# Patient Record
Sex: FEMALE | Race: WHITE | NOT HISPANIC OR LATINO | Employment: FULL TIME | URBAN - METROPOLITAN AREA
[De-identification: names, ages, dates, MRNs, and addresses within clinical notes are randomized per-mention and may not be internally consistent; named-entity substitution may affect disease eponyms.]

---

## 2017-06-10 ENCOUNTER — OFFICE VISIT (OUTPATIENT)
Dept: URGENT CARE | Facility: MEDICAL CENTER | Age: 52
End: 2017-06-10

## 2017-08-14 ENCOUNTER — TRANSCRIBE ORDERS (OUTPATIENT)
Dept: ADMINISTRATIVE | Age: 52
End: 2017-08-14

## 2017-08-14 ENCOUNTER — APPOINTMENT (OUTPATIENT)
Dept: LAB | Age: 52
End: 2017-08-14
Payer: COMMERCIAL

## 2017-08-14 DIAGNOSIS — Z00.8 HEALTH EXAMINATION IN POPULATION SURVEY: Primary | ICD-10-CM

## 2017-08-14 DIAGNOSIS — Z00.8 HEALTH EXAMINATION IN POPULATION SURVEY: ICD-10-CM

## 2017-08-14 LAB
CHOLEST SERPL-MCNC: 217 MG/DL (ref 50–200)
EST. AVERAGE GLUCOSE BLD GHB EST-MCNC: 120 MG/DL
HBA1C MFR BLD: 5.8 % (ref 4.2–6.3)
HDLC SERPL-MCNC: 50 MG/DL (ref 40–60)
LDLC SERPL CALC-MCNC: 141 MG/DL (ref 0–100)
TRIGL SERPL-MCNC: 129 MG/DL

## 2017-08-14 PROCEDURE — 80061 LIPID PANEL: CPT

## 2017-08-14 PROCEDURE — 36415 COLL VENOUS BLD VENIPUNCTURE: CPT

## 2017-08-14 PROCEDURE — 83036 HEMOGLOBIN GLYCOSYLATED A1C: CPT

## 2017-10-05 ENCOUNTER — OFFICE VISIT (OUTPATIENT)
Dept: URGENT CARE | Age: 52
End: 2017-10-05
Payer: COMMERCIAL

## 2017-10-05 PROCEDURE — 99202 OFFICE O/P NEW SF 15 MIN: CPT

## 2017-10-05 PROCEDURE — S9088 SERVICES PROVIDED IN URGENT: HCPCS

## 2017-10-06 NOTE — PROGRESS NOTES
Assessment  1  Herpes zoster (053 9) (B02 9)    Plan  Herpes zoster    · Famciclovir 500 MG Oral Tablet; take 1 tablet every 8 hours (3 doses daily) until  finished (7 days)    Discussion/Summary  Discussion Summary:   Famvir every 8 hours (3 doses daily) until finished (7 days)  Zostrix cream to skin  with family physician as discussed  Medication Side Effects Reviewed: Possible side effects of new medications were reviewed with the patient/guardian today  Understands and agrees with treatment plan: The treatment plan was reviewed with the patient/guardian  The patient/guardian understands and agrees with the treatment plan   Counseling Documentation With Imm: The patient was counseled regarding  Chief Complaint  Chief Complaint Free Text Note Form: rash x4 days      History of Present Illness  HPI: Pain and erythematous lesions left chest and left upper back areas   Hospital Based Practices Required Assessment: (on a scale of 0 to 10, the patient rates the pain at 4 )   Abuse And Domestic Violence Screen    Yes, the patient is safe at home -The patient states no one is hurting them  Depression And Suicide Screen  No, the patient has not had thoughts of hurting themself  No, the patient has not felt depressed in the past 7 days  Prefered Language is  [place]  Primary Language is  English  Review of Systems  Focused-Female:   Constitutional: as noted in HPI    ENT: no ear ache, no loss of hearing, no nosebleeds or nasal discharge, no sore throat or hoarseness  Cardiovascular: no complaints of slow or fast heart rate, no chest pain, no palpitations, no leg claudication or lower extremity edema  Respiratory: no complaints of shortness of breath, no wheezing, no dyspnea on exertion, no orthopnea or PND  Breasts: no complaints of breast pain, breast lump or nipple discharge     Gastrointestinal: no complaints of abdominal pain, no constipation, no nausea or diarrhea, no vomiting, no bloody stools  Genitourinary: no complaints of dysuria, no incontinence, no pelvic pain, no dysmenorrhea, no vaginal discharge or abnormal vaginal bleeding  Musculoskeletal: no complaints of arthralgia, no myalgia, no joint swelling or stiffness, no limb pain or swelling  Integumentary: skin lesion, but-as noted in HPI  Neurological: no complaints of headache, no confusion, no numbness or tingling, no dizziness or fainting  ROS Reviewed:   ROS reviewed  Active Problems  1  Arthritis (716 90) (M19 90)   2  Hay fever (477 9) (J30 1)   3  Physical exam (V70 9) (Z00 00)   4  School physical exam (V70 5) (Z02 0)   5  Seasonal allergies (477 9) (J30 2)    Past Medical History  1  History of Appendicitis (541)  Active Problems And Past Medical History Reviewed: The active problems and past medical history were reviewed and updated today  Family History  Mother    1  No pertinent family history  Father    2  No pertinent family history  Maternal Grandmother    3  Family history of Acute Myocardial Infarction (V17 3)  Family History Reviewed: The family history was reviewed and updated today  Social History   · Denied: History of Alcohol Use (History)   · Denied: History of Drug Use   · Never A Smoker   · Non-smoker (V49 89) (Z78 9)  Social History Reviewed: The social history was reviewed and updated today  The social history was reviewed and is unchanged  Surgical History  1  History of Appendectomy   2  History of Oral Surgery Tooth Extraction   3  History of Tubal Ligation  Surgical History Reviewed: The surgical history was reviewed and updated today  Current Meds   1  No Reported Medications Recorded  Medication List Reviewed: The medication list was reviewed and updated today  Allergies  1   No Known Drug Allergies    Vitals  Signs   Recorded: [id number] 03:49PM   Temperature: 98 1 F  Heart Rate: 66  Respiration: 16  Systolic: 091  Diastolic: 86  Height: 5 ft 5 in  Weight: 150 lb   BMI Calculated: 24 96  BSA Calculated: 1 75  O2 Saturation: 97  LMP: [id number]    Physical Exam    Skin Erythematous vesiculated lesions left chest and left upper back areas        Signatures   Electronically signed by : Emir Styles DO; Oct  5 2017  3:59PM EST                       (Author)

## 2018-01-03 ENCOUNTER — GENERIC CONVERSION - ENCOUNTER (OUTPATIENT)
Dept: OBGYN CLINIC | Facility: CLINIC | Age: 53
End: 2018-01-03

## 2018-01-03 ENCOUNTER — HOSPITAL ENCOUNTER (OUTPATIENT)
Dept: RADIOLOGY | Facility: HOSPITAL | Age: 53
Discharge: HOME | End: 2018-01-03
Attending: OBSTETRICS & GYNECOLOGY
Payer: COMMERCIAL

## 2018-01-03 DIAGNOSIS — Z12.31 VISIT FOR SCREENING MAMMOGRAM: ICD-10-CM

## 2018-01-03 PROCEDURE — 77067 SCR MAMMO BI INCL CAD: CPT

## 2018-02-18 ENCOUNTER — OFFICE VISIT (OUTPATIENT)
Dept: URGENT CARE | Facility: MEDICAL CENTER | Age: 53
End: 2018-02-18
Payer: COMMERCIAL

## 2018-02-18 VITALS
SYSTOLIC BLOOD PRESSURE: 136 MMHG | HEART RATE: 88 BPM | RESPIRATION RATE: 18 BRPM | TEMPERATURE: 99.3 F | OXYGEN SATURATION: 99 % | HEIGHT: 65 IN | WEIGHT: 143.4 LBS | DIASTOLIC BLOOD PRESSURE: 78 MMHG | BODY MASS INDEX: 23.89 KG/M2

## 2018-02-18 DIAGNOSIS — B34.9 VIRAL INFECTION: Primary | ICD-10-CM

## 2018-02-18 PROCEDURE — 99202 OFFICE O/P NEW SF 15 MIN: CPT | Performed by: FAMILY MEDICINE

## 2018-02-18 PROCEDURE — S9088 SERVICES PROVIDED IN URGENT: HCPCS | Performed by: FAMILY MEDICINE

## 2018-02-18 RX ORDER — OSELTAMIVIR PHOSPHATE 75 MG/1
75 CAPSULE ORAL EVERY 12 HOURS SCHEDULED
Qty: 10 CAPSULE | Refills: 0 | Status: SHIPPED | OUTPATIENT
Start: 2018-02-18 | End: 2018-02-23

## 2018-02-18 RX ORDER — IBUPROFEN 600 MG/1
600 TABLET ORAL EVERY 6 HOURS PRN
Qty: 30 TABLET | Refills: 0 | Status: SHIPPED | OUTPATIENT
Start: 2018-02-18 | End: 2018-09-13

## 2018-02-18 NOTE — PROGRESS NOTES
Assessment/Plan:    Patient Instructions   Viral Syndrome   WHAT YOU NEED TO KNOW:   Viral syndrome is a term used for a viral infection that has no clear cause  Viruses are spread easily from person to person through the air and on shared items  DISCHARGE INSTRUCTIONS:   Call 911 for the following:   · You have a seizure  · You cannot be woken  · You have chest pain or trouble breathing  Return to the emergency department if:   · You have a stiff neck, a bad headache, and sensitivity to light  · You feel weak, dizzy, or confused  · You stop urinating or urinate a lot less than normal      · You cough up blood or thick, yellow or green, mucus  · You have severe abdominal pain or your abdomen is larger than usual   Contact your healthcare provider if:   · Your symptoms do not get better with treatment, or get worse, after 3 days  · You have a rash or ear pain  · You have burning when you urinate  · You have questions or concerns about your condition or care  Medicines: You may  need any of the following:  · Acetaminophen  decreases pain and fever  It is available without a doctor's order  Ask how much medicine to take and how often to take it  Follow directions  Acetaminophen can cause liver damage if not taken correctly  · NSAIDs , such as ibuprofen, help decrease swelling, pain, and fever  NSAIDs can cause stomach bleeding or kidney problems in certain people  If you take blood thinner medicine, always ask your healthcare provider if NSAIDs are safe for you  Always read the medicine label and follow directions  · Cold medicine  helps decrease swelling, control a cough, and relieve chest or nasal congestion  · Saline nasal spray  helps decrease nasal congestion  · Take your medicine as directed  Contact your healthcare provider if you think your medicine is not helping or if you have side effects  Tell him of her if you are allergic to any medicine   Keep a list of the medicines, vitamins, and herbs you take  Include the amounts, and when and why you take them  Bring the list or the pill bottles to follow-up visits  Carry your medicine list with you in case of an emergency  Manage your symptoms:   · Drink liquids as directed  to prevent dehydration  Ask how much liquid to drink each day and which liquids are best for you  Ask if you should drink an oral rehydration solution (ORS)  An ORS has the right amounts of water, salts, and sugar you need to replace body fluids  This may help prevent dehydration caused by vomiting or diarrhea  Do not drink liquids with caffeine  Drinks with caffeine can make dehydration worse  · Get plenty of rest  to help your body heal  Take naps throughout the day  Ask your healthcare provider when you can return to work and your normal activities  · Use a cool mist humidifier  to help you breathe easier if you have nasal or chest congestion  Ask your healthcare provider how to use a cool mist humidifier  · Eat honey or use cough drops  to help decrease throat discomfort  Ask your healthcare provider how much honey you should eat each day  Cough drops are available without a doctor's order  Follow directions for taking cough drops  · Do not smoke and stay away from others who smoke  Nicotine and other chemicals in cigarettes and cigars can cause lung damage  Smoking can also delay healing  Ask your healthcare provider for information if you currently smoke and need help to quit  E-cigarettes or smokeless tobacco still contain nicotine  Talk to your healthcare provider before you use these products  · Wash your hands frequently  to prevent the spread of germs to others  Use soap and water  Use gel hand  when soap and water are not available  Wash your hands after you use the bathroom, cough, or sneeze  Wash your hands before you prepare or eat food    Follow up with your healthcare provider as directed:  Write down your questions so you remember to ask them during your visits  © 2017 [employer] Information is for End User's use only and may not be sold, redistributed or otherwise used for commercial purposes  All illustrations and images included in CareNotes® are the copyrighted property of [employer]  or [employer]  The above information is an  only  It is not intended as medical advice for individual conditions or treatments  Talk to your doctor, nurse or pharmacist before following any medical regimen to see if it is safe and effective for you  Diagnoses and all orders for this visit:    Viral infection  -     oseltamivir (TAMIFLU) 75 mg capsule; Take 1 capsule (75 mg total) by mouth every 12 (twelve) hours for 5 days  -     ibuprofen (MOTRIN) 600 mg tablet; Take 1 tablet (600 mg total) by mouth every 6 (six) hours as needed for mild pain, moderate pain, fever or headaches for up to 5 days          Subjective:      Patient ID: Arelis Cabrera 46 y o  female      URI    This is a new problem  The current episode started yesterday  The problem has been rapidly worsening  The maximum temperature recorded prior to her arrival was 100 4 - 100 9 F  The fever has been present for less than 1 day  Associated symptoms include congestion, coughing, headaches, rhinorrhea, sinus pain, sneezing and a sore throat  Pertinent negatives include no abdominal pain, chest pain, diarrhea, dysuria, ear pain, joint pain, joint swelling, nausea, neck pain, plugged ear sensation, rash, swollen glands, vomiting or wheezing  She has tried acetaminophen for the symptoms  The treatment provided no relief  Patient son had been diagnosed with the flu several days ago and is on Tamiflu  The following portions of the patient's history were reviewed and updated as appropriate: past family history, past medical history, past social history and past surgical history      Review of Systems   HENT: Positive for congestion, rhinorrhea, sinus pain, sneezing and sore throat  Negative for ear pain  Respiratory: Positive for cough  Negative for wheezing  Cardiovascular: Negative for chest pain  Gastrointestinal: Negative for abdominal pain, diarrhea, nausea and vomiting  Genitourinary: Negative for dysuria  Musculoskeletal: Negative for joint pain and neck pain  Skin: Negative for rash  Neurological: Positive for headaches  All other systems reviewed and are negative  Objective:    Physical Exam   Constitutional: She appears well-developed and well-nourished  HENT:   Head: Normocephalic and atraumatic  Right Ear: External ear normal    Left Ear: External ear normal    Cardiovascular: Normal rate, regular rhythm and normal heart sounds  Pulmonary/Chest: Effort normal and breath sounds normal    Lymphadenopathy:     She has no cervical adenopathy     Tms bulging, nasal mucosa boggy  Throat slightly injected  Vitals:    02/18/18 1044   BP: 136/78   BP Location: Left arm   Patient Position: Sitting   Cuff Size: Adult   Pulse: 88   Resp: 18   Temp: 99 3 °F (37 4 °C)   TempSrc: Tympanic   SpO2: 99%   Weight: 65 kg (143 lb 6 4 oz)   Height: 5' 5" (1 651 m)

## 2018-02-18 NOTE — LETTER
February 18, 2018     Patient: Marly Granger   YOB: 1965   Date of Visit: 2/18/2018       To Whom It May Concern: It is my medical opinion that Pauly Gomez should remain out of work until 2/26/18   Diagnosis: Flu  If you have any questions or concerns, please don't hesitate to call           Sincerely,        Lazaro Spencer PA-C    CC: Marly Lana

## 2018-02-18 NOTE — PATIENT INSTRUCTIONS
Viral Syndrome   WHAT YOU NEED TO KNOW:   Viral syndrome is a term used for a viral infection that has no clear cause  Viruses are spread easily from person to person through the air and on shared items  DISCHARGE INSTRUCTIONS:   Call 911 for the following:   · You have a seizure  · You cannot be woken  · You have chest pain or trouble breathing  Return to the emergency department if:   · You have a stiff neck, a bad headache, and sensitivity to light  · You feel weak, dizzy, or confused  · You stop urinating or urinate a lot less than normal      · You cough up blood or thick, yellow or green, mucus  · You have severe abdominal pain or your abdomen is larger than usual   Contact your healthcare provider if:   · Your symptoms do not get better with treatment, or get worse, after 3 days  · You have a rash or ear pain  · You have burning when you urinate  · You have questions or concerns about your condition or care  Medicines: You may  need any of the following:  · Acetaminophen  decreases pain and fever  It is available without a doctor's order  Ask how much medicine to take and how often to take it  Follow directions  Acetaminophen can cause liver damage if not taken correctly  · NSAIDs , such as ibuprofen, help decrease swelling, pain, and fever  NSAIDs can cause stomach bleeding or kidney problems in certain people  If you take blood thinner medicine, always ask your healthcare provider if NSAIDs are safe for you  Always read the medicine label and follow directions  · Cold medicine  helps decrease swelling, control a cough, and relieve chest or nasal congestion  · Saline nasal spray  helps decrease nasal congestion  · Take your medicine as directed  Contact your healthcare provider if you think your medicine is not helping or if you have side effects  Tell him of her if you are allergic to any medicine   Keep a list of the medicines, vitamins, and herbs you take  Include the amounts, and when and why you take them  Bring the list or the pill bottles to follow-up visits  Carry your medicine list with you in case of an emergency  Manage your symptoms:   · Drink liquids as directed  to prevent dehydration  Ask how much liquid to drink each day and which liquids are best for you  Ask if you should drink an oral rehydration solution (ORS)  An ORS has the right amounts of water, salts, and sugar you need to replace body fluids  This may help prevent dehydration caused by vomiting or diarrhea  Do not drink liquids with caffeine  Drinks with caffeine can make dehydration worse  · Get plenty of rest  to help your body heal  Take naps throughout the day  Ask your healthcare provider when you can return to work and your normal activities  · Use a cool mist humidifier  to help you breathe easier if you have nasal or chest congestion  Ask your healthcare provider how to use a cool mist humidifier  · Eat honey or use cough drops  to help decrease throat discomfort  Ask your healthcare provider how much honey you should eat each day  Cough drops are available without a doctor's order  Follow directions for taking cough drops  · Do not smoke and stay away from others who smoke  Nicotine and other chemicals in cigarettes and cigars can cause lung damage  Smoking can also delay healing  Ask your healthcare provider for information if you currently smoke and need help to quit  E-cigarettes or smokeless tobacco still contain nicotine  Talk to your healthcare provider before you use these products  · Wash your hands frequently  to prevent the spread of germs to others  Use soap and water  Use gel hand  when soap and water are not available  Wash your hands after you use the bathroom, cough, or sneeze  Wash your hands before you prepare or eat food    Follow up with your healthcare provider as directed:  Write down your questions so you remember to ask them during your visits  © 2017 [employer] Information is for End User's use only and may not be sold, redistributed or otherwise used for commercial purposes  All illustrations and images included in CareNotes® are the copyrighted property of [employer]  or [employer]  The above information is an  only  It is not intended as medical advice for individual conditions or treatments  Talk to your doctor, nurse or pharmacist before following any medical regimen to see if it is safe and effective for you

## 2018-03-21 ENCOUNTER — OFFICE VISIT (OUTPATIENT)
Dept: FAMILY MEDICINE CLINIC | Facility: CLINIC | Age: 53
End: 2018-03-21
Payer: COMMERCIAL

## 2018-03-21 VITALS
DIASTOLIC BLOOD PRESSURE: 68 MMHG | HEART RATE: 76 BPM | SYSTOLIC BLOOD PRESSURE: 108 MMHG | WEIGHT: 133 LBS | HEIGHT: 64 IN | BODY MASS INDEX: 22.71 KG/M2

## 2018-03-21 DIAGNOSIS — E78.49 OTHER HYPERLIPIDEMIA: ICD-10-CM

## 2018-03-21 DIAGNOSIS — R73.01 IMPAIRED FASTING GLUCOSE: ICD-10-CM

## 2018-03-21 DIAGNOSIS — F41.9 ANXIETY: Primary | ICD-10-CM

## 2018-03-21 DIAGNOSIS — M06.041 RHEUMATOID ARTHRITIS INVOLVING BOTH HANDS WITH NEGATIVE RHEUMATOID FACTOR (HCC): ICD-10-CM

## 2018-03-21 DIAGNOSIS — R23.2 HOT FLASHES: ICD-10-CM

## 2018-03-21 DIAGNOSIS — M06.042 RHEUMATOID ARTHRITIS INVOLVING BOTH HANDS WITH NEGATIVE RHEUMATOID FACTOR (HCC): ICD-10-CM

## 2018-03-21 PROBLEM — R53.83 FATIGUE: Status: ACTIVE | Noted: 2018-03-21

## 2018-03-21 PROBLEM — B02.9 HERPES ZOSTER: Status: ACTIVE | Noted: 2017-10-05

## 2018-03-21 PROCEDURE — 99214 OFFICE O/P EST MOD 30 MIN: CPT | Performed by: FAMILY MEDICINE

## 2018-03-21 RX ORDER — VENLAFAXINE HYDROCHLORIDE 37.5 MG/1
37.5 CAPSULE, EXTENDED RELEASE ORAL DAILY
Qty: 30 CAPSULE | Refills: 0 | Status: SHIPPED | OUTPATIENT
Start: 2018-03-21 | End: 2018-04-25 | Stop reason: SDUPTHER

## 2018-03-21 RX ORDER — CLONAZEPAM 0.5 MG/1
0.5 TABLET, ORALLY DISINTEGRATING ORAL
Qty: 30 TABLET | Refills: 0 | Status: SHIPPED | OUTPATIENT
Start: 2018-03-21 | End: 2018-04-25 | Stop reason: SDUPTHER

## 2018-03-21 RX ORDER — VENLAFAXINE HYDROCHLORIDE 37.5 MG/1
37.5 CAPSULE, EXTENDED RELEASE ORAL DAILY
Qty: 30 CAPSULE | Refills: 0 | Status: SHIPPED | OUTPATIENT
Start: 2018-03-21 | End: 2018-03-21 | Stop reason: SDUPTHER

## 2018-03-21 NOTE — PROGRESS NOTES
Assessment/Plan:    45 y/o woman with:  anxiety, hot flashes, insomnia, IFG, HLD, Rheumatoid arthritis and fatigue  Discussed treatment options with risks and benefits  Encourage healthy diet, exercise, ample sleep and stress reduction  Will begin trial of Effexor and give Klonopin PRN  Will check labs and follow-up in 1 month  No problem-specific Assessment & Plan notes found for this encounter  Diagnoses and all orders for this visit:    Anxiety  -     Discontinue: venlafaxine (EFFEXOR-XR) 37 5 mg 24 hr capsule; Take 1 capsule (37 5 mg total) by mouth daily  -     clonazePAM (KlonoPIN) 0 5 MG disintegrating tablet; Take 1 tablet (0 5 mg total) by mouth daily at bedtime as needed for seizures  -     CBC and differential; Future  -     Comprehensive metabolic panel; Future  -     TSH, 3rd generation with T4 reflex; Future  -     HEMOGLOBIN A1C W/ EAG ESTIMATION; Future  -     C-reactive protein; Future  -     Sedimentation rate, automated; Future  -     venlafaxine (EFFEXOR-XR) 37 5 mg 24 hr capsule; Take 1 capsule (37 5 mg total) by mouth daily    Hot flashes  -     Discontinue: venlafaxine (EFFEXOR-XR) 37 5 mg 24 hr capsule; Take 1 capsule (37 5 mg total) by mouth daily  -     CBC and differential; Future  -     Comprehensive metabolic panel; Future  -     TSH, 3rd generation with T4 reflex; Future  -     HEMOGLOBIN A1C W/ EAG ESTIMATION; Future  -     C-reactive protein; Future  -     Sedimentation rate, automated; Future  -     venlafaxine (EFFEXOR-XR) 37 5 mg 24 hr capsule; Take 1 capsule (37 5 mg total) by mouth daily    Impaired fasting glucose  -     CBC and differential; Future  -     Comprehensive metabolic panel; Future  -     TSH, 3rd generation with T4 reflex; Future  -     HEMOGLOBIN A1C W/ EAG ESTIMATION; Future  -     C-reactive protein; Future  -     Sedimentation rate, automated;  Future    Rheumatoid arthritis involving both hands with negative rheumatoid factor (HCC)  -     CBC and differential; Future  -     Comprehensive metabolic panel; Future  -     TSH, 3rd generation with T4 reflex; Future  -     HEMOGLOBIN A1C W/ EAG ESTIMATION; Future  -     C-reactive protein; Future  -     Sedimentation rate, automated; Future          Subjective:   Chief Complaint   Patient presents with    Anxiety     past month, cannot think of a reason why its happening    Insomnia      Patient ID: Ye Dang is a 46 y o  female  Patient is a 45 y/o woman who presents to establish care in this practice  Patient has h/o anxiety, hot flashes, insomnia, IFG, HLD, Rheumatoid arthritis and fatigue  Patient is having some difficulties at work and is finding it difficult to cope  No suicidal or homicidal ideation  Patient previously was on meds  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  Anxiety   Symptoms include nervous/anxious behavior  The following portions of the patient's history were reviewed and updated as appropriate: allergies, current medications, past family history, past medical history, past social history, past surgical history and problem list     Review of Systems   Constitutional: Positive for fatigue  HENT: Negative  Eyes: Negative  Respiratory: Negative  Cardiovascular: Negative  Gastrointestinal: Negative  Endocrine: Negative  Genitourinary: Negative  Musculoskeletal: Positive for arthralgias  Allergic/Immunologic: Negative  Neurological: Negative  Hematological: Negative  Psychiatric/Behavioral: Positive for dysphoric mood  The patient is nervous/anxious  All other systems reviewed and are negative  Objective:      /68 (BP Location: Right arm)   Pulse 76   Ht 5' 4" (1 626 m)   Wt 60 3 kg (133 lb)   LMP 02/21/2018   BMI 22 83 kg/m²          Physical Exam   Constitutional: She is oriented to person, place, and time  She appears well-developed and well-nourished  HENT:   Head: Atraumatic     Right Ear: External ear normal  Left Ear: External ear normal    Eyes: Conjunctivae and EOM are normal  Pupils are equal, round, and reactive to light  Neck: Normal range of motion  Cardiovascular: Normal rate, regular rhythm and normal heart sounds  Pulmonary/Chest: Effort normal and breath sounds normal  No respiratory distress  Abdominal: Soft  Bowel sounds are normal  She exhibits no distension  There is no tenderness  There is no rebound and no guarding  Musculoskeletal: Normal range of motion  Neurological: She is alert and oriented to person, place, and time  No cranial nerve deficit  Skin: Skin is warm and dry  Psychiatric: She has a normal mood and affect   Her behavior is normal  Judgment and thought content normal

## 2018-03-21 NOTE — LETTER
March 21, 2018     Patient: Evertt Sandhoff   YOB: 1965   Date of Visit: 3/21/2018       To Whom it May Concern:    Winter Banuelos is under my professional care  She was seen in my office on 3/21/2018  She may return to work on 3/21/18  If you have any questions or concerns, please don't hesitate to call           Sincerely,          Augustine Casanova MD        CC: No Recipients

## 2018-03-26 ENCOUNTER — OFFICE VISIT (OUTPATIENT)
Dept: OBGYN CLINIC | Facility: CLINIC | Age: 53
End: 2018-03-26
Payer: COMMERCIAL

## 2018-03-26 VITALS
BODY MASS INDEX: 22.57 KG/M2 | SYSTOLIC BLOOD PRESSURE: 100 MMHG | DIASTOLIC BLOOD PRESSURE: 72 MMHG | HEIGHT: 64 IN | WEIGHT: 132.2 LBS

## 2018-03-26 DIAGNOSIS — F41.9 ANXIETY: Primary | ICD-10-CM

## 2018-03-26 DIAGNOSIS — N95.1 PERIMENOPAUSAL SYMPTOMS: ICD-10-CM

## 2018-03-26 PROCEDURE — 99213 OFFICE O/P EST LOW 20 MIN: CPT | Performed by: OBSTETRICS & GYNECOLOGY

## 2018-03-26 NOTE — PROGRESS NOTES
Assessment/Plan:      Diagnoses and all orders for this visit:    Anxiety     Newly prescribed Effexor and Clonazepam by pcp  Perimenopausal symptoms    The definition of menopause reviewed with patient  Patient informed it is not uncommon to have changes in her mood due to the fluctuation in her hormones  She has already been started on an antidepressant and antianxiety medication  I explained to the patient that it takes 4 - 6 weeks until she will feel the effects of the medications  She has a f/u appt with her pcp in April  All questions and concerns addressed and patient verbalized understanding  She was advised to schedule a yearly appt as she is overdue for her pap smear  Subjective:    HPI      Patient ID: Michael Hamilton is a 46 y o  female presents with symptoms of anxiety, sadness and insomnia, racing thoughts  Symptoms have been present since February  Last menses was 2/2018  She was seen by her pcp last week and placed on Effexor-XR and Clonazepam   She is experiencing night sweats  Denies hot flashes  She has a hx postpartum depression 11 years ago, she was on Zoloft in the past  Denies SI/HI  She denies any new changes in her life that may be contributing to her symptoms  Review of Systems   Constitutional: Positive for fatigue  Negative for chills and fever  Respiratory: Negative for shortness of breath  Cardiovascular: Negative for chest pain and palpitations  Endocrine: Positive for heat intolerance  Psychiatric/Behavioral: Positive for decreased concentration and sleep disturbance  Negative for self-injury and suicidal ideas  The patient is nervous/anxious  Increased sadness   All other systems reviewed and are negative  Objective:     Physical Exam   Constitutional: Vital signs are normal  She appears well-developed and well-nourished  HENT:   Head: Normocephalic  Neck: Neck supple  Cardiovascular: Normal rate      Pulmonary/Chest: Effort normal  Neurological: She is alert  Skin: Skin is warm  Nursing note and vitals reviewed

## 2018-04-11 ENCOUNTER — ANNUAL EXAM (OUTPATIENT)
Dept: OBGYN CLINIC | Facility: CLINIC | Age: 53
End: 2018-04-11
Payer: COMMERCIAL

## 2018-04-11 VITALS
WEIGHT: 128.8 LBS | BODY MASS INDEX: 21.99 KG/M2 | SYSTOLIC BLOOD PRESSURE: 104 MMHG | HEIGHT: 64 IN | DIASTOLIC BLOOD PRESSURE: 74 MMHG

## 2018-04-11 DIAGNOSIS — Z01.419 WOMEN'S ANNUAL ROUTINE GYNECOLOGICAL EXAMINATION: Primary | ICD-10-CM

## 2018-04-11 DIAGNOSIS — Z12.39 BREAST CANCER SCREENING: ICD-10-CM

## 2018-04-11 PROCEDURE — 87624 HPV HI-RISK TYP POOLED RSLT: CPT | Performed by: OBSTETRICS & GYNECOLOGY

## 2018-04-11 PROCEDURE — 99396 PREV VISIT EST AGE 40-64: CPT | Performed by: OBSTETRICS & GYNECOLOGY

## 2018-04-11 PROCEDURE — G0145 SCR C/V CYTO,THINLAYER,RESCR: HCPCS | Performed by: OBSTETRICS & GYNECOLOGY

## 2018-04-11 NOTE — PATIENT INSTRUCTIONS
Try Augustine Salena over the counter for night sweats  Take daily for 3 months  Follow up if symptoms do not improve

## 2018-04-11 NOTE — PROGRESS NOTES
Subjective  HPI:     Eunice Andrade is a 46 y o  female  She is a  1 Para 1, with one vaginal delivery 11 years ago  She has not had a menses since February 10, 2018  She admits to experiencing night sweats and vaginal dryness  No hot flashes  Her current method of contraception includes tubal ligation    She denies issues with intimacy  She denies /GI and Gyn complaints  She is being treated for anxiety by per pcp with Effexor and Klonopin  She states she has good days and some bad days, but she is noticing an improvement  She denies any new family illnesses or stressors  Her dental care is up-to-date  Vaccines are up-to-date  Gynecologic History    Patient's last menstrual period was 02/10/2018 (approximate)  Last Pap: years ago  Last mammogram: 2018  Results were: normal  Colonoscopy: Needs to make arrangements    Obstetric History    OB History    Para Term  AB Living   1 1       1   SAB TAB Ectopic Multiple Live Births           1      # Outcome Date GA Lbr John/2nd Weight Sex Delivery Anes PTL Lv   1 Para 07    M Vag-Spont             The following portions of the patient's history were reviewed and updated as appropriate: allergies, current medications, past family history, past medical history, past social history, past surgical history and problem list     Review of Systems    Pertinent items are noted in HPI  Objective    Physical Exam   Constitutional: Vital signs are normal  She appears well-developed and well-nourished  Genitourinary: Vagina normal and uterus normal  Pelvic exam was performed with patient supine  There is no rash, tenderness, lesion or Bartholin's cyst on the right labia  There is no rash, tenderness, lesion or Bartholin's cyst on the left labia  Right adnexum does not display mass, does not display tenderness and does not display fullness  Left adnexum does not display mass, does not display tenderness and does not display fullness     Cervix is parous  Cervix does not exhibit motion tenderness, lesion, discharge, friability, polyp or nabothian cyst      Uterus is anteverted  HENT:   Head: Normocephalic and atraumatic  Neck: Neck supple  No thyromegaly present  Cardiovascular: Normal rate, regular rhythm, S1 normal, S2 normal and normal heart sounds  Pulmonary/Chest: Effort normal and breath sounds normal  Right breast exhibits no inverted nipple, no mass, no nipple discharge, no skin change and no tenderness  Left breast exhibits no inverted nipple, no mass, no nipple discharge, no skin change and no tenderness  Abdominal: Soft  Bowel sounds are normal  She exhibits no distension and no mass  There is no tenderness  There is no guarding  Lymphadenopathy:     She has no cervical adenopathy  She has no axillary adenopathy  Neurological: She is alert  Skin: Skin is warm  Psychiatric: She has a normal mood and affect  Nursing note and vitals reviewed  Assessment and Plan    Patient informed of a Stable GYN exam  A pap smear was performed  The current ASCCP guidelines were reviewed  The low risk patient will receive pap smear screening every 3 years  I emphasized the importance of an annual pelvic and breast exam  A yearly mammogram is recommended for breast cancer screening starting at age 36  She was advised to try Estraven OTC for her night sweats  She needs to take it daily for 3 months consistently to see it's effectiveness  The definition of menopause was reviewed and she understands that she needs to have a full year with no menses to be considered menopausal  She is aware she needs to make arrangements for a colonoscopy  All questions have been answered to her satisfaction  Hormone replacement therapy: OTC Estraven  Mammogram ordered  Follow up in: 1 year

## 2018-04-16 LAB
HPV RRNA GENITAL QL NAA+PROBE: NORMAL
LAB AP GYN PRIMARY INTERPRETATION: NORMAL
LAB AP LMP: NORMAL
Lab: NORMAL

## 2018-04-24 ENCOUNTER — APPOINTMENT (OUTPATIENT)
Dept: LAB | Facility: OTHER | Age: 53
End: 2018-04-24
Payer: COMMERCIAL

## 2018-04-24 ENCOUNTER — TRANSCRIBE ORDERS (OUTPATIENT)
Dept: LAB | Facility: OTHER | Age: 53
End: 2018-04-24

## 2018-04-24 DIAGNOSIS — R73.01 IMPAIRED FASTING GLUCOSE: ICD-10-CM

## 2018-04-24 DIAGNOSIS — M06.042 RHEUMATOID ARTHRITIS INVOLVING BOTH HANDS WITH NEGATIVE RHEUMATOID FACTOR (HCC): ICD-10-CM

## 2018-04-24 DIAGNOSIS — F41.9 ANXIETY: ICD-10-CM

## 2018-04-24 DIAGNOSIS — R23.2 HOT FLASHES: ICD-10-CM

## 2018-04-24 DIAGNOSIS — M06.041 RHEUMATOID ARTHRITIS INVOLVING BOTH HANDS WITH NEGATIVE RHEUMATOID FACTOR (HCC): ICD-10-CM

## 2018-04-24 LAB
ALBUMIN SERPL BCP-MCNC: 2.9 G/DL (ref 3.5–5)
ALP SERPL-CCNC: 105 U/L (ref 46–116)
ALT SERPL W P-5'-P-CCNC: 14 U/L (ref 12–78)
ANION GAP SERPL CALCULATED.3IONS-SCNC: 7 MMOL/L (ref 4–13)
AST SERPL W P-5'-P-CCNC: 15 U/L (ref 5–45)
BASOPHILS # BLD AUTO: 0.02 THOUSANDS/ΜL (ref 0–0.1)
BASOPHILS NFR BLD AUTO: 0 % (ref 0–1)
BILIRUB SERPL-MCNC: 0.31 MG/DL (ref 0.2–1)
BUN SERPL-MCNC: 14 MG/DL (ref 5–25)
CALCIUM SERPL-MCNC: 8.8 MG/DL (ref 8.3–10.1)
CHLORIDE SERPL-SCNC: 106 MMOL/L (ref 100–108)
CO2 SERPL-SCNC: 28 MMOL/L (ref 21–32)
CREAT SERPL-MCNC: 0.62 MG/DL (ref 0.6–1.3)
CRP SERPL QL: 7.5 MG/L
EOSINOPHIL # BLD AUTO: 0.19 THOUSAND/ΜL (ref 0–0.61)
EOSINOPHIL NFR BLD AUTO: 3 % (ref 0–6)
ERYTHROCYTE [DISTWIDTH] IN BLOOD BY AUTOMATED COUNT: 14.2 % (ref 11.6–15.1)
ERYTHROCYTE [SEDIMENTATION RATE] IN BLOOD: 51 MM/HOUR (ref 0–20)
EST. AVERAGE GLUCOSE BLD GHB EST-MCNC: 111 MG/DL
GFR SERPL CREATININE-BSD FRML MDRD: 104 ML/MIN/1.73SQ M
GLUCOSE P FAST SERPL-MCNC: 88 MG/DL (ref 65–99)
HBA1C MFR BLD: 5.5 % (ref 4.2–6.3)
HCT VFR BLD AUTO: 35.5 % (ref 34.8–46.1)
HGB BLD-MCNC: 11 G/DL (ref 11.5–15.4)
LYMPHOCYTES # BLD AUTO: 1.63 THOUSANDS/ΜL (ref 0.6–4.47)
LYMPHOCYTES NFR BLD AUTO: 26 % (ref 14–44)
MCH RBC QN AUTO: 24.9 PG (ref 26.8–34.3)
MCHC RBC AUTO-ENTMCNC: 31 G/DL (ref 31.4–37.4)
MCV RBC AUTO: 80 FL (ref 82–98)
MONOCYTES # BLD AUTO: 0.53 THOUSAND/ΜL (ref 0.17–1.22)
MONOCYTES NFR BLD AUTO: 8 % (ref 4–12)
NEUTROPHILS # BLD AUTO: 3.92 THOUSANDS/ΜL (ref 1.85–7.62)
NEUTS SEG NFR BLD AUTO: 63 % (ref 43–75)
NRBC BLD AUTO-RTO: 0 /100 WBCS
PLATELET # BLD AUTO: 326 THOUSANDS/UL (ref 149–390)
PMV BLD AUTO: 9.1 FL (ref 8.9–12.7)
POTASSIUM SERPL-SCNC: 3.9 MMOL/L (ref 3.5–5.3)
PROT SERPL-MCNC: 7 G/DL (ref 6.4–8.2)
RBC # BLD AUTO: 4.42 MILLION/UL (ref 3.81–5.12)
SODIUM SERPL-SCNC: 141 MMOL/L (ref 136–145)
TSH SERPL DL<=0.05 MIU/L-ACNC: 2.7 UIU/ML (ref 0.36–3.74)
WBC # BLD AUTO: 6.3 THOUSAND/UL (ref 4.31–10.16)

## 2018-04-24 PROCEDURE — 85025 COMPLETE CBC W/AUTO DIFF WBC: CPT

## 2018-04-24 PROCEDURE — 83036 HEMOGLOBIN GLYCOSYLATED A1C: CPT

## 2018-04-24 PROCEDURE — 84443 ASSAY THYROID STIM HORMONE: CPT

## 2018-04-24 PROCEDURE — 86140 C-REACTIVE PROTEIN: CPT

## 2018-04-24 PROCEDURE — 36415 COLL VENOUS BLD VENIPUNCTURE: CPT

## 2018-04-24 PROCEDURE — 80053 COMPREHEN METABOLIC PANEL: CPT

## 2018-04-24 PROCEDURE — 85652 RBC SED RATE AUTOMATED: CPT

## 2018-04-25 ENCOUNTER — OFFICE VISIT (OUTPATIENT)
Dept: FAMILY MEDICINE CLINIC | Facility: CLINIC | Age: 53
End: 2018-04-25
Payer: COMMERCIAL

## 2018-04-25 ENCOUNTER — TELEPHONE (OUTPATIENT)
Dept: FAMILY MEDICINE CLINIC | Facility: CLINIC | Age: 53
End: 2018-04-25

## 2018-04-25 VITALS
SYSTOLIC BLOOD PRESSURE: 106 MMHG | HEIGHT: 65 IN | HEART RATE: 68 BPM | DIASTOLIC BLOOD PRESSURE: 74 MMHG | BODY MASS INDEX: 21.49 KG/M2 | WEIGHT: 129 LBS | TEMPERATURE: 98.2 F | OXYGEN SATURATION: 98 %

## 2018-04-25 DIAGNOSIS — F41.9 ANXIETY: ICD-10-CM

## 2018-04-25 DIAGNOSIS — M06.041 RHEUMATOID ARTHRITIS INVOLVING BOTH HANDS WITH NEGATIVE RHEUMATOID FACTOR (HCC): ICD-10-CM

## 2018-04-25 DIAGNOSIS — M06.042 RHEUMATOID ARTHRITIS INVOLVING BOTH HANDS WITH NEGATIVE RHEUMATOID FACTOR (HCC): ICD-10-CM

## 2018-04-25 DIAGNOSIS — R23.2 HOT FLASHES: Primary | ICD-10-CM

## 2018-04-25 PROCEDURE — 99214 OFFICE O/P EST MOD 30 MIN: CPT | Performed by: FAMILY MEDICINE

## 2018-04-25 RX ORDER — VENLAFAXINE HYDROCHLORIDE 37.5 MG/1
37.5 CAPSULE, EXTENDED RELEASE ORAL DAILY
Qty: 90 CAPSULE | Refills: 1 | Status: SHIPPED | OUTPATIENT
Start: 2018-04-25 | End: 2018-11-02

## 2018-04-25 RX ORDER — CLONAZEPAM 0.5 MG/1
0.5 TABLET, ORALLY DISINTEGRATING ORAL
Qty: 30 TABLET | Refills: 1 | Status: SHIPPED | OUTPATIENT
Start: 2018-04-25 | End: 2018-09-13

## 2018-04-25 NOTE — TELEPHONE ENCOUNTER
----- Message from Bailee Jeong MD sent at 4/25/2018 12:23 PM EDT -----  Please call patient to discuss that her labs showed elevated inflammatory markers but were otherwise fairly normal   Will discuss more fully at her follow-up visit

## 2018-04-25 NOTE — PROGRESS NOTES
Assessment/Plan:    47 y/o woman with: Hot flashes, Ra, anxiety and insomnia  Improving  Continue current meds  Discussed supportive care and return parameters and follow-up in 3 months  No problem-specific Assessment & Plan notes found for this encounter  Diagnoses and all orders for this visit:    Hot flashes  -     venlafaxine (EFFEXOR-XR) 37 5 mg 24 hr capsule; Take 1 capsule (37 5 mg total) by mouth daily    Rheumatoid arthritis involving both hands with negative rheumatoid factor (HCC)    Anxiety  -     venlafaxine (EFFEXOR-XR) 37 5 mg 24 hr capsule; Take 1 capsule (37 5 mg total) by mouth daily  -     clonazePAM (KlonoPIN) 0 5 MG disintegrating tablet; Take 1 tablet (0 5 mg total) by mouth daily at bedtime as needed for seizures          Subjective:   Chief Complaint   Patient presents with    Follow-up     1 month          Patient ID: Gregg Birmingham is a 46 y o  female  Patient is a 47 y/o woman who presents for follow-up on anxiety, hot flashes and insomnia  Patient admits significant improvement in symptoms  She is in an RA flare so started OTC NSAIDs but denies fevers, chills, nausea or vomiting  The following portions of the patient's history were reviewed and updated as appropriate: allergies, current medications, past family history, past medical history, past social history, past surgical history and problem list     Review of Systems   Constitutional: Negative  HENT: Negative  Eyes: Negative  Respiratory: Negative  Cardiovascular: Negative  Gastrointestinal: Negative  Endocrine: Negative  Genitourinary: Negative  Musculoskeletal: Positive for arthralgias  Allergic/Immunologic: Negative  Neurological: Negative  Hematological: Negative  Psychiatric/Behavioral: Negative  All other systems reviewed and are negative          Objective:      /74 (BP Location: Right arm, Patient Position: Sitting, Cuff Size: Standard)   Pulse 68   Temp 98 2 °F (36 8 °C) (Tympanic)   Ht 5' 5" (1 651 m)   Wt 58 5 kg (129 lb)   SpO2 98%   BMI 21 47 kg/m²          Physical Exam   Constitutional: She is oriented to person, place, and time  She appears well-developed and well-nourished  HENT:   Head: Atraumatic  Right Ear: External ear normal    Left Ear: External ear normal    Eyes: Conjunctivae and EOM are normal  Pupils are equal, round, and reactive to light  Neck: Normal range of motion  Pulmonary/Chest: Effort normal  No respiratory distress  Musculoskeletal: Normal range of motion  Neurological: She is alert and oriented to person, place, and time  No cranial nerve deficit  Skin: Skin is warm and dry  Psychiatric: She has a normal mood and affect   Her behavior is normal  Judgment and thought content normal

## 2018-07-14 ENCOUNTER — OFFICE VISIT (OUTPATIENT)
Dept: URGENT CARE | Age: 53
End: 2018-07-14
Payer: COMMERCIAL

## 2018-07-14 VITALS
DIASTOLIC BLOOD PRESSURE: 86 MMHG | OXYGEN SATURATION: 98 % | TEMPERATURE: 99.1 F | HEART RATE: 94 BPM | SYSTOLIC BLOOD PRESSURE: 121 MMHG | RESPIRATION RATE: 16 BRPM | HEIGHT: 65 IN | BODY MASS INDEX: 20.66 KG/M2 | WEIGHT: 124 LBS

## 2018-07-14 DIAGNOSIS — R11.0 NAUSEA: ICD-10-CM

## 2018-07-14 DIAGNOSIS — R10.32 LEFT LOWER QUADRANT PAIN: Primary | ICD-10-CM

## 2018-07-14 DIAGNOSIS — R19.7 DIARRHEA, UNSPECIFIED TYPE: ICD-10-CM

## 2018-07-14 PROCEDURE — S9088 SERVICES PROVIDED IN URGENT: HCPCS | Performed by: PHYSICIAN ASSISTANT

## 2018-07-14 PROCEDURE — 99213 OFFICE O/P EST LOW 20 MIN: CPT | Performed by: PHYSICIAN ASSISTANT

## 2018-07-14 PROCEDURE — 87086 URINE CULTURE/COLONY COUNT: CPT | Performed by: PHYSICIAN ASSISTANT

## 2018-07-14 RX ORDER — ONDANSETRON 4 MG/1
4 TABLET, FILM COATED ORAL EVERY 8 HOURS PRN
Qty: 20 TABLET | Refills: 0 | Status: SHIPPED | OUTPATIENT
Start: 2018-07-14 | End: 2018-11-02

## 2018-07-14 RX ORDER — LOPERAMIDE HYDROCHLORIDE 2 MG/1
2 CAPSULE ORAL 4 TIMES DAILY PRN
Qty: 30 CAPSULE | Refills: 0 | Status: SHIPPED | OUTPATIENT
Start: 2018-07-14 | End: 2018-11-02

## 2018-07-14 NOTE — PATIENT INSTRUCTIONS
Complaining of fluids  Motrin and/or Tylenol as needed for body aches and pains  [place] diet  Follow up with PCP in 3-5 days  Proceed to  ER if symptoms worsen  Gastroenteritis   AMBULATORY CARE:   Gastroenteritis , or stomach flu, is an infection of the stomach and intestines  It is caused by bacteria, parasites, or viruses  Common symptoms include the following:   · Diarrhea or gas    · Nausea, vomiting, or poor appetite    · Abdominal cramps, pain, or gurgling    · Fever    · Tiredness or weakness    · Headaches or muscle aches with any of the above symptoms  Call 911 for any of the following:   · You have trouble breathing or a very fast pulse  Seek care immediately if:   · You see blood in your diarrhea  · You cannot stop vomiting  · You have not urinated for 12 hours  · You feel like you are going to faint  Contact your healthcare provider if:   · You have a fever  · You continue to vomit or have diarrhea, even after treatment  · You see worms in your diarrhea  · Your mouth or eyes are dry  You are not urinating as much or as often  · You have questions or concerns about your condition or care  Treatment for gastroenteritis  may include medicines to slow or stop your diarrhea or vomiting  You may also need medicines to treat an infection caused by bacteria or a parasite  Manage your symptoms:   · Drink liquids as directed  Ask your healthcare provider how much liquid to drink each day, and which liquids are best for you  You may also need to drink an oral rehydration solution (ORS)  An ORS has the right amounts of sugar, salt, and minerals in water to replace body fluids  · Eat bland foods  When you feel hungry, begin eating soft, bland foods  Examples are bananas, clear soup, potatoes, and applesauce  Do not have dairy products, alcohol, sugary drinks, or drinks with caffeine until you feel better  · Rest as much as possible    Slowly start to do more each day when you begin to feel better  Prevent the spread of germs:  Gastroenteritis can spread easily  Keep yourself, your family, and your surroundings clean to help prevent the spread of gastroenteritis:  · Wash your hands often  Use soap and water  Wash your hands after you use the bathroom, change a child's diapers, or sneeze  Wash your hands before you prepare or eat food  · Clean surfaces and do laundry often  Wash your clothes and towels separately from the rest of the laundry  Clean surfaces in your home with antibacterial  or bleach  · Clean food thoroughly and cook safely  Wash raw vegetables before you cook  Cook meat, fish, and eggs fully  Do not use the same dishes for raw meat as you do for other foods  Refrigerate any leftover food immediately  · Be aware when you camp or travel  Drink only clean water  Do not drink from rivers or lakes unless you purify or boil the water first  When you travel, drink bottled water and do not add ice  Do not eat fruit that has not been peeled  Do not eat raw fish or meat that is not fully cooked  Follow up with your healthcare provider as directed:  Write down your questions so you remember to ask them during your visits  © 2017 [employer]  Information is for End User's use only and may not be sold, redistributed or otherwise used for commercial purposes  All illustrations and images included in CareNotes® are the copyrighted property of [employer]  or [employer] Arriaga  The above information is an  only  It is not intended as medical advice for individual conditions or treatments  Talk to your doctor, nurse or pharmacist before following any medical regimen to see if it is safe and effective for you  Abdominal Pain   AMBULATORY CARE:   Abdominal pain  can be dull, achy, or sharp  You may have pain in one area of your abdomen, or in your entire abdomen   Your pain may be caused by a condition such as constipation, food sensitivity or poisoning, infection, or a blockage  Abdominal pain can also be from a hernia, appendicitis, or an ulcer  Liver, gallbladder, or kidney conditions can also cause abdominal pain  The cause of your abdominal pain may be unknown  Seek care immediately if:   · You have new chest pain or shortness of breath  · You have pulsing pain in your upper abdomen or lower back that suddenly becomes constant  · Your pain is in the right lower abdominal area and worsens with movement  · You have a fever over 100 4°F (38°C) or shaking chills  · You are vomiting and cannot keep food or liquids down  · Your pain does not improve or gets worse over the next 8 to 12 hours  · You see blood in your vomit or bowel movements, or they look black and tarry  · Your skin or the whites of your eyes turn yellow  · You are a woman and have a large amount of vaginal bleeding that is not your monthly period  Contact your healthcare provider if:   · You have pain in your lower back  · You are a man and have pain in your testicles  · You have pain when you urinate  · You have questions or concerns about your condition or care  Treatment for abdominal pain  may include medicine to calm your stomach, prevent vomiting, or decrease pain  Follow up with your healthcare provider as directed:  Write down your questions so you remember to ask them during your visits  © 2017 [employer]  Information is for End User's use only and may not be sold, redistributed or otherwise used for commercial purposes  All illustrations and images included in CareNotes® are the copyrighted property of A [employer]  or N[employer] Arriaga  The above information is an  only  It is not intended as medical advice for individual conditions or treatments   Talk to your doctor, nurse or pharmacist before following any medical regimen to see if it is safe and effective for you  Acute Diarrhea   AMBULATORY CARE:   Acute diarrhea  starts quickly and lasts a short time, usually 1 to 3 days  It can last up to 2 weeks  Signs and symptoms that may happen with diarrhea:  You may have 3 or more episodes of diarrhea  It may be hard to control your diarrhea  You may also have any of the following:  · Fever and chills    · Headache or abdominal pain    · Nausea and vomiting    · Symptoms of dehydration such as thirst, decreased urination, dry skin, sunken eyes, or fast, pounding heartbeat  Seek care immediately if:   · You feel confused  · Your heartbeat is faster than normal      · Your eyes look deeply sunken, or you have no tears when you cry  · You urinate less than usual, or your urine is dark yellow  · You have blood or mucus in your stools  · You have severe abdominal pain  · You are unable to drink any liquids  Contact your healthcare provider if:  · Your symptoms do not get better with treatment  · You have a fever higher than 101 3°F (38 5°C)  · You have trouble eating and drinking because you are vomiting  · You are thirsty or have a dry mouth  · Your diarrhea does not get better in 7 days  · You have questions or concerns about your condition or care  Treatment for acute diarrhea  may include medicines to slow or stop your diarrhea  You may also need medicine to treat an infection  Self-care:   · Drink liquids as directed  Liquids will help prevent dehydration caused by diarrhea  Ask your healthcare provider how much liquid to drink each day and which liquids are best for you  You may need to drink an oral rehydration solution (ORS)  An ORS has the right amounts of water, salts, and sugar you need to replace body fluids  You can buy an ORS at most grocery stores and pharmacies  · Eat foods that are easy to digest   Examples include rice, lentils, cereal, bananas, potatoes, and bread   It also includes some fruits (bananas, melon), well-cooked vegetables, and lean meats  Avoid foods high in fiber, fat, and sugar  Also avoid caffeine, alcohol, dairy, and red meat until your diarrhea is gone  Prevent diarrhea:   · Wash your hands often  Use soap and water  Wash your hands before you eat or prepare food  Also wash your hands after you use the bathroom  Use an alcohol-based hand gel when soap and water are not available  · Keep bathroom surfaces clean  This helps prevent the spread of germs that cause acute diarrhea  · Wash fruits and vegetables well before you eat them  This can help remove germs that cause diarrhea  If possible, remove the skin from fruits and vegetables, or cook them well before you eat them  · Cook meat as directed  ¨ Cook ground meat  to 160°F      ¨ Cook ground poultry, whole poultry, or cuts of poultry  to at least 165°F  Remove the meat from heat  Let it stand for 3 minutes before you eat it  ¨ Cook whole cuts of meat other than poultry  to at least 145°F  Remove the meat from heat  Let it stand for 3 minutes before you eat it  · Wash dishes that have touched raw meat with hot water and soap  This includes cutting boards, utensils, dishes, and serving containers  · Place raw or cooked meat in the refrigerator as soon as possible  Bacteria can grow in meat that is left at room temperature too long  · Do not eat raw or undercooked oysters, clams, or mussels  These foods may be contaminated and cause infection  · Drink filtered or treated water only when you travel  Do not put ice in your drinks  Drink bottled water whenever possible  Follow up with your healthcare provider as directed:  Write down your questions so you remember to ask them during your visits  © 2017 [employer] Information is for End User's use only and may not be sold, redistributed or otherwise used for commercial purposes   All illustrations and images included in CareNotes® are the copyrighted property of [employer]  or Nito Arriaga  The above information is an  only  It is not intended as medical advice for individual conditions or treatments  Talk to your doctor, nurse or pharmacist before following any medical regimen to see if it is safe and effective for you

## 2018-07-14 NOTE — PROGRESS NOTES
[employer]        NAME: Soila Matos is a 46 y o  female  : 1965    MRN: [id number]  DATE: 2018  TIME: 3:42 PM    Assessment and Plan   Left lower quadrant pain [R10 32]  1  Left lower quadrant pain  Urine culture   2  Diarrhea, unspecified type  loperamide (IMODIUM) 2 mg capsule   3  Nausea  ondansetron (ZOFRAN) 4 mg tablet         Patient Instructions     Complaining of fluids  Motrin and/or Tylenol as needed for body aches and pains  [place] diet  Follow up with PCP in 3-5 days  Proceed to  ER if symptoms worsen  Chief Complaint     Chief Complaint   Patient presents with    Nausea     abdominal pain 5 days; with diarrhea         History of Present Illness       24-year-old female presents with abdominal pain nausea and diarrhea that started last Tuesday  Patient reports she was out to dinner with her  and they both ate the same thing however Tuesday morning she started with her symptoms  Reports for the 1st 2 days profound diarrhea of fevers chills body aches and pains  Was starting to feel better over the last 2 days and then today started feeling bad again  Has not tried any medications to help with diarrhea  Abdominal Pain   This is a new problem  The current episode started in the past 7 days  The onset quality is sudden  The problem occurs intermittently  The problem has been waxing and waning  The pain is located in the LLQ  The pain is moderate  The quality of the pain is dull and aching  The abdominal pain does not radiate  Associated symptoms include diarrhea, a fever (), myalgias and nausea  Pertinent negatives include no arthralgias, belching, constipation, dysuria, frequency, headaches, hematochezia, melena, vomiting or weight loss  The pain is aggravated by eating  The pain is relieved by nothing  She has tried nothing for the symptoms  Nausea   This is a new problem  The current episode started in the past 7 days   The problem occurs constantly  The problem has been waxing and waning  Associated symptoms include abdominal pain, a fever (Tuesday Wednesday), myalgias and nausea  Pertinent negatives include no arthralgias, chills, congestion, coughing, headaches, urinary symptoms or vomiting  Nothing aggravates the symptoms  She has tried nothing for the symptoms  Diarrhea    This is a new problem  The current episode started in the past 7 days  The problem occurs less than 2 times per day  The problem has been waxing and waning  The stool consistency is described as watery  The patient states that diarrhea does not awaken her from sleep  Associated symptoms include abdominal pain, a fever (Tuesday Wednesday) and myalgias  Pertinent negatives include no arthralgias, chills, coughing, headaches, vomiting or weight loss  Nothing aggravates the symptoms  There are no known risk factors  She has tried nothing for the symptoms  The treatment provided mild relief  Review of Systems   Review of Systems   Constitutional: Positive for fever (Tuesday Wednesday)  Negative for chills and weight loss  HENT: Negative for congestion  Eyes: Negative  Respiratory: Negative  Negative for cough  Cardiovascular: Negative  Gastrointestinal: Positive for abdominal pain, diarrhea and nausea  Negative for constipation, hematochezia, melena and vomiting  Genitourinary: Negative  Negative for dysuria and frequency  Musculoskeletal: Positive for myalgias  Negative for arthralgias  Neurological: Negative for headaches           Current Medications       Current Outpatient Prescriptions:     clonazePAM (KlonoPIN) 0 5 MG disintegrating tablet, Take 1 tablet (0 5 mg total) by mouth daily at bedtime as needed for seizures, Disp: 30 tablet, Rfl: 1    ibuprofen (MOTRIN) 600 mg tablet, Take 1 tablet (600 mg total) by mouth every 6 (six) hours as needed for mild pain, moderate pain, fever or headaches for up to 5 days, Disp: 30 tablet, Rfl: 0   loperamide (IMODIUM) 2 mg capsule, Take 1 capsule (2 mg total) by mouth 4 (four) times a day as needed for diarrhea, Disp: 30 capsule, Rfl: 0    ondansetron (ZOFRAN) 4 mg tablet, Take 1 tablet (4 mg total) by mouth every 8 (eight) hours as needed for nausea or vomiting, Disp: 20 tablet, Rfl: 0    venlafaxine (EFFEXOR-XR) 37 5 mg 24 hr capsule, Take 1 capsule (37 5 mg total) by mouth daily, Disp: 90 capsule, Rfl: 1    Current Allergies     Allergies as of 07/14/2018    (No Known Allergies)            The following portions of the patient's history were reviewed and updated as appropriate: allergies, current medications, past family history, past medical history, past social history, past surgical history and problem list      Past Medical History:   Diagnosis Date    Anxious mood        Past Surgical History:   Procedure Laterality Date    APPENDECTOMY      TUBAL LIGATION      resolved 2007    WISDOM TOOTH EXTRACTION      resolved 1990       Family History   Problem Relation Age of Onset    Anxiety disorder Mother     Hypertension Father     Diabetes Father     Heart attack Maternal Grandmother         acute MI         Medications have been verified  Objective   /86   Pulse 94   Temp 99 1 °F (37 3 °C)   Resp 16   Ht 5' 5" (1 651 m)   Wt 56 2 kg (124 lb)   SpO2 98%   BMI 20 63 kg/m²        Physical Exam     Physical Exam   Constitutional: She is oriented to person, place, and time  She appears well-developed and well-nourished  No distress  HENT:   Head: Normocephalic and atraumatic  Right Ear: External ear normal    Left Ear: External ear normal    Nose: Nose normal    Mouth/Throat: Oropharynx is clear and moist  No oropharyngeal exudate  Eyes: Conjunctivae are normal  Right eye exhibits no discharge  Left eye exhibits no discharge  Neck: Normal range of motion  Neck supple  Cardiovascular: Normal rate, regular rhythm, normal heart sounds and intact distal pulses      No murmur heard   Pulmonary/Chest: Effort normal and breath sounds normal  No respiratory distress  She has no wheezes  She has no rales  Abdominal: Soft  Bowel sounds are normal  She exhibits no shifting dullness, no distension, no pulsatile liver, no fluid wave, no abdominal bruit, no ascites, no pulsatile midline mass and no mass  There is no hepatosplenomegaly  There is tenderness (Mild tenderness to palpation) in the left lower quadrant  There is no rigidity, no rebound, no guarding, no tenderness at McBurney's point and negative Ruelas's sign  Musculoskeletal: Normal range of motion  Lymphadenopathy:     She has no cervical adenopathy  Neurological: She is alert and oriented to person, place, and time  Skin: Skin is warm and dry  Psychiatric: She has a normal mood and affect  Nursing note and vitals reviewed

## 2018-07-15 LAB — BACTERIA UR CULT: NORMAL

## 2018-07-24 ENCOUNTER — TRANSCRIBE ORDERS (OUTPATIENT)
Dept: ADMINISTRATIVE | Facility: HOSPITAL | Age: 53
End: 2018-07-24

## 2018-07-24 ENCOUNTER — APPOINTMENT (OUTPATIENT)
Dept: LAB | Age: 53
End: 2018-07-24
Payer: COMMERCIAL

## 2018-07-24 ENCOUNTER — APPOINTMENT (OUTPATIENT)
Dept: RADIOLOGY | Age: 53
End: 2018-07-24
Payer: COMMERCIAL

## 2018-07-24 DIAGNOSIS — M05.89 OTHER RHEUMATOID ARTHRITIS WITH RHEUMATOID FACTOR OF MULTIPLE SITES (HCC): ICD-10-CM

## 2018-07-24 DIAGNOSIS — Z00.8 HEALTH EXAMINATION IN POPULATION SURVEY: ICD-10-CM

## 2018-07-24 DIAGNOSIS — M05.89 OTHER RHEUMATOID ARTHRITIS WITH RHEUMATOID FACTOR OF MULTIPLE SITES (HCC): Primary | ICD-10-CM

## 2018-07-24 DIAGNOSIS — M54.2 CERVICALGIA: ICD-10-CM

## 2018-07-24 DIAGNOSIS — D64.9 ANEMIA, UNSPECIFIED TYPE: ICD-10-CM

## 2018-07-24 DIAGNOSIS — Z00.8 HEALTH EXAMINATION IN POPULATION SURVEY: Primary | ICD-10-CM

## 2018-07-24 LAB
CHOLEST SERPL-MCNC: 167 MG/DL (ref 50–200)
CRP SERPL QL: 25 MG/L
CRYOGLOB RF SER-ACNC: ABNORMAL [IU]/ML
ERYTHROCYTE [SEDIMENTATION RATE] IN BLOOD: 98 MM/HOUR (ref 0–20)
EST. AVERAGE GLUCOSE BLD GHB EST-MCNC: 105 MG/DL
FERRITIN SERPL-MCNC: 6 NG/ML (ref 8–388)
HBA1C MFR BLD: 5.3 % (ref 4.2–6.3)
HBV SURFACE AG SER QL: NORMAL
HCV AB SER QL: NORMAL
HDLC SERPL-MCNC: 35 MG/DL (ref 40–60)
IRON SERPL-MCNC: 8 UG/DL (ref 50–170)
LDLC SERPL CALC-MCNC: 110 MG/DL (ref 0–100)
NONHDLC SERPL-MCNC: 132 MG/DL
RHEUMATOID FACT SER QL LA: POSITIVE
TRIGL SERPL-MCNC: 111 MG/DL

## 2018-07-24 PROCEDURE — 82728 ASSAY OF FERRITIN: CPT

## 2018-07-24 PROCEDURE — 83540 ASSAY OF IRON: CPT

## 2018-07-24 PROCEDURE — 83036 HEMOGLOBIN GLYCOSYLATED A1C: CPT

## 2018-07-24 PROCEDURE — 73130 X-RAY EXAM OF HAND: CPT

## 2018-07-24 PROCEDURE — 85652 RBC SED RATE AUTOMATED: CPT

## 2018-07-24 PROCEDURE — 86803 HEPATITIS C AB TEST: CPT

## 2018-07-24 PROCEDURE — 72050 X-RAY EXAM NECK SPINE 4/5VWS: CPT

## 2018-07-24 PROCEDURE — 86430 RHEUMATOID FACTOR TEST QUAL: CPT

## 2018-07-24 PROCEDURE — 86140 C-REACTIVE PROTEIN: CPT

## 2018-07-24 PROCEDURE — 86200 CCP ANTIBODY: CPT

## 2018-07-24 PROCEDURE — 71046 X-RAY EXAM CHEST 2 VIEWS: CPT

## 2018-07-24 PROCEDURE — 36415 COLL VENOUS BLD VENIPUNCTURE: CPT

## 2018-07-24 PROCEDURE — 80061 LIPID PANEL: CPT

## 2018-07-24 PROCEDURE — 86038 ANTINUCLEAR ANTIBODIES: CPT

## 2018-07-24 PROCEDURE — 73110 X-RAY EXAM OF WRIST: CPT

## 2018-07-24 PROCEDURE — 87340 HEPATITIS B SURFACE AG IA: CPT

## 2018-07-24 PROCEDURE — 86431 RHEUMATOID FACTOR QUANT: CPT

## 2018-07-24 PROCEDURE — 86480 TB TEST CELL IMMUN MEASURE: CPT

## 2018-07-24 PROCEDURE — 86235 NUCLEAR ANTIGEN ANTIBODY: CPT

## 2018-07-25 ENCOUNTER — OFFICE VISIT (OUTPATIENT)
Dept: FAMILY MEDICINE CLINIC | Facility: CLINIC | Age: 53
End: 2018-07-25
Payer: COMMERCIAL

## 2018-07-25 VITALS
DIASTOLIC BLOOD PRESSURE: 62 MMHG | RESPIRATION RATE: 12 BRPM | OXYGEN SATURATION: 95 % | SYSTOLIC BLOOD PRESSURE: 98 MMHG | BODY MASS INDEX: 21.16 KG/M2 | WEIGHT: 127 LBS | HEART RATE: 65 BPM | HEIGHT: 65 IN

## 2018-07-25 DIAGNOSIS — K42.9 UMBILICAL HERNIA WITHOUT OBSTRUCTION AND WITHOUT GANGRENE: Primary | ICD-10-CM

## 2018-07-25 DIAGNOSIS — M06.041 RHEUMATOID ARTHRITIS INVOLVING BOTH HANDS WITH NEGATIVE RHEUMATOID FACTOR (HCC): ICD-10-CM

## 2018-07-25 DIAGNOSIS — M06.042 RHEUMATOID ARTHRITIS INVOLVING BOTH HANDS WITH NEGATIVE RHEUMATOID FACTOR (HCC): ICD-10-CM

## 2018-07-25 DIAGNOSIS — F41.9 ANXIETY: ICD-10-CM

## 2018-07-25 DIAGNOSIS — R23.2 HOT FLASHES: ICD-10-CM

## 2018-07-25 LAB
ENA SS-A AB SER-ACNC: <0.2 AI (ref 0–0.9)
ENA SS-B AB SER-ACNC: <0.2 AI (ref 0–0.9)
RYE IGE QN: NEGATIVE

## 2018-07-25 PROCEDURE — 99214 OFFICE O/P EST MOD 30 MIN: CPT | Performed by: FAMILY MEDICINE

## 2018-07-25 NOTE — PROGRESS NOTES
Assessment/Plan:    60-year-old woman with: Anxiety, rheumatoid arthritis, hot flashes and umbilical hernia  Discussed supportive care return parameters  Continue current medications  Will refer to General surgery for fixation of her hernia  Encouraged follow-up with Rheumatology as well  Follow-up in 6 months  No problem-specific Assessment & Plan notes found for this encounter  Diagnoses and all orders for this visit:    Umbilical hernia without obstruction and without gangrene  -     Ambulatory referral to General Surgery; Future    Anxiety    Rheumatoid arthritis involving both hands with negative rheumatoid factor (HCC)    Hot flashes          Subjective:   Chief Complaint   Patient presents with    Follow-up     Patient presents to the office today for follow up without blood work discussion  Patient declines medication refills at this time   Hernia     Patient does have c/o a possible hernia today  Patient ID: Tian Perez is a 46 y o  female  Patient is a 60-year-old woman who presents for follow-up on anxiety, rheumatoid arthritis and hot flashes  She started the Effexor which has significantly helped her symptoms  She is seeing Rheumatology and plans begin heme are  Patient also admits an umbilical hernia that she would like fixed  No fevers chills nausea vomiting  Tolerating p o  intake  No other complaints at this time  The following portions of the patient's history were reviewed and updated as appropriate: allergies, current medications, past family history, past medical history, past social history, past surgical history and problem list     Review of Systems   Constitutional: Negative  HENT: Negative  Eyes: Negative  Respiratory: Negative  Cardiovascular: Negative  Gastrointestinal: Negative  Endocrine: Negative  Genitourinary: Negative  Musculoskeletal: Positive for arthralgias  Allergic/Immunologic: Negative      Neurological: Negative  Hematological: Negative  Psychiatric/Behavioral: Negative  All other systems reviewed and are negative  Objective:      BP 98/62 (BP Location: Right arm, Patient Position: Sitting, Cuff Size: Adult)   Pulse 65   Resp 12   Ht 5' 5" (1 651 m)   Wt 57 6 kg (127 lb)   SpO2 95%   BMI 21 13 kg/m²          Physical Exam   Constitutional: She is oriented to person, place, and time  She appears well-developed and well-nourished  HENT:   Head: Atraumatic  Right Ear: External ear normal    Left Ear: External ear normal    Eyes: Conjunctivae and EOM are normal  Pupils are equal, round, and reactive to light  Neck: Normal range of motion  Cardiovascular: Normal rate, regular rhythm and normal heart sounds  Pulmonary/Chest: Effort normal and breath sounds normal  No respiratory distress  Abdominal: Soft  She exhibits no distension  There is no tenderness  There is no rebound and no guarding  Musculoskeletal: Normal range of motion  Neurological: She is alert and oriented to person, place, and time  No cranial nerve deficit  Skin: Skin is warm and dry  Psychiatric: She has a normal mood and affect   Her behavior is normal  Judgment and thought content normal

## 2018-07-26 LAB
ANNOTATION COMMENT IMP: NORMAL
CCP IGA+IGG SERPL IA-ACNC: 170 UNITS (ref 0–19)
GAMMA INTERFERON BACKGROUND BLD IA-ACNC: 0.05 IU/ML
M TB IFN-G BLD-IMP: NEGATIVE
M TB IFN-G CD4+ BCKGRND COR BLD-ACNC: 0 IU/ML
M TB IFN-G CD4+ T-CELLS BLD-ACNC: 0.05 IU/ML
MITOGEN IGNF BLD-ACNC: 1.11 IU/ML
QUANTIFERON-TB GOLD IN TUBE: NORMAL
SERVICE CMNT-IMP: NORMAL

## 2018-09-13 ENCOUNTER — APPOINTMENT (INPATIENT)
Dept: RADIOLOGY | Facility: HOSPITAL | Age: 53
DRG: 812 | End: 2018-09-13
Payer: COMMERCIAL

## 2018-09-13 ENCOUNTER — APPOINTMENT (OUTPATIENT)
Dept: LAB | Age: 53
End: 2018-09-13
Payer: COMMERCIAL

## 2018-09-13 ENCOUNTER — HOSPITAL ENCOUNTER (INPATIENT)
Facility: HOSPITAL | Age: 53
LOS: 1 days | Discharge: HOME | DRG: 812 | End: 2018-09-14
Attending: EMERGENCY MEDICINE | Admitting: INTERNAL MEDICINE
Payer: COMMERCIAL

## 2018-09-13 ENCOUNTER — TRANSCRIBE ORDERS (OUTPATIENT)
Dept: ADMINISTRATIVE | Facility: HOSPITAL | Age: 53
End: 2018-09-13

## 2018-09-13 DIAGNOSIS — M54.2 CERVICALGIA: ICD-10-CM

## 2018-09-13 DIAGNOSIS — D64.9 ANEMIA, UNSPECIFIED TYPE: ICD-10-CM

## 2018-09-13 DIAGNOSIS — M25.539 PAIN IN WRIST, UNSPECIFIED LATERALITY: ICD-10-CM

## 2018-09-13 DIAGNOSIS — D64.9 SEVERE ANEMIA: Primary | ICD-10-CM

## 2018-09-13 DIAGNOSIS — M05.89 OTHER RHEUMATOID ARTHRITIS WITH RHEUMATOID FACTOR OF MULTIPLE SITES (HCC): ICD-10-CM

## 2018-09-13 DIAGNOSIS — M25.549 ARTHRALGIA OF HAND, UNSPECIFIED LATERALITY: ICD-10-CM

## 2018-09-13 DIAGNOSIS — R19.5 HEME POSITIVE STOOL: ICD-10-CM

## 2018-09-13 DIAGNOSIS — K92.2 GI BLEED: ICD-10-CM

## 2018-09-13 DIAGNOSIS — M05.89 OTHER RHEUMATOID ARTHRITIS WITH RHEUMATOID FACTOR OF MULTIPLE SITES (HCC): Primary | ICD-10-CM

## 2018-09-13 PROBLEM — D75.839 THROMBOCYTOSIS: Status: ACTIVE | Noted: 2018-09-13

## 2018-09-13 PROBLEM — R06.00 DOE (DYSPNEA ON EXERTION): Status: ACTIVE | Noted: 2018-09-13

## 2018-09-13 PROBLEM — D50.9 IRON DEFICIENCY ANEMIA: Status: ACTIVE | Noted: 2018-09-13

## 2018-09-13 LAB
ABO GROUP BLD: NORMAL
ALBUMIN SERPL BCP-MCNC: 2.5 G/DL (ref 3.5–5)
ALBUMIN SERPL BCP-MCNC: 2.7 G/DL (ref 3.5–5)
ALP SERPL-CCNC: 169 U/L (ref 46–116)
ALP SERPL-CCNC: 185 U/L (ref 46–116)
ALT SERPL W P-5'-P-CCNC: 15 U/L (ref 12–78)
ALT SERPL W P-5'-P-CCNC: 19 U/L (ref 12–78)
ANION GAP SERPL CALCULATED.3IONS-SCNC: 5 MMOL/L (ref 4–13)
ANION GAP SERPL CALCULATED.3IONS-SCNC: 8 MMOL/L (ref 4–13)
APTT PPP: 25 SECONDS (ref 24–36)
AST SERPL W P-5'-P-CCNC: 15 U/L (ref 5–45)
AST SERPL W P-5'-P-CCNC: 17 U/L (ref 5–45)
BACTERIA UR QL AUTO: ABNORMAL /HPF
BASOPHILS # BLD AUTO: 0.02 THOUSANDS/ΜL (ref 0–0.1)
BASOPHILS # BLD AUTO: 0.03 THOUSANDS/ΜL (ref 0–0.1)
BASOPHILS NFR BLD AUTO: 0 % (ref 0–1)
BASOPHILS NFR BLD AUTO: 0 % (ref 0–1)
BILIRUB SERPL-MCNC: 0.36 MG/DL (ref 0.2–1)
BILIRUB SERPL-MCNC: 0.51 MG/DL (ref 0.2–1)
BILIRUB UR QL STRIP: ABNORMAL
BLD GP AB SCN SERPL QL: NEGATIVE
BUN SERPL-MCNC: 8 MG/DL (ref 5–25)
BUN SERPL-MCNC: 9 MG/DL (ref 5–25)
CALCIUM SERPL-MCNC: 8.7 MG/DL (ref 8.3–10.1)
CALCIUM SERPL-MCNC: 9 MG/DL (ref 8.3–10.1)
CHLORIDE SERPL-SCNC: 101 MMOL/L (ref 100–108)
CHLORIDE SERPL-SCNC: 104 MMOL/L (ref 100–108)
CLARITY UR: ABNORMAL
CO2 SERPL-SCNC: 28 MMOL/L (ref 21–32)
CO2 SERPL-SCNC: 30 MMOL/L (ref 21–32)
COLOR UR: ABNORMAL
CREAT SERPL-MCNC: 0.58 MG/DL (ref 0.6–1.3)
CREAT SERPL-MCNC: 0.84 MG/DL (ref 0.6–1.3)
CRP SERPL QL: 19.9 MG/L
EOSINOPHIL # BLD AUTO: 0.08 THOUSAND/ΜL (ref 0–0.61)
EOSINOPHIL # BLD AUTO: 0.13 THOUSAND/ΜL (ref 0–0.61)
EOSINOPHIL NFR BLD AUTO: 1 % (ref 0–6)
EOSINOPHIL NFR BLD AUTO: 2 % (ref 0–6)
ERYTHROCYTE [DISTWIDTH] IN BLOOD BY AUTOMATED COUNT: 20.1 % (ref 11.6–15.1)
ERYTHROCYTE [DISTWIDTH] IN BLOOD BY AUTOMATED COUNT: 20.5 % (ref 11.6–15.1)
ERYTHROCYTE [SEDIMENTATION RATE] IN BLOOD: 77 MM/HOUR (ref 0–20)
FERRITIN SERPL-MCNC: 15 NG/ML (ref 8–388)
FOLATE SERPL-MCNC: 11.4 NG/ML (ref 3.1–17.5)
GFR SERPL CREATININE-BSD FRML MDRD: 106 ML/MIN/1.73SQ M
GFR SERPL CREATININE-BSD FRML MDRD: 80 ML/MIN/1.73SQ M
GLUCOSE P FAST SERPL-MCNC: 100 MG/DL (ref 65–99)
GLUCOSE SERPL-MCNC: 109 MG/DL (ref 65–140)
GLUCOSE UR STRIP-MCNC: NEGATIVE MG/DL
HCT VFR BLD AUTO: 24.9 % (ref 34.8–46.1)
HCT VFR BLD AUTO: 26.7 % (ref 34.8–46.1)
HCT VFR BLD AUTO: 27.1 % (ref 34.8–46.1)
HGB BLD-MCNC: 6.4 G/DL (ref 11.5–15.4)
HGB BLD-MCNC: 7.2 G/DL (ref 11.5–15.4)
HGB BLD-MCNC: 7.5 G/DL (ref 11.5–15.4)
HGB UR QL STRIP.AUTO: NEGATIVE
IMM GRANULOCYTES # BLD AUTO: 0.03 THOUSAND/UL (ref 0–0.2)
IMM GRANULOCYTES # BLD AUTO: 0.04 THOUSAND/UL (ref 0–0.2)
IMM GRANULOCYTES NFR BLD AUTO: 0 % (ref 0–2)
IMM GRANULOCYTES NFR BLD AUTO: 0 % (ref 0–2)
INR PPP: 1.08 (ref 0.86–1.17)
IRON SATN MFR SERPL: 4 %
IRON SERPL-MCNC: 11 UG/DL (ref 50–170)
KETONES UR STRIP-MCNC: ABNORMAL MG/DL
LEUKOCYTE ESTERASE UR QL STRIP: NEGATIVE
LYMPHOCYTES # BLD AUTO: 1.39 THOUSANDS/ΜL (ref 0.6–4.47)
LYMPHOCYTES # BLD AUTO: 1.6 THOUSANDS/ΜL (ref 0.6–4.47)
LYMPHOCYTES NFR BLD AUTO: 17 % (ref 14–44)
LYMPHOCYTES NFR BLD AUTO: 18 % (ref 14–44)
MCH RBC QN AUTO: 17.2 PG (ref 26.8–34.3)
MCH RBC QN AUTO: 17.8 PG (ref 26.8–34.3)
MCHC RBC AUTO-ENTMCNC: 25.7 G/DL (ref 31.4–37.4)
MCHC RBC AUTO-ENTMCNC: 27 G/DL (ref 31.4–37.4)
MCV RBC AUTO: 66 FL (ref 82–98)
MCV RBC AUTO: 67 FL (ref 82–98)
MONOCYTES # BLD AUTO: 0.59 THOUSAND/ΜL (ref 0.17–1.22)
MONOCYTES # BLD AUTO: 0.65 THOUSAND/ΜL (ref 0.17–1.22)
MONOCYTES NFR BLD AUTO: 6 % (ref 4–12)
MONOCYTES NFR BLD AUTO: 8 % (ref 4–12)
MUCOUS THREADS UR QL AUTO: ABNORMAL
NEUTROPHILS # BLD AUTO: 5.64 THOUSANDS/ΜL (ref 1.85–7.62)
NEUTROPHILS # BLD AUTO: 7.19 THOUSANDS/ΜL (ref 1.85–7.62)
NEUTS SEG NFR BLD AUTO: 72 % (ref 43–75)
NEUTS SEG NFR BLD AUTO: 76 % (ref 43–75)
NITRITE UR QL STRIP: NEGATIVE
NON-SQ EPI CELLS URNS QL MICRO: ABNORMAL /HPF
NRBC BLD AUTO-RTO: 0 /100 WBCS
NRBC BLD AUTO-RTO: 0 /100 WBCS
PH UR STRIP.AUTO: 5.5 [PH] (ref 4.5–8)
PLATELET # BLD AUTO: 708 THOUSANDS/UL (ref 149–390)
PLATELET # BLD AUTO: 715 THOUSANDS/UL (ref 149–390)
PMV BLD AUTO: 8.2 FL (ref 8.9–12.7)
PMV BLD AUTO: 8.8 FL (ref 8.9–12.7)
POTASSIUM SERPL-SCNC: 3.5 MMOL/L (ref 3.5–5.3)
POTASSIUM SERPL-SCNC: 3.8 MMOL/L (ref 3.5–5.3)
PROT SERPL-MCNC: 6.8 G/DL (ref 6.4–8.2)
PROT SERPL-MCNC: 7.5 G/DL (ref 6.4–8.2)
PROT UR STRIP-MCNC: ABNORMAL MG/DL
PROTHROMBIN TIME: 14.1 SECONDS (ref 11.8–14.2)
RBC # BLD AUTO: 3.72 MILLION/UL (ref 3.81–5.12)
RBC # BLD AUTO: 4.04 MILLION/UL (ref 3.81–5.12)
RBC #/AREA URNS AUTO: ABNORMAL /HPF
RH BLD: POSITIVE
SODIUM SERPL-SCNC: 137 MMOL/L (ref 136–145)
SODIUM SERPL-SCNC: 139 MMOL/L (ref 136–145)
SP GR UR STRIP.AUTO: >=1.03 (ref 1–1.03)
SPECIMEN EXPIRATION DATE: NORMAL
TIBC SERPL-MCNC: 302 UG/DL (ref 250–450)
TROPONIN I SERPL-MCNC: <0.02 NG/ML
TROPONIN I SERPL-MCNC: <0.02 NG/ML
UROBILINOGEN UR QL STRIP.AUTO: 2 E.U./DL
WBC # BLD AUTO: 7.86 THOUSAND/UL (ref 4.31–10.16)
WBC # BLD AUTO: 9.53 THOUSAND/UL (ref 4.31–10.16)
WBC #/AREA URNS AUTO: ABNORMAL /HPF

## 2018-09-13 PROCEDURE — 99223 1ST HOSP IP/OBS HIGH 75: CPT | Performed by: INTERNAL MEDICINE

## 2018-09-13 PROCEDURE — 86923 COMPATIBILITY TEST ELECTRIC: CPT

## 2018-09-13 PROCEDURE — 80053 COMPREHEN METABOLIC PANEL: CPT | Performed by: EMERGENCY MEDICINE

## 2018-09-13 PROCEDURE — 71045 X-RAY EXAM CHEST 1 VIEW: CPT

## 2018-09-13 PROCEDURE — 99285 EMERGENCY DEPT VISIT HI MDM: CPT

## 2018-09-13 PROCEDURE — C9113 INJ PANTOPRAZOLE SODIUM, VIA: HCPCS | Performed by: EMERGENCY MEDICINE

## 2018-09-13 PROCEDURE — 83550 IRON BINDING TEST: CPT | Performed by: EMERGENCY MEDICINE

## 2018-09-13 PROCEDURE — 83540 ASSAY OF IRON: CPT | Performed by: EMERGENCY MEDICINE

## 2018-09-13 PROCEDURE — 93005 ELECTROCARDIOGRAM TRACING: CPT

## 2018-09-13 PROCEDURE — 36415 COLL VENOUS BLD VENIPUNCTURE: CPT

## 2018-09-13 PROCEDURE — 86140 C-REACTIVE PROTEIN: CPT

## 2018-09-13 PROCEDURE — 85025 COMPLETE CBC W/AUTO DIFF WBC: CPT

## 2018-09-13 PROCEDURE — 36430 TRANSFUSION BLD/BLD COMPNT: CPT

## 2018-09-13 PROCEDURE — 85652 RBC SED RATE AUTOMATED: CPT

## 2018-09-13 PROCEDURE — 82728 ASSAY OF FERRITIN: CPT | Performed by: EMERGENCY MEDICINE

## 2018-09-13 PROCEDURE — 85610 PROTHROMBIN TIME: CPT | Performed by: EMERGENCY MEDICINE

## 2018-09-13 PROCEDURE — 85014 HEMATOCRIT: CPT | Performed by: PHYSICIAN ASSISTANT

## 2018-09-13 PROCEDURE — 81001 URINALYSIS AUTO W/SCOPE: CPT

## 2018-09-13 PROCEDURE — 85025 COMPLETE CBC W/AUTO DIFF WBC: CPT | Performed by: EMERGENCY MEDICINE

## 2018-09-13 PROCEDURE — 86850 RBC ANTIBODY SCREEN: CPT | Performed by: EMERGENCY MEDICINE

## 2018-09-13 PROCEDURE — 82746 ASSAY OF FOLIC ACID SERUM: CPT | Performed by: PHYSICIAN ASSISTANT

## 2018-09-13 PROCEDURE — 84484 ASSAY OF TROPONIN QUANT: CPT | Performed by: PHYSICIAN ASSISTANT

## 2018-09-13 PROCEDURE — 80053 COMPREHEN METABOLIC PANEL: CPT

## 2018-09-13 PROCEDURE — 86900 BLOOD TYPING SEROLOGIC ABO: CPT | Performed by: EMERGENCY MEDICINE

## 2018-09-13 PROCEDURE — 85730 THROMBOPLASTIN TIME PARTIAL: CPT | Performed by: EMERGENCY MEDICINE

## 2018-09-13 PROCEDURE — 86901 BLOOD TYPING SEROLOGIC RH(D): CPT | Performed by: EMERGENCY MEDICINE

## 2018-09-13 PROCEDURE — 30233N1 TRANSFUSION OF NONAUTOLOGOUS RED BLOOD CELLS INTO PERIPHERAL VEIN, PERCUTANEOUS APPROACH: ICD-10-PCS | Performed by: INTERNAL MEDICINE

## 2018-09-13 PROCEDURE — 85018 HEMOGLOBIN: CPT | Performed by: PHYSICIAN ASSISTANT

## 2018-09-13 PROCEDURE — P9021 RED BLOOD CELLS UNIT: HCPCS

## 2018-09-13 RX ORDER — FOLIC ACID 1 MG/1
1 TABLET ORAL DAILY
Status: DISCONTINUED | OUTPATIENT
Start: 2018-09-14 | End: 2018-09-14 | Stop reason: HOSPADM

## 2018-09-13 RX ORDER — FUROSEMIDE 10 MG/ML
40 INJECTION INTRAMUSCULAR; INTRAVENOUS ONCE
Status: DISCONTINUED | OUTPATIENT
Start: 2018-09-13 | End: 2018-09-13

## 2018-09-13 RX ORDER — FERROUS SULFATE 325(65) MG
325 TABLET ORAL
Status: DISCONTINUED | OUTPATIENT
Start: 2018-09-14 | End: 2018-09-14 | Stop reason: HOSPADM

## 2018-09-13 RX ORDER — FOLIC ACID 1 MG/1
1 TABLET ORAL DAILY
COMMUNITY
Start: 2018-08-07 | End: 2021-01-01

## 2018-09-13 RX ORDER — FERROUS SULFATE 325(65) MG
325 TABLET ORAL
Status: ON HOLD | COMMUNITY
End: 2018-09-14

## 2018-09-13 RX ORDER — VENLAFAXINE HYDROCHLORIDE 37.5 MG/1
37.5 CAPSULE, EXTENDED RELEASE ORAL DAILY
Status: DISCONTINUED | OUTPATIENT
Start: 2018-09-14 | End: 2018-09-14 | Stop reason: HOSPADM

## 2018-09-13 RX ORDER — PANTOPRAZOLE SODIUM 40 MG/1
40 INJECTION, POWDER, FOR SOLUTION INTRAVENOUS EVERY 12 HOURS
Status: DISCONTINUED | OUTPATIENT
Start: 2018-09-13 | End: 2018-09-14 | Stop reason: HOSPADM

## 2018-09-13 RX ORDER — PANTOPRAZOLE SODIUM 40 MG/1
40 INJECTION, POWDER, FOR SOLUTION INTRAVENOUS ONCE
Status: COMPLETED | OUTPATIENT
Start: 2018-09-13 | End: 2018-09-13

## 2018-09-13 RX ORDER — ONDANSETRON 2 MG/ML
4 INJECTION INTRAMUSCULAR; INTRAVENOUS EVERY 6 HOURS PRN
Status: DISCONTINUED | OUTPATIENT
Start: 2018-09-13 | End: 2018-09-14 | Stop reason: HOSPADM

## 2018-09-13 RX ADMIN — PANTOPRAZOLE SODIUM 40 MG: 40 INJECTION, POWDER, FOR SOLUTION INTRAVENOUS at 17:22

## 2018-09-13 NOTE — ASSESSMENT & PLAN NOTE
Symptomatic   chronic as evidenced by ferritin and iron panel in July 2018 likely secondary to NSAID overuse for rheumatoid arthritis    Concern for hemorrhagic gastritis vs PUD  Has been on iron/folic acid since starting methotrexate last month as well  Agree w/transfusion of 1 IU PRBC  Clear liquid diet, check h/h q 6 h, folic acid level and consult GI, agree w/IV PPI will continue daily

## 2018-09-13 NOTE — H&P
H&P- Miguel Rivers 1965, 48 y o  female MRN: [id number]    Unit/Bed#: ED 19 Encounter: [id number]    Primary Care Provider: Paula Rae MD   Date and time admitted to hospital: 9/13/2018  2:40 PM    History and Physical - [facility] Internal Medicine    Patient Information: Miguel Rivers 48 y o  female MRN: [id number]  Unit/Bed#: ED 19 Encounter: [id number]  Admitting Physician: Adele Rosales PA-C  PCP: Paula Rae MD  Date of Admission:  09/13/18    Assessment/Plan:    Hospital Problem List:     Principal Problem:    Iron deficiency anemia  Active Problems:    Rheumatoid arthritis involving both hands with negative rheumatoid factor (HCC)    Thrombocytosis (HCC)    Heme positive stool    COFFEY (dyspnea on exertion)          * Iron deficiency anemia   Assessment & Plan    Symptomatic   chronic as evidenced by ferritin and iron panel in July 2018 likely secondary to NSAID overuse for rheumatoid arthritis    Concern for hemorrhagic gastritis vs PUD  Has been on iron/folic acid since starting methotrexate last month as well  Agree w/transfusion of 1 IU PRBC  Clear liquid diet, check h/h q 6 h, folic acid level and consult GI, agree w/IV PPI will continue daily        COFFEY (dyspnea on exertion)   Assessment & Plan    Likely 2* anemia, will check ekg/cxr/cycle troponins for completenessa        Heme positive stool   Assessment & Plan    2* iron supplementation over last month vs GI bleed   work up as above        Thrombocytosis ([facility] [address] )   Assessment & Plan    In setting of RA continue to monitor        Rheumatoid arthritis involving both hands with negative rheumatoid factor ([facility] [address] )   Assessment & Plan    Started on methotrexate last month per her rheumatologist  Continue methotrexate            ·     VTE Prophylaxis: Pharmacologic VTE Prophylaxis contraindicated due to gi bleed  / reason for no mechanical VTE prophylaxis vte screen   Code Status: fc  POLST: There is no POLST form on file for this patient (pre-hospital)    Anticipated Length of Stay:  Patient will be admitted on an Inpatient basis with an anticipated length of stay of  Greater than 2 midnights  Justification for Hospital Stay: symptomatic anemia workup    Total Time for Visit, including Counseling / Coordination of Care: 45 minutes  Greater than 50% of this total time spent on direct patient counseling and coordination of care  Chief Complaint:   My blood count is low    History of Present Illness:    Ab Ramirez is a 48 y o  female who presents with PMH of rheumatoid arthritis coming to hospital for abnormal lab value  Patient is a pleasant 40-year-old female who sent in by PCP and rheumatologist for low hemoglobin  Patient reports that she has been having fatigue since July in 2018 when she had a severe episode of gastro enteritis and was having recurrent nausea vomiting diarrhea  She has noted that she has been having dyspnea on exertion but this is been stable  No chest pain no cough fevers or chills  She was recently started on methotrexate 1 month prior for her rheumatoid arthritis as well as iron and folic acid  She reports that she has been compliant with all these medications and rheumatism is feeling better  She does note that since she started this she has been having dark black stools but suspected this was her iron pills  She does know that prior to starting methotrexate she used to [employer] or location, whenever I get my hands on" and reported that she frequently had to take multiple doses these medications to help control her rheumatoid arthritis  She also drinks coffee once daily  She has never had a colonoscopy before  No family hx or personal hx of ibd  On review of labs her mcv was 80 in 04/2018 w/mild anemia of hgb of 11  She had ferritin iron panel in July that were both low  Review of Systems:    Review of Systems   Constitutional: Negative for chills and fever  HENT: Negative for sore throat  Respiratory: Positive for shortness of breath  Negative for cough  Cardiovascular: Negative for chest pain and palpitations  Gastrointestinal: Positive for abdominal pain  Negative for anal bleeding, blood in stool and diarrhea  Musculoskeletal: Positive for arthralgias  All other systems reviewed and are negative  Past Medical and Surgical History:     Past Medical History:   Diagnosis Date    Anxious mood     Rheumatoid arthritis ([facility] [address] )        Past Surgical History:   Procedure Laterality Date    APPENDECTOMY      TUBAL LIGATION      resolved 2007    WISDOM TOOTH EXTRACTION      resolved 1990       Meds/Allergies:    Prior to Admission medications    Medication Sig Start Date End Date Taking? Authorizing Provider   ferrous sulfate 325 (65 Fe) mg tablet Take 325 mg by mouth daily with breakfast   Yes Historical Provider, MD   folic acid (FOLVITE) 1 mg tablet 1 mg 8/7/18  Yes Historical Provider, MD   loperamide (IMODIUM) 2 mg capsule Take 1 capsule (2 mg total) by mouth 4 (four) times a day as needed for diarrhea 7/14/18  Yes Sunday Cueva PA-C   methotrexate 2 5 mg tablet 2 5 mg 8/7/18  Yes Historical Provider, MD   ondansetron (ZOFRAN) 4 mg tablet Take 1 tablet (4 mg total) by mouth every 8 (eight) hours as needed for nausea or vomiting 7/14/18  Yes Sunday Cueva PA-C   venlafaxine (EFFEXOR-XR) 37 5 mg 24 hr capsule Take 1 capsule (37 5 mg total) by mouth daily 4/25/18  Yes Jemal Bran MD   clonazePAM (KlonoPIN) 0 5 MG disintegrating tablet Take 1 tablet (0 5 mg total) by mouth daily at bedtime as needed for seizures 4/25/18 9/13/18  Jemal Bran MD   ibuprofen (MOTRIN) 600 mg tablet Take 1 tablet (600 mg total) by mouth every 6 (six) hours as needed for mild pain, moderate pain, fever or headaches for up to 5 days 2/18/18 9/13/18  Madhav Posada PA-C     I have reviewed home medications with patient personally      Allergies: No Known Allergies    Social History:     Marital Status: Single   Occupation:   Patient Pre-hospital Living Situation:   Patient Pre-hospital Level of Mobility:   Patient Pre-hospital Diet Restrictions:   Substance Use History:   History   Alcohol Use No     History   Smoking Status    Former Smoker   Smokeless Tobacco    Never Used     History   Drug Use No       Family History:    Family History   Problem Relation Age of Onset    Anxiety disorder Mother     Hypertension Father     Diabetes Father     Heart attack Maternal Grandmother         acute MI       Physical Exam:     Vitals:   Blood Pressure: 117/78 (09/13/18 1753)  Pulse: 89 (09/13/18 1753)  Temperature: 98 4 °F (36 9 °C) (09/13/18 1753)  Temp Source: Temporal (09/13/18 1351)  Respirations: 16 (09/13/18 1753)  Weight - Scale: 57 5 kg (126 lb 12 8 oz) (09/13/18 1351)  SpO2: 97 % (09/13/18 1723)    Physical Exam   Constitutional: She appears well-developed  No distress  HENT:   Head: Normocephalic and atraumatic  Right Ear: External ear normal    Left Ear: External ear normal    Mouth/Throat: No oropharyngeal exudate  Eyes: Conjunctivae are normal  Right eye exhibits no discharge  Left eye exhibits no discharge  No scleral icterus  Neck: Normal range of motion  Cardiovascular: Normal rate, regular rhythm and normal heart sounds  Exam reveals no gallop and no friction rub  No murmur heard  Pulmonary/Chest: Effort normal and breath sounds normal  No respiratory distress  She has no wheezes  She has no rales  Abdominal: Soft  She exhibits no distension  There is tenderness (umbilical hernia, reducible)  There is no rebound and no guarding  Musculoskeletal: She exhibits no edema  Neurological: She is alert  Skin: Skin is warm and dry  She is not diaphoretic  Psychiatric: She has a normal mood and affect  Vitals reviewed        (  Be Sure to Include Physical Exam: Delete this entire line when you have entered your exam)    Additional Data:     Lab Results: I have personally reviewed pertinent reports  Results from last 7 days  Lab Units 09/13/18  1508   WBC Thousand/uL 9 53   HEMOGLOBIN g/dL 7 2*   HEMATOCRIT % 26 7*   PLATELETS Thousands/uL 708*   NEUTROS PCT % 76*   LYMPHS PCT % 17   MONOS PCT % 6   EOS PCT % 1       Results from last 7 days  Lab Units 09/13/18  1507   SODIUM mmol/L 139   POTASSIUM mmol/L 3 5   CHLORIDE mmol/L 101   CO2 mmol/L 30   BUN mg/dL 9   CREATININE mg/dL 0 84   CALCIUM mg/dL 9 0   ALK PHOS U/L 185*   ALT U/L 19   AST U/L 17       Results from last 7 days  Lab Units 09/13/18  1507   INR  1 08       Imaging: will f/u cxr    No results found  EKG, Pathology, and Other Studies Reviewed on Admission:   · EKG: will f/u ekg    Allscripts / Epic Records Reviewed: Yes     ** Please Note: This note has been constructed using a voice recognition system   **

## 2018-09-13 NOTE — ED PROVIDER NOTES
History  Chief Complaint   Patient presents with    Abnormal Lab     pt reports she had blood work done yesterday and they called her and told her, her hgb was 6 4   pt denies hx of anemia, reports she is not bleeding from anywhere  states today she feels fine, but yesterday did have some abdominal cramping  59-year-old female presents for the evaluation and abnormal a low hemoglobin as noted on palpation blood work earlier today  The patient got a call from her primary care provider to presents to the emergency department  She was reportedly having a hemoglobin of 6 4 which is new for her  The patient does have a history of mild anemia which she takes iron for  The patient has had some associated dyspnea on exertion  She denies any chest pain or pressure  Her shortness of breath is worse with exertion  She has had reportedly dark stools however she is taking iron for the previously diagnosed anemia  She has never required a blood transfusion in the past             Prior to Admission Medications   Prescriptions Last Dose Informant Patient Reported?  Taking?   ferrous sulfate 325 (65 Fe) mg tablet   Yes Yes   Sig: Take 325 mg by mouth daily with breakfast   folic acid (FOLVITE) 1 mg tablet   Yes Yes   Si mg   loperamide (IMODIUM) 2 mg capsule  Self No Yes   Sig: Take 1 capsule (2 mg total) by mouth 4 (four) times a day as needed for diarrhea   methotrexate 2 5 mg tablet   Yes Yes   Si 5 mg   ondansetron (ZOFRAN) 4 mg tablet  Self No Yes   Sig: Take 1 tablet (4 mg total) by mouth every 8 (eight) hours as needed for nausea or vomiting   venlafaxine (EFFEXOR-XR) 37 5 mg 24 hr capsule  Self No Yes   Sig: Take 1 capsule (37 5 mg total) by mouth daily      Facility-Administered Medications: None       Past Medical History:   Diagnosis Date    Anxious mood     Rheumatoid arthritis ([facility] [address] )        Past Surgical History:   Procedure Laterality Date    APPENDECTOMY      ESOPHAGOGASTRODUODENOSCOPY N/A 9/14/2018    Procedure: ESOPHAGOGASTRODUODENOSCOPY (EGD) with polypectomy;  Surgeon: Himanshu Barbour MD;  Location: [facility]; Service: Gastroenterology    TUBAL LIGATION      resolved 2007    WISDOM TOOTH EXTRACTION      resolved 1990       Family History   Problem Relation Age of Onset    Anxiety disorder Mother     Hypertension Father     Diabetes Father     Heart attack Maternal Grandmother         acute MI     I have reviewed and agree with the history as documented  Social History   Substance Use Topics    Smoking status: Former Smoker    Smokeless tobacco: Never Used    Alcohol use No        Review of Systems   Constitutional: Positive for fatigue  Negative for chills and fever  Respiratory: Negative for chest tightness and shortness of breath  Cardiovascular: Negative for chest pain and leg swelling  Gastrointestinal: Negative for blood in stool, constipation, diarrhea and vomiting  Genitourinary: Negative for difficulty urinating and vaginal bleeding  Neurological: Negative for light-headedness and headaches  All other systems reviewed and are negative  Physical Exam  Physical Exam   Constitutional: She is oriented to person, place, and time  She appears well-developed and well-nourished  No distress  HENT:   Head: Normocephalic and atraumatic  Right Ear: External ear normal    Left Ear: External ear normal    Eyes: EOM are normal  Pupils are equal, round, and reactive to light  No scleral icterus  Pale conjunctiva   Neck: Normal range of motion  Cardiovascular: Normal rate, regular rhythm and normal heart sounds  Pulmonary/Chest: Effort normal and breath sounds normal  No respiratory distress  Abdominal: Soft  Bowel sounds are normal  There is no tenderness  There is no rebound and no guarding  Genitourinary: Rectal exam shows guaiac positive stool ( dark stool Clovis Nathan V RN present during exam))  Musculoskeletal: Normal range of motion  She exhibits no edema  [place]-neck deformity of the MCP joints of the right hand   Neurological: She is alert and oriented to person, place, and time  Skin: Skin is warm and dry  No rash noted  There is pallor  Psychiatric: She has a normal mood and affect  Nursing note and vitals reviewed        Vital Signs  ED Triage Vitals   Temperature Pulse Respirations Blood Pressure SpO2   09/13/18 1351 09/13/18 1351 09/13/18 1351 09/13/18 1351 09/13/18 1351   98 °F (36 7 °C) 105 16 139/76 100 %      Temp Source Heart Rate Source Patient Position - Orthostatic VS BP Location FiO2 (%)   09/13/18 1351 09/13/18 1548 09/13/18 1548 09/13/18 1548 --   Temporal Monitor Sitting Right arm       Pain Score       09/13/18 1351       No Pain           Vitals:    09/14/18 1246 09/14/18 1324 09/14/18 1334 09/14/18 1500   BP: 128/74 95/55 107/63 103/74   Pulse: 92 77 80 81   Patient Position - Orthostatic VS:    Lying       Visual Acuity      ED Medications  Medications   pantoprazole (PROTONIX) injection 40 mg (40 mg Intravenous Given 9/13/18 1722)       Diagnostic Studies  Results Reviewed     Procedure Component Value Units Date/Time    Troponin I [[id number]]  (Normal) Collected:  09/14/18 0010    Lab Status:  Final result Specimen:  Blood from Arm, Left Updated:  09/14/18 0040     Troponin I <0 02 ng/mL     Iron Saturation % [[id number]]  (Abnormal) Collected:  09/13/18 1507    Lab Status:  Final result Specimen:  Blood from Arm, Right Updated:  09/13/18 2217     Iron Saturation 4 %      TIBC 302 ug/dL      Iron 11 (L) ug/dL     Ferritin [[id number]]  (Normal) Collected:  09/13/18 1507    Lab Status:  Final result Specimen:  Blood from Arm, Right Updated:  09/13/18 2217     Ferritin 15 ng/mL     Troponin I [[id number]]  (Normal) Collected:  09/13/18 2119    Lab Status:  Final result Specimen:  Blood from Arm, Left Updated:  09/13/18 2144     Troponin I <0 02 ng/mL     Troponin I [[id number]]  (Normal) Collected:  09/13/18 1818    Lab Status:  Final result Specimen:  Blood from Arm, Left Updated:  09/13/18 1847     Troponin I <0 02 ng/mL     Protime-INR [[id number]]  (Normal) Collected:  09/13/18 1507    Lab Status:  Final result Specimen:  Blood from Arm, Right Updated:  09/13/18 1557     Protime 14 1 seconds      INR 1 08    APTT [[id number]]  (Normal) Collected:  09/13/18 1507    Lab Status:  Final result Specimen:  Blood from Arm, Right Updated:  09/13/18 1557     PTT 25 seconds     Comprehensive metabolic panel [[id number]]  (Abnormal) Collected:  09/13/18 1507    Lab Status:  Final result Specimen:  Blood from Arm, Right Updated:  09/13/18 1536     Sodium 139 mmol/L      Potassium 3 5 mmol/L      Chloride 101 mmol/L      CO2 30 mmol/L      ANION GAP 8 mmol/L      BUN 9 mg/dL      Creatinine 0 84 mg/dL      Glucose 109 mg/dL      Calcium 9 0 mg/dL      AST 17 U/L      ALT 19 U/L      Alkaline Phosphatase 185 (H) U/L      Total Protein 7 5 g/dL      Albumin 2 7 (L) g/dL      Total Bilirubin 0 36 mg/dL      eGFR 80 ml/min/1 73sq m     Narrative:         National Kidney Disease Education Program recommendations are as follows:  GFR calculation is accurate only with a steady state creatinine  Chronic Kidney disease less than 60 ml/min/1 73 sq  meters  Kidney failure less than 15 ml/min/1 73 sq  meters      Urine Microscopic [[id number]]  (Abnormal) Collected:  09/13/18 1507    Lab Status:  Final result Specimen:  Urine from Urine, Clean Catch Updated:  09/13/18 1522     RBC, UA None Seen /hpf      WBC, UA 0-1 (A) /hpf      Epithelial Cells Occasional /hpf      Bacteria, UA Occasional /hpf      MUCOUS THREADS Moderate (A)    CBC and differential [[id number]]  (Abnormal) Collected:  09/13/18 1508    Lab Status:  Final result Specimen:  Blood from Arm, Right Updated:  09/13/18 1519     WBC 9 53 Thousand/uL      RBC 4 04 Million/uL      Hemoglobin 7 2 (L) g/dL      Hematocrit 26 7 (L) %      MCV 66 (L) fL      MCH 17 8 (L) pg      MCHC 27 0 (L) g/dL      RDW 20 1 (H) %      MPV 8 2 (L) fL      Platelets 677 (H) Thousands/uL      nRBC 0 /100 WBCs      Neutrophils Relative 76 (H) %      Immat GRANS % 0 %      Lymphocytes Relative 17 %      Monocytes Relative 6 %      Eosinophils Relative 1 %      Basophils Relative 0 %      Neutrophils Absolute 7 19 Thousands/µL      Immature Grans Absolute 0 04 Thousand/uL      Lymphocytes Absolute 1 60 Thousands/µL      Monocytes Absolute 0 59 Thousand/µL      Eosinophils Absolute 0 08 Thousand/µL      Basophils Absolute 0 03 Thousands/µL     ED Urine Macroscopic [[id number]]  (Abnormal) Collected:  09/13/18 1507    Lab Status:  Final result Specimen:  Urine Updated:  09/13/18 1457     Color, UA Lesly     Clarity, UA Slightly Cloudy     pH, UA 5 5     Leukocytes, UA Negative     Nitrite, UA Negative     Protein, UA Trace (A) mg/dl      Glucose, UA Negative mg/dl      Ketones, UA Trace (A) mg/dl      Urobilinogen, UA 2 0 (A) E U /dl      Bilirubin, UA Interference- unable to analyze (A)     Blood, UA Negative     Specific Gravity, UA >=1 030    Narrative:       [facility] RESULT                 XR chest portable   Final Result by Kieran Moreno DO (09/14 7191)      No acute cardiopulmonary disease  Workstation performed: [id number]                    Procedures  Procedures       Phone Contacts  ED Phone Contact    ED Course  ED Course as of Sep 15 2312   Thu Sep 13, 2018   1710 5:11 PM  I discussed the case with the hospitalist  We reviewed the HPI, pertinent PMH, ED course and workup  Hospitalist agreed with plan and will admit the patient to the hospital                                 MDM  Number of Diagnoses or Management Options  GI bleed: new and requires workup  Severe anemia: new and requires workup  Diagnosis management comments: The plan is to recheck laboratories to confirm anemia  Patient will be typed and screen  Rectal exam performed started patient is a positive   Blood consent obtained with all questions being answered prior to patient's signing form  Note patient's hemoglobin his 7 2 on laboratories here  Discussed case with Dr  and Raul Henry for admission  She advised  transfusion 1 unit packed cells and agree with the plan for admission       Amount and/or Complexity of Data Reviewed  Clinical lab tests: ordered and reviewed  Review and summarize past medical records: yes (Hemoglobin is 6 4 from this morning noted  That is a change from previous hemoglobin 11)      CritCare Time    Disposition  Final diagnoses:   Severe anemia   GI bleed     Time reflects when diagnosis was documented in both MDM as applicable and the Disposition within this note     Time User Action Codes Description Comment    9/13/2018  5:11 PM Charline Yarsanism Add [D64 9] Severe anemia     9/13/2018  5:12 PM Charline Escobedotist Add [K92 2] GI bleed     9/13/2018  6:02 PM Dagmar Vasquez Add [R19 5] Heme positive stool     9/13/2018  6:02 PM Licha BERRIOS Modify [R19 5] Heme positive stool     9/14/2018  1:21 PM [place] Martin [D64 9] Severe anemia     9/14/2018  1:21 PM Iman Martin [K92 2] GI bleed     9/14/2018  4:11 PM Wyatt Aguilera Modify [D64 9] Severe anemia     9/14/2018  4:11 PM Esequiel Vasquez Modify [K92 2] GI bleed       ED Disposition     ED Disposition Condition Comment    Admit  Case was discussed with Dr Abundio Leyva and the patient's admission status was agreed to be Admission Status: inpatient status to the service of Dr Abundio Leyva           Follow-up Information     Follow up With Specialties Details Why Rosa Hutton MD Family Medicine Schedule an appointment as soon as possible for a visit in 1 week(s)  [address] [facility]      Karon Hernandez MD Gastroenterology Schedule an appointment as soon as possible for a visit in 1 month(s)  Toby Mehta 1998 [address] [facility]  [phone]            Discharge Medication List as of 9/14/2018  4:30 PM CONTINUE these medications which have CHANGED    Details   ferrous sulfate 325 (65 Fe) mg tablet Take 1 tablet (325 mg total) by mouth 3 (three) times a day with meals, Starting Fri 9/14/2018, Print         CONTINUE these medications which have NOT CHANGED    Details   folic acid (FOLVITE) 1 mg tablet 1 mg, Starting Tue 8/7/2018, Historical Med      loperamide (IMODIUM) 2 mg capsule Take 1 capsule (2 mg total) by mouth 4 (four) times a day as needed for diarrhea, Starting Sat 7/14/2018, Normal      methotrexate 2 5 mg tablet 2 5 mg, Starting Tue 8/7/2018, Historical Med      ondansetron (ZOFRAN) 4 mg tablet Take 1 tablet (4 mg total) by mouth every 8 (eight) hours as needed for nausea or vomiting, Starting Sat 7/14/2018, Normal      venlafaxine (EFFEXOR-XR) 37 5 mg 24 hr capsule Take 1 capsule (37 5 mg total) by mouth daily, Starting Wed 4/25/2018, Normal             Outpatient Discharge Orders  CBC and Platelet   Standing Status: Future  Standing Exp   Date: 09/14/19     Discharge Diet         ED Provider  Electronically Signed by           Radha Pearl, DO  09/13/18 [address] [place] [address], DO  09/15/18 1126

## 2018-09-14 ENCOUNTER — ANESTHESIA EVENT (INPATIENT)
Dept: GASTROENTEROLOGY | Facility: HOSPITAL | Age: 53
DRG: 812 | End: 2018-09-14
Payer: COMMERCIAL

## 2018-09-14 ENCOUNTER — ANESTHESIA (INPATIENT)
Dept: GASTROENTEROLOGY | Facility: HOSPITAL | Age: 53
DRG: 812 | End: 2018-09-14
Payer: COMMERCIAL

## 2018-09-14 VITALS
OXYGEN SATURATION: 100 % | SYSTOLIC BLOOD PRESSURE: 103 MMHG | DIASTOLIC BLOOD PRESSURE: 74 MMHG | BODY MASS INDEX: 21 KG/M2 | TEMPERATURE: 98.7 F | HEIGHT: 64 IN | RESPIRATION RATE: 18 BRPM | WEIGHT: 123.02 LBS | HEART RATE: 81 BPM

## 2018-09-14 PROBLEM — R06.00 DOE (DYSPNEA ON EXERTION): Status: RESOLVED | Noted: 2018-09-13 | Resolved: 2018-09-14

## 2018-09-14 PROBLEM — K92.2 GI BLEED: Status: ACTIVE | Noted: 2018-09-13

## 2018-09-14 PROBLEM — R19.5 HEME POSITIVE STOOL: Status: RESOLVED | Noted: 2018-09-13 | Resolved: 2018-09-14

## 2018-09-14 PROBLEM — D75.839 THROMBOCYTOSIS: Status: RESOLVED | Noted: 2018-09-13 | Resolved: 2018-09-14

## 2018-09-14 PROBLEM — D64.9 SEVERE ANEMIA: Status: ACTIVE | Noted: 2018-09-13

## 2018-09-14 PROBLEM — K92.2 GI BLEED: Status: RESOLVED | Noted: 2018-09-13 | Resolved: 2018-09-14

## 2018-09-14 LAB
ABO GROUP BLD BPU: NORMAL
BPU ID: NORMAL
EXT PREGNANCY TEST URINE: NEGATIVE
HCT VFR BLD AUTO: 26.1 % (ref 34.8–46.1)
HCT VFR BLD AUTO: 27.8 % (ref 34.8–46.1)
HGB BLD-MCNC: 7.4 G/DL (ref 11.5–15.4)
HGB BLD-MCNC: 7.9 G/DL (ref 11.5–15.4)
TROPONIN I SERPL-MCNC: <0.02 NG/ML
UNIT DISPENSE STATUS: NORMAL
UNIT PRODUCT CODE: NORMAL
UNIT RH: NORMAL

## 2018-09-14 PROCEDURE — 88305 TISSUE EXAM BY PATHOLOGIST: CPT | Performed by: PATHOLOGY

## 2018-09-14 PROCEDURE — 85018 HEMOGLOBIN: CPT | Performed by: PHYSICIAN ASSISTANT

## 2018-09-14 PROCEDURE — 84484 ASSAY OF TROPONIN QUANT: CPT | Performed by: PHYSICIAN ASSISTANT

## 2018-09-14 PROCEDURE — 81025 URINE PREGNANCY TEST: CPT | Performed by: ANESTHESIOLOGY

## 2018-09-14 PROCEDURE — 99239 HOSP IP/OBS DSCHRG MGMT >30: CPT | Performed by: PHYSICIAN ASSISTANT

## 2018-09-14 PROCEDURE — C9113 INJ PANTOPRAZOLE SODIUM, VIA: HCPCS | Performed by: PHYSICIAN ASSISTANT

## 2018-09-14 PROCEDURE — 0DB68ZX EXCISION OF STOMACH, VIA NATURAL OR ARTIFICIAL OPENING ENDOSCOPIC, DIAGNOSTIC: ICD-10-PCS | Performed by: INTERNAL MEDICINE

## 2018-09-14 PROCEDURE — 85014 HEMATOCRIT: CPT | Performed by: PHYSICIAN ASSISTANT

## 2018-09-14 RX ORDER — FERROUS SULFATE 325(65) MG
325 TABLET ORAL
Qty: 90 TABLET | Refills: 0 | Status: SHIPPED | OUTPATIENT
Start: 2018-09-14 | End: 2020-06-05 | Stop reason: HOSPADM

## 2018-09-14 RX ORDER — FERROUS SULFATE 325(65) MG
325 TABLET ORAL
Qty: 90 TABLET | Refills: 0 | Status: SHIPPED | OUTPATIENT
Start: 2018-09-14 | End: 2018-09-14

## 2018-09-14 RX ORDER — PROPOFOL 10 MG/ML
INJECTION, EMULSION INTRAVENOUS AS NEEDED
Status: DISCONTINUED | OUTPATIENT
Start: 2018-09-14 | End: 2018-09-14 | Stop reason: SURG

## 2018-09-14 RX ORDER — SODIUM CHLORIDE 9 MG/ML
125 INJECTION, SOLUTION INTRAVENOUS CONTINUOUS
Status: DISCONTINUED | OUTPATIENT
Start: 2018-09-14 | End: 2018-09-14 | Stop reason: HOSPADM

## 2018-09-14 RX ADMIN — FOLIC ACID 1 MG: 1 TABLET ORAL at 09:33

## 2018-09-14 RX ADMIN — SODIUM CHLORIDE 125 ML/HR: 0.9 INJECTION, SOLUTION INTRAVENOUS at 12:56

## 2018-09-14 RX ADMIN — PROPOFOL 150 MG: 10 INJECTION, EMULSION INTRAVENOUS at 13:13

## 2018-09-14 RX ADMIN — PANTOPRAZOLE SODIUM 40 MG: 40 INJECTION, POWDER, FOR SOLUTION INTRAVENOUS at 09:33

## 2018-09-14 RX ADMIN — PROPOFOL 50 MG: 10 INJECTION, EMULSION INTRAVENOUS at 13:19

## 2018-09-14 RX ADMIN — FERROUS SULFATE TAB 325 MG (65 MG ELEMENTAL FE) 325 MG: 325 (65 FE) TAB at 09:33

## 2018-09-14 RX ADMIN — LIDOCAINE HYDROCHLORIDE 60 MG: 20 INJECTION, SOLUTION INTRAVENOUS at 13:13

## 2018-09-14 NOTE — OP NOTE
OPERATIVE REPORT  PATIENT NAME: Phan Toth    :  1965  MRN: [id number]  Pt Location: [facility]    SURGERY DATE: 2018    Surgeon(s) and Role:     * Sara Yoon MD - Primary    Preop Diagnosis:  GI bleed [K92 2]  Severe anemia [D64 9]    Post-Op Diagnosis Codes:     * GI bleed [K92 2]     * Severe anemia [D64 9]    Procedure(s) (LRB):  ESOPHAGOGASTRODUODENOSCOPY (EGD) with polypectomy (N/A)    Specimen(s):  ID Type Source Tests Collected by Time Destination   1 : polyp Tissue Polyp, Stomach/Small Intestine TISSUE EXAM Sara Yoon MD 2018 1320        Estimated Blood Loss:   Minimal    Drains:       Anesthesia Type:   Conscious Sedation     Operative Indications:  GI bleed [K92 2]  Severe anemia [D64 9]      Operative Findings:  Hiatal hernia Schatzki's ring gastric polyps    Complications:   None    Procedure and Technique: The endoscope was introduced without difficulty  Esophagus was normal down to a slight Schatzki's ring and a small hiatal hernia  Esophagogastric junction is normal from below  Several tiny polyps on the greater curvature midbody 1 was sampled  There is no evidence of any  recent bleeding or ulceration or even any gastritis    The upper duodenum is normal    I was present for the entire procedure    Patient Disposition:  hemodynamically stable    SIGNATURE: Sara Yoon MD  DATE: 2018  TIME: 1:21 PM

## 2018-09-14 NOTE — DISCHARGE SUMMARY
Discharge- Sofía Callahan 1965, 48 y o  female MRN: [id number]    Unit/Bed#: E2 -01 Encounter: [id number]    Primary Care Provider: Jourdan Badillo MD   Date and time admitted to hospital: 9/13/2018  2:40 PM        Discharge Summary - [facility]  Internal Medicine    Patient Information: Sofía Callahan 48 y o  female MRN: [id number]  Unit/Bed#: E2 -01 Encounter: [id number]    Discharging Physician / Practitioner: Ronda Claire PA-C  PCP: Jourdan Badillo MD  Admission Date: 9/13/2018  Discharge Date: 09/14/18    Disposition:     Home    Reason for Admission: symptomatic anemia, r/o gi bleed    Discharge Diagnoses:     Principal Problem:    Iron deficiency anemia  Active Problems:    Rheumatoid arthritis involving both hands with negative rheumatoid factor (HCC)    Severe anemia  Resolved Problems: Thrombocytosis (HCC)    Heme positive stool    COFFEY (dyspnea on exertion)    GI bleed      Consultations During Hospital Stay:  · gi    Procedures Performed:     · egd  · Transfusion 1 IU PRBC    Significant Findings / Test Results:     · Anemia  Iron deficiency severe  · egd w/small hiatal hernia small schatzki ring    Incidental Findings:   · none     Test Results Pending at Discharge (will require follow up):   · none     Outpatient Tests Requested:  · Cbc and platelet on 2/73/75  · colonoscopy    Complications:  none    Hospital Course:     Sofía Callahan is a 48 y o  female patient who originally presented to the hospital on 9/13/2018 w/ PMH of rheumatoid arthritis coming to hospital for abnormal lab value  Patient is a pleasant 45-year-old female who sent in by PCP and rheumatologist for low hemoglobin  Patient reports that she has been having fatigue since July in 2018 when she had a severe episode of gastro enteritis and was having recurrent nausea vomiting diarrhea  She has noted that she has been having dyspnea on exertion but this is been stable    No chest pain no cough fevers or chills  She was recently started on methotrexate 1 month prior for her rheumatoid arthritis as well as iron and folic acid  She reports that she has been compliant with all these medications and rheumatism is feeling better  She does note that since she started this she has been having dark black stools but suspected this was her iron pills  She does know that prior to starting methotrexate she used to [facility] [place] or location, whenever I get my hands on" and reported that she frequently had to take multiple doses these medications to help control her rheumatoid arthritis  She also drinks coffee once daily  She has never had a colonoscopy before  No family hx or personal hx of ibd  On review of labs her mcv was 80 in 04/2018 w/mild anemia of hgb of 11  She had ferritin iron panel in July that were both low  The patient's hospital stay is by problem list below  She is stable for d/c and her s/sx improved quicker than anticipated  Pt will be discharged home w/close f/u with pcp and gi      * Iron deficiency anemia   Assessment & Plan    Symptomatic   chronic as evidenced by ferritin and iron panel in July 2018 likely secondary to NSAID overuse for rheumatoid arthritis  Concern for hemorrhagic gastritis vs PUD  Appropriate improvement w/transfusion of 1 IU PRBC  Folic acid level normal while on methotrexate, EGD demonstrates small schatzki ring and small hiatal hernia no gastritis or ulcer or s/sx of bleeding  Pt appetite okay, no pain, and anxious to go home  D/w gastroenterology  Repeat cbc and platelet w/pcp on 6/33/59  Rx provided for fax results to pcp  GI to call and schedule for office visit for colonoscopy  Stressed need for nonulcerogenic diet and need for colonoscopy      Increase FeSO4 to 3x/daily ac        Rheumatoid arthritis involving both hands with negative rheumatoid factor ([facility] [address] )   Assessment & Plan    Started on methotrexate last month per her rheumatologist  Continue methotrexate            Condition at Discharge: good     Discharge Day Visit / Exam:     Subjective:  See same day progress note for assessment and plan  Vitals: Blood Pressure: 103/74 (09/14/18 1500)  Pulse: 81 (09/14/18 1500)  Temperature: 98 7 °F (37 1 °C) (09/14/18 1500)  Temp Source: Tympanic (09/14/18 1500)  Respirations: 18 (09/14/18 1500)  Height: 5' 4" (162 6 cm) (09/13/18 1931)  Weight - Scale: 55 8 kg (123 lb 0 3 oz) (09/13/18 1931)  SpO2: 100 % (09/14/18 1500)  Exam:   Physical Exam  (  Be Sure to Include Physical Exam: Delete this entire line when you have entered your exam)  Discussion with Family: f/u needs    Discharge instructions/Information to patient and family:   See after visit summary for information provided to patient and family  Provisions for Follow-Up Care:  See after visit summary for information related to follow-up care and any pertinent home health orders  Planned Readmission: none     Discharge Statement:  I spent 45 minutes discharging the patient  This time was spent on the day of discharge  I had direct contact with the patient on the day of discharge  Greater than 50% of the total time was spent examining patient, answering all patient questions, arranging and discussing plan of care with patient as well as directly providing post-discharge instructions  Additional time then spent on discharge activities  Discharge Medications:  See after visit summary for reconciled discharge medications provided to patient and family        ** Please Note: This note has been constructed using a voice recognition system **

## 2018-09-14 NOTE — CASE MANAGEMENT
Initial Clinical Review    Admission: Date/Time/Statement: 9/13/18 @ 1714     Orders Placed This Encounter   Procedures    Inpatient Admission (expected length of stay for this patient is greater than two midnights)     Standing Status:   Standing     Number of Occurrences:   1     Order Specific Question:   Admitting Physician     Answer:   Ladonna Goldberg     Order Specific Question:   Level of Care     Answer:   Med Surg [16]     Order Specific Question:   Estimated length of stay     Answer:   More than 2 Midnights     Order Specific Question:   Certification     Answer:   I certify that inpatient services are medically necessary for this patient for a duration of greater than two midnights  See H&P and MD Progress Notes for additional information about the patient's course of treatment  ED: Date/Time/Mode of Arrival:   ED Arrival Information     Expected Arrival Acuity Means of Arrival Escorted By Service Admission Type    - 9/13/2018 13:45 Urgent Walk-In Self General Medicine Urgent    Arrival Complaint    abnormal lab results          Chief Complaint:   Chief Complaint   Patient presents with    Abnormal Lab     pt reports she had blood work done yesterday and they called her and told her, her hgb was 6 4   pt denies hx of anemia, reports she is not bleeding from anywhere  states today she feels fine, but yesterday did have some abdominal cramping  History of Illness: Miguel He is a 48 y o  female who presents with PMH of rheumatoid arthritis coming to hospital for abnormal lab value  Patient is a pleasant 72-year-old female who sent in by PCP and rheumatologist for low hemoglobin  Patient reports that she has been having fatigue since July in 2018 when she had a severe episode of gastro enteritis and was having recurrent nausea vomiting diarrhea  She has noted that she has been having dyspnea on exertion but this is been stable  No chest pain no cough fevers or chills    She was recently started on methotrexate 1 month prior for her rheumatoid arthritis as well as iron and folic acid  She reports that she has been compliant with all these medications and rheumatism is feeling better  She does note that since she started this she has been having dark black stools but suspected this was her iron pills  She does know that prior to starting methotrexate she used to [facility], Aleve or location, whenever I get my hands on" and reported that she frequently had to take multiple doses these medications to help control her rheumatoid arthritis  She also drinks coffee once daily  She has never had a colonoscopy before  No family hx or personal hx of ibd  On review of labs her mcv was 80 in 04/2018 w/mild anemia of hgb of 11  She had ferritin iron panel in July that were both low        ED Vital Signs:   ED Triage Vitals   Temperature Pulse Respirations Blood Pressure SpO2   09/13/18 1351 09/13/18 1351 09/13/18 1351 09/13/18 1351 09/13/18 1351   98 °F (36 7 °C) 105 16 139/76 100 %      Temp Source Heart Rate Source Patient Position - Orthostatic VS BP Location FiO2 (%)   09/13/18 1351 09/13/18 1548 09/13/18 1548 09/13/18 1548 --   Temporal Monitor Sitting Right arm       Pain Score       09/13/18 1351       No Pain        Wt Readings from Last 1 Encounters:   09/13/18 55 8 kg (123 lb 0 3 oz)       Vital Signs (abnormal):    above    Abnormal Labs/Diagnostic Test Results:    Iron   11  Alk phos  185  Albumin   2 7  H/H  7 2/26 7       Repeat  H/H   7 5/27 1    MCV   66  MCH   17 8  Platelets   586  CXR:  NAD    ED Treatment:   Medication Administration from 09/13/2018 1345 to 09/13/2018 1926       Date/Time Order Dose Route Action Action by Comments     09/13/2018 1722 pantoprazole (PROTONIX) injection 40 mg 40 mg Intravenous Given Leticia Zee RN           Past Medical/Surgical History:    Active Ambulatory Problems     Diagnosis Date Noted    Arthritis 04/16/2013    Hay fever 04/16/2013    Herpes zoster 10/05/2017    Seasonal allergies 06/20/2015    Anxiety 03/21/2018    Hot flashes 03/21/2018    Impaired fasting glucose 03/21/2018    Fatigue 03/21/2018    Rheumatoid arthritis involving both hands with negative rheumatoid factor ([facility] ) 03/21/2018    Other hyperlipidemia 03/21/2018     Resolved Ambulatory Problems     Diagnosis Date Noted    No Resolved Ambulatory Problems     Past Medical History:   Diagnosis Date    Anxious mood     Rheumatoid arthritis ([facility] )        Admitting Diagnosis: GI bleed [K92 2]  Heme positive stool [R19 5]  Severe anemia [D64 9]  Abnormal respiratory laboratory results [R84 9]    Age/Sex: 48 y o  female    Assessment/Plan:   Iron deficiency anemia   Assessment & Plan     Symptomatic   chronic as evidenced by ferritin and iron panel in July 2018 likely secondary to NSAID overuse for rheumatoid arthritis  Concern for hemorrhagic gastritis vs PUD  Has been on iron/folic acid since starting methotrexate last month as well  Agree w/transfusion of 1 IU PRBC  Clear liquid diet, check h/h q 6 h, folic acid level and consult GI, agree w/IV PPI will continue daily          COFFEY (dyspnea on exertion)   Assessment & Plan     Likely 2* anemia, will check ekg/cxr/cycle troponins for completenessa          Heme positive stool   Assessment & Plan     2* iron supplementation over last month vs GI bleed   work up as above     Thrombocytosis ([facility] )   Assessment & Plan     In setting of RA continue to monitor          Rheumatoid arthritis involving both hands with negative rheumatoid factor (HCC)   Assessment & Plan     Started on methotrexate last month per her rheumatologist  Continue methotrexate      Anticipated Length of Stay:  Patient will be admitted on an Inpatient basis with an anticipated length of stay of  Greater than 2 midnights     Justification for Hospital Stay: symptomatic anemia workup      Admission Orders:  Charan    9/13  @   1715  Scheduled Meds: Current Facility-Administered Medications:  ferrous sulfate 325 mg Oral Daily With Breakfast Janey Vasquez PA-C   folic acid 1 mg Oral Daily Janey Johns PA-C   [START ON 9/15/2018] methotrexate 2 5 mg Oral Weekly Janey Vasquez PA-C   ondansetron 4 mg Intravenous Q6H PRN Janey Vasquez PA-C   pantoprazole 40 mg Intravenous Q12H Janey Vasquez PA-C   venlafaxine 37 5 mg Oral Daily Janey Vasquez PA-C     Continuous Infusions:    PRN Meds: ondansetron       NPO  Cons GI  Serial troponin  H/H  Q 6 hrs  EGD  9/14  1  U  PRBC      Per  Gi Consult:  Acute on chronic iron-deficiency anemia with history of excessive NSAID use  Possible upper GI bleed secondary to peptic ulcer disease or gastritis  NPO  EGD    Karen Johnson is a 48 y o  female who was admitted with Iron deficiency anemia  She patient presented to the hospital after being seen by her rheumatologist with abnormal lab value  She was noted to have a hemoglobin of 6 4 with an MCV of 67 and MCH 17 2  BUN is 9  Patient states that she was noted to have anemia several months ago with a hemoglobin of 11 had been started on oral iron supplement  She has a history of arthritis and had been taking Aleve Advil Motrin and very high doses due to extreme pain  She was then started on methotrexate and stop taking the NSAIDs  She has noted some black stools over the last month but she attributed that to her iron supplement  She has also noted some increasing fatigue  She did have some periumbilical pain and in July of 2018 she had a severe episode of gastroenteritis with nausea vomiting and diarrhea  She has not had diarrhea recently  She has not seen any bright red blood per rectum  She has no complaints of any epigastric pain reflux or burning  She has never had an EGD or colonoscopy  She has no family history of GI malignancy or colon polyps  Ferritin in July was low at 6 and iron was low at 8    She has received 1 unit of packed red blood cells and her hemoglobin is currently 7 4  Thank you,  [address]  Utilization Review Department  Phone: [phone]; Fax [phone]  ATTENTION: Please call with any questions or concerns to [phone]  and carefully follow the prompts so that you are directed to the right person  Send all requests for admission clinical reviews, approved or denied determinations and any other requests to fax [phone]  All voicemails are confidential         Ppi b i d  Check H&H frequently transfuse as needed  2    Rheumatoid arthritis

## 2018-09-14 NOTE — PROGRESS NOTES
Progress Note - Wilmer Pack 1965, 48 y o  female MRN: [id number]    Unit/Bed#: E2 -01 Encounter: [id number]    Primary Care Provider: Afsaneh Renee MD   Date and time admitted to hospital: 9/13/2018  2:40 PM        * Iron deficiency anemia   Assessment & Plan    Symptomatic   chronic as evidenced by ferritin and iron panel in July 2018 likely secondary to NSAID overuse for rheumatoid arthritis  Concern for hemorrhagic gastritis vs PUD  Appropriate improvement w/transfusion of 1 IU PRBC  Folic acid level normal while on methotrexate, patient for EGD today  Change diet to npo as outlined in GI documentation, continue IV PPI  Repeat h/h in am        COFFEY (dyspnea on exertion)   Assessment & Plan    Likely 2* anemia, acs negative        Heme positive stool   Assessment & Plan    2* iron supplementation over last month vs GI bleed   work up as above        Rheumatoid arthritis involving both hands with negative rheumatoid factor ([facility] )   Assessment & Plan    Started on methotrexate last month per her rheumatologist  Continue methotrexate          VTE Pharmacologic Prophylaxis:   Pharmacologic: Pharmacologic VTE Prophylaxis contraindicated due to gi bleed  Mechanical VTE Prophylaxis in Place: No    Patient Centered Rounds: I have performed bedside rounds with nursing staff today  Discussions with Specialists or Other Care Team Provider:     Education and Discussions with Family / Patient:     Time Spent for Care: 20 minutes  More than 50% of total time spent on counseling and coordination of care as described above  Current Length of Stay: 1 day(s)    Current Patient Status: Inpatient   Certification Statement: The patient will continue to require additional inpatient hospital stay due to gi bleed    Discharge Plan: after egd and possible colonoscopy    Code Status: Level 1 - Full Code      Subjective:   Patient seen examined  No events overnight per nursing    Patient has not had a bowel movement since admission  She has no chest pain or shortness of breath her epigastric pain is only mild today  No n/v/f/c  In good spirits  Objective:     Vitals:   Temp (24hrs), Av 9 °F (36 6 °C), Min:96 8 °F (36 °C), Max:98 4 °F (36 9 °C)    HR:  [] 66  Resp:  [16-24] 18  BP: (108-139)/(60-82) 112/81  SpO2:  [96 %-100 %] 99 %  Body mass index is 21 12 kg/m²  Input and Output Summary (last 24 hours):     No intake or output data in the 24 hours ending 18 1126    Physical Exam:     Physical Exam   Constitutional: She appears well-developed  No distress  HENT:   Head: Normocephalic and atraumatic  Right Ear: External ear normal    Left Ear: External ear normal    Eyes: Conjunctivae are normal  Right eye exhibits no discharge  Left eye exhibits no discharge  No scleral icterus  Neck: Normal range of motion  Cardiovascular: Normal rate, regular rhythm and normal heart sounds  Exam reveals no gallop and no friction rub  No murmur heard  Pulmonary/Chest: Effort normal  No respiratory distress  She has no wheezes  She has no rales  Abdominal: Soft  She exhibits no distension  There is no tenderness  There is no rebound and no guarding  Musculoskeletal: She exhibits no edema  Neurological: She is alert  Skin: Skin is warm and dry  She is not diaphoretic  Psychiatric: She has a normal mood and affect  Vitals reviewed  (  Be Sure to Include Physical Exam: Delete this entire line when you have entered your exam)    Additional Data:     Labs:      Results from last 7 days  Lab Units 18  1001  18  1508   WBC Thousand/uL  --   --  9 53   HEMOGLOBIN g/dL 7 9*  < > 7 2*   HEMATOCRIT % 27 8*  < > 26 7*   PLATELETS Thousands/uL  --   --  708*   NEUTROS PCT %  --   --  76*   LYMPHS PCT %  --   --  17   MONOS PCT %  --   --  6   EOS PCT %  --   --  1   < > = values in this interval not displayed      Results from last 7 days  Lab Units 18  1507   SODIUM mmol/L 139 POTASSIUM mmol/L 3 5   CHLORIDE mmol/L 101   CO2 mmol/L 30   BUN mg/dL 9   CREATININE mg/dL 0 84   CALCIUM mg/dL 9 0   ALK PHOS U/L 185*   ALT U/L 19   AST U/L 17       Results from last 7 days  Lab Units 09/13/18  1507   INR  1 08                 * I Have Reviewed All Lab Data Listed Above  * Additional Pertinent Lab Tests Reviewed: All Labs Within Last 24 Hours Reviewed    Imaging:    Imaging Reports Reviewed Today Include:   Imaging Personally Reviewed by Myself Includes:      Recent Cultures (last 7 days):           Last 24 Hours Medication List:     Current Facility-Administered Medications:  ferrous sulfate 325 mg Oral Daily With Breakfast Janey Vasquez PA-C   folic acid 1 mg Oral Daily Janey Vasquez PA-C   [START ON 9/15/2018] methotrexate 2 5 mg Oral Weekly Janey Vasquez PA-C   ondansetron 4 mg Intravenous Q6H PRN Janey Vasquez PA-C   pantoprazole 40 mg Intravenous Q12H Janey Vasquez PA-C   venlafaxine 37 5 mg Oral Daily Janey Pulido PA-C        Today, Patient Was Seen By: Haile Mazariegos PA-C    ** Please Note: Dictation voice to text software may have been used in the creation of this document   **

## 2018-09-14 NOTE — H&P (VIEW-ONLY)
Patient MRN: [id number]  Date of Service: 9/14/2018  Referring Physician: Dr Felipe Love  Provider Creating Note: CEDRIC Martins  PCP: Joao Cabrera  Reason for Consult:  Severe anemia with heme-positive stool  HPI  Teresa England is a 48 y o  female who was admitted with Iron deficiency anemia  She patient presented to the hospital after being seen by her rheumatologist with abnormal lab value  She was noted to have a hemoglobin of 6 4 with an MCV of 67 and MCH 17 2  BUN is 9  Patient states that she was noted to have anemia several months ago with a hemoglobin of 11 had been started on oral iron supplement  She has a history of arthritis and had been taking Aleve Advil Motrin and very high doses due to extreme pain  She was then started on methotrexate and stop taking the NSAIDs  She has noted some black stools over the last month but she attributed that to her iron supplement  She has also noted some increasing fatigue  She did have some periumbilical pain and in July of 2018 she had a severe episode of gastroenteritis with nausea vomiting and diarrhea  She has not had diarrhea recently  She has not seen any bright red blood per rectum  She has no complaints of any epigastric pain reflux or burning  She has never had an EGD or colonoscopy  She has no family history of GI malignancy or colon polyps  Ferritin in July was low at 6 and iron was low at 8  She has received 1 unit of packed red blood cells and her hemoglobin is currently 7 4  Past Medical History:   Diagnosis Date    Anxious mood     Rheumatoid arthritis ([facility] )      Past Surgical History:   Procedure Laterality Date    APPENDECTOMY      TUBAL LIGATION      resolved 2007    WISDOM TOOTH EXTRACTION      resolved 1990     Medications  Home Medications:   Prior to Admission medications    Medication Sig Start Date End Date Taking?  Authorizing Provider   ferrous sulfate 325 (65 Fe) mg tablet Take 325 mg by mouth daily with breakfast Yes Historical Provider, MD   folic acid (FOLVITE) 1 mg tablet 1 mg 8/7/18  Yes Historical Provider, MD   loperamide (IMODIUM) 2 mg capsule Take 1 capsule (2 mg total) by mouth 4 (four) times a day as needed for diarrhea 7/14/18  Yes Sunday Cueva PA-C   methotrexate 2 5 mg tablet 2 5 mg 8/7/18  Yes Historical Provider, MD   ondansetron (ZOFRAN) 4 mg tablet Take 1 tablet (4 mg total) by mouth every 8 (eight) hours as needed for nausea or vomiting 7/14/18  Yes Sunday Cueva PA-C   venlafaxine (EFFEXOR-XR) 37 5 mg 24 hr capsule Take 1 capsule (37 5 mg total) by mouth daily 4/25/18  Yes Pina Dailey MD       Inhouse Medications    Current Facility-Administered Medications:     ferrous sulfate tablet 325 mg, 325 mg, Oral, Daily With Breakfast, 325 mg at 81/97/30 5917    folic acid (FOLVITE) tablet 1 mg, 1 mg, Oral, Daily, 1 mg at 09/14/18 0933    [START ON 9/15/2018] methotrexate tablet 2 5 mg, 2 5 mg, Oral, Weekly    ondansetron (ZOFRAN) injection 4 mg, 4 mg, Intravenous, Q6H PRN    pantoprazole (PROTONIX) injection 40 mg, 40 mg, Intravenous, Q12H, 40 mg at 09/14/18 0933    venlafaxine (EFFEXOR-XR) 24 hr capsule 37 5 mg, 37 5 mg, Oral, Daily    Allergies  No Known Allergies  Social History   reports that she has quit smoking  She has never used smokeless tobacco  She reports that she does not drink alcohol or use drugs  Family History  Family History   Problem Relation Age of Onset    Anxiety disorder Mother     Hypertension Father     Diabetes Father     Heart attack Maternal Grandmother         acute MI     ROS  ROS: Denies CP, epigastric pain nausea or vomiting, positive for shortness of breath positive for periumbilical pain   All others negative except as noted in HPI  Objective   Vitals  Blood pressure 112/81, pulse 66, temperature 98 1 °F (36 7 °C), temperature source Temporal, resp   rate 18, height 5' 4" (1 626 m), weight 55 8 kg (123 lb 0 3 oz), SpO2 99 %, not currently breastfeeding  General: Alert, no apparent distress  Eyes: No scleral icterus, pale conjunctiva  ENT: MMM  Card: RRR no murmur  Lungs: Clear to ascultation b/l  No wheezes, rales, rhonchi  Abdomen: Soft  Positive periumbilical tenderness and umbilical hernia  Nondistended  Bowel sounds present and normoactive  Skin: No jaundice  Neuro: Alert and oriented x3          Laboratory Studies  Lab Results   Component Value Date    WBC 9 53 09/13/2018    HGB 7 4 (L) 09/14/2018    HCT 26 1 (L) 09/14/2018     (H) 09/13/2018    MCV 66 (L) 09/13/2018     Lab Results   Component Value Date    CREATININE 0 84 09/13/2018    BUN 9 09/13/2018    K 3 5 09/13/2018     09/13/2018    CO2 30 09/13/2018    CALCIUM 9 0 09/13/2018    ALKPHOS 185 (H) 09/13/2018    AST 17 09/13/2018    ALT 19 09/13/2018     Lab Results   Component Value Date    PROTIME 14 1 09/13/2018    INR 1 08 09/13/2018       Imaging and Other Studies      Assessment and Plan:  1  Acute on chronic iron-deficiency anemia with history of excessive NSAID use  Possible upper GI bleed secondary to peptic ulcer disease or gastritis  NPO  EGD  Ppi b i d  Check H&H frequently transfuse as needed  2    Rheumatoid arthritis    Principal Problem:    Iron deficiency anemia  Active Problems:    Rheumatoid arthritis involving both hands with negative rheumatoid factor (HCC)    Thrombocytosis (HCC)    Heme positive stool    COFFEY (dyspnea on exertion)      CEDRIC Kay

## 2018-09-14 NOTE — CONSULTS
Patient MRN: [id number]  Date of Service: 9/14/2018  Referring Physician: Dr Jessa Cerrato  Provider Creating Note: CEDRIC Gifford  PCP: Pina Dailey  Reason for Consult:  Severe anemia with heme-positive stool  HPI  Baron Stock is a 48 y o  female who was admitted with Iron deficiency anemia  She patient presented to the hospital after being seen by her rheumatologist with abnormal lab value  She was noted to have a hemoglobin of 6 4 with an MCV of 67 and MCH 17 2  BUN is 9  Patient states that she was noted to have anemia several months ago with a hemoglobin of 11 had been started on oral iron supplement  She has a history of arthritis and had been taking Aleve Advil Motrin and very high doses due to extreme pain  She was then started on methotrexate and stop taking the NSAIDs  She has noted some black stools over the last month but she attributed that to her iron supplement  She has also noted some increasing fatigue  She did have some periumbilical pain and in July of 2018 she had a severe episode of gastroenteritis with nausea vomiting and diarrhea  She has not had diarrhea recently  She has not seen any bright red blood per rectum  She has no complaints of any epigastric pain reflux or burning  She has never had an EGD or colonoscopy  She has no family history of GI malignancy or colon polyps  Ferritin in July was low at 6 and iron was low at 8  She has received 1 unit of packed red blood cells and her hemoglobin is currently 7 4  Past Medical History:   Diagnosis Date    Anxious mood     Rheumatoid arthritis ([facility] [address] )      Past Surgical History:   Procedure Laterality Date    APPENDECTOMY      TUBAL LIGATION      resolved 2007    WISDOM TOOTH EXTRACTION      resolved 1990     Medications  Home Medications:   Prior to Admission medications    Medication Sig Start Date End Date Taking?  Authorizing Provider   ferrous sulfate 325 (65 Fe) mg tablet Take 325 mg by mouth daily with breakfast Yes Historical Provider, MD   folic acid (FOLVITE) 1 mg tablet 1 mg 8/7/18  Yes Historical Provider, MD   loperamide (IMODIUM) 2 mg capsule Take 1 capsule (2 mg total) by mouth 4 (four) times a day as needed for diarrhea 7/14/18  Yes Sunday Cueva PA-C   methotrexate 2 5 mg tablet 2 5 mg 8/7/18  Yes Historical Provider, MD   ondansetron (ZOFRAN) 4 mg tablet Take 1 tablet (4 mg total) by mouth every 8 (eight) hours as needed for nausea or vomiting 7/14/18  Yes Sunday Cueva PA-C   venlafaxine (EFFEXOR-XR) 37 5 mg 24 hr capsule Take 1 capsule (37 5 mg total) by mouth daily 4/25/18  Yes Paula Rae MD       Inhouse Medications    Current Facility-Administered Medications:     ferrous sulfate tablet 325 mg, 325 mg, Oral, Daily With Breakfast, 325 mg at 77/60/27 4307    folic acid (FOLVITE) tablet 1 mg, 1 mg, Oral, Daily, 1 mg at 09/14/18 0933    [START ON 9/15/2018] methotrexate tablet 2 5 mg, 2 5 mg, Oral, Weekly    ondansetron (ZOFRAN) injection 4 mg, 4 mg, Intravenous, Q6H PRN    pantoprazole (PROTONIX) injection 40 mg, 40 mg, Intravenous, Q12H, 40 mg at 09/14/18 0933    venlafaxine (EFFEXOR-XR) 24 hr capsule 37 5 mg, 37 5 mg, Oral, Daily    Allergies  No Known Allergies  Social History   reports that she has quit smoking  She has never used smokeless tobacco  She reports that she does not drink alcohol or use drugs  Family History  Family History   Problem Relation Age of Onset    Anxiety disorder Mother     Hypertension Father     Diabetes Father     Heart attack Maternal Grandmother         acute MI     ROS  ROS: Denies CP, epigastric pain nausea or vomiting, positive for shortness of breath positive for periumbilical pain   All others negative except as noted in HPI  Objective   Vitals  Blood pressure 112/81, pulse 66, temperature 98 1 °F (36 7 °C), temperature source Temporal, resp   rate 18, height 5' 4" (1 626 m), weight 55 8 kg (123 lb 0 3 oz), SpO2 99 %, not currently breastfeeding  General: Alert, no apparent distress  Eyes: No scleral icterus, pale conjunctiva  ENT: MMM  Card: RRR no murmur  Lungs: Clear to ascultation b/l  No wheezes, rales, rhonchi  Abdomen: Soft  Positive periumbilical tenderness and umbilical hernia  Nondistended  Bowel sounds present and normoactive  Skin: No jaundice  Neuro: Alert and oriented x3          Laboratory Studies  Lab Results   Component Value Date    WBC 9 53 09/13/2018    HGB 7 4 (L) 09/14/2018    HCT 26 1 (L) 09/14/2018     (H) 09/13/2018    MCV 66 (L) 09/13/2018     Lab Results   Component Value Date    CREATININE 0 84 09/13/2018    BUN 9 09/13/2018    K 3 5 09/13/2018     09/13/2018    CO2 30 09/13/2018    CALCIUM 9 0 09/13/2018    ALKPHOS 185 (H) 09/13/2018    AST 17 09/13/2018    ALT 19 09/13/2018     Lab Results   Component Value Date    PROTIME 14 1 09/13/2018    INR 1 08 09/13/2018       Imaging and Other Studies      Assessment and Plan:  1  Acute on chronic iron-deficiency anemia with history of excessive NSAID use  Possible upper GI bleed secondary to peptic ulcer disease or gastritis  NPO  EGD  Ppi b i d  Check H&H frequently transfuse as needed  2    Rheumatoid arthritis    Principal Problem:    Iron deficiency anemia  Active Problems:    Rheumatoid arthritis involving both hands with negative rheumatoid factor (HCC)    Thrombocytosis (HCC)    Heme positive stool    COFFEY (dyspnea on exertion)      CEDRIC Gupta

## 2018-09-14 NOTE — ANESTHESIA PREPROCEDURE EVALUATION
Review of Systems/Medical History          Cardiovascular  Hyperlipidemia, COFFEY,    Pulmonary  Shortness of breath,        GI/Hepatic  Negative GI/hepatic ROS          Negative  ROS        Endo/Other  Negative endo/other ROS      GYN  Negative gynecology ROS          Hematology  Anemia ,     Musculoskeletal  Rheumatoid arthritis Severity: severe,   Arthritis     Neurology  Negative neurology ROS      Psychology   Anxiety,              Physical Exam    Airway    Mallampati score: I  TM Distance: >3 FB  Neck ROM: full     Dental   No notable dental hx     Cardiovascular  Rhythm: regular, Rate: normal, Cardiovascular exam normal    Pulmonary  Pulmonary exam normal Breath sounds clear to auscultation,     Other Findings        Anesthesia Plan  ASA Score- 3     Anesthesia Type- IV sedation with anesthesia and general with ASA Monitors  Additional Monitors:   Airway Plan:         Plan Factors-  Patient did not smoke on day of surgery  Induction- intravenous  Postoperative Plan-     Informed Consent- Anesthetic plan and risks discussed with patient

## 2018-09-14 NOTE — ASSESSMENT & PLAN NOTE
Symptomatic   chronic as evidenced by ferritin and iron panel in July 2018 likely secondary to NSAID overuse for rheumatoid arthritis    Concern for hemorrhagic gastritis vs PUD  Appropriate improvement w/transfusion of 1 IU PRBC  Folic acid level normal while on methotrexate, patient for EGD today  Change diet to npo as outlined in GI documentation, continue IV PPI  Repeat h/h in am

## 2018-09-16 LAB
ATRIAL RATE: 92 BPM
P AXIS: 72 DEGREES
PR INTERVAL: 142 MS
QRS AXIS: 75 DEGREES
QRSD INTERVAL: 84 MS
QT INTERVAL: 360 MS
QTC INTERVAL: 445 MS
T WAVE AXIS: 58 DEGREES
VENTRICULAR RATE: 92 BPM

## 2018-09-16 PROCEDURE — 93010 ELECTROCARDIOGRAM REPORT: CPT | Performed by: INTERNAL MEDICINE

## 2018-09-17 ENCOUNTER — APPOINTMENT (OUTPATIENT)
Dept: LAB | Age: 53
End: 2018-09-17
Payer: COMMERCIAL

## 2018-09-17 ENCOUNTER — TRANSITIONAL CARE MANAGEMENT (OUTPATIENT)
Dept: FAMILY MEDICINE CLINIC | Facility: CLINIC | Age: 53
End: 2018-09-17

## 2018-09-17 DIAGNOSIS — D64.9 SEVERE ANEMIA: ICD-10-CM

## 2018-09-17 LAB
ERYTHROCYTE [DISTWIDTH] IN BLOOD BY AUTOMATED COUNT: 25.4 % (ref 11.6–15.1)
HCT VFR BLD AUTO: 30.5 % (ref 34.8–46.1)
HGB BLD-MCNC: 8.2 G/DL (ref 11.5–15.4)
MCH RBC QN AUTO: 19.2 PG (ref 26.8–34.3)
MCHC RBC AUTO-ENTMCNC: 26.9 G/DL (ref 31.4–37.4)
MCV RBC AUTO: 71 FL (ref 82–98)
PLATELET # BLD AUTO: 687 THOUSANDS/UL (ref 149–390)
PMV BLD AUTO: 8.6 FL (ref 8.9–12.7)
RBC # BLD AUTO: 4.27 MILLION/UL (ref 3.81–5.12)
WBC # BLD AUTO: 7.55 THOUSAND/UL (ref 4.31–10.16)

## 2018-09-17 PROCEDURE — 85027 COMPLETE CBC AUTOMATED: CPT

## 2018-09-17 PROCEDURE — 36415 COLL VENOUS BLD VENIPUNCTURE: CPT

## 2018-09-18 ENCOUNTER — OFFICE VISIT (OUTPATIENT)
Dept: FAMILY MEDICINE CLINIC | Facility: CLINIC | Age: 53
End: 2018-09-18
Payer: COMMERCIAL

## 2018-09-18 VITALS
HEART RATE: 99 BPM | HEIGHT: 64 IN | WEIGHT: 123 LBS | SYSTOLIC BLOOD PRESSURE: 98 MMHG | DIASTOLIC BLOOD PRESSURE: 62 MMHG | TEMPERATURE: 99.3 F | BODY MASS INDEX: 21 KG/M2

## 2018-09-18 DIAGNOSIS — M06.042 RHEUMATOID ARTHRITIS INVOLVING BOTH HANDS WITH NEGATIVE RHEUMATOID FACTOR (HCC): ICD-10-CM

## 2018-09-18 DIAGNOSIS — M06.041 RHEUMATOID ARTHRITIS INVOLVING BOTH HANDS WITH NEGATIVE RHEUMATOID FACTOR (HCC): ICD-10-CM

## 2018-09-18 DIAGNOSIS — D50.9 IRON DEFICIENCY ANEMIA, UNSPECIFIED IRON DEFICIENCY ANEMIA TYPE: Primary | ICD-10-CM

## 2018-09-18 PROCEDURE — 1111F DSCHRG MED/CURRENT MED MERGE: CPT | Performed by: FAMILY MEDICINE

## 2018-09-18 PROCEDURE — 99495 TRANSJ CARE MGMT MOD F2F 14D: CPT | Performed by: FAMILY MEDICINE

## 2018-09-18 NOTE — PROGRESS NOTES
Assessment/Plan:     70-year-old woman with:  Iron deficiency anemia status post recent transfusion and rheumatoid arthritis  Discussed workup and treatment options with risks and benefits  Will check follow-up blood work in several weeks  Will refer to GI and encouraged her to go for colonoscopy  Encouraged follow-up with patient's rheumatologist as well  Discuss that if she has any worsening of her symptoms she must call back immediately otherwise follow-up in 1 month  No problem-specific Assessment & Plan notes found for this encounter  Diagnoses and all orders for this visit:    Iron deficiency anemia, unspecified iron deficiency anemia type  -     Ambulatory referral to Gastroenterology; Future  -     CBC and differential; Future  -     LD,Blood; Future  -     Haptoglobin; Future  -     Retic Count; Future    Rheumatoid arthritis involving both hands with negative rheumatoid factor (HCC)  -     CBC and differential; Future  -     LD,Blood; Future  -     Haptoglobin; Future  -     Retic Count; Future         Subjective:     Patient ID: Viola Loomis is a 48 y o  female  Patient is a 70-year-old woman who presents for a transitional care visit after recent hospitalization  On routine blood work patient was found to have a hemoglobin of 6  On rectal exam patient had positive guaiac stools in the emergency room  She was transfused 1 unit of packed red blood cells and her hemoglobin came up and symptomatic [place] she improved as previously she had been short of breath and fatigue  Patient had an upper endoscopy which did not show cause for her anemia but was advised as an out patient to go for colonoscopy  No fevers chills nausea vomiting  Tolerating p o  intake  Patient continues to follow with Rheumatology as well for her rheumatoid arthritis and is on the methotrexate  No other complaints at this time  Review of Systems   Constitutional: Negative  HENT: Negative      Eyes: Negative  Respiratory: Negative  Cardiovascular: Negative  Gastrointestinal: Negative  Endocrine: Negative  Genitourinary: Negative  Musculoskeletal: Negative  Allergic/Immunologic: Negative  Neurological: Negative  Hematological: Negative  Psychiatric/Behavioral: Negative  All other systems reviewed and are negative  Objective:     Physical Exam   Constitutional: She is oriented to person, place, and time  She appears well-developed and well-nourished  HENT:   Head: Atraumatic  Right Ear: External ear normal    Left Ear: External ear normal    Eyes: Conjunctivae and EOM are normal  Pupils are equal, round, and reactive to light  Neck: Normal range of motion  Cardiovascular: Normal rate, regular rhythm and normal heart sounds  Pulmonary/Chest: Effort normal and breath sounds normal  No respiratory distress  Abdominal: Soft  She exhibits no distension  There is no tenderness  There is no rebound and no guarding  Musculoskeletal: Normal range of motion  Neurological: She is alert and oriented to person, place, and time  No cranial nerve deficit  Skin: Skin is warm and dry  Psychiatric: She has a normal mood and affect  Her behavior is normal  Judgment and thought content normal          Vitals:    09/18/18 0920   BP: 98/62   BP Location: Right arm   Pulse: 99   Temp: 99 3 °F (37 4 °C)   Weight: 55 8 kg (123 lb)   Height: 5' 4" (1 626 m)       Transitional Care Management Review:  Baron Stock is a 48 y o  female here for TCM follow up       During the TCM phone call patient stated:    Date and time hospital follow up call was made:  9/17/2018 12:58 PM  Hospital care reviewed:  Discussed with Inpatient Physician, Records reviewed, Lab studies pending  Patient was hopsitalized at:  [facility]  Date of admission:  9/13/18  Date of discharge:  9/14/18  Diagnosis:  Iron deficiency anemia  Disposition:  Home  Were the patients medicaitons reviewed and updated:  Yes  Current symptoms:  None  Post hospital issues:  None  Should patient be enrolled in anticoag monitoring?:  No  Scheduled for follow up?:  Yes  Did you obtain your prescribed medications:  Yes  Do you need help managing your perscriptions or medications:  No  Is transportation to your appointments needed:  No  I have advised the patient to call PCP with any new or worsening symptoms (please type in name along with any credentials):  stephanie renee  Living Arrangements:  Spouse or Significiant other  Support System:  Spouse  The type of support provided:  Emotional, Financial, Physical  Do you have social support:  Yes, as much as I need  Are you recieving outpatient services:  No  Are you recieving home care services:  No  Are you using any community resources:  No  Current waiver service:  No  Have you fallen in the last 12 months:  No  Interperter language line required?:  No  Counseling:  Patient, Family  Counseling topics:  Diagnostic results, Risk factor reduction, instructions for management, Prognosis, patient and family education, Activities of daily living, Importance of RX compliance             Vicky Webb MA

## 2018-09-24 ENCOUNTER — APPOINTMENT (OUTPATIENT)
Dept: LAB | Age: 53
End: 2018-09-24
Payer: COMMERCIAL

## 2018-09-24 DIAGNOSIS — M54.2 CERVICALGIA: ICD-10-CM

## 2018-09-24 DIAGNOSIS — D64.9 ANEMIA, UNSPECIFIED TYPE: ICD-10-CM

## 2018-09-24 DIAGNOSIS — M06.041 RHEUMATOID ARTHRITIS INVOLVING BOTH HANDS WITH NEGATIVE RHEUMATOID FACTOR (HCC): ICD-10-CM

## 2018-09-24 DIAGNOSIS — M25.549 ARTHRALGIA OF HAND, UNSPECIFIED LATERALITY: ICD-10-CM

## 2018-09-24 DIAGNOSIS — M25.539 PAIN IN WRIST, UNSPECIFIED LATERALITY: ICD-10-CM

## 2018-09-24 DIAGNOSIS — M06.042 RHEUMATOID ARTHRITIS INVOLVING BOTH HANDS WITH NEGATIVE RHEUMATOID FACTOR (HCC): ICD-10-CM

## 2018-09-24 DIAGNOSIS — M05.89 OTHER RHEUMATOID ARTHRITIS WITH RHEUMATOID FACTOR OF MULTIPLE SITES (HCC): ICD-10-CM

## 2018-09-24 DIAGNOSIS — D50.9 IRON DEFICIENCY ANEMIA, UNSPECIFIED IRON DEFICIENCY ANEMIA TYPE: ICD-10-CM

## 2018-09-24 LAB
ALBUMIN SERPL BCP-MCNC: 2.4 G/DL (ref 3.5–5)
ALP SERPL-CCNC: 206 U/L (ref 46–116)
ALT SERPL W P-5'-P-CCNC: 21 U/L (ref 12–78)
ANION GAP SERPL CALCULATED.3IONS-SCNC: 8 MMOL/L (ref 4–13)
AST SERPL W P-5'-P-CCNC: 23 U/L (ref 5–45)
BASOPHILS # BLD AUTO: 0.02 THOUSANDS/ΜL (ref 0–0.1)
BASOPHILS NFR BLD AUTO: 0 % (ref 0–1)
BILIRUB SERPL-MCNC: 0.4 MG/DL (ref 0.2–1)
BUN SERPL-MCNC: 8 MG/DL (ref 5–25)
CALCIUM SERPL-MCNC: 8.7 MG/DL (ref 8.3–10.1)
CHLORIDE SERPL-SCNC: 102 MMOL/L (ref 100–108)
CO2 SERPL-SCNC: 26 MMOL/L (ref 21–32)
CREAT SERPL-MCNC: 0.58 MG/DL (ref 0.6–1.3)
CRP SERPL QL: 36.4 MG/L
EOSINOPHIL # BLD AUTO: 0.09 THOUSAND/ΜL (ref 0–0.61)
EOSINOPHIL NFR BLD AUTO: 1 % (ref 0–6)
ERYTHROCYTE [DISTWIDTH] IN BLOOD BY AUTOMATED COUNT: 26.5 % (ref 11.6–15.1)
ERYTHROCYTE [SEDIMENTATION RATE] IN BLOOD: 67 MM/HOUR (ref 0–20)
GFR SERPL CREATININE-BSD FRML MDRD: 106 ML/MIN/1.73SQ M
GLUCOSE P FAST SERPL-MCNC: 103 MG/DL (ref 65–99)
HCT VFR BLD AUTO: 31.9 % (ref 34.8–46.1)
HGB BLD-MCNC: 8.6 G/DL (ref 11.5–15.4)
IMM GRANULOCYTES # BLD AUTO: 0.03 THOUSAND/UL (ref 0–0.2)
IMM GRANULOCYTES NFR BLD AUTO: 0 % (ref 0–2)
LDH SERPL-CCNC: 334 U/L (ref 81–234)
LYMPHOCYTES # BLD AUTO: 1.67 THOUSANDS/ΜL (ref 0.6–4.47)
LYMPHOCYTES NFR BLD AUTO: 17 % (ref 14–44)
MCH RBC QN AUTO: 19.5 PG (ref 26.8–34.3)
MCHC RBC AUTO-ENTMCNC: 27 G/DL (ref 31.4–37.4)
MCV RBC AUTO: 72 FL (ref 82–98)
MONOCYTES # BLD AUTO: 0.89 THOUSAND/ΜL (ref 0.17–1.22)
MONOCYTES NFR BLD AUTO: 9 % (ref 4–12)
NEUTROPHILS # BLD AUTO: 7.3 THOUSANDS/ΜL (ref 1.85–7.62)
NEUTS SEG NFR BLD AUTO: 73 % (ref 43–75)
NRBC BLD AUTO-RTO: 0 /100 WBCS
PLATELET # BLD AUTO: 607 THOUSANDS/UL (ref 149–390)
PMV BLD AUTO: 8.7 FL (ref 8.9–12.7)
POTASSIUM SERPL-SCNC: 3.6 MMOL/L (ref 3.5–5.3)
PROT SERPL-MCNC: 6.8 G/DL (ref 6.4–8.2)
RBC # BLD AUTO: 4.41 MILLION/UL (ref 3.81–5.12)
RETICS # AUTO: NORMAL 10*3/UL (ref 14097–95744)
RETICS # CALC: 1.34 % (ref 0.37–1.87)
SODIUM SERPL-SCNC: 136 MMOL/L (ref 136–145)
WBC # BLD AUTO: 10 THOUSAND/UL (ref 4.31–10.16)

## 2018-09-24 PROCEDURE — 83615 LACTATE (LD) (LDH) ENZYME: CPT

## 2018-09-24 PROCEDURE — 86140 C-REACTIVE PROTEIN: CPT

## 2018-09-24 PROCEDURE — 85045 AUTOMATED RETICULOCYTE COUNT: CPT

## 2018-09-24 PROCEDURE — 36415 COLL VENOUS BLD VENIPUNCTURE: CPT

## 2018-09-24 PROCEDURE — 85652 RBC SED RATE AUTOMATED: CPT

## 2018-09-24 PROCEDURE — 83010 ASSAY OF HAPTOGLOBIN QUANT: CPT

## 2018-09-24 PROCEDURE — 85025 COMPLETE CBC W/AUTO DIFF WBC: CPT

## 2018-09-24 PROCEDURE — 80053 COMPREHEN METABOLIC PANEL: CPT

## 2018-09-25 LAB — HAPTOGLOB SERPL-MCNC: 342 MG/DL (ref 34–200)

## 2018-10-08 ENCOUNTER — TRANSCRIBE ORDERS (OUTPATIENT)
Dept: ADMINISTRATIVE | Facility: HOSPITAL | Age: 53
End: 2018-10-08

## 2018-10-08 DIAGNOSIS — K29.71 GASTROINTESTINAL HEMORRHAGE ASSOCIATED WITH GASTRITIS, UNSPECIFIED GASTRITIS TYPE: Primary | ICD-10-CM

## 2018-10-08 DIAGNOSIS — R93.811 ABNORMAL FINDING ON DIAGNOSTIC IMAGING OF RIGHT TESTICLE: ICD-10-CM

## 2018-10-09 ENCOUNTER — HOSPITAL ENCOUNTER (OUTPATIENT)
Dept: ULTRASOUND IMAGING | Facility: HOSPITAL | Age: 53
Discharge: HOME | End: 2018-10-09
Payer: COMMERCIAL

## 2018-10-09 ENCOUNTER — TELEPHONE (OUTPATIENT)
Dept: SURGERY | Facility: CLINIC | Age: 53
End: 2018-10-09

## 2018-10-09 ENCOUNTER — HOSPITAL ENCOUNTER (OUTPATIENT)
Dept: ULTRASOUND IMAGING | Facility: HOSPITAL | Age: 53
Discharge: HOME | End: 2018-10-09
Attending: INTERNAL MEDICINE
Payer: COMMERCIAL

## 2018-10-09 DIAGNOSIS — R16.0 HEPATOMEGALY: ICD-10-CM

## 2018-10-09 DIAGNOSIS — K29.71 GASTROINTESTINAL HEMORRHAGE ASSOCIATED WITH GASTRITIS, UNSPECIFIED GASTRITIS TYPE: ICD-10-CM

## 2018-10-09 PROCEDURE — 76856 US EXAM PELVIC COMPLETE: CPT

## 2018-10-09 PROCEDURE — 76700 US EXAM ABDOM COMPLETE: CPT

## 2018-10-09 NOTE — TELEPHONE ENCOUNTER
Spoke with the patient regarding a date for her open hernia repair and she stated that she will be calling back to reschedule once she finds out why her hemoglobin is low and is going for other testing    Gwendolyn Iglesias MA

## 2018-10-10 ENCOUNTER — APPOINTMENT (OUTPATIENT)
Dept: LAB | Age: 53
End: 2018-10-10
Payer: COMMERCIAL

## 2018-10-10 DIAGNOSIS — M54.2 CERVICALGIA: ICD-10-CM

## 2018-10-10 DIAGNOSIS — M25.549 ARTHRALGIA OF HAND, UNSPECIFIED LATERALITY: ICD-10-CM

## 2018-10-10 DIAGNOSIS — M05.89 OTHER RHEUMATOID ARTHRITIS WITH RHEUMATOID FACTOR OF MULTIPLE SITES (HCC): ICD-10-CM

## 2018-10-10 DIAGNOSIS — D64.9 ANEMIA, UNSPECIFIED TYPE: ICD-10-CM

## 2018-10-10 DIAGNOSIS — M25.539 PAIN IN WRIST, UNSPECIFIED LATERALITY: ICD-10-CM

## 2018-10-10 LAB
ALBUMIN SERPL BCP-MCNC: 2.4 G/DL (ref 3.5–5)
ALP SERPL-CCNC: 241 U/L (ref 46–116)
ALT SERPL W P-5'-P-CCNC: 19 U/L (ref 12–78)
ANION GAP SERPL CALCULATED.3IONS-SCNC: 5 MMOL/L (ref 4–13)
AST SERPL W P-5'-P-CCNC: 19 U/L (ref 5–45)
BASOPHILS # BLD AUTO: 0.03 THOUSANDS/ΜL (ref 0–0.1)
BASOPHILS NFR BLD AUTO: 0 % (ref 0–1)
BILIRUB SERPL-MCNC: 0.42 MG/DL (ref 0.2–1)
BUN SERPL-MCNC: 7 MG/DL (ref 5–25)
CALCIUM SERPL-MCNC: 8.9 MG/DL (ref 8.3–10.1)
CHLORIDE SERPL-SCNC: 100 MMOL/L (ref 100–108)
CO2 SERPL-SCNC: 28 MMOL/L (ref 21–32)
CREAT SERPL-MCNC: 0.62 MG/DL (ref 0.6–1.3)
CRP SERPL QL: 43.4 MG/L
EOSINOPHIL # BLD AUTO: 0.27 THOUSAND/ΜL (ref 0–0.61)
EOSINOPHIL NFR BLD AUTO: 3 % (ref 0–6)
ERYTHROCYTE [DISTWIDTH] IN BLOOD BY AUTOMATED COUNT: 24.2 % (ref 11.6–15.1)
ERYTHROCYTE [SEDIMENTATION RATE] IN BLOOD: 90 MM/HOUR (ref 0–20)
GFR SERPL CREATININE-BSD FRML MDRD: 103 ML/MIN/1.73SQ M
GLUCOSE P FAST SERPL-MCNC: 97 MG/DL (ref 65–99)
HCT VFR BLD AUTO: 31.1 % (ref 34.8–46.1)
HGB BLD-MCNC: 8.5 G/DL (ref 11.5–15.4)
IMM GRANULOCYTES # BLD AUTO: 0.02 THOUSAND/UL (ref 0–0.2)
IMM GRANULOCYTES NFR BLD AUTO: 0 % (ref 0–2)
LYMPHOCYTES # BLD AUTO: 1.83 THOUSANDS/ΜL (ref 0.6–4.47)
LYMPHOCYTES NFR BLD AUTO: 20 % (ref 14–44)
MCH RBC QN AUTO: 20.1 PG (ref 26.8–34.3)
MCHC RBC AUTO-ENTMCNC: 27.3 G/DL (ref 31.4–37.4)
MCV RBC AUTO: 74 FL (ref 82–98)
MONOCYTES # BLD AUTO: 0.67 THOUSAND/ΜL (ref 0.17–1.22)
MONOCYTES NFR BLD AUTO: 7 % (ref 4–12)
NEUTROPHILS # BLD AUTO: 6.22 THOUSANDS/ΜL (ref 1.85–7.62)
NEUTS SEG NFR BLD AUTO: 70 % (ref 43–75)
NRBC BLD AUTO-RTO: 0 /100 WBCS
PLATELET # BLD AUTO: 627 THOUSANDS/UL (ref 149–390)
PMV BLD AUTO: 8.8 FL (ref 8.9–12.7)
POTASSIUM SERPL-SCNC: 4.1 MMOL/L (ref 3.5–5.3)
PROT SERPL-MCNC: 6.9 G/DL (ref 6.4–8.2)
RBC # BLD AUTO: 4.22 MILLION/UL (ref 3.81–5.12)
SODIUM SERPL-SCNC: 133 MMOL/L (ref 136–145)
WBC # BLD AUTO: 9.04 THOUSAND/UL (ref 4.31–10.16)

## 2018-10-10 PROCEDURE — 85025 COMPLETE CBC W/AUTO DIFF WBC: CPT

## 2018-10-10 PROCEDURE — 36415 COLL VENOUS BLD VENIPUNCTURE: CPT

## 2018-10-10 PROCEDURE — 80053 COMPREHEN METABOLIC PANEL: CPT

## 2018-10-10 PROCEDURE — 86140 C-REACTIVE PROTEIN: CPT

## 2018-10-10 PROCEDURE — 85652 RBC SED RATE AUTOMATED: CPT

## 2018-10-11 ENCOUNTER — HOSPITAL ENCOUNTER (OUTPATIENT)
Dept: CT IMAGING | Facility: HOSPITAL | Age: 53
Discharge: HOME | End: 2018-10-11
Attending: INTERNAL MEDICINE
Payer: COMMERCIAL

## 2018-10-11 DIAGNOSIS — R93.811 ABNORMAL FINDING ON DIAGNOSTIC IMAGING OF RIGHT TESTICLE: ICD-10-CM

## 2018-10-11 PROCEDURE — 74177 CT ABD & PELVIS W/CONTRAST: CPT

## 2018-10-11 RX ADMIN — IOHEXOL 100 ML: 350 INJECTION, SOLUTION INTRAVENOUS at 20:06

## 2018-10-17 ENCOUNTER — OFFICE VISIT (OUTPATIENT)
Dept: FAMILY MEDICINE CLINIC | Facility: CLINIC | Age: 53
End: 2018-10-17
Payer: COMMERCIAL

## 2018-10-17 VITALS
SYSTOLIC BLOOD PRESSURE: 100 MMHG | TEMPERATURE: 100.6 F | DIASTOLIC BLOOD PRESSURE: 84 MMHG | BODY MASS INDEX: 20.32 KG/M2 | HEIGHT: 64 IN | WEIGHT: 119 LBS

## 2018-10-17 DIAGNOSIS — D50.9 IRON DEFICIENCY ANEMIA, UNSPECIFIED IRON DEFICIENCY ANEMIA TYPE: ICD-10-CM

## 2018-10-17 DIAGNOSIS — M06.042 RHEUMATOID ARTHRITIS INVOLVING BOTH HANDS WITH NEGATIVE RHEUMATOID FACTOR (HCC): ICD-10-CM

## 2018-10-17 DIAGNOSIS — R16.0 LIVER MASSES: ICD-10-CM

## 2018-10-17 DIAGNOSIS — M06.041 RHEUMATOID ARTHRITIS INVOLVING BOTH HANDS WITH NEGATIVE RHEUMATOID FACTOR (HCC): ICD-10-CM

## 2018-10-17 DIAGNOSIS — K63.89 COLONIC MASS: Primary | ICD-10-CM

## 2018-10-17 PROCEDURE — 99214 OFFICE O/P EST MOD 30 MIN: CPT | Performed by: FAMILY MEDICINE

## 2018-10-17 NOTE — PROGRESS NOTES
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 metastasis in the setting of underlying iron deficiency anemia and rheumatoid arthritis with longstanding immunosuppression  Discussed workup and treatment options at length with risks and benefits  Called to get patient in to see Oncology after the pathology will hopefully be back for her liver biopsy  Discussed supportive care return parameters  Offered referral for counseling or other support patient declines any additional help at this point but plans to get the biopsy and follow-up with Oncology and will follow up in our office in 1 month  Advised that if she experiences any other questions concerns or issues she should call our office and we will be happy to help  No problem-specific Assessment & Plan notes found for this encounter  Diagnoses and all orders for this visit:    Colonic mass  -     Ambulatory referral to Oncology; Future    Iron deficiency anemia, unspecified iron deficiency anemia type    Liver masses    Rheumatoid arthritis involving both hands with negative rheumatoid factor (HCC)          Subjective:   Chief Complaint   Patient presents with    Follow-up     1 month          Patient ID: Donnell Phillips is a 48 y o  female  Patient is a 59-year-old woman who presents for follow-up after recent workup from her GI doctor showed colonic mass along with multiple liver masses consistent with possible metastases  Patient denies family history of cancer that she is aware of  She does have a history of rheumatoid arthritis and has been on immunosuppressive medication longstanding  No fevers chills nausea vomiting  No abdominal pain  No constipation or diarrhea or abdominal bloating  No blood in her stool  Patient is being planned for a biopsy through interventional radiology next week          The following portions of the patient's history were reviewed and updated as appropriate: allergies, current medications, past family history, past medical history, past social history, past surgical history and problem list     Review of Systems   Constitutional: Negative  HENT: Negative  Eyes: Negative  Respiratory: Negative  Cardiovascular: Negative  Gastrointestinal: Negative  Endocrine: Negative  Genitourinary: Negative  Musculoskeletal: Negative  Allergic/Immunologic: Negative  Neurological: Negative  Hematological: Negative  Psychiatric/Behavioral: Negative  All other systems reviewed and are negative  Objective:      /84 (BP Location: Left arm, Patient Position: Sitting, Cuff Size: Standard)   Temp (!) 100 6 °F (38 1 °C) (Tympanic)   Ht 5' 4" (1 626 m)   Wt 54 kg (119 lb)   LMP 09/28/2018 (Exact Date)   Breastfeeding? No   BMI 20 43 kg/m²          Physical Exam   Constitutional: She is oriented to person, place, and time  She appears well-developed and well-nourished  HENT:   Head: Atraumatic  Right Ear: External ear normal    Left Ear: External ear normal    Eyes: Pupils are equal, round, and reactive to light  Conjunctivae and EOM are normal    Neck: Normal range of motion  Cardiovascular: Normal rate, regular rhythm and normal heart sounds  Pulmonary/Chest: Effort normal and breath sounds normal  No respiratory distress  Abdominal: Soft  She exhibits no distension  There is no tenderness  There is no rebound and no guarding  Musculoskeletal: Normal range of motion  Neurological: She is alert and oriented to person, place, and time  No cranial nerve deficit  Skin: Skin is warm and dry  Psychiatric: She has a normal mood and affect   Her behavior is normal  Judgment and thought content normal

## 2018-10-19 ENCOUNTER — TELEPHONE (OUTPATIENT)
Dept: RADIOLOGY | Facility: HOSPITAL | Age: 53
End: 2018-10-19

## 2018-10-19 RX ORDER — SODIUM CHLORIDE 9 MG/ML
75 INJECTION, SOLUTION INTRAVENOUS CONTINUOUS
Status: CANCELLED | OUTPATIENT
Start: 2018-10-19

## 2018-10-24 ENCOUNTER — TELEPHONE (OUTPATIENT)
Dept: INPATIENT UNIT | Facility: HOSPITAL | Age: 53
End: 2018-10-24

## 2018-10-25 ENCOUNTER — HOSPITAL ENCOUNTER (OUTPATIENT)
Dept: RADIOLOGY | Facility: HOSPITAL | Age: 53
Discharge: HOME | End: 2018-10-25
Attending: INTERNAL MEDICINE | Admitting: RADIOLOGY
Payer: COMMERCIAL

## 2018-10-25 VITALS
SYSTOLIC BLOOD PRESSURE: 96 MMHG | OXYGEN SATURATION: 100 % | DIASTOLIC BLOOD PRESSURE: 65 MMHG | RESPIRATION RATE: 18 BRPM | HEART RATE: 76 BPM | TEMPERATURE: 98.2 F

## 2018-10-25 DIAGNOSIS — R93.2 ABNORMAL LIVER SCAN: ICD-10-CM

## 2018-10-25 PROCEDURE — 76942 ECHO GUIDE FOR BIOPSY: CPT | Performed by: RADIOLOGY

## 2018-10-25 PROCEDURE — 88307 TISSUE EXAM BY PATHOLOGIST: CPT | Performed by: PATHOLOGY

## 2018-10-25 PROCEDURE — 47000 NEEDLE BIOPSY OF LIVER PERQ: CPT | Performed by: RADIOLOGY

## 2018-10-25 PROCEDURE — 88341 IMHCHEM/IMCYTCHM EA ADD ANTB: CPT | Performed by: PATHOLOGY

## 2018-10-25 PROCEDURE — 99152 MOD SED SAME PHYS/QHP 5/>YRS: CPT | Performed by: RADIOLOGY

## 2018-10-25 PROCEDURE — 88342 IMHCHEM/IMCYTCHM 1ST ANTB: CPT | Performed by: PATHOLOGY

## 2018-10-25 PROCEDURE — 88334 PATH CONSLTJ SURG CYTO XM EA: CPT | Performed by: PATHOLOGY

## 2018-10-25 PROCEDURE — 88333 PATH CONSLTJ SURG CYTO XM 1: CPT | Performed by: PATHOLOGY

## 2018-10-25 PROCEDURE — 47000 NEEDLE BIOPSY OF LIVER PERQ: CPT

## 2018-10-25 PROCEDURE — 76937 US GUIDE VASCULAR ACCESS: CPT

## 2018-10-25 RX ORDER — MIDAZOLAM HYDROCHLORIDE 1 MG/ML
INJECTION INTRAMUSCULAR; INTRAVENOUS CODE/TRAUMA/SEDATION MEDICATION
Status: COMPLETED | OUTPATIENT
Start: 2018-10-25 | End: 2018-10-25

## 2018-10-25 RX ORDER — FENTANYL CITRATE 50 UG/ML
INJECTION, SOLUTION INTRAMUSCULAR; INTRAVENOUS CODE/TRAUMA/SEDATION MEDICATION
Status: COMPLETED | OUTPATIENT
Start: 2018-10-25 | End: 2018-10-25

## 2018-10-25 RX ORDER — SODIUM CHLORIDE 9 MG/ML
75 INJECTION, SOLUTION INTRAVENOUS CONTINUOUS
Status: DISCONTINUED | OUTPATIENT
Start: 2018-10-25 | End: 2018-10-25 | Stop reason: HOSPADM

## 2018-10-25 RX ADMIN — MIDAZOLAM 1 MG: 1 INJECTION INTRAMUSCULAR; INTRAVENOUS at 08:42

## 2018-10-25 RX ADMIN — SODIUM CHLORIDE 75 ML/HR: 0.9 INJECTION, SOLUTION INTRAVENOUS at 07:04

## 2018-10-25 RX ADMIN — MIDAZOLAM 1 MG: 1 INJECTION INTRAMUSCULAR; INTRAVENOUS at 08:51

## 2018-10-25 RX ADMIN — FENTANYL CITRATE 50 MCG: 50 INJECTION, SOLUTION INTRAMUSCULAR; INTRAVENOUS at 08:51

## 2018-10-25 RX ADMIN — FENTANYL CITRATE 50 MCG: 50 INJECTION, SOLUTION INTRAMUSCULAR; INTRAVENOUS at 08:42

## 2018-10-25 NOTE — INTERVAL H&P NOTE
Update:     Patient presents for biopsy of liver masses, suspected to be metastatic disease from a GI primary  Discussed the rationale for biopsy for tissue diagnosis, and the risks of bleeding and organ damage  She wishes to proceed  No other interval changes in health    Patient re-evaluated   Accept as history and physical     MP 2 ASA 2    Maty Mosley MD/October 25, 2018/8:37 AM

## 2018-10-25 NOTE — DISCHARGE INSTRUCTIONS
Percutaneous Liver Biopsy   WHAT YOU NEED TO KNOW:   A PLB is a procedure to remove a sample of tissue from your liver  The sample can be sent to a lab and tested for liver disease, cancer, or infection  After the procedure you may have pain and bruising at the biopsy site  You may also have pain in your right shoulder  These symptoms should get better in 48 to 72 hours  DISCHARGE INSTRUCTIONS:     [id number] patients,  Contact Interventional Radiology at [phone] PATIENTS: Contact Interventional Radiology at [phone]   Critical access hospital PATIENTS: Contact Interventional Radiology at [phone] if:    · Fever greater than 101 or chills  · You have severe pain in your abdomen  · Your abdomen is larger than usual and feels hard  · Your neck is more swollen and you have trouble swallowing  · You feel weak or dizzy  · Your heart is beating faster than usual    · Your pain does not get better after you take pain medicine  · Your wound is red, swollen, or draining pus  · You have nausea or are vomiting  · Your skin is itchy, swollen, or you have a rash  · You have questions or concerns about your condition or care  Medicines:   · Acetaminophen decreases pain and fever  It is available without a doctor's order  Acetaminophen can cause liver damage if not taken correctly  · Take your home medicine as directed  · Resume your normal diet  Small sips of flat soda will help with mild nausea  Self-care:   · Rest as directed  Do not play sports, exercise, or lift anything heavier than 5 pounds for up to 1 week  · Apply firm, steady pressure if bleeding occurs  A small amount of bleeding from your wound is possible  Apply pressure with a clean gauze or towel for 5 to 10 minutes  Call 911 if bleeding becomes heavy or does not stop  · Ask your healthcare provider when to take your blood thinner or antiplatelet medicine   You may need to wait 24 to 72 hours to take your medicine  This will prevent bleeding  Follow up with your healthcare provider as directed: Write down your questions so you remember to ask them during your visits

## 2018-10-25 NOTE — BRIEF OP NOTE (RAD/CATH)
IR IMAGE GUIDED BIOPSY/ASPIRATION LIVER  Procedure Note    PATIENT NAME: Quique Vu  : 1965  MRN: [id number]     Pre-op Diagnosis:   1  Abnormal liver scan      Post-op Diagnosis:   1   Abnormal liver scan        Surgeon:   Edita Rao MD  Assistants:     Estimated Blood Loss: none  Findings: needle in left liver lesion    D stat tract hemostasis    Specimens: 7 cores    Complications:  None immediate    Anesthesia: Conscious sedation    Edita Rao MD     Date: 10/25/2018  Time: 9:25 AM

## 2018-10-25 NOTE — H&P (VIEW-ONLY)
Assessment/Plan:    55-year-old woman with:  Colonic mass with liver masses consistent with possible metastasis in the setting of underlying iron deficiency anemia and rheumatoid arthritis with longstanding immunosuppression  Discussed workup and treatment options at length with risks and benefits  Called to get patient in to see Oncology after the pathology will hopefully be back for her liver biopsy  Discussed supportive care return parameters  Offered referral for counseling or other support patient declines any additional help at this point but plans to get the biopsy and follow-up with Oncology and will follow up in our office in 1 month  Advised that if she experiences any other questions concerns or issues she should call our office and we will be happy to help  No problem-specific Assessment & Plan notes found for this encounter  Diagnoses and all orders for this visit:    Colonic mass  -     Ambulatory referral to Oncology; Future    Iron deficiency anemia, unspecified iron deficiency anemia type    Liver masses    Rheumatoid arthritis involving both hands with negative rheumatoid factor (HCC)          Subjective:   Chief Complaint   Patient presents with    Follow-up     1 month          Patient ID: Cheryl Villeda is a 48 y o  female  Patient is a 55-year-old woman who presents for follow-up after recent workup from her GI doctor showed colonic mass along with multiple liver masses consistent with possible metastases  Patient denies family history of cancer that she is aware of  She does have a history of rheumatoid arthritis and has been on immunosuppressive medication longstanding  No fevers chills nausea vomiting  No abdominal pain  No constipation or diarrhea or abdominal bloating  No blood in her stool  Patient is being planned for a biopsy through interventional radiology next week          The following portions of the patient's history were reviewed and updated as appropriate: allergies, current medications, past family history, past medical history, past social history, past surgical history and problem list     Review of Systems   Constitutional: Negative  HENT: Negative  Eyes: Negative  Respiratory: Negative  Cardiovascular: Negative  Gastrointestinal: Negative  Endocrine: Negative  Genitourinary: Negative  Musculoskeletal: Negative  Allergic/Immunologic: Negative  Neurological: Negative  Hematological: Negative  Psychiatric/Behavioral: Negative  All other systems reviewed and are negative  Objective:      /84 (BP Location: Left arm, Patient Position: Sitting, Cuff Size: Standard)   Temp (!) 100 6 °F (38 1 °C) (Tympanic)   Ht 5' 4" (1 626 m)   Wt 54 kg (119 lb)   LMP 09/28/2018 (Exact Date)   Breastfeeding? No   BMI 20 43 kg/m²          Physical Exam   Constitutional: She is oriented to person, place, and time  She appears well-developed and well-nourished  HENT:   Head: Atraumatic  Right Ear: External ear normal    Left Ear: External ear normal    Eyes: Pupils are equal, round, and reactive to light  Conjunctivae and EOM are normal    Neck: Normal range of motion  Cardiovascular: Normal rate, regular rhythm and normal heart sounds  Pulmonary/Chest: Effort normal and breath sounds normal  No respiratory distress  Abdominal: Soft  She exhibits no distension  There is no tenderness  There is no rebound and no guarding  Musculoskeletal: Normal range of motion  Neurological: She is alert and oriented to person, place, and time  No cranial nerve deficit  Skin: Skin is warm and dry  Psychiatric: She has a normal mood and affect   Her behavior is normal  Judgment and thought content normal

## 2018-10-31 ENCOUNTER — TELEPHONE (OUTPATIENT)
Dept: HEMATOLOGY ONCOLOGY | Facility: CLINIC | Age: 53
End: 2018-10-31

## 2018-11-02 ENCOUNTER — OFFICE VISIT (OUTPATIENT)
Dept: HEMATOLOGY ONCOLOGY | Facility: CLINIC | Age: 53
End: 2018-11-02
Payer: COMMERCIAL

## 2018-11-02 ENCOUNTER — DOCUMENTATION (OUTPATIENT)
Dept: HEMATOLOGY ONCOLOGY | Facility: CLINIC | Age: 53
End: 2018-11-02

## 2018-11-02 VITALS
TEMPERATURE: 97.9 F | SYSTOLIC BLOOD PRESSURE: 144 MMHG | OXYGEN SATURATION: 96 % | HEIGHT: 64 IN | HEART RATE: 80 BPM | WEIGHT: 121 LBS | RESPIRATION RATE: 18 BRPM | BODY MASS INDEX: 20.66 KG/M2 | DIASTOLIC BLOOD PRESSURE: 78 MMHG

## 2018-11-02 DIAGNOSIS — K63.89 COLONIC MASS: ICD-10-CM

## 2018-11-02 DIAGNOSIS — L65.9 ALOPECIA: Primary | ICD-10-CM

## 2018-11-02 PROCEDURE — 99245 OFF/OP CONSLTJ NEW/EST HI 55: CPT | Performed by: INTERNAL MEDICINE

## 2018-11-02 NOTE — PROGRESS NOTES
Hematology / Oncology Outpatient Consult Note    Juan C Brown 48 y o  female [id number]         Date:  11/2/2018    Assessment / Plan:  A 59-year-old female with newly diagnosed metastatic colon cancer  She has extensive liver metastasis  She presents today to discuss treatment options  She has good performance status  Therefore, aggressive approach is warranted  We had extensive discussion regarding the diagnosis, staging information, prognosis and treatment options  I recommended her to have systemic chemotherapy with FOLFOX-6, consistent of oxaliplatin, leucovorin and 5-FU  Side effects of this regimen was thoroughly discussed, including but not limited to minimal alopecia, some nausea, neutropenia, small risk infection, diarrhea, neuropathy as well as allergic reactions  She understood and wished to proceed  I am going to ask pathology department to send her tumor tissue for K-wanda, N-wanda as well as B Griffin mutation analysis  Dependent on these test results, II may consider adding either bevacizumab or cetuximab  We discussed general prognostic information for stage IV colorectal cancer  I told her that this is highly treatable but not curable disease  Median survival of metastatic colorectal cancer is approximately 2 years  We also discussed possible clinical trial using atezolizumab  However, she is not eligible for this study, due to the pre-existing rheumatoid arthritis  She is in agreement with my recommendation  Interventional radiologist will place the Port-A-Cath  I would obtain baseline CEA prior to the 1st cycle chemotherapy  I will see her again when she is due for 2nd cycle of FOLFOX -6  Subjective:     HPI:  A 59-year-old female who was found to have severe microcytic anemia in September 2018  Therefore, she was advised to be hospitalized  She was given transfusion  EGD was performed which did not show any major pathology    As outpatient basis, she underwent CT scan of abdomen pelvis which showed transverse colon mass as well as multiple liver metastatic disease  She subsequently underwent liver biopsy which showed metastatic adenocarcinoma, consistent with colorectal primary  She presents today to discuss treatment options  Since January 2018, she lost 25 pound  She has mildly anorexic  However, she has no complaint of pain  She denied any respiratory symptoms  She has normal bowel movement  Prior to the hospitalization, she had slowly progressive fatigue as well as some exertional shortness of breath  She is currently on oral iron 3 times per day  She has rheumatoid arthritis for which she is on weekly methotrexate and folic acid  She has no family history of colorectal cancer  She is a lifetime never smoker  Her performance status is normal         Interval History:          Objective:     Primary Diagnosis:    Metastatic colon cancer  Diagnosed in October 2018  Cancer Staging:  Cancer Staging  No matching staging information was found for the patient  Previous Hematologic/ Oncologic Treatment:         Current Hematologic/ Oncologic Treatment:      FOLFOX-6 to be started in November 12, 2018  Disease Status:     Not evaluated at this time  Test Results:    Pathology:    Liver biopsy showed metastatic adenocarcinoma, consistent with colorectal primary  Radiology:    CT scan of abdomen pelvis showed large transverse colon mass as well as multiple liver and metastasis  I personally reviewed this films  Laboratory:    See below  Physical Exam:      General Appearance:    Alert, oriented        Eyes:    PERRL   Ears:    Normal external ear canals, both ears   Nose:   Nares normal, septum midline   Throat:   Mucosa moist  Pharynx without injection      Neck:   Supple       Lungs:     Clear to auscultation bilaterally   Chest Wall:    No tenderness or deformity    Heart:    Regular rate and rhythm       Abdomen:     Soft, non-tender, bowel sounds +, no organomegaly           Extremities:   Extremities no cyanosis or edema       Skin:   no rash or icterus  Lymph nodes:   Cervical, supraclavicular, and axillary nodes normal   Neurologic:   CNII-XII intact, normal strength, sensation and reflexes     Throughout          Breast exam:  NA         ROS: Review of Systems   Constitutional:        Anorexia  25 pound weight loss   All other systems reviewed and are negative  Imaging: Us Abdomen Complete    Result Date: 10/9/2018  Narrative: ABDOMEN ULTRASOUND, COMPLETE INDICATION:   R16 0: Hepatomegaly, not elsewhere classified  Low hemoglobin  COMPARISON: CT abdomen and pelvis 12/20/2012 TECHNIQUE:   Real-time ultrasound of the abdomen was performed with a curvilinear transducer with both volumetric sweeps and still imaging techniques  FINDINGS: PANCREAS:  Visualized portions of the pancreas are within normal limits  AORTA AND IVC:  Visualized portions are normal for patient age  LIVER: Size: Enlarged measuring 19 cm in the midclavicular line  Contour:  Surface contour is smooth  Parenchyma:  Diffusely heterogeneous with multiple predominantly hypo to isoechoic solid masses, largest of which is a mixed cystic and solid mass measuring 2 9 x 2 9 x 3 5 cm in the left hepatic lobe  Immediately inferior to this mass is a 2 2 x 2 1 x 1 8 cm hypoechoic solid mass with poorly defined margins  There is also a partially exophytic hypoechoic mass involving the liver capsule in the left hepatic lobe measuring 1 9 x 1 6 x 2 cm  These are incompletely characterized but suspicious for metastases  In addition, there are smaller, well demarcated hyperechoic solid lesions with representative lesion in the right hepatic lobe measuring 1 5 x 1 4 x 1 5 cm compatible with metastatic or benign etiology  Limited imaging of the main portal vein shows it to be patent and hepatopetal  BILIARY: The gallbladder is normal in caliber   No wall thickening or pericholecystic fluid  Multiple shadowing gallstones are identified  No sonographic Ruelas's sign  No intrahepatic biliary dilatation  CBD measures 3 mm  No choledocholithiasis  KIDNEY: Right kidney measures 4 7 x 11 2 cm  Within normal limits  Left kidney measures 5 8 x 11 1 cm  Simple cyst in the upper pole measures 1 5 x 1 6 x 1 2 cm  SPLEEN: Measures 3 x 10 2 x 8 cm  Within normal limits  ASCITES:  None  Impression: Hepatomegaly with multiple predominantly hypoechoic solid masses, incompletely characterized but suspicious for metastases  Further evaluation with CT abdomen and pelvis recommended  Additional well demarcated small hyperechoic hepatic lesions may represent benign lesions such as hemangioma or metastases and can be further evaluated at the time of CT  Cholelithiasis without sonographic features of cholecystitis  I personally discussed this study with Thong Trimble on 10/9/2018 at 1:28 PM  Workstation performed: [id number]     Ir Image Guided Biopsy/aspiration Liver    Result Date: 10/25/2018  Narrative: Ultrasound-guided biopsy of liver mass Clinical History: Bowel mass, liver lesions, suspected metastatic cancer Conscious sedation time: 35 minutes Technique: The patient was seen in the holding area and identified verbally and by wristband  After discussion of the procedure and its details, risks, benefits and alternatives, both verbal and written informed consent were obtained from the patient  The patient was then brought to the ultrasound area and placed supine on the stretcher  After a brief ultrasound examination was performed of the liver and correlated with prior imaging, a site on the anterior abdomen was prepped and draped in usual sterile fashion  Sterile ultrasound technique with sterile gel and sterile probe covers was also utilized  A preprocedure timeout was performed per standard department protocol   Under sonographic guidance, lidocaine was administered to the skin and underlying soft tissues  A coaxial biopsy needle set was advanced into the left hepatic mass under direct ultrasound guidance  The inner stylette was removed and an 18-gauge biopsy needle was advanced into the lesion and core samples obtained under continuous sonographic guidance  Initial samples revealed only necrotic material, therefore the needle was repositioned to the periphery of the mass  The specimen was found to be adequate  D-Stat was administered under ultrasound to the biopsy tract for hemostatic purposes  The patient tolerated the procedure well and suffered no complications  Specimens: seven 18-gauge bio pince core samples, touch prep Findings: Centrally necrotic 3 2 cm left hepatic mass  Needle within the lesion  Postbiopsy changes without hemorrhage  Impression: Impression: Ultrasound-guided biopsy of a left hepatic mass  Workstation performed: [id number]     Us Pelvis Transabdominal Only    Result Date: 10/9/2018  Narrative: PELVIC ULTRASOUND, LIMITED INDICATION:  48years old  K29 71: Gastritis, unspecified, with bleeding  COMPARISON: CT abdomen and pelvis on 12/20/2012 TECHNIQUE:   Transabdominal pelvic ultrasound was performed in sagittal and transverse planes with a curvilinear transducer  Targeted ultrasound of the periumbilical soft tissues was also performed  Imaging included volumetric sweeps as well as traditional still imaging technique  FINDINGS: UTERUS: The uterus is anteverted in position, measuring 4 x 5 7 x 7 1 cm  Contour and echotexture appear normal  The cervix shows no suspicious abnormality  ENDOMETRIUM:  Normal caliber of 3 mm  Homogenous and normal in appearance  OVARIES/ADNEXA: Right ovary: Not visualized  Left ovary:  1 x 2 6 x 2 7 cm  No suspicious left ovarian abnormality  Doppler flow within normal limits   Targeted ultrasound of area of palpable clinical abnormality in the left periumbilical region demonstrates masslike conglomerate measuring 5 x 7 9 x 7 8 cm likely representing multiple heterogeneous and thick walled bowel loops with questionable intraluminal soft tissue  The masslike conglomerate demonstrates target-like appearance suspicious for intussusception perhaps on the basis of underlying mass, although incompletely evaluated  Impression:  Normal uterus and left ovary  Right ovary not visualized  No suspicious right adnexal mass  Area of palpable clinical abnormality corresponds to masslike conglomerate which likely represents multiple heterogeneous and thick walled bowel segments with questionable intraluminal soft tissue  Masslike conglomerate demonstrates a target-like appearance suspicious for intussusception perhaps on the basis of underlying mass  These findings are incompletely evaluated and CT abdomen and pelvis is therefore recommended  I personally discussed this study with Thao Trimble on 10/9/2018 at 1:28 PM   Workstation performed: [id number]     Ct Abdomen Pelvis W Contrast    Result Date: 10/12/2018  Narrative: CT ABDOMEN AND PELVIS WITH IV CONTRAST INDICATION:   Abnormal ultrasound with liver lesions and bowel mass  COMPARISON:  12/20/2012 and 10/9/2018 TECHNIQUE:  CT examination of the abdomen and pelvis was performed  Axial, sagittal, and coronal 2D reformatted images were created from the source data and submitted for interpretation  Radiation dose length product (DLP) for this visit:  275 mGy-cm   This examination, like all CT scans performed in the [facility], was performed utilizing techniques to minimize radiation dose exposure, including the use of iterative reconstruction and automated exposure control  IV Contrast:  100 mL of iohexol (OMNIPAQUE) Enteric Contrast:  Enteric contrast was administered  FINDINGS: ABDOMEN LOWER CHEST:  There is a subpleural nodule in the right lower lobe measuring 5 mm   LIVER/BILIARY TREE:  As seen on ultrasound, there are multiple low-density liver lesions which measure greater than fluid attenuation and are consistent with metastasis  A lesion in the posterior segment of the right lobe measures 3 5 x 3 7 cm  A lesion in the anterior right lobe measures 3 4 x 2 8 cm  A lesion in the lateral left lobe measures 3 0 x 3 1 cm  GALLBLADDER:  No calcified gallstones  No pericholecystic inflammatory change  SPLEEN:  Unremarkable  PANCREAS:  Unremarkable  ADRENAL GLANDS:  Unremarkable  KIDNEYS/URETERS:  There are small renal cysts  No hydronephrosis  STOMACH AND BOWEL:  As seen on ultrasound, there is a soft tissue mass involving the mid transverse colon without evidence of obstruction  The mass appears to act as a lead point for intussusception, better seen on ultrasound  Measures approximately 7 2 x 7 6 cm  APPENDIX:  No findings to suggest appendicitis  ABDOMINOPELVIC CAVITY:  No ascites or free intraperitoneal air  No lymphadenopathy  VESSELS:  Unremarkable for patient's age  PELVIS REPRODUCTIVE ORGANS:  Unremarkable for patient's age  URINARY BLADDER:  Unremarkable  ABDOMINAL WALL/INGUINAL REGIONS:  There is a fat-containing umbilical hernia  OSSEOUS STRUCTURES:  No acute fracture or destructive osseous lesion  Impression: 1  Large soft tissue mass of the mid transverse colon consistent with neoplasm  This acts as a lead point for intussusception, better visualized on ultrasound though there is no evidence of bowel obstruction  2   Multiple liver masses consistent with metastasis  3   Subpleural lung nodule in the right lower lobe, nonspecific  A follow-up dedicated chest CT can be obtained to evaluate for pulmonary metastasis  The study was marked in EPIC for significant notification   Workstation performed: [id number]         Labs:   Lab Results   Component Value Date    WBC 9 04 10/10/2018    HGB 8 5 (L) 10/10/2018    HCT 31 1 (L) 10/10/2018    MCV 74 (L) 10/10/2018     (H) 10/10/2018     Lab Results   Component Value Date    K 4 1 10/10/2018     10/10/2018    CO2 28 10/10/2018    BUN 7 10/10/2018    CREATININE 0 62 10/10/2018    GLUF 97 10/10/2018    CALCIUM 8 9 10/10/2018    AST 19 10/10/2018    ALT 19 10/10/2018    ALKPHOS 241 (H) 10/10/2018    EGFR 103 10/10/2018       Lab Results   Component Value Date     (H) 09/24/2018       Lab Results   Component Value Date    IRON 11 (L) 09/13/2018    TIBC 302 09/13/2018    FERRITIN 15 09/13/2018       Lab Results   Component Value Date    FOLATE 11 4 09/13/2018           Vital Sign:    Body surface area is 1 58 meters squared  Wt Readings from Last 3 Encounters:   11/02/18 54 9 kg (121 lb)   10/17/18 54 kg (119 lb)   09/18/18 55 8 kg (123 lb)        Temp Readings from Last 3 Encounters:   11/02/18 97 9 °F (36 6 °C) (Tympanic)   10/25/18 98 2 °F (36 8 °C) (Oral)   10/17/18 (!) 100 6 °F (38 1 °C) (Tympanic)        BP Readings from Last 3 Encounters:   11/02/18 144/78   10/25/18 96/65   10/17/18 100/84         Pulse Readings from Last 3 Encounters:   11/02/18 80   10/25/18 76   09/18/18 99     @LASTSAO2(3)@    Active Problems:   Patient Active Problem List   Diagnosis    Arthritis    Hay fever    Herpes zoster    Seasonal allergies    Anxiety    Hot flashes    Impaired fasting glucose    Fatigue    Rheumatoid arthritis involving both hands with negative rheumatoid factor (HCC)    Other hyperlipidemia    Iron deficiency anemia    Severe anemia    Liver masses       Past Medical History:   Past Medical History:   Diagnosis Date    Anxious mood     Rheumatoid arthritis ([facility] [address] )        Surgical History:   Past Surgical History:   Procedure Laterality Date    APPENDECTOMY      ESOPHAGOGASTRODUODENOSCOPY N/A 9/14/2018    Procedure: ESOPHAGOGASTRODUODENOSCOPY (EGD) with polypectomy;  Surgeon: Char Topete MD;  Location: [facility];   Service: Gastroenterology    IR IMAGE GUIDED [id number] [facility]  10/25/2018    TUBAL LIGATION      resolved 2007    WISDOM TOOTH EXTRACTION      resolved 1990       Family History: Family History   Problem Relation Age of Onset    Anxiety disorder Mother     Hypertension Father     Diabetes Father     Heart attack Maternal Grandmother         acute MI       Cancer-related family history is not on file  Social History:   Social History     Social History    Marital status: Single     Spouse name: N/A    Number of children: N/A    Years of education: N/A     Occupational History    Not on file  Social History Main Topics    Smoking status: Former Smoker    Smokeless tobacco: Never Used    Alcohol use No    Drug use: No    Sexual activity: Yes     Partners: Male     Other Topics Concern    Not on file     Social History Narrative    No narrative on file       Current Medications:   Current Outpatient Prescriptions   Medication Sig Dispense Refill    ferrous sulfate 325 (65 Fe) mg tablet Take 1 tablet (325 mg total) by mouth 3 (three) times a day with meals 90 tablet 0    folic acid (FOLVITE) 1 mg tablet 1 mg      methotrexate 2 5 mg tablet 2 5 mg       No current facility-administered medications for this visit          Allergies: No Known Allergies

## 2018-11-02 NOTE — LETTER
November 2, 2018     Colon Emperor, [address]    Patient: Marcos Garza   YOB: 1965   Date of Visit: 11/2/2018       Dear Dr Fox Rosado: Thank you for referring Angela Marie to me for evaluation  Below are my notes for this consultation  If you have questions, please do not hesitate to call me  I look forward to following your patient along with you  Sincerely,        Alberto Bucio MD        CC: MD Alberto Roque MD  11/2/2018 12:31 PM  Sign at close encounter  Hematology / Oncology Outpatient Consult Note    Marcos Garza 48 y o  female [id number]         Date:  11/2/2018    Assessment / Plan:  A 12-year-old female with newly diagnosed metastatic colon cancer  She has extensive liver metastasis  She presents today to discuss treatment options  She has good performance status  Therefore, aggressive approach is warranted  We had extensive discussion regarding the diagnosis, staging information, prognosis and treatment options  I recommended her to have systemic chemotherapy with FOLFOX-6, consistent of oxaliplatin, leucovorin and 5-FU  Side effects of this regimen was thoroughly discussed, including but not limited to minimal alopecia, some nausea, neutropenia, small risk infection, diarrhea, neuropathy as well as allergic reactions  She understood and wished to proceed  I am going to ask pathology department to send her tumor tissue for K-wanda, N-wanda as well as B Griffin mutation analysis  Dependent on these test results, II may consider adding either bevacizumab or cetuximab  We discussed general prognostic information for stage IV colorectal cancer  I told her that this is highly treatable but not curable disease  Median survival of metastatic colorectal cancer is approximately 2 years  We also discussed possible clinical trial using atezolizumab    However, she is not eligible for this study, due to the pre-existing rheumatoid arthritis  She is in agreement with my recommendation  Interventional radiologist will place the Port-A-Cath  I would obtain baseline CEA prior to the 1st cycle chemotherapy  I will see her again when she is due for 2nd cycle of FOLFOX -6  Subjective:     HPI:  A 49-year-old female who was found to have severe microcytic anemia in September 2018  Therefore, she was advised to be hospitalized  She was given transfusion  EGD was performed which did not show any major pathology  As outpatient basis, she underwent CT scan of abdomen pelvis which showed transverse colon mass as well as multiple liver metastatic disease  She subsequently underwent liver biopsy which showed metastatic adenocarcinoma, consistent with colorectal primary  She presents today to discuss treatment options  Since January 2018, she lost 25 pound  She has mildly anorexic  However, she has no complaint of pain  She denied any respiratory symptoms  She has normal bowel movement  Prior to the hospitalization, she had slowly progressive fatigue as well as some exertional shortness of breath  She is currently on oral iron 3 times per day  She has rheumatoid arthritis for which she is on weekly methotrexate and folic acid  She has no family history of colorectal cancer  She is a lifetime never smoker  Her performance status is normal         Interval History:          Objective:     Primary Diagnosis:    Metastatic colon cancer  Diagnosed in October 2018  Cancer Staging:  Cancer Staging  No matching staging information was found for the patient  Previous Hematologic/ Oncologic Treatment:         Current Hematologic/ Oncologic Treatment:      FOLFOX-6 to be started in November 12, 2018  Disease Status:     Not evaluated at this time  Test Results:    Pathology:    Liver biopsy showed metastatic adenocarcinoma, consistent with colorectal primary      Radiology:    CT scan of abdomen pelvis showed large transverse colon mass as well as multiple liver and metastasis  I personally reviewed this films  Laboratory:    See below  Physical Exam:      General Appearance:    Alert, oriented        Eyes:    PERRL   Ears:    Normal external ear canals, both ears   Nose:   Nares normal, septum midline   Throat:   Mucosa moist  Pharynx without injection  Neck:   Supple       Lungs:     Clear to auscultation bilaterally   Chest Wall:    No tenderness or deformity    Heart:    Regular rate and rhythm       Abdomen:     Soft, non-tender, bowel sounds +, no organomegaly           Extremities:   Extremities no cyanosis or edema       Skin:   no rash or icterus  Lymph nodes:   Cervical, supraclavicular, and axillary nodes normal   Neurologic:   CNII-XII intact, normal strength, sensation and reflexes     Throughout          Breast exam:  NA         ROS: Review of Systems   Constitutional:        Anorexia  25 pound weight loss   All other systems reviewed and are negative  Imaging: Us Abdomen Complete    Result Date: 10/9/2018  Narrative: ABDOMEN ULTRASOUND, COMPLETE INDICATION:   R16 0: Hepatomegaly, not elsewhere classified  Low hemoglobin  COMPARISON: CT abdomen and pelvis 12/20/2012 TECHNIQUE:   Real-time ultrasound of the abdomen was performed with a curvilinear transducer with both volumetric sweeps and still imaging techniques  FINDINGS: PANCREAS:  Visualized portions of the pancreas are within normal limits  AORTA AND IVC:  Visualized portions are normal for patient age  LIVER: Size: Enlarged measuring 19 cm in the midclavicular line  Contour:  Surface contour is smooth  Parenchyma:  Diffusely heterogeneous with multiple predominantly hypo to isoechoic solid masses, largest of which is a mixed cystic and solid mass measuring 2 9 x 2 9 x 3 5 cm in the left hepatic lobe  Immediately inferior to this mass is a 2 2 x 2 1 x 1 8 cm hypoechoic solid mass with poorly defined margins    There is also a partially exophytic hypoechoic mass involving the liver capsule in the left hepatic lobe measuring 1 9 x 1 6 x 2 cm  These are incompletely characterized but suspicious for metastases  In addition, there are smaller, well demarcated hyperechoic solid lesions with representative lesion in the right hepatic lobe measuring 1 5 x 1 4 x 1 5 cm compatible with metastatic or benign etiology  Limited imaging of the main portal vein shows it to be patent and hepatopetal  BILIARY: The gallbladder is normal in caliber  No wall thickening or pericholecystic fluid  Multiple shadowing gallstones are identified  No sonographic Ruelas's sign  No intrahepatic biliary dilatation  CBD measures 3 mm  No choledocholithiasis  KIDNEY: Right kidney measures 4 7 x 11 2 cm  Within normal limits  Left kidney measures 5 8 x 11 1 cm  Simple cyst in the upper pole measures 1 5 x 1 6 x 1 2 cm  SPLEEN: Measures 3 x 10 2 x 8 cm  Within normal limits  ASCITES:  None  Impression: Hepatomegaly with multiple predominantly hypoechoic solid masses, incompletely characterized but suspicious for metastases  Further evaluation with CT abdomen and pelvis recommended  Additional well demarcated small hyperechoic hepatic lesions may represent benign lesions such as hemangioma or metastases and can be further evaluated at the time of CT  Cholelithiasis without sonographic features of cholecystitis  I personally discussed this study with Oanh Trimble on 10/9/2018 at 1:28 PM  Workstation performed: [id number]     Ir Image Guided Biopsy/aspiration Liver    Result Date: 10/25/2018  Narrative: Ultrasound-guided biopsy of liver mass Clinical History: Bowel mass, liver lesions, suspected metastatic cancer Conscious sedation time: 35 minutes Technique: The patient was seen in the holding area and identified verbally and by wristband    After discussion of the procedure and its details, risks, benefits and alternatives, both verbal and written informed consent were obtained from the patient  The patient was then brought to the ultrasound area and placed supine on the stretcher  After a brief ultrasound examination was performed of the liver and correlated with prior imaging, a site on the anterior abdomen was prepped and draped in usual sterile fashion  Sterile ultrasound technique with sterile gel and sterile probe covers was also utilized  A preprocedure timeout was performed per standard department protocol  Under sonographic guidance, lidocaine was administered to the skin and underlying soft tissues  A coaxial biopsy needle set was advanced into the left hepatic mass under direct ultrasound guidance  The inner stylette was removed and an 18-gauge biopsy needle was advanced into the lesion and core samples obtained under continuous sonographic guidance  Initial samples revealed only necrotic material, therefore the needle was repositioned to the periphery of the mass  The specimen was found to be adequate  D-Stat was administered under ultrasound to the biopsy tract for hemostatic purposes  The patient tolerated the procedure well and suffered no complications  Specimens: seven 18-gauge bio pince core samples, touch prep Findings: Centrally necrotic 3 2 cm left hepatic mass  Needle within the lesion  Postbiopsy changes without hemorrhage  Impression: Impression: Ultrasound-guided biopsy of a left hepatic mass  Workstation performed: [id number]     Us Pelvis Transabdominal Only    Result Date: 10/9/2018  Narrative: PELVIC ULTRASOUND, LIMITED INDICATION:  48years old  K29 71: Gastritis, unspecified, with bleeding  COMPARISON: CT abdomen and pelvis on 12/20/2012 TECHNIQUE:   Transabdominal pelvic ultrasound was performed in sagittal and transverse planes with a curvilinear transducer  Targeted ultrasound of the periumbilical soft tissues was also performed  Imaging included volumetric sweeps as well as traditional still imaging technique   FINDINGS: UTERUS: The uterus is anteverted in position, measuring 4 x 5 7 x 7 1 cm  Contour and echotexture appear normal  The cervix shows no suspicious abnormality  ENDOMETRIUM:  Normal caliber of 3 mm  Homogenous and normal in appearance  OVARIES/ADNEXA: Right ovary: Not visualized  Left ovary:  1 x 2 6 x 2 7 cm  No suspicious left ovarian abnormality  Doppler flow within normal limits  Targeted ultrasound of area of palpable clinical abnormality in the left periumbilical region demonstrates masslike conglomerate measuring 5 x 7 9 x 7 8 cm likely representing multiple heterogeneous and thick walled bowel loops with questionable intraluminal soft tissue  The masslike conglomerate demonstrates target-like appearance suspicious for intussusception perhaps on the basis of underlying mass, although incompletely evaluated  Impression:  Normal uterus and left ovary  Right ovary not visualized  No suspicious right adnexal mass  Area of palpable clinical abnormality corresponds to masslike conglomerate which likely represents multiple heterogeneous and thick walled bowel segments with questionable intraluminal soft tissue  Masslike conglomerate demonstrates a target-like appearance suspicious for intussusception perhaps on the basis of underlying mass  These findings are incompletely evaluated and CT abdomen and pelvis is therefore recommended  I personally discussed this study with [employer]Ren [employer] on 10/9/2018 at 1:28 PM   Workstation performed: [id number]     Ct Abdomen Pelvis W Contrast    Result Date: 10/12/2018  Narrative: CT ABDOMEN AND PELVIS WITH IV CONTRAST INDICATION:   Abnormal ultrasound with liver lesions and bowel mass  COMPARISON:  12/20/2012 and 10/9/2018 TECHNIQUE:  CT examination of the abdomen and pelvis was performed  Axial, sagittal, and coronal 2D reformatted images were created from the source data and submitted for interpretation  Radiation dose length product (DLP) for this visit:  275 mGy-cm     This examination, like all CT scans performed in the [facility], was performed utilizing techniques to minimize radiation dose exposure, including the use of iterative reconstruction and automated exposure control  IV Contrast:  100 mL of iohexol (OMNIPAQUE) Enteric Contrast:  Enteric contrast was administered  FINDINGS: ABDOMEN LOWER CHEST:  There is a subpleural nodule in the right lower lobe measuring 5 mm  LIVER/BILIARY TREE:  As seen on ultrasound, there are multiple low-density liver lesions which measure greater than fluid attenuation and are consistent with metastasis  A lesion in the posterior segment of the right lobe measures 3 5 x 3 7 cm  A lesion in the anterior right lobe measures 3 4 x 2 8 cm  A lesion in the lateral left lobe measures 3 0 x 3 1 cm  GALLBLADDER:  No calcified gallstones  No pericholecystic inflammatory change  SPLEEN:  Unremarkable  PANCREAS:  Unremarkable  ADRENAL GLANDS:  Unremarkable  KIDNEYS/URETERS:  There are small renal cysts  No hydronephrosis  STOMACH AND BOWEL:  As seen on ultrasound, there is a soft tissue mass involving the mid transverse colon without evidence of obstruction  The mass appears to act as a lead point for intussusception, better seen on ultrasound  Measures approximately 7 2 x 7 6 cm  APPENDIX:  No findings to suggest appendicitis  ABDOMINOPELVIC CAVITY:  No ascites or free intraperitoneal air  No lymphadenopathy  VESSELS:  Unremarkable for patient's age  PELVIS REPRODUCTIVE ORGANS:  Unremarkable for patient's age  URINARY BLADDER:  Unremarkable  ABDOMINAL WALL/INGUINAL REGIONS:  There is a fat-containing umbilical hernia  OSSEOUS STRUCTURES:  No acute fracture or destructive osseous lesion  Impression: 1  Large soft tissue mass of the mid transverse colon consistent with neoplasm  This acts as a lead point for intussusception, better visualized on ultrasound though there is no evidence of bowel obstruction   2   Multiple liver masses consistent with metastasis  3   Subpleural lung nodule in the right lower lobe, nonspecific  A follow-up dedicated chest CT can be obtained to evaluate for pulmonary metastasis  The study was marked in EPIC for significant notification  Workstation performed: [id number]         Labs:   Lab Results   Component Value Date    WBC 9 04 10/10/2018    HGB 8 5 (L) 10/10/2018    HCT 31 1 (L) 10/10/2018    MCV 74 (L) 10/10/2018     (H) 10/10/2018     Lab Results   Component Value Date    K 4 1 10/10/2018     10/10/2018    CO2 28 10/10/2018    BUN 7 10/10/2018    CREATININE 0 62 10/10/2018    GLUF 97 10/10/2018    CALCIUM 8 9 10/10/2018    AST 19 10/10/2018    ALT 19 10/10/2018    ALKPHOS 241 (H) 10/10/2018    EGFR 103 10/10/2018       Lab Results   Component Value Date     (H) 09/24/2018       Lab Results   Component Value Date    IRON 11 (L) 09/13/2018    TIBC 302 09/13/2018    FERRITIN 15 09/13/2018       Lab Results   Component Value Date    FOLATE 11 4 09/13/2018           Vital Sign:    Body surface area is 1 58 meters squared      Wt Readings from Last 3 Encounters:   11/02/18 54 9 kg (121 lb)   10/17/18 54 kg (119 lb)   09/18/18 55 8 kg (123 lb)        Temp Readings from Last 3 Encounters:   11/02/18 97 9 °F (36 6 °C) (Tympanic)   10/25/18 98 2 °F (36 8 °C) (Oral)   10/17/18 (!) 100 6 °F (38 1 °C) (Tympanic)        BP Readings from Last 3 Encounters:   11/02/18 144/78   10/25/18 96/65   10/17/18 100/84         Pulse Readings from Last 3 Encounters:   11/02/18 80   10/25/18 76   09/18/18 99     @LASTSAO2(3)@    Active Problems:   Patient Active Problem List   Diagnosis    Arthritis    Hay fever    Herpes zoster    Seasonal allergies    Anxiety    Hot flashes    Impaired fasting glucose    Fatigue    Rheumatoid arthritis involving both hands with negative rheumatoid factor (HCC)    Other hyperlipidemia    Iron deficiency anemia    Severe anemia    Liver masses       Past Medical History:   Past Medical History:   Diagnosis Date    Anxious mood     Rheumatoid arthritis ([facility] [address] )        Surgical History:   Past Surgical History:   Procedure Laterality Date    APPENDECTOMY      ESOPHAGOGASTRODUODENOSCOPY N/A 9/14/2018    Procedure: ESOPHAGOGASTRODUODENOSCOPY (EGD) with polypectomy;  Surgeon: Magen Fallon MD;  Location: [facility]; Service: Gastroenterology    IR IMAGE GUIDED BIOPSY/ASPIRATION LIVER  10/25/2018    TUBAL LIGATION      resolved 2007    WISDOM TOOTH EXTRACTION      resolved 1990       Family History:    Family History   Problem Relation Age of Onset    Anxiety disorder Mother     Hypertension Father     Diabetes Father     Heart attack Maternal Grandmother         acute MI       Cancer-related family history is not on file  Social History:   Social History     Social History    Marital status: Single     Spouse name: N/A    Number of children: N/A    Years of education: N/A     Occupational History    Not on file  Social History Main Topics    Smoking status: Former Smoker    Smokeless tobacco: Never Used    Alcohol use No    Drug use: No    Sexual activity: Yes     Partners: Male     Other Topics Concern    Not on file     Social History Narrative    No narrative on file       Current Medications:   Current Outpatient Prescriptions   Medication Sig Dispense Refill    ferrous sulfate 325 (65 Fe) mg tablet Take 1 tablet (325 mg total) by mouth 3 (three) times a day with meals 90 tablet 0    folic acid (FOLVITE) 1 mg tablet 1 mg      methotrexate 2 5 mg tablet 2 5 mg       No current facility-administered medications for this visit          Allergies: No Known Allergies

## 2018-11-05 ENCOUNTER — TELEPHONE (OUTPATIENT)
Dept: RADIOLOGY | Facility: HOSPITAL | Age: 53
End: 2018-11-05

## 2018-11-05 ENCOUNTER — TELEPHONE (OUTPATIENT)
Dept: HEMATOLOGY ONCOLOGY | Facility: CLINIC | Age: 53
End: 2018-11-05

## 2018-11-05 RX ORDER — SODIUM CHLORIDE 9 MG/ML
75 INJECTION, SOLUTION INTRAVENOUS CONTINUOUS
Status: CANCELLED | OUTPATIENT
Start: 2018-11-07

## 2018-11-06 ENCOUNTER — TELEPHONE (OUTPATIENT)
Dept: INPATIENT UNIT | Facility: HOSPITAL | Age: 53
End: 2018-11-06

## 2018-11-07 ENCOUNTER — HOSPITAL ENCOUNTER (OUTPATIENT)
Dept: RADIOLOGY | Facility: HOSPITAL | Age: 53
Discharge: HOME | End: 2018-11-07
Attending: INTERNAL MEDICINE | Admitting: RADIOLOGY
Payer: COMMERCIAL

## 2018-11-07 VITALS
HEART RATE: 94 BPM | HEIGHT: 64 IN | WEIGHT: 120 LBS | SYSTOLIC BLOOD PRESSURE: 119 MMHG | TEMPERATURE: 98 F | BODY MASS INDEX: 20.49 KG/M2 | RESPIRATION RATE: 20 BRPM | DIASTOLIC BLOOD PRESSURE: 63 MMHG | OXYGEN SATURATION: 99 %

## 2018-11-07 DIAGNOSIS — K63.89 COLONIC MASS: ICD-10-CM

## 2018-11-07 PROCEDURE — 99152 MOD SED SAME PHYS/QHP 5/>YRS: CPT | Performed by: RADIOLOGY

## 2018-11-07 PROCEDURE — C1788 PORT, INDWELLING, IMP: HCPCS

## 2018-11-07 PROCEDURE — 76937 US GUIDE VASCULAR ACCESS: CPT | Performed by: RADIOLOGY

## 2018-11-07 PROCEDURE — 36561 INSERT TUNNELED CV CATH: CPT | Performed by: RADIOLOGY

## 2018-11-07 PROCEDURE — 99152 MOD SED SAME PHYS/QHP 5/>YRS: CPT

## 2018-11-07 PROCEDURE — 99153 MOD SED SAME PHYS/QHP EA: CPT

## 2018-11-07 PROCEDURE — C1894 INTRO/SHEATH, NON-LASER: HCPCS

## 2018-11-07 PROCEDURE — 76937 US GUIDE VASCULAR ACCESS: CPT

## 2018-11-07 PROCEDURE — 77001 FLUOROGUIDE FOR VEIN DEVICE: CPT | Performed by: RADIOLOGY

## 2018-11-07 PROCEDURE — 77001 FLUOROGUIDE FOR VEIN DEVICE: CPT

## 2018-11-07 PROCEDURE — 36561 INSERT TUNNELED CV CATH: CPT

## 2018-11-07 RX ORDER — MIDAZOLAM HYDROCHLORIDE 1 MG/ML
INJECTION INTRAMUSCULAR; INTRAVENOUS CODE/TRAUMA/SEDATION MEDICATION
Status: COMPLETED | OUTPATIENT
Start: 2018-11-07 | End: 2018-11-07

## 2018-11-07 RX ORDER — DIAZEPAM 5 MG/ML
INJECTION, SOLUTION INTRAMUSCULAR; INTRAVENOUS CODE/TRAUMA/SEDATION MEDICATION
Status: COMPLETED | OUTPATIENT
Start: 2018-11-07 | End: 2018-11-07

## 2018-11-07 RX ORDER — FENTANYL CITRATE 50 UG/ML
INJECTION, SOLUTION INTRAMUSCULAR; INTRAVENOUS CODE/TRAUMA/SEDATION MEDICATION
Status: COMPLETED | OUTPATIENT
Start: 2018-11-07 | End: 2018-11-07

## 2018-11-07 RX ORDER — SODIUM CHLORIDE 9 MG/ML
75 INJECTION, SOLUTION INTRAVENOUS CONTINUOUS
Status: DISCONTINUED | OUTPATIENT
Start: 2018-11-07 | End: 2018-11-07 | Stop reason: HOSPADM

## 2018-11-07 RX ADMIN — MIDAZOLAM 0.5 MG: 1 INJECTION INTRAMUSCULAR; INTRAVENOUS at 13:38

## 2018-11-07 RX ADMIN — MIDAZOLAM 0.5 MG: 1 INJECTION INTRAMUSCULAR; INTRAVENOUS at 13:15

## 2018-11-07 RX ADMIN — FENTANYL CITRATE 50 MCG: 50 INJECTION, SOLUTION INTRAMUSCULAR; INTRAVENOUS at 13:23

## 2018-11-07 RX ADMIN — FENTANYL CITRATE 25 MCG: 50 INJECTION, SOLUTION INTRAMUSCULAR; INTRAVENOUS at 13:13

## 2018-11-07 RX ADMIN — FENTANYL CITRATE 25 MCG: 50 INJECTION, SOLUTION INTRAMUSCULAR; INTRAVENOUS at 13:15

## 2018-11-07 RX ADMIN — SODIUM CHLORIDE 75 ML/HR: 0.9 INJECTION, SOLUTION INTRAVENOUS at 11:25

## 2018-11-07 RX ADMIN — MIDAZOLAM 1 MG: 1 INJECTION INTRAMUSCULAR; INTRAVENOUS at 13:23

## 2018-11-07 RX ADMIN — MIDAZOLAM 0.5 MG: 1 INJECTION INTRAMUSCULAR; INTRAVENOUS at 13:12

## 2018-11-07 RX ADMIN — FENTANYL CITRATE 50 MCG: 50 INJECTION, SOLUTION INTRAMUSCULAR; INTRAVENOUS at 13:39

## 2018-11-07 NOTE — DISCHARGE INSTRUCTIONS
Implanted Venous Access Port     WHAT YOU NEED TO KNOW:   An implanted venous access port is a device used to give treatments and take blood  It may also be called a central venous access device (CVAD)  The port is a small container that is placed under your skin, usually in your upper chest  The port is attached to a catheter that enters a large vein  DISCHARGE INSTRUCTIONS:   Resume your normal diet  Small sips of flat soda will help with mild nausea  Prevent an infection:   · Wash your hands often  Use soap and water  Clean your hands before and after you care for your port  Remind everyone who cares for your port to wash their hands  · Check your skin for infection every day  Look for redness, swelling, or fluid oozing from the port site  Care for your port:   1  You may shower beginning 48 hours after procedure  2  Change dressing if it becomes wet  3  Remove dressing after 24 hours  Leave glue in place  4  It is normal for some bruising to occur  5  Use Tylenol for pain  6  Limit use of arm on the side that your port was placed  Lift nothing heavier than 5 pounds for 1 week, and then gradually increase activity as tolerated  7  DO NOT apply ointment, lotion or cream to port site until incision is healed  Allow glue to fall off  DO NOT attempt to peel glue from skin even it it begins to flake  8, After the port incision is healed you may swim, bathe  Notify the Interventional Radiologist if you have any of the followin  Fever above 101 F    2  Increased redness or swelling after 1st day  3  Increased pain after 1st day  4  Any sign of infection (drainage from port site, skin separation, hot to touch)  5  Persistent nausea or vomiting  Contact Interventional Radiology at [phone] [facility] PATIENTS: Contact Interventional Radiology at [phone]) ([address]: Contact Interventional Radiology at [phone])

## 2018-11-07 NOTE — H&P (VIEW-ONLY)
Hematology / Oncology Outpatient Consult Note    Feliz Osei 48 y o  female [id number]         Date:  11/2/2018    Assessment / Plan:  A 55-year-old female with newly diagnosed metastatic colon cancer  She has extensive liver metastasis  She presents today to discuss treatment options  She has good performance status  Therefore, aggressive approach is warranted  We had extensive discussion regarding the diagnosis, staging information, prognosis and treatment options  I recommended her to have systemic chemotherapy with FOLFOX-6, consistent of oxaliplatin, leucovorin and 5-FU  Side effects of this regimen was thoroughly discussed, including but not limited to minimal alopecia, some nausea, neutropenia, small risk infection, diarrhea, neuropathy as well as allergic reactions  She understood and wished to proceed  I am going to ask pathology department to send her tumor tissue for K-wanda, N-wanda as well as B Griffin mutation analysis  Dependent on these test results, II may consider adding either bevacizumab or cetuximab  We discussed general prognostic information for stage IV colorectal cancer  I told her that this is highly treatable but not curable disease  Median survival of metastatic colorectal cancer is approximately 2 years  We also discussed possible clinical trial using atezolizumab  However, she is not eligible for this study, due to the pre-existing rheumatoid arthritis  She is in agreement with my recommendation  Interventional radiologist will place the Port-A-Cath  I would obtain baseline CEA prior to the 1st cycle chemotherapy  I will see her again when she is due for 2nd cycle of FOLFOX -6  Subjective:     HPI:  A 55-year-old female who was found to have severe microcytic anemia in September 2018  Therefore, she was advised to be hospitalized  She was given transfusion  EGD was performed which did not show any major pathology    As outpatient basis, she underwent CT scan of abdomen pelvis which showed transverse colon mass as well as multiple liver metastatic disease  She subsequently underwent liver biopsy which showed metastatic adenocarcinoma, consistent with colorectal primary  She presents today to discuss treatment options  Since January 2018, she lost 25 pound  She has mildly anorexic  However, she has no complaint of pain  She denied any respiratory symptoms  She has normal bowel movement  Prior to the hospitalization, she had slowly progressive fatigue as well as some exertional shortness of breath  She is currently on oral iron 3 times per day  She has rheumatoid arthritis for which she is on weekly methotrexate and folic acid  She has no family history of colorectal cancer  She is a lifetime never smoker  Her performance status is normal         Interval History:          Objective:     Primary Diagnosis:    Metastatic colon cancer  Diagnosed in October 2018  Cancer Staging:  Cancer Staging  No matching staging information was found for the patient  Previous Hematologic/ Oncologic Treatment:         Current Hematologic/ Oncologic Treatment:      FOLFOX-6 to be started in November 12, 2018  Disease Status:     Not evaluated at this time  Test Results:    Pathology:    Liver biopsy showed metastatic adenocarcinoma, consistent with colorectal primary  Radiology:    CT scan of abdomen pelvis showed large transverse colon mass as well as multiple liver and metastasis  I personally reviewed this films  Laboratory:    See below  Physical Exam:      General Appearance:    Alert, oriented        Eyes:    PERRL   Ears:    Normal external ear canals, both ears   Nose:   Nares normal, septum midline   Throat:   Mucosa moist  Pharynx without injection      Neck:   Supple       Lungs:     Clear to auscultation bilaterally   Chest Wall:    No tenderness or deformity    Heart:    Regular rate and rhythm       Abdomen:     Soft, non-tender, bowel sounds +, no organomegaly           Extremities:   Extremities no cyanosis or edema       Skin:   no rash or icterus  Lymph nodes:   Cervical, supraclavicular, and axillary nodes normal   Neurologic:   CNII-XII intact, normal strength, sensation and reflexes     Throughout          Breast exam:  NA         ROS: Review of Systems   Constitutional:        Anorexia  25 pound weight loss   All other systems reviewed and are negative  Imaging: Us Abdomen Complete    Result Date: 10/9/2018  Narrative: ABDOMEN ULTRASOUND, COMPLETE INDICATION:   R16 0: Hepatomegaly, not elsewhere classified  Low hemoglobin  COMPARISON: CT abdomen and pelvis 12/20/2012 TECHNIQUE:   Real-time ultrasound of the abdomen was performed with a curvilinear transducer with both volumetric sweeps and still imaging techniques  FINDINGS: PANCREAS:  Visualized portions of the pancreas are within normal limits  AORTA AND IVC:  Visualized portions are normal for patient age  LIVER: Size: Enlarged measuring 19 cm in the midclavicular line  Contour:  Surface contour is smooth  Parenchyma:  Diffusely heterogeneous with multiple predominantly hypo to isoechoic solid masses, largest of which is a mixed cystic and solid mass measuring 2 9 x 2 9 x 3 5 cm in the left hepatic lobe  Immediately inferior to this mass is a 2 2 x 2 1 x 1 8 cm hypoechoic solid mass with poorly defined margins  There is also a partially exophytic hypoechoic mass involving the liver capsule in the left hepatic lobe measuring 1 9 x 1 6 x 2 cm  These are incompletely characterized but suspicious for metastases  In addition, there are smaller, well demarcated hyperechoic solid lesions with representative lesion in the right hepatic lobe measuring 1 5 x 1 4 x 1 5 cm compatible with metastatic or benign etiology  Limited imaging of the main portal vein shows it to be patent and hepatopetal  BILIARY: The gallbladder is normal in caliber   No wall thickening or pericholecystic fluid  Multiple shadowing gallstones are identified  No sonographic Ruelas's sign  No intrahepatic biliary dilatation  CBD measures 3 mm  No choledocholithiasis  KIDNEY: Right kidney measures 4 7 x 11 2 cm  Within normal limits  Left kidney measures 5 8 x 11 1 cm  Simple cyst in the upper pole measures 1 5 x 1 6 x 1 2 cm  SPLEEN: Measures 3 x 10 2 x 8 cm  Within normal limits  ASCITES:  None  Impression: Hepatomegaly with multiple predominantly hypoechoic solid masses, incompletely characterized but suspicious for metastases  Further evaluation with CT abdomen and pelvis recommended  Additional well demarcated small hyperechoic hepatic lesions may represent benign lesions such as hemangioma or metastases and can be further evaluated at the time of CT  Cholelithiasis without sonographic features of cholecystitis  I personally discussed this study with [facility] on 10/9/2018 at 1:28 PM  Workstation performed: [id number]     Ir Image Guided Biopsy/aspiration Liver    Result Date: 10/25/2018  Narrative: Ultrasound-guided biopsy of liver mass Clinical History: Bowel mass, liver lesions, suspected metastatic cancer Conscious sedation time: 35 minutes Technique: The patient was seen in the holding area and identified verbally and by wristband  After discussion of the procedure and its details, risks, benefits and alternatives, both verbal and written informed consent were obtained from the patient  The patient was then brought to the ultrasound area and placed supine on the stretcher  After a brief ultrasound examination was performed of the liver and correlated with prior imaging, a site on the anterior abdomen was prepped and draped in usual sterile fashion  Sterile ultrasound technique with sterile gel and sterile probe covers was also utilized  A preprocedure timeout was performed per standard department protocol   Under sonographic guidance, lidocaine was administered to the skin and underlying soft tissues  A coaxial biopsy needle set was advanced into the left hepatic mass under direct ultrasound guidance  The inner stylette was removed and an 18-gauge biopsy needle was advanced into the lesion and core samples obtained under continuous sonographic guidance  Initial samples revealed only necrotic material, therefore the needle was repositioned to the periphery of the mass  The specimen was found to be adequate  D-Stat was administered under ultrasound to the biopsy tract for hemostatic purposes  The patient tolerated the procedure well and suffered no complications  Specimens: seven 18-gauge bio pince core samples, touch prep Findings: Centrally necrotic 3 2 cm left hepatic mass  Needle within the lesion  Postbiopsy changes without hemorrhage  Impression: Impression: Ultrasound-guided biopsy of a left hepatic mass  Workstation performed: [id number]     Us Pelvis Transabdominal Only    Result Date: 10/9/2018  Narrative: PELVIC ULTRASOUND, LIMITED INDICATION:  48years old  K29 71: Gastritis, unspecified, with bleeding  COMPARISON: CT abdomen and pelvis on 12/20/2012 TECHNIQUE:   Transabdominal pelvic ultrasound was performed in sagittal and transverse planes with a curvilinear transducer  Targeted ultrasound of the periumbilical soft tissues was also performed  Imaging included volumetric sweeps as well as traditional still imaging technique  FINDINGS: UTERUS: The uterus is anteverted in position, measuring 4 x 5 7 x 7 1 cm  Contour and echotexture appear normal  The cervix shows no suspicious abnormality  ENDOMETRIUM:  Normal caliber of 3 mm  Homogenous and normal in appearance  OVARIES/ADNEXA: Right ovary: Not visualized  Left ovary:  1 x 2 6 x 2 7 cm  No suspicious left ovarian abnormality  Doppler flow within normal limits   Targeted ultrasound of area of palpable clinical abnormality in the left periumbilical region demonstrates masslike conglomerate measuring 5 x 7 9 x 7 8 cm likely representing multiple heterogeneous and thick walled bowel loops with questionable intraluminal soft tissue  The masslike conglomerate demonstrates target-like appearance suspicious for intussusception perhaps on the basis of underlying mass, although incompletely evaluated  Impression:  Normal uterus and left ovary  Right ovary not visualized  No suspicious right adnexal mass  Area of palpable clinical abnormality corresponds to masslike conglomerate which likely represents multiple heterogeneous and thick walled bowel segments with questionable intraluminal soft tissue  Masslike conglomerate demonstrates a target-like appearance suspicious for intussusception perhaps on the basis of underlying mass  These findings are incompletely evaluated and CT abdomen and pelvis is therefore recommended  I personally discussed this study with Libra Trimble on 10/9/2018 at 1:28 PM   Workstation performed: [id number]     Ct Abdomen Pelvis W Contrast    Result Date: 10/12/2018  Narrative: CT ABDOMEN AND PELVIS WITH IV CONTRAST INDICATION:   Abnormal ultrasound with liver lesions and bowel mass  COMPARISON:  12/20/2012 and 10/9/2018 TECHNIQUE:  CT examination of the abdomen and pelvis was performed  Axial, sagittal, and coronal 2D reformatted images were created from the source data and submitted for interpretation  Radiation dose length product (DLP) for this visit:  275 mGy-cm   This examination, like all CT scans performed in the [facility], was performed utilizing techniques to minimize radiation dose exposure, including the use of iterative reconstruction and automated exposure control  IV Contrast:  100 mL of iohexol (OMNIPAQUE) Enteric Contrast:  Enteric contrast was administered  FINDINGS: ABDOMEN LOWER CHEST:  There is a subpleural nodule in the right lower lobe measuring 5 mm   LIVER/BILIARY TREE:  As seen on ultrasound, there are multiple low-density liver lesions which measure greater than fluid attenuation and are consistent with metastasis  A lesion in the posterior segment of the right lobe measures 3 5 x 3 7 cm  A lesion in the anterior right lobe measures 3 4 x 2 8 cm  A lesion in the lateral left lobe measures 3 0 x 3 1 cm  GALLBLADDER:  No calcified gallstones  No pericholecystic inflammatory change  SPLEEN:  Unremarkable  PANCREAS:  Unremarkable  ADRENAL GLANDS:  Unremarkable  KIDNEYS/URETERS:  There are small renal cysts  No hydronephrosis  STOMACH AND BOWEL:  As seen on ultrasound, there is a soft tissue mass involving the mid transverse colon without evidence of obstruction  The mass appears to act as a lead point for intussusception, better seen on ultrasound  Measures approximately 7 2 x 7 6 cm  APPENDIX:  No findings to suggest appendicitis  ABDOMINOPELVIC CAVITY:  No ascites or free intraperitoneal air  No lymphadenopathy  VESSELS:  Unremarkable for patient's age  PELVIS REPRODUCTIVE ORGANS:  Unremarkable for patient's age  URINARY BLADDER:  Unremarkable  ABDOMINAL WALL/INGUINAL REGIONS:  There is a fat-containing umbilical hernia  OSSEOUS STRUCTURES:  No acute fracture or destructive osseous lesion  Impression: 1  Large soft tissue mass of the mid transverse colon consistent with neoplasm  This acts as a lead point for intussusception, better visualized on ultrasound though there is no evidence of bowel obstruction  2   Multiple liver masses consistent with metastasis  3   Subpleural lung nodule in the right lower lobe, nonspecific  A follow-up dedicated chest CT can be obtained to evaluate for pulmonary metastasis  The study was marked in EPIC for significant notification   Workstation performed: [id number]         Labs:   Lab Results   Component Value Date    WBC 9 04 10/10/2018    HGB 8 5 (L) 10/10/2018    HCT 31 1 (L) 10/10/2018    MCV 74 (L) 10/10/2018     (H) 10/10/2018     Lab Results   Component Value Date    K 4 1 10/10/2018     10/10/2018    CO2 28 10/10/2018    BUN 7 10/10/2018    CREATININE 0 62 10/10/2018    GLUF 97 10/10/2018    CALCIUM 8 9 10/10/2018    AST 19 10/10/2018    ALT 19 10/10/2018    ALKPHOS 241 (H) 10/10/2018    EGFR 103 10/10/2018       Lab Results   Component Value Date     (H) 09/24/2018       Lab Results   Component Value Date    IRON 11 (L) 09/13/2018    TIBC 302 09/13/2018    FERRITIN 15 09/13/2018       Lab Results   Component Value Date    FOLATE 11 4 09/13/2018           Vital Sign:    Body surface area is 1 58 meters squared  Wt Readings from Last 3 Encounters:   11/02/18 54 9 kg (121 lb)   10/17/18 54 kg (119 lb)   09/18/18 55 8 kg (123 lb)        Temp Readings from Last 3 Encounters:   11/02/18 97 9 °F (36 6 °C) (Tympanic)   10/25/18 98 2 °F (36 8 °C) (Oral)   10/17/18 (!) 100 6 °F (38 1 °C) (Tympanic)        BP Readings from Last 3 Encounters:   11/02/18 144/78   10/25/18 96/65   10/17/18 100/84         Pulse Readings from Last 3 Encounters:   11/02/18 80   10/25/18 76   09/18/18 99     @LASTSAO2(3)@    Active Problems:   Patient Active Problem List   Diagnosis    Arthritis    Hay fever    Herpes zoster    Seasonal allergies    Anxiety    Hot flashes    Impaired fasting glucose    Fatigue    Rheumatoid arthritis involving both hands with negative rheumatoid factor (HCC)    Other hyperlipidemia    Iron deficiency anemia    Severe anemia    Liver masses       Past Medical History:   Past Medical History:   Diagnosis Date    Anxious mood     Rheumatoid arthritis ([facility] [address] )        Surgical History:   Past Surgical History:   Procedure Laterality Date    APPENDECTOMY      ESOPHAGOGASTRODUODENOSCOPY N/A 9/14/2018    Procedure: ESOPHAGOGASTRODUODENOSCOPY (EGD) with polypectomy;  Surgeon: Pastor Dutton MD;  Location: [facility];   Service: Gastroenterology    IR IMAGE GUIDED [id number] [place] [address]  10/25/2018    TUBAL LIGATION      resolved 2007    WISDOM TOOTH EXTRACTION      resolved 1990       Family History: Family History   Problem Relation Age of Onset    Anxiety disorder Mother     Hypertension Father     Diabetes Father     Heart attack Maternal Grandmother         acute MI       Cancer-related family history is not on file  Social History:   Social History     Social History    Marital status: Single     Spouse name: N/A    Number of children: N/A    Years of education: N/A     Occupational History    Not on file  Social History Main Topics    Smoking status: Former Smoker    Smokeless tobacco: Never Used    Alcohol use No    Drug use: No    Sexual activity: Yes     Partners: Male     Other Topics Concern    Not on file     Social History Narrative    No narrative on file       Current Medications:   Current Outpatient Prescriptions   Medication Sig Dispense Refill    ferrous sulfate 325 (65 Fe) mg tablet Take 1 tablet (325 mg total) by mouth 3 (three) times a day with meals 90 tablet 0    folic acid (FOLVITE) 1 mg tablet 1 mg      methotrexate 2 5 mg tablet 2 5 mg       No current facility-administered medications for this visit          Allergies: No Known Allergies

## 2018-11-07 NOTE — INTERVAL H&P NOTE
Update:     Patient returns to Interventional Radiology for port placement to receive chemotherapy  She now has confirmed metastases to her liver from colon cancer  There have been no interval changes in health  I discussed the rationale for port placement, the risks of bleeding and infection, and she wishes to proceed  History notable for rheumatoid arthritis  We will proceed with moderate sedation  MP 1 ASA 2    Patient re-evaluated   Accept as history and physical     Lionel Lynn MD/November 7, 2018/1:03 PM

## 2018-11-07 NOTE — BRIEF OP NOTE (RAD/CATH)
IR PORT PLACEMENT  Procedure Note    PATIENT NAME: Orlando Rodriguez  : 1965  MRN: [id number]     Pre-op Diagnosis:   1  Colonic mass      Post-op Diagnosis:   1   Colonic mass        Surgeon:   Suzy Claros MD  Assistants:         Estimated Blood Loss: minimal  Findings: right chest port with tip at cavoatrial junction     Specimens: none    Complications:  None immediate    Anesthesia: Conscious sedation    Suzy Claros MD     Date: 2018  Time: 2:23 PM

## 2018-11-09 ENCOUNTER — APPOINTMENT (OUTPATIENT)
Dept: LAB | Age: 53
End: 2018-11-09
Payer: COMMERCIAL

## 2018-11-09 DIAGNOSIS — K63.89 COLONIC MASS: ICD-10-CM

## 2018-11-09 LAB
ALBUMIN SERPL BCP-MCNC: 2.3 G/DL (ref 3.5–5)
ALP SERPL-CCNC: 292 U/L (ref 46–116)
ALT SERPL W P-5'-P-CCNC: 27 U/L (ref 12–78)
ANION GAP SERPL CALCULATED.3IONS-SCNC: 5 MMOL/L (ref 4–13)
AST SERPL W P-5'-P-CCNC: 32 U/L (ref 5–45)
BASOPHILS # BLD AUTO: 0.04 THOUSANDS/ΜL (ref 0–0.1)
BASOPHILS NFR BLD AUTO: 0 % (ref 0–1)
BILIRUB SERPL-MCNC: 0.39 MG/DL (ref 0.2–1)
BUN SERPL-MCNC: 6 MG/DL (ref 5–25)
CALCIUM SERPL-MCNC: 9 MG/DL (ref 8.3–10.1)
CEA SERPL-MCNC: 73.1 NG/ML (ref 0–3)
CHLORIDE SERPL-SCNC: 100 MMOL/L (ref 100–108)
CO2 SERPL-SCNC: 28 MMOL/L (ref 21–32)
CREAT SERPL-MCNC: 0.53 MG/DL (ref 0.6–1.3)
EOSINOPHIL # BLD AUTO: 0.22 THOUSAND/ΜL (ref 0–0.61)
EOSINOPHIL NFR BLD AUTO: 2 % (ref 0–6)
ERYTHROCYTE [DISTWIDTH] IN BLOOD BY AUTOMATED COUNT: 19.4 % (ref 11.6–15.1)
GFR SERPL CREATININE-BSD FRML MDRD: 109 ML/MIN/1.73SQ M
GLUCOSE P FAST SERPL-MCNC: 106 MG/DL (ref 65–99)
HCT VFR BLD AUTO: 30.3 % (ref 34.8–46.1)
HGB BLD-MCNC: 8.3 G/DL (ref 11.5–15.4)
IMM GRANULOCYTES # BLD AUTO: 0.05 THOUSAND/UL (ref 0–0.2)
IMM GRANULOCYTES NFR BLD AUTO: 1 % (ref 0–2)
LYMPHOCYTES # BLD AUTO: 1.28 THOUSANDS/ΜL (ref 0.6–4.47)
LYMPHOCYTES NFR BLD AUTO: 12 % (ref 14–44)
MCH RBC QN AUTO: 20 PG (ref 26.8–34.3)
MCHC RBC AUTO-ENTMCNC: 27.4 G/DL (ref 31.4–37.4)
MCV RBC AUTO: 73 FL (ref 82–98)
MONOCYTES # BLD AUTO: 0.79 THOUSAND/ΜL (ref 0.17–1.22)
MONOCYTES NFR BLD AUTO: 7 % (ref 4–12)
NEUTROPHILS # BLD AUTO: 8.24 THOUSANDS/ΜL (ref 1.85–7.62)
NEUTS SEG NFR BLD AUTO: 78 % (ref 43–75)
NRBC BLD AUTO-RTO: 0 /100 WBCS
PLATELET # BLD AUTO: 621 THOUSANDS/UL (ref 149–390)
PMV BLD AUTO: 8.5 FL (ref 8.9–12.7)
POTASSIUM SERPL-SCNC: 3.5 MMOL/L (ref 3.5–5.3)
PROT SERPL-MCNC: 7.2 G/DL (ref 6.4–8.2)
RBC # BLD AUTO: 4.15 MILLION/UL (ref 3.81–5.12)
SODIUM SERPL-SCNC: 133 MMOL/L (ref 136–145)
WBC # BLD AUTO: 10.62 THOUSAND/UL (ref 4.31–10.16)

## 2018-11-09 PROCEDURE — 85025 COMPLETE CBC W/AUTO DIFF WBC: CPT | Performed by: INTERNAL MEDICINE

## 2018-11-09 PROCEDURE — 80053 COMPREHEN METABOLIC PANEL: CPT | Performed by: INTERNAL MEDICINE

## 2018-11-09 PROCEDURE — 36415 COLL VENOUS BLD VENIPUNCTURE: CPT

## 2018-11-09 PROCEDURE — 82378 CARCINOEMBRYONIC ANTIGEN: CPT

## 2018-11-12 ENCOUNTER — HOSPITAL ENCOUNTER (OUTPATIENT)
Dept: INFUSION CENTER | Facility: HOSPITAL | Age: 53
Discharge: HOME | End: 2018-11-12
Payer: COMMERCIAL

## 2018-11-12 ENCOUNTER — TELEPHONE (OUTPATIENT)
Dept: HEMATOLOGY ONCOLOGY | Facility: HOSPITAL | Age: 53
End: 2018-11-12

## 2018-11-12 VITALS
RESPIRATION RATE: 18 BRPM | TEMPERATURE: 97.8 F | WEIGHT: 119.05 LBS | BODY MASS INDEX: 20.32 KG/M2 | SYSTOLIC BLOOD PRESSURE: 111 MMHG | HEART RATE: 101 BPM | HEIGHT: 64 IN | DIASTOLIC BLOOD PRESSURE: 65 MMHG

## 2018-11-12 PROCEDURE — G0498 CHEMO EXTEND IV INFUS W/PUMP: HCPCS

## 2018-11-12 PROCEDURE — 96367 TX/PROPH/DG ADDL SEQ IV INF: CPT

## 2018-11-12 PROCEDURE — 96415 CHEMO IV INFUSION ADDL HR: CPT

## 2018-11-12 PROCEDURE — 96368 THER/DIAG CONCURRENT INF: CPT

## 2018-11-12 PROCEDURE — 96413 CHEMO IV INFUSION 1 HR: CPT

## 2018-11-12 PROCEDURE — 96411 CHEMO IV PUSH ADDL DRUG: CPT

## 2018-11-12 RX ORDER — DEXTROSE MONOHYDRATE 50 MG/ML
20 INJECTION, SOLUTION INTRAVENOUS CONTINUOUS
Status: DISCONTINUED | OUTPATIENT
Start: 2018-11-12 | End: 2018-11-16 | Stop reason: HOSPADM

## 2018-11-12 RX ORDER — FLUOROURACIL 50 MG/ML
632 INJECTION, SOLUTION INTRAVENOUS ONCE
Status: COMPLETED | OUTPATIENT
Start: 2018-11-12 | End: 2018-11-12

## 2018-11-12 RX ORDER — SODIUM CHLORIDE 9 MG/ML
20 INJECTION, SOLUTION INTRAVENOUS CONTINUOUS
Status: DISCONTINUED | OUTPATIENT
Start: 2018-11-12 | End: 2018-11-16 | Stop reason: HOSPADM

## 2018-11-12 RX ADMIN — LEUCOVORIN CALCIUM 632 MG: 350 INJECTION, POWDER, LYOPHILIZED, FOR SOLUTION INTRAMUSCULAR; INTRAVENOUS at 10:25

## 2018-11-12 RX ADMIN — FLUOROURACIL 632 MG: 50 INJECTION, SOLUTION INTRAVENOUS at 12:39

## 2018-11-12 RX ADMIN — DEXTROSE MONOHYDRATE 20 ML/HR: 50 INJECTION, SOLUTION INTRAVENOUS at 10:05

## 2018-11-12 RX ADMIN — SODIUM CHLORIDE 20 ML/HR: 9 INJECTION, SOLUTION INTRAVENOUS at 09:18

## 2018-11-12 RX ADMIN — DEXAMETHASONE SODIUM PHOSPHATE: 10 INJECTION, SOLUTION INTRAMUSCULAR; INTRAVENOUS at 09:40

## 2018-11-12 RX ADMIN — OXALIPLATIN 134 MG: 5 INJECTION, SOLUTION INTRAVENOUS at 10:29

## 2018-11-12 NOTE — PROGRESS NOTES
Pt admitted to infusion center today for first treatment of folfox  Port accessed without difficulty  Excellent blood return noted  Pre meds tolerated well  Currently has oxaliplatin and leucovorin infusing  Emotional thermometer completed by pt  New pt box given  All questions answered

## 2018-11-12 NOTE — TELEPHONE ENCOUNTER
Call from Dana/ infusion center-pt is for first tx FOLFOX today  Pt  Is taking Methotrexate and Folic Acid for dx of RA  Called Dr Josef Breaux to continue folic acid if using Methotrexate  Reunion aware and will make pt aware

## 2018-11-12 NOTE — PROGRESS NOTES
Pt admitted to infusion center today for first folfox treatment  Port accessed without difficulty  Excellent blood return  Recent labs reviewed  Side effects of medications reviewed with pt  Tolerated all drugs without adverse reaction  Pt questioned taking folic acid oral supplement along with leucovorin  "the paperwork says dont take it but I take it along with my methotrexate for my ra  Spoke with emma resendiz who is covering dr Domonique Kennedy  She will find the answer to that question and return our call  Pt given information on her cadd pump and was connected  All connections secured with tape  Pump light is green indicating "running"  discahrged ambulatory with  to return at 200 Wednesday

## 2018-11-13 RX ORDER — SODIUM CHLORIDE 9 MG/ML
20 INJECTION, SOLUTION INTRAVENOUS CONTINUOUS
Status: DISPENSED | OUTPATIENT
Start: 2018-11-14 | End: 2018-11-15

## 2018-11-14 ENCOUNTER — HOSPITAL ENCOUNTER (OUTPATIENT)
Dept: INFUSION CENTER | Facility: HOSPITAL | Age: 53
Discharge: HOME | End: 2018-11-14
Payer: COMMERCIAL

## 2018-11-14 ENCOUNTER — OFFICE VISIT (OUTPATIENT)
Dept: FAMILY MEDICINE CLINIC | Facility: CLINIC | Age: 53
End: 2018-11-14
Payer: COMMERCIAL

## 2018-11-14 ENCOUNTER — DOCUMENTATION (OUTPATIENT)
Dept: HEMATOLOGY ONCOLOGY | Facility: CLINIC | Age: 53
End: 2018-11-14

## 2018-11-14 VITALS
DIASTOLIC BLOOD PRESSURE: 68 MMHG | WEIGHT: 118 LBS | HEIGHT: 64 IN | BODY MASS INDEX: 20.14 KG/M2 | TEMPERATURE: 98.4 F | SYSTOLIC BLOOD PRESSURE: 110 MMHG

## 2018-11-14 DIAGNOSIS — C18.9 COLON CANCER METASTASIZED TO LIVER (HCC): ICD-10-CM

## 2018-11-14 DIAGNOSIS — C78.7 COLON CANCER METASTASIZED TO LIVER (HCC): ICD-10-CM

## 2018-11-14 DIAGNOSIS — Z23 NEED FOR INFLUENZA VACCINATION: Primary | ICD-10-CM

## 2018-11-14 PROCEDURE — 99213 OFFICE O/P EST LOW 20 MIN: CPT | Performed by: FAMILY MEDICINE

## 2018-11-14 PROCEDURE — 90682 RIV4 VACC RECOMBINANT DNA IM: CPT | Performed by: FAMILY MEDICINE

## 2018-11-14 PROCEDURE — 1036F TOBACCO NON-USER: CPT | Performed by: FAMILY MEDICINE

## 2018-11-14 PROCEDURE — 90471 IMMUNIZATION ADMIN: CPT | Performed by: FAMILY MEDICINE

## 2018-11-14 RX ADMIN — Medication 300 UNITS: at 11:01

## 2018-11-14 NOTE — PROGRESS NOTES
GI Oncology Nurse Navigator Note    Received a voicemail from Ronaldo stating that she was just checking in to let me know her first chemotherapy infusion went well and she was feeling good, will check in with her in a few days to see how she is feeling and if she has any needs

## 2018-11-14 NOTE — PROGRESS NOTES
Patient here for CADD pump disconnect and offers no new complaints  CADD pump disconnected and port flushed per protocol    Confirmed next appts and declined AVS

## 2018-11-15 NOTE — PROGRESS NOTES
Assessment/Plan:    51-year-old woman with:  Stage IV adenocarcinoma of the colon metastasized to the liver  Discussed workup and treatment options with risks and benefits  Encouraged continued follow-up with Oncology and advised if she ever would like a 2nd opinion we can put in a referral to do so  Discussed supportive care return parameters and follow-up in 3 months  No problem-specific Assessment & Plan notes found for this encounter  Diagnoses and all orders for this visit:    Need for influenza vaccination  -     influenza vaccine, 5194-2417, quadrivalent, recombinant, PF, 0 5 mL, for patients 18 yr+ (FLUBLOK)    Colon cancer metastasized to liver [facility])          Subjective:   Chief Complaint   Patient presents with    Follow-up    Flu Vaccine     Patient will be given the flu shot today          Patient ID: Marly Granger is a 48 y o  female  Patient is a 51-year-old woman who presents for follow-up  She was recently diagnosed with colon cancer stage IV adenocarcinoma that metastasized to her liver  She recently underwent placement of a port and had her 1st treatment with chemotherapy  Patient admits overall that she is doing well without any side effects at this point  No nausea vomiting abdominal pain or diarrhea  No pain  No other complaints at this time  The following portions of the patient's history were reviewed and updated as appropriate: allergies, current medications, past family history, past medical history, past social history, past surgical history and problem list     Review of Systems   Constitutional: Negative  HENT: Negative  Eyes: Negative  Respiratory: Negative  Cardiovascular: Negative  Gastrointestinal: Negative  Endocrine: Negative  Genitourinary: Negative  Musculoskeletal: Negative  Allergic/Immunologic: Negative  Neurological: Negative  Hematological: Negative  Psychiatric/Behavioral: Negative      All other systems reviewed and are negative  Objective:      /68 (BP Location: Left arm, Patient Position: Sitting, Cuff Size: Standard)   Temp 98 4 °F (36 9 °C) (Tympanic)   Ht 5' 4" (1 626 m)   Wt 53 5 kg (118 lb)   BMI 20 25 kg/m²          Physical Exam   Constitutional: She is oriented to person, place, and time  She appears well-developed and well-nourished  HENT:   Head: Atraumatic  Right Ear: External ear normal    Left Ear: External ear normal    Eyes: Pupils are equal, round, and reactive to light  Conjunctivae and EOM are normal    Neck: Normal range of motion  Cardiovascular: Normal rate, regular rhythm and normal heart sounds  Pulmonary/Chest: Effort normal and breath sounds normal  No respiratory distress  Abdominal: Soft  She exhibits no distension  There is no tenderness  There is no rebound and no guarding  Musculoskeletal: Normal range of motion  Neurological: She is alert and oriented to person, place, and time  No cranial nerve deficit  Skin: Skin is warm and dry  Psychiatric: She has a normal mood and affect   Her behavior is normal  Judgment and thought content normal

## 2018-11-23 ENCOUNTER — OFFICE VISIT (OUTPATIENT)
Dept: HEMATOLOGY ONCOLOGY | Facility: CLINIC | Age: 53
End: 2018-11-23
Payer: COMMERCIAL

## 2018-11-23 VITALS
TEMPERATURE: 97.5 F | WEIGHT: 118 LBS | SYSTOLIC BLOOD PRESSURE: 96 MMHG | DIASTOLIC BLOOD PRESSURE: 62 MMHG | OXYGEN SATURATION: 93 % | HEIGHT: 64 IN | RESPIRATION RATE: 18 BRPM | BODY MASS INDEX: 20.14 KG/M2 | HEART RATE: 97 BPM

## 2018-11-23 DIAGNOSIS — C18.9 COLON CANCER METASTASIZED TO LIVER (HCC): Primary | ICD-10-CM

## 2018-11-23 DIAGNOSIS — C78.7 COLON CANCER METASTASIZED TO LIVER (HCC): Primary | ICD-10-CM

## 2018-11-23 PROCEDURE — 99214 OFFICE O/P EST MOD 30 MIN: CPT | Performed by: INTERNAL MEDICINE

## 2018-11-23 RX ORDER — FLUOROURACIL 50 MG/ML
632 INJECTION, SOLUTION INTRAVENOUS ONCE
Status: COMPLETED | OUTPATIENT
Start: 2018-11-26 | End: 2018-11-26

## 2018-11-23 RX ORDER — DEXTROSE MONOHYDRATE 50 MG/ML
20 INJECTION, SOLUTION INTRAVENOUS CONTINUOUS
Status: DISCONTINUED | OUTPATIENT
Start: 2018-11-26 | End: 2018-11-29 | Stop reason: HOSPADM

## 2018-11-23 RX ORDER — SODIUM CHLORIDE 9 MG/ML
20 INJECTION, SOLUTION INTRAVENOUS CONTINUOUS
Status: DISCONTINUED | OUTPATIENT
Start: 2018-11-26 | End: 2018-11-29 | Stop reason: HOSPADM

## 2018-11-23 NOTE — PROGRESS NOTES
Hematology / Oncology Outpatient Follow Up Note    Anny Wilcox 48 y o  female :1965 [id number]         Date:  2018    Assessment / Plan:  A 31-year-old female with metastatic colon cancer  She has extensive liver metastasis  She is currently on systemic therapy with FOLFOX-6 with excellent tolerance  I recommended her to continue with FOLFOX-6 every 2 weeks  I am going to request pathology department to do B Griffin, K-wanda and N-wanda mutation analysis, since we have not received any report, although I requested dose test to be done  I will see her again in end of 2018 for follow-up with [facility]  She is in agreement with my recommendations                                      Subjective:      HPI:  A 31-year-old female who was found to have severe microcytic anemia in 2018  Therefore, she was advised to be hospitalized  She was given transfusion  EGD was performed which did not show any major pathology  As outpatient basis, she underwent CT scan of abdomen pelvis which showed transverse colon mass as well as multiple liver metastatic disease  She subsequently underwent liver biopsy which showed metastatic adenocarcinoma, consistent with colorectal primary  She presents today to discuss treatment options  Since 2018, she lost 25 pound  She has mildly anorexic  However, she has no complaint of pain  She denied any respiratory symptoms  She has normal bowel movement  Prior to the hospitalization, she had slowly progressive fatigue as well as some exertional shortness of breath  She is currently on oral iron 3 times per day  She has rheumatoid arthritis for which she is on weekly methotrexate and folic acid  She has no family history of colorectal cancer  She is a lifetime never smoker  Her performance status is normal            Interval History:   A 31-year-old female with metastatic colon cancer with extensive liver metastasis    She started systemic chemotherapy with FOLFOX-6 which she tolerated very well  She had very minimal nausea  She did not throw up  She has no neuropathy  She has no complaint of pain  She denied respiratory symptoms  She has normal bowel movement  She is on oral iron for iron deficiency anemia  Her performance status is normal  She is going to have 2nd cycle of treatment on Monday            Objective:      Primary Diagnosis:     Metastatic colon cancer  Diagnosed in October 2018      Cancer Staging:  Cancer Staging  No matching staging information was found for the patient         Previous Hematologic/ Oncologic Treatment:            Current Hematologic/ Oncologic Treatment:       FOLFOX-6  Second cycle to be given in November 26, 2018      Disease Status:      Not evaluated at this time      Test Results:     Pathology:     Liver biopsy showed metastatic adenocarcinoma, consistent with colorectal primary      Radiology:     CT scan of abdomen pelvis showed large transverse colon mass as well as multiple liver and metastasis  I personally reviewed this films      Laboratory:     See below      Physical Exam:        General Appearance:    Alert, oriented          Eyes:    PERRL   Ears:    Normal external ear canals, both ears   Nose:   Nares normal, septum midline   Throat:   Mucosa moist  Pharynx without injection  Neck:   Supple         Lungs:     Clear to auscultation bilaterally   Chest Wall:    No tenderness or deformity    Heart:    Regular rate and rhythm         Abdomen:     Soft, non-tender, bowel sounds +, no organomegaly               Extremities:   Extremities no cyanosis or edema         Skin:   no rash or icterus  Lymph nodes:   Cervical, supraclavicular, and axillary nodes normal   Neurologic:   CNII-XII intact, normal strength, sensation and reflexes     Throughout             Breast exam:  NA           ROS: Review of Systems   All other systems reviewed and are negative            Imaging: Ir Image Guided Biopsy/aspiration Liver    Result Date: 10/25/2018  Narrative: Ultrasound-guided biopsy of liver mass Clinical History: Bowel mass, liver lesions, suspected metastatic cancer Conscious sedation time: 35 minutes Technique: The patient was seen in the holding area and identified verbally and by wristband  After discussion of the procedure and its details, risks, benefits and alternatives, both verbal and written informed consent were obtained from the patient  The patient was then brought to the ultrasound area and placed supine on the stretcher  After a brief ultrasound examination was performed of the liver and correlated with prior imaging, a site on the anterior abdomen was prepped and draped in usual sterile fashion  Sterile ultrasound technique with sterile gel and sterile probe covers was also utilized  A preprocedure timeout was performed per standard department protocol  Under sonographic guidance, lidocaine was administered to the skin and underlying soft tissues  A coaxial biopsy needle set was advanced into the left hepatic mass under direct ultrasound guidance  The inner stylette was removed and an 18-gauge biopsy needle was advanced into the lesion and core samples obtained under continuous sonographic guidance  Initial samples revealed only necrotic material, therefore the needle was repositioned to the periphery of the mass  The specimen was found to be adequate  D-Stat was administered under ultrasound to the biopsy tract for hemostatic purposes  The patient tolerated the procedure well and suffered no complications  Specimens: seven 18-gauge bio pince core samples, touch prep Findings: Centrally necrotic 3 2 cm left hepatic mass  Needle within the lesion  Postbiopsy changes without hemorrhage  Impression: Impression: Ultrasound-guided biopsy of a left hepatic mass  Workstation performed: [id number]     Ir Port Placement    Result Date: 11/8/2018  Narrative: Chest port placement   Clinical History: Biopsy-proven colon cancer metastatic to liver  Port placement for chemotherapy  Fluoro time: 0 9 minutes Number of Images: 4 Conscious sedation time: 35 minutes Technique: The patient was brought to the interventional radiology preprocedure area and identified verbally and by wristband  After discussion of the procedure and its details, risks, benefits, and alternatives both verbal and written informed consent was obtained from the patient and placed in the chart  The patient was then brought to the interventional radiology angiography suite and placed supine on the table  The right internal jugular vein was evaluated as a potential access site with ultrasound  The vessel was found to be patent and compressible  An 8-[place] Midsize Dignity  port was selected for the procedure  The right neck and upper chest were prepped and draped in the usual sterile fashion  All elements of maximal sterile barrier technique were followed (cap, mask, sterile gown, sterile gloves, large sterile sheet, hand hygiene and 2% chlorhexidine for cutaneous antisepsis)  Sterile ultrasound technique with sterile gel and sterile probe covers were also utilized  A preprocedure timeout was performed per standard department protocol  Under ultrasound guidance, lidocaine was administered to the skin and a small skin incision was made  Under ultrasound guidance, the right internal jugular vein was accessed using single wall Seldinger technique  Static images of real time needle entry into the vessel were obtained  A 0 018 wire was then advanced through the needle into the central venous system  The needle was removed, and a 5 [place] coaxial dilator was inserted  Next, attention was turned to the right chest and a site for the port pocket was selected  This area was then anesthetized with lidocaine, as well as the projected tunnel    A subcutaneous pocket was created in the skin of the upper chest for the port reservoir utilizing a surgical incision  The port catheter was then tunneled under the skin of the upper chest  Attention was then turned to the 5-[place]izen [place] coaxial dilator  The microwire and inner dilator were removed  A J wire was inserted through the outer dilator and a peel-away sheath was inserted over the wire  The catheter was advanced through the peel-away  and the peel-away was removed  The catheter tip was then positioned in the right atrium under fluoroscopic control  The catheter was connected to the port, and the port inserted into the subcutaneous pocket  The port was then flushed and aspirated successfully and 100 unit heparin/cc solution was administered into the lumen  The pocket was closed with 3-0 Vicryl suture and Histoacryl  A sterile dressing was applied  The neck incision was closed with 3-0 Vicryl suture and Histoacryl  Findings: Patent and compressible right internal jugular vein  Successful ultrasound and fluoroscopically guided placement of an 8-[place]izen [place] Midsize Dignity chest port via the right internal jugular vein  Impression: Impression: 1  Ultrasound and fluoroscopically guided placement of an 8-[place] chest port via the right internal jugular vein  2  The tip of the catheter projects in the superior cavoatrial junction and may be used immediately   Workstation performed: [id number]         Labs:   Lab Results   Component Value Date    WBC 10 62 (H) 11/09/2018    HGB 8 3 (L) 11/09/2018    HCT 30 3 (L) 11/09/2018    MCV 73 (L) 11/09/2018     (H) 11/09/2018     Lab Results   Component Value Date    K 3 5 11/09/2018     11/09/2018    CO2 28 11/09/2018    BUN 6 11/09/2018    CREATININE 0 53 (L) 11/09/2018    GLUF 106 (H) 11/09/2018    CALCIUM 9 0 11/09/2018    AST 32 11/09/2018    ALT 27 11/09/2018    ALKPHOS 292 (H) 11/09/2018    EGFR 109 11/09/2018         Lab Results   Component Value Date     (H) 09/24/2018       Lab Results   Component Value Date    CEA 73 1 (H) 11/09/2018 Lab Results   Component Value Date    IRON 11 (L) 09/13/2018    TIBC 302 09/13/2018    FERRITIN 15 09/13/2018       Lab Results   Component Value Date    FOLATE 11 4 09/13/2018         Current Medications: Reviewed  Allergies: Reviewed  PMH/FH/SH:  Reviewed      Vital Sign:    Body surface area is 1 57 meters squared      Wt Readings from Last 3 Encounters:   11/23/18 53 5 kg (118 lb)   11/14/18 53 5 kg (118 lb)   11/12/18 54 kg (119 lb 0 8 oz)        Temp Readings from Last 3 Encounters:   11/23/18 97 5 °F (36 4 °C) (Tympanic)   11/14/18 98 4 °F (36 9 °C) (Tympanic)   11/12/18 97 8 °F (36 6 °C) (Temporal)        BP Readings from Last 3 Encounters:   11/23/18 96/62   11/14/18 110/68   11/12/18 111/65         Pulse Readings from Last 3 Encounters:   11/23/18 97   11/12/18 101   11/07/18 94     @LASTSAO2(3)@

## 2018-11-24 ENCOUNTER — APPOINTMENT (OUTPATIENT)
Dept: LAB | Facility: HOSPITAL | Age: 53
End: 2018-11-24
Attending: INTERNAL MEDICINE
Payer: COMMERCIAL

## 2018-11-24 DIAGNOSIS — C18.9 COLON CANCER METASTASIZED TO LIVER (HCC): ICD-10-CM

## 2018-11-24 DIAGNOSIS — C78.7 COLON CANCER METASTASIZED TO LIVER (HCC): ICD-10-CM

## 2018-11-24 LAB
ALBUMIN SERPL BCP-MCNC: 2.4 G/DL (ref 3.5–5)
ALP SERPL-CCNC: 229 U/L (ref 46–116)
ALT SERPL W P-5'-P-CCNC: 21 U/L (ref 12–78)
ANION GAP SERPL CALCULATED.3IONS-SCNC: 8 MMOL/L (ref 4–13)
AST SERPL W P-5'-P-CCNC: 24 U/L (ref 5–45)
BASOPHILS # BLD AUTO: 0.04 THOUSANDS/ΜL (ref 0–0.1)
BASOPHILS NFR BLD AUTO: 1 % (ref 0–1)
BILIRUB SERPL-MCNC: 0.32 MG/DL (ref 0.2–1)
BUN SERPL-MCNC: 9 MG/DL (ref 5–25)
CALCIUM SERPL-MCNC: 9.1 MG/DL (ref 8.3–10.1)
CEA SERPL-MCNC: 178.9 NG/ML (ref 0–3)
CHLORIDE SERPL-SCNC: 101 MMOL/L (ref 100–108)
CO2 SERPL-SCNC: 27 MMOL/L (ref 21–32)
CREAT SERPL-MCNC: 0.61 MG/DL (ref 0.6–1.3)
EOSINOPHIL # BLD AUTO: 0.19 THOUSAND/ΜL (ref 0–0.61)
EOSINOPHIL NFR BLD AUTO: 4 % (ref 0–6)
ERYTHROCYTE [DISTWIDTH] IN BLOOD BY AUTOMATED COUNT: 18.6 % (ref 11.6–15.1)
GFR SERPL CREATININE-BSD FRML MDRD: 104 ML/MIN/1.73SQ M
GLUCOSE SERPL-MCNC: 97 MG/DL (ref 65–140)
HCT VFR BLD AUTO: 28.8 % (ref 34.8–46.1)
HGB BLD-MCNC: 7.9 G/DL (ref 11.5–15.4)
IMM GRANULOCYTES # BLD AUTO: 0.01 THOUSAND/UL (ref 0–0.2)
IMM GRANULOCYTES NFR BLD AUTO: 0 % (ref 0–2)
LYMPHOCYTES # BLD AUTO: 1.64 THOUSANDS/ΜL (ref 0.6–4.47)
LYMPHOCYTES NFR BLD AUTO: 30 % (ref 14–44)
MCH RBC QN AUTO: 20.3 PG (ref 26.8–34.3)
MCHC RBC AUTO-ENTMCNC: 27.4 G/DL (ref 31.4–37.4)
MCV RBC AUTO: 74 FL (ref 82–98)
MONOCYTES # BLD AUTO: 0.49 THOUSAND/ΜL (ref 0.17–1.22)
MONOCYTES NFR BLD AUTO: 9 % (ref 4–12)
NEUTROPHILS # BLD AUTO: 3.02 THOUSANDS/ΜL (ref 1.85–7.62)
NEUTS SEG NFR BLD AUTO: 56 % (ref 43–75)
NRBC BLD AUTO-RTO: 0 /100 WBCS
PLATELET # BLD AUTO: 514 THOUSANDS/UL (ref 149–390)
PMV BLD AUTO: 8 FL (ref 8.9–12.7)
POTASSIUM SERPL-SCNC: 3.8 MMOL/L (ref 3.5–5.3)
PROT SERPL-MCNC: 7.2 G/DL (ref 6.4–8.2)
RBC # BLD AUTO: 3.9 MILLION/UL (ref 3.81–5.12)
SODIUM SERPL-SCNC: 136 MMOL/L (ref 136–145)
WBC # BLD AUTO: 5.39 THOUSAND/UL (ref 4.31–10.16)

## 2018-11-24 PROCEDURE — 82378 CARCINOEMBRYONIC ANTIGEN: CPT

## 2018-11-24 PROCEDURE — 36415 COLL VENOUS BLD VENIPUNCTURE: CPT

## 2018-11-24 PROCEDURE — 85025 COMPLETE CBC W/AUTO DIFF WBC: CPT | Performed by: INTERNAL MEDICINE

## 2018-11-24 PROCEDURE — 80053 COMPREHEN METABOLIC PANEL: CPT | Performed by: INTERNAL MEDICINE

## 2018-11-26 ENCOUNTER — HOSPITAL ENCOUNTER (OUTPATIENT)
Dept: INFUSION CENTER | Facility: CLINIC | Age: 53
Discharge: HOME | End: 2018-11-26
Payer: COMMERCIAL

## 2018-11-26 VITALS
HEART RATE: 90 BPM | DIASTOLIC BLOOD PRESSURE: 67 MMHG | RESPIRATION RATE: 18 BRPM | BODY MASS INDEX: 19.99 KG/M2 | TEMPERATURE: 97.5 F | SYSTOLIC BLOOD PRESSURE: 101 MMHG | WEIGHT: 117.06 LBS | HEIGHT: 64 IN

## 2018-11-26 PROCEDURE — G0498 CHEMO EXTEND IV INFUS W/PUMP: HCPCS

## 2018-11-26 PROCEDURE — 96368 THER/DIAG CONCURRENT INF: CPT

## 2018-11-26 PROCEDURE — 96375 TX/PRO/DX INJ NEW DRUG ADDON: CPT

## 2018-11-26 PROCEDURE — 96413 CHEMO IV INFUSION 1 HR: CPT

## 2018-11-26 PROCEDURE — 96411 CHEMO IV PUSH ADDL DRUG: CPT

## 2018-11-26 PROCEDURE — 96415 CHEMO IV INFUSION ADDL HR: CPT

## 2018-11-26 PROCEDURE — 96367 TX/PROPH/DG ADDL SEQ IV INF: CPT

## 2018-11-26 RX ADMIN — DEXTROSE 20 ML/HR: 5 SOLUTION INTRAVENOUS at 10:36

## 2018-11-26 RX ADMIN — SODIUM CHLORIDE 20 ML/HR: 0.9 INJECTION, SOLUTION INTRAVENOUS at 09:49

## 2018-11-26 RX ADMIN — LEUCOVORIN CALCIUM 632 MG: 200 INJECTION, POWDER, LYOPHILIZED, FOR SOLUTION INTRAMUSCULAR; INTRAVENOUS at 10:39

## 2018-11-26 RX ADMIN — IRON SUCROSE 200 MG: 20 INJECTION, SOLUTION INTRAVENOUS at 10:07

## 2018-11-26 RX ADMIN — FLUOROURACIL 632 MG: 50 INJECTION, SOLUTION INTRAVENOUS at 12:48

## 2018-11-26 RX ADMIN — DEXAMETHASONE SODIUM PHOSPHATE: 10 INJECTION, SOLUTION INTRAMUSCULAR; INTRAVENOUS at 09:49

## 2018-11-26 RX ADMIN — OXALIPLATIN 134 MG: 5 INJECTION, SOLUTION INTRAVENOUS at 10:42

## 2018-11-26 NOTE — PLAN OF CARE
Problem: Potential for Falls  Goal: Patient will remain free of falls  INTERVENTIONS:  - Assess patient frequently for physical needs  -  Identify cognitive and physical deficits and behaviors that affect risk of falls    -  [place] fall precautions as indicated by assessment   - Educate patient/family on patient safety including physical limitations  - Instruct patient to call for assistance with activity based on assessment  - Modify environment to reduce risk of injury  - Consider OT/PT consult to assist with strengthening/mobility   Outcome: Progressing

## 2018-11-26 NOTE — PROGRESS NOTES
Pt connected to CADD pump as ordered  Pt is aware to return to infusion center on Wednesday 11/28 for CADD d/c    Pt was provided with AVS

## 2018-11-26 NOTE — PROGRESS NOTES
Hemoglobin = 7 9  Thor RN covering from Dr Moy Madrigal was notified  Waiting for OK to treat at this time

## 2018-11-28 ENCOUNTER — HOSPITAL ENCOUNTER (OUTPATIENT)
Dept: INFUSION CENTER | Facility: CLINIC | Age: 53
Discharge: HOME | End: 2018-11-28
Payer: COMMERCIAL

## 2018-11-28 ENCOUNTER — DOCUMENTATION (OUTPATIENT)
Dept: HEMATOLOGY ONCOLOGY | Facility: CLINIC | Age: 53
End: 2018-11-28

## 2018-11-28 RX ADMIN — Medication 300 UNITS: at 11:24

## 2018-11-28 NOTE — PLAN OF CARE
Problem: Potential for Falls  Goal: Patient will remain free of falls  INTERVENTIONS:  - Assess patient frequently for physical needs  -  Identify cognitive and physical deficits and behaviors that affect risk of falls    -  [place] fall precautions as indicated by assessment   - Educate patient/family on patient safety including physical limitations  - Instruct patient to call for assistance with activity based on assessment  - Modify environment to reduce risk of injury  - Consider OT/PT consult to assist with strengthening/mobility   Outcome: Progressing

## 2018-11-28 NOTE — PROGRESS NOTES
GI Oncology Nurse Navigator Note    Received a message from Da Ryan asking if it would be alright to go to the dentist for a cleaning, reviewed her last labs and spoke to Dr Harmeet Tirado, called her back and got her voicemail, left a message and told her that it would be ok but she is getting her pump taken off today and her next infusion is 12/10, told her it would be better to schedule the cleaning for as close to her next infusion as possible so 12/6 or 12/7 as that would be when her counts would recover, also told her to inform the dentist and hygienist that she is undergoing chemotherapy treatments and not to let them do anything invasive as to not increase her risk of infection, asked her to call me back so we can talk more about it and I could answer any questions she may have, left my number for her

## 2018-11-28 NOTE — PROGRESS NOTES
Patient tolerated her CADD pump infusion well without adverse reaction  Patient denies complications

## 2018-12-07 ENCOUNTER — APPOINTMENT (OUTPATIENT)
Dept: LAB | Age: 53
End: 2018-12-07
Payer: COMMERCIAL

## 2018-12-07 ENCOUNTER — TRANSCRIBE ORDERS (OUTPATIENT)
Dept: ADMINISTRATIVE | Facility: HOSPITAL | Age: 53
End: 2018-12-07

## 2018-12-07 DIAGNOSIS — C18.9 MALIGNANT NEOPLASM OF COLON, UNSPECIFIED PART OF COLON (HCC): ICD-10-CM

## 2018-12-07 DIAGNOSIS — C78.7 SECONDARY MALIGNANT NEOPLASM OF LIVER (HCC): ICD-10-CM

## 2018-12-07 DIAGNOSIS — C18.9 MALIGNANT NEOPLASM OF COLON, UNSPECIFIED PART OF COLON (HCC): Primary | ICD-10-CM

## 2018-12-07 LAB — CEA SERPL-MCNC: 241.9 NG/ML (ref 0–3)

## 2018-12-07 PROCEDURE — 82378 CARCINOEMBRYONIC ANTIGEN: CPT

## 2018-12-07 RX ORDER — FLUOROURACIL 50 MG/ML
600 INJECTION, SOLUTION INTRAVENOUS ONCE
Status: COMPLETED | OUTPATIENT
Start: 2018-12-10 | End: 2018-12-10

## 2018-12-07 RX ORDER — SODIUM CHLORIDE 9 MG/ML
20 INJECTION, SOLUTION INTRAVENOUS CONTINUOUS
Status: DISCONTINUED | OUTPATIENT
Start: 2018-12-10 | End: 2018-12-07 | Stop reason: SDUPTHER

## 2018-12-07 RX ORDER — DEXTROSE MONOHYDRATE 50 MG/ML
20 INJECTION, SOLUTION INTRAVENOUS CONTINUOUS
Status: DISCONTINUED | OUTPATIENT
Start: 2018-12-10 | End: 2018-12-13 | Stop reason: HOSPADM

## 2018-12-10 ENCOUNTER — HOSPITAL ENCOUNTER (OUTPATIENT)
Dept: INFUSION CENTER | Facility: CLINIC | Age: 53
Discharge: HOME | End: 2018-12-10
Payer: COMMERCIAL

## 2018-12-10 PROCEDURE — G0498 CHEMO EXTEND IV INFUS W/PUMP: HCPCS

## 2018-12-10 PROCEDURE — 96367 TX/PROPH/DG ADDL SEQ IV INF: CPT

## 2018-12-10 PROCEDURE — 96413 CHEMO IV INFUSION 1 HR: CPT

## 2018-12-10 PROCEDURE — 96375 TX/PRO/DX INJ NEW DRUG ADDON: CPT

## 2018-12-10 PROCEDURE — 96411 CHEMO IV PUSH ADDL DRUG: CPT

## 2018-12-10 PROCEDURE — 96368 THER/DIAG CONCURRENT INF: CPT

## 2018-12-10 RX ADMIN — FLUOROURACIL 600 MG: 50 INJECTION, SOLUTION INTRAVENOUS at 13:20

## 2018-12-10 RX ADMIN — OXALIPLATIN 133 MG: 5 INJECTION, SOLUTION INTRAVENOUS at 11:21

## 2018-12-10 RX ADMIN — DEXAMETHASONE SODIUM PHOSPHATE: 10 INJECTION, SOLUTION INTRAMUSCULAR; INTRAVENOUS at 09:57

## 2018-12-10 RX ADMIN — IRON SUCROSE 200 MG: 20 INJECTION, SOLUTION INTRAVENOUS at 10:25

## 2018-12-10 RX ADMIN — DEXTROSE 20 ML/HR: 5 SOLUTION INTRAVENOUS at 09:57

## 2018-12-10 RX ADMIN — LEUCOVORIN CALCIUM 600 MG: 200 INJECTION, POWDER, LYOPHILIZED, FOR SOLUTION INTRAMUSCULAR; INTRAVENOUS at 11:22

## 2018-12-10 NOTE — PLAN OF CARE
Problem: Potential for Falls  Goal: Patient will remain free of falls  INTERVENTIONS:  - Assess patient frequently for physical needs  -  Identify cognitive and physical deficits and behaviors that affect risk of falls    -  [place] fall precautions as indicated by assessment   - Educate patient/family on patient safety including physical limitations  - Instruct patient to call for assistance with activity based on assessment  - Modify environment to reduce risk of injury  - Consider OT/PT consult to assist with strengthening/mobility   Outcome: Progressing

## 2018-12-11 VITALS
HEIGHT: 64 IN | HEART RATE: 73 BPM | RESPIRATION RATE: 18 BRPM | TEMPERATURE: 98.5 F | BODY MASS INDEX: 20.32 KG/M2 | SYSTOLIC BLOOD PRESSURE: 116 MMHG | DIASTOLIC BLOOD PRESSURE: 78 MMHG | WEIGHT: 119.05 LBS
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
## 2018-12-12 ENCOUNTER — HOSPITAL ENCOUNTER (OUTPATIENT)
Dept: INFUSION CENTER | Facility: CLINIC | Age: 53
Discharge: HOME | End: 2018-12-12
Payer: COMMERCIAL

## 2018-12-12 VITALS
DIASTOLIC BLOOD PRESSURE: 67 MMHG | SYSTOLIC BLOOD PRESSURE: 100 MMHG | RESPIRATION RATE: 18 BRPM | HEART RATE: 76 BPM | TEMPERATURE: 97.8 F

## 2018-12-12 RX ADMIN — Medication 300 UNITS: at 11:43

## 2018-12-12 NOTE — PROGRESS NOTES
Pt tolerated 46hr 5FU infusion well without adverse reaction  Pt's CADD pump disconnected, PAC deaccessed per protocol  Pt tolerated all well  Pt declines AVS today, aware of all future appts

## 2018-12-21 ENCOUNTER — APPOINTMENT (OUTPATIENT)
Dept: LAB | Age: 53
End: 2018-12-21
Payer: COMMERCIAL

## 2018-12-21 DIAGNOSIS — C18.9 MALIGNANT NEOPLASM OF COLON, UNSPECIFIED PART OF COLON (HCC): ICD-10-CM

## 2018-12-21 DIAGNOSIS — C78.7 SECONDARY MALIGNANT NEOPLASM OF LIVER (HCC): ICD-10-CM

## 2018-12-21 LAB
ALBUMIN SERPL BCP-MCNC: 3.1 G/DL (ref 3.5–5)
ALP SERPL-CCNC: 246 U/L (ref 46–116)
ALT SERPL W P-5'-P-CCNC: 83 U/L (ref 12–78)
ANION GAP SERPL CALCULATED.3IONS-SCNC: 4 MMOL/L (ref 4–13)
AST SERPL W P-5'-P-CCNC: 48 U/L (ref 5–45)
BASOPHILS # BLD AUTO: 0.04 THOUSANDS/ΜL (ref 0–0.1)
BASOPHILS NFR BLD AUTO: 1 % (ref 0–1)
BILIRUB SERPL-MCNC: 0.42 MG/DL (ref 0.2–1)
BUN SERPL-MCNC: 11 MG/DL (ref 5–25)
CALCIUM SERPL-MCNC: 8.9 MG/DL (ref 8.3–10.1)
CEA SERPL-MCNC: 136.1 NG/ML (ref 0–3)
CHLORIDE SERPL-SCNC: 106 MMOL/L (ref 100–108)
CO2 SERPL-SCNC: 28 MMOL/L (ref 21–32)
CREAT SERPL-MCNC: 0.65 MG/DL (ref 0.6–1.3)
EOSINOPHIL # BLD AUTO: 0.04 THOUSAND/ΜL (ref 0–0.61)
EOSINOPHIL NFR BLD AUTO: 1 % (ref 0–6)
GFR SERPL CREATININE-BSD FRML MDRD: 102 ML/MIN/1.73SQ M
GLUCOSE SERPL-MCNC: 99 MG/DL (ref 65–140)
HCT VFR BLD AUTO: 35.7 % (ref 34.8–46.1)
HGB BLD-MCNC: 10.2 G/DL (ref 11.5–15.4)
IMM GRANULOCYTES # BLD AUTO: 0.02 THOUSAND/UL (ref 0–0.2)
IMM GRANULOCYTES NFR BLD AUTO: 0 % (ref 0–2)
LYMPHOCYTES # BLD AUTO: 1.83 THOUSANDS/ΜL (ref 0.6–4.47)
LYMPHOCYTES NFR BLD AUTO: 33 % (ref 14–44)
MCH RBC QN AUTO: 23.9 PG (ref 26.8–34.3)
MCHC RBC AUTO-ENTMCNC: 28.6 G/DL (ref 31.4–37.4)
MCV RBC AUTO: 84 FL (ref 82–98)
MONOCYTES # BLD AUTO: 0.6 THOUSAND/ΜL (ref 0.17–1.22)
MONOCYTES NFR BLD AUTO: 11 % (ref 4–12)
NEUTROPHILS # BLD AUTO: 2.99 THOUSANDS/ΜL (ref 1.85–7.62)
NEUTS SEG NFR BLD AUTO: 54 % (ref 43–75)
NRBC BLD AUTO-RTO: 0 /100 WBCS
PLATELET # BLD AUTO: 267 THOUSANDS/UL (ref 149–390)
PMV BLD AUTO: 8.9 FL (ref 8.9–12.7)
POTASSIUM SERPL-SCNC: 3.6 MMOL/L (ref 3.5–5.3)
PROT SERPL-MCNC: 7.2 G/DL (ref 6.4–8.2)
RBC # BLD AUTO: 4.27 MILLION/UL (ref 3.81–5.12)
SODIUM SERPL-SCNC: 138 MMOL/L (ref 136–145)
WBC # BLD AUTO: 5.52 THOUSAND/UL (ref 4.31–10.16)

## 2018-12-21 PROCEDURE — 80053 COMPREHEN METABOLIC PANEL: CPT

## 2018-12-21 PROCEDURE — 85025 COMPLETE CBC W/AUTO DIFF WBC: CPT

## 2018-12-21 PROCEDURE — 82378 CARCINOEMBRYONIC ANTIGEN: CPT

## 2018-12-21 PROCEDURE — 36415 COLL VENOUS BLD VENIPUNCTURE: CPT

## 2018-12-21 RX ORDER — FLUOROURACIL 50 MG/ML
400 INJECTION, SOLUTION INTRAVENOUS ONCE
Status: DISCONTINUED | OUTPATIENT
Start: 2018-12-24 | End: 2018-12-21 | Stop reason: SDUPTHER

## 2018-12-21 RX ORDER — FLUOROURACIL 50 MG/ML
628 INJECTION, SOLUTION INTRAVENOUS ONCE
Status: COMPLETED | OUTPATIENT
Start: 2018-12-24 | End: 2018-12-24

## 2018-12-21 RX ORDER — SODIUM CHLORIDE 9 MG/ML
20 INJECTION, SOLUTION INTRAVENOUS CONTINUOUS
Status: DISCONTINUED | OUTPATIENT
Start: 2018-12-24 | End: 2018-12-28 | Stop reason: HOSPADM

## 2018-12-21 RX ORDER — DEXTROSE MONOHYDRATE 50 MG/ML
20 INJECTION, SOLUTION INTRAVENOUS ONCE
Status: COMPLETED | OUTPATIENT
Start: 2018-12-24 | End: 2018-12-24

## 2018-12-24 ENCOUNTER — HOSPITAL ENCOUNTER (OUTPATIENT)
Dept: INFUSION CENTER | Facility: HOSPITAL | Age: 53
Discharge: HOME | End: 2018-12-24
Payer: COMMERCIAL

## 2018-12-24 VITALS
WEIGHT: 123.9 LBS | HEART RATE: 78 BPM | RESPIRATION RATE: 18 BRPM | SYSTOLIC BLOOD PRESSURE: 129 MMHG | DIASTOLIC BLOOD PRESSURE: 60 MMHG | TEMPERATURE: 99.2 F | BODY MASS INDEX: 21.15 KG/M2 | HEIGHT: 64 IN

## 2018-12-24 PROCEDURE — 96368 THER/DIAG CONCURRENT INF: CPT

## 2018-12-24 PROCEDURE — 96375 TX/PRO/DX INJ NEW DRUG ADDON: CPT

## 2018-12-24 PROCEDURE — 96411 CHEMO IV PUSH ADDL DRUG: CPT

## 2018-12-24 PROCEDURE — 96413 CHEMO IV INFUSION 1 HR: CPT

## 2018-12-24 PROCEDURE — 96415 CHEMO IV INFUSION ADDL HR: CPT

## 2018-12-24 PROCEDURE — 96367 TX/PROPH/DG ADDL SEQ IV INF: CPT

## 2018-12-24 PROCEDURE — G0498 CHEMO EXTEND IV INFUS W/PUMP: HCPCS

## 2018-12-24 RX ORDER — SODIUM CHLORIDE 9 MG/ML
20 INJECTION, SOLUTION INTRAVENOUS CONTINUOUS
Status: DISCONTINUED | OUTPATIENT
Start: 2018-12-26 | End: 2018-12-30 | Stop reason: HOSPADM

## 2018-12-24 RX ADMIN — IRON SUCROSE 200 MG: 20 INJECTION, SOLUTION INTRAVENOUS at 11:27

## 2018-12-24 RX ADMIN — LEUCOVORIN CALCIUM 628 MG: 350 INJECTION, POWDER, LYOPHILIZED, FOR SOLUTION INTRAMUSCULAR; INTRAVENOUS at 09:07

## 2018-12-24 RX ADMIN — DEXTROSE MONOHYDRATE 20 ML/HR: 50 INJECTION, SOLUTION INTRAVENOUS at 09:00

## 2018-12-24 RX ADMIN — FLUOROURACIL 628 MG: 50 INJECTION, SOLUTION INTRAVENOUS at 11:18

## 2018-12-24 RX ADMIN — DEXAMETHASONE SODIUM PHOSPHATE: 10 INJECTION, SOLUTION INTRAMUSCULAR; INTRAVENOUS at 08:29

## 2018-12-24 RX ADMIN — SODIUM CHLORIDE 20 ML/HR: 9 INJECTION, SOLUTION INTRAVENOUS at 08:12

## 2018-12-24 RX ADMIN — OXALIPLATIN 133 MG: 5 INJECTION, SOLUTION INTRAVENOUS at 09:10

## 2018-12-24 NOTE — PLAN OF CARE
Problem: Potential for Falls  Goal: Patient will remain free of falls  INTERVENTIONS:  - Assess patient frequently for physical needs  -  Identify cognitive and physical deficits and behaviors that affect risk of falls    -  [place] fall precautions as indicated by assessment   - Educate patient/family on patient safety including physical limitations  - Instruct patient to call for assistance with activity based on assessment  - Modify environment to reduce risk of injury  - Consider OT/PT consult to assist with strengthening/mobility   Outcome: Progressing

## 2018-12-24 NOTE — PROGRESS NOTES
Pt here for chemo and CADD pump today  Offers no new complaints  Tolerated well without complications as well as #3/5 venofer push  Patient aware to return Wed 12-26-18 at 10:00  For pump disconnect

## 2018-12-26 ENCOUNTER — HOSPITAL ENCOUNTER (OUTPATIENT)
Dept: INFUSION CENTER | Facility: HOSPITAL | Age: 53
Discharge: HOME | End: 2018-12-26
Payer: COMMERCIAL

## 2018-12-26 RX ADMIN — Medication 300 UNITS: at 10:01

## 2018-12-26 NOTE — PROGRESS NOTES
5 fu cadd pump discontinued  Reservoir volume was 0 0  Pt tolerated treatment well  No adverse reactions noted  Discharged ambulatory with   Aware of follow up appts  avs provided

## 2019-01-04 ENCOUNTER — OFFICE VISIT (OUTPATIENT)
Dept: HEMATOLOGY ONCOLOGY | Facility: CLINIC | Age: 54
End: 2019-01-04
Payer: COMMERCIAL

## 2019-01-04 ENCOUNTER — TRANSCRIBE ORDERS (OUTPATIENT)
Dept: ADMINISTRATIVE | Facility: HOSPITAL | Age: 54
End: 2019-01-04

## 2019-01-04 ENCOUNTER — APPOINTMENT (OUTPATIENT)
Dept: LAB | Age: 54
End: 2019-01-04
Payer: COMMERCIAL

## 2019-01-04 VITALS
DIASTOLIC BLOOD PRESSURE: 62 MMHG | SYSTOLIC BLOOD PRESSURE: 112 MMHG | HEART RATE: 76 BPM | TEMPERATURE: 97.3 F | OXYGEN SATURATION: 99 % | BODY MASS INDEX: 21 KG/M2 | RESPIRATION RATE: 16 BRPM | WEIGHT: 123 LBS | HEIGHT: 64 IN

## 2019-01-04 DIAGNOSIS — C18.9 MALIGNANT NEOPLASM OF COLON, UNSPECIFIED PART OF COLON (HCC): Primary | ICD-10-CM

## 2019-01-04 DIAGNOSIS — C78.7 SECONDARY MALIGNANT NEOPLASM OF LIVER (HCC): ICD-10-CM

## 2019-01-04 DIAGNOSIS — C18.9 MALIGNANT NEOPLASM OF COLON, UNSPECIFIED PART OF COLON (HCC): ICD-10-CM

## 2019-01-04 DIAGNOSIS — C78.7 COLON CANCER METASTASIZED TO LIVER (HCC): Primary | ICD-10-CM

## 2019-01-04 DIAGNOSIS — C18.9 COLON CANCER METASTASIZED TO LIVER (HCC): Primary | ICD-10-CM

## 2019-01-04 LAB
ALBUMIN SERPL BCP-MCNC: 3.1 G/DL (ref 3.5–5)
ALP SERPL-CCNC: 281 U/L (ref 46–116)
ALT SERPL W P-5'-P-CCNC: 72 U/L (ref 12–78)
ANION GAP SERPL CALCULATED.3IONS-SCNC: 5 MMOL/L (ref 4–13)
AST SERPL W P-5'-P-CCNC: 57 U/L (ref 5–45)
BASOPHILS # BLD AUTO: 0.04 THOUSANDS/ΜL (ref 0–0.1)
BASOPHILS NFR BLD AUTO: 1 % (ref 0–1)
BILIRUB SERPL-MCNC: 0.36 MG/DL (ref 0.2–1)
BUN SERPL-MCNC: 11 MG/DL (ref 5–25)
CALCIUM SERPL-MCNC: 8.9 MG/DL (ref 8.3–10.1)
CEA SERPL-MCNC: 79.7 NG/ML (ref 0–3)
CHLORIDE SERPL-SCNC: 107 MMOL/L (ref 100–108)
CO2 SERPL-SCNC: 26 MMOL/L (ref 21–32)
CREAT SERPL-MCNC: 0.74 MG/DL (ref 0.6–1.3)
EOSINOPHIL # BLD AUTO: 0.1 THOUSAND/ΜL (ref 0–0.61)
EOSINOPHIL NFR BLD AUTO: 2 % (ref 0–6)
GFR SERPL CREATININE-BSD FRML MDRD: 93 ML/MIN/1.73SQ M
GLUCOSE SERPL-MCNC: 116 MG/DL (ref 65–140)
HCT VFR BLD AUTO: 38.1 % (ref 34.8–46.1)
HGB BLD-MCNC: 11.3 G/DL (ref 11.5–15.4)
IMM GRANULOCYTES # BLD AUTO: 0.02 THOUSAND/UL (ref 0–0.2)
IMM GRANULOCYTES NFR BLD AUTO: 0 % (ref 0–2)
LYMPHOCYTES # BLD AUTO: 1.48 THOUSANDS/ΜL (ref 0.6–4.47)
LYMPHOCYTES NFR BLD AUTO: 30 % (ref 14–44)
MCH RBC QN AUTO: 26.3 PG (ref 26.8–34.3)
MCHC RBC AUTO-ENTMCNC: 29.7 G/DL (ref 31.4–37.4)
MCV RBC AUTO: 89 FL (ref 82–98)
MONOCYTES # BLD AUTO: 0.48 THOUSAND/ΜL (ref 0.17–1.22)
MONOCYTES NFR BLD AUTO: 10 % (ref 4–12)
NEUTROPHILS # BLD AUTO: 2.78 THOUSANDS/ΜL (ref 1.85–7.62)
NEUTS SEG NFR BLD AUTO: 57 % (ref 43–75)
NRBC BLD AUTO-RTO: 0 /100 WBCS
PLATELET # BLD AUTO: 220 THOUSANDS/UL (ref 149–390)
PMV BLD AUTO: 9 FL (ref 8.9–12.7)
POTASSIUM SERPL-SCNC: 3.5 MMOL/L (ref 3.5–5.3)
PROT SERPL-MCNC: 7.2 G/DL (ref 6.4–8.2)
RBC # BLD AUTO: 4.29 MILLION/UL (ref 3.81–5.12)
SODIUM SERPL-SCNC: 138 MMOL/L (ref 136–145)
WBC # BLD AUTO: 4.9 THOUSAND/UL (ref 4.31–10.16)

## 2019-01-04 PROCEDURE — 36415 COLL VENOUS BLD VENIPUNCTURE: CPT

## 2019-01-04 PROCEDURE — 99214 OFFICE O/P EST MOD 30 MIN: CPT | Performed by: INTERNAL MEDICINE

## 2019-01-04 PROCEDURE — 85025 COMPLETE CBC W/AUTO DIFF WBC: CPT

## 2019-01-04 PROCEDURE — 82378 CARCINOEMBRYONIC ANTIGEN: CPT

## 2019-01-04 PROCEDURE — 80053 COMPREHEN METABOLIC PANEL: CPT

## 2019-01-04 RX ORDER — FLUOROURACIL 50 MG/ML
640 INJECTION, SOLUTION INTRAVENOUS ONCE
Status: COMPLETED | OUTPATIENT
Start: 2019-01-07 | End: 2019-01-07

## 2019-01-04 RX ORDER — DEXTROSE MONOHYDRATE 50 MG/ML
20 INJECTION, SOLUTION INTRAVENOUS CONTINUOUS
Status: DISCONTINUED | OUTPATIENT
Start: 2019-01-07 | End: 2019-01-11 | Stop reason: HOSPADM

## 2019-01-04 RX ORDER — SODIUM CHLORIDE 9 MG/ML
20 INJECTION, SOLUTION INTRAVENOUS CONTINUOUS
Status: DISCONTINUED | OUTPATIENT
Start: 2019-01-07 | End: 2019-01-11 | Stop reason: HOSPADM

## 2019-01-04 NOTE — PROGRESS NOTES
Hematology / Oncology Outpatient Follow Up Note    Anny Wilcox 48 y o  female :1965 [id number]         Date:  2019    Assessment / Plan:  A 71-year-old female with metastatic colon cancer  She has extensive liver metastasis  She is currently on systemic therapy with FOLFOX-6 with excellent tolerance  Molecular testing could not be performed due to the insufficient material, per pathology department  I recommended her to continue with FOLFOX-6 every 2 weeks  I am going to add bevacizumab on exist in chemotherapy  Side effects of bevacizumab was thoroughly discussed, including but not limited to hypertension, proteinuria, small risk of DVT and bleeding  She understood and wished to proceed  I am going to obtain CT scan of chest abdomen pelvis for disease evaluation in the end of 2019  I will see her again in beginning of 2019 for follow-up at which time we are going to discuss CT scan finding                                            Subjective:      HPI:  A 71-year-old female who was found to have severe microcytic anemia in 2018   Therefore, she was advised to be hospitalized  Papitoemil Marichuy was given transfusion  Alpheus Born was performed which did not show any major pathology   As outpatient basis, she underwent CT scan of abdomen pelvis which showed transverse colon mass as well as multiple liver metastatic disease   She subsequently underwent liver biopsy which showed metastatic adenocarcinoma, consistent with colorectal primary   She presents today to discuss treatment options   Since 2018, she lost 25 pound  Ej Benedict has mildly anorexic  Derick Chito, she has no complaint of pain   She denied any respiratory symptoms    She has normal bowel movement    Prior to the hospitalization, she had slowly progressive fatigue as well as some exertional shortness of breath   She is currently on oral iron 3 times per day  Ej Benedict has rheumatoid arthritis for which she is on weekly methotrexate and folic acid   She has no family history of colorectal cancer  Stacey Pearson is a lifetime never smoker   Her performance status is normal            Interval History:   A 51-year-old female with metastatic colon cancer with extensive liver metastasis  She started systemic chemotherapy with FOLFOX-6 which she tolerated very well  We were notified by pathology Department that molecular testing cannot be performed on liver biopsy tissue  She has very minimal nausea with no vomiting after the chemotherapy  So far, she has received 4th cycle treatment  She had iron deficiency anemia for which she is on oral iron  Her anemia has marked improved  She has very minimal neuropathy  Her weight is stable  She has no respiratory symptoms  She denied any pain  She is working full-time  Her performance status is normal                                        Objective:      Primary Diagnosis:     Metastatic colon cancer   Diagnosed in October 2018      Cancer Staging:  Cancer Staging  No matching staging information was found for the patient         Previous Hematologic/ Oncologic Treatment:            Current Hematologic/ Oncologic Treatment:       FOLFOX-6  Fifth cycle to be given in January 7, 2019 with addition of bevacizumab      Disease Status:      Not evaluated at this time      Test Results:     Pathology:     Liver biopsy showed metastatic adenocarcinoma, consistent with colorectal primary      Radiology:     CT scan of abdomen pelvis showed large transverse colon mass as well as multiple liver and metastasis   I personally reviewed this films      Laboratory:     See below      Physical Exam:        General Appearance:    Alert, oriented          Eyes:    PERRL   Ears:    Normal external ear canals, both ears   Nose:   Nares normal, septum midline   Throat:   Mucosa moist  Pharynx without injection      Neck:   Supple         Lungs:     Clear to auscultation bilaterally   Chest Wall:    No tenderness or deformity    Heart:    Regular rate and rhythm         Abdomen:     Soft, non-tender, bowel sounds +, no organomegaly               Extremities:   Extremities no cyanosis or edema         Skin:   no rash or icterus  Lymph nodes:   Cervical, supraclavicular, and axillary nodes normal   Neurologic:   CNII-XII intact, normal strength, sensation and reflexes     Throughout             Breast exam:  NA           ROS: Review of Systems   All other systems reviewed and are negative  Imaging: No results found  Labs:   Lab Results   Component Value Date    WBC 4 90 01/04/2019    HGB 11 3 (L) 01/04/2019    HCT 38 1 01/04/2019    MCV 89 01/04/2019     01/04/2019     Lab Results   Component Value Date    K 3 6 12/21/2018     12/21/2018    CO2 28 12/21/2018    BUN 11 12/21/2018    CREATININE 0 65 12/21/2018    GLUF 106 (H) 11/09/2018    CALCIUM 8 9 12/21/2018    AST 48 (H) 12/21/2018    ALT 83 (H) 12/21/2018    ALKPHOS 246 (H) 12/21/2018    EGFR 102 12/21/2018         Lab Results   Component Value Date     (H) 09/24/2018         Lab Results   Component Value Date     1 (H) 12/21/2018         Lab Results   Component Value Date    IRON 11 (L) 09/13/2018    TIBC 302 09/13/2018    FERRITIN 15 09/13/2018         Lab Results   Component Value Date    FOLATE 11 4 09/13/2018         Current Medications: Reviewed  Allergies: Reviewed  PMH/FH/SH:  Reviewed      Vital Sign:    Body surface area is 1 59 meters squared      Wt Readings from Last 3 Encounters:   01/04/19 55 8 kg (123 lb)   12/24/18 56 2 kg (123 lb 14 4 oz)   12/10/18 54 kg (119 lb 0 8 oz)        Temp Readings from Last 3 Encounters:   01/04/19 (!) 97 3 °F (36 3 °C) (Tympanic)   12/24/18 99 2 °F (37 3 °C) (Temporal)   12/12/18 97 8 °F (36 6 °C) (Tympanic)        BP Readings from Last 3 Encounters:   01/04/19 112/62   12/24/18 129/60   12/12/18 100/67         Pulse Readings from Last 3 Encounters:   01/04/19 76   12/24/18 78   12/12/18 76     @[place] [id number](3)@

## 2019-01-04 NOTE — PROGRESS NOTES
TIME OUT:    TOOK CALL FROM Juanito Villafana RN at 11:54 am 1-4-19, adding Avastin 5mg/kg to patient's chemo starting Monday 1-7-19, orders to follow  Yvonne Buitrago in pharmacy aware

## 2019-01-07 ENCOUNTER — HOSPITAL ENCOUNTER (OUTPATIENT)
Dept: INFUSION CENTER | Facility: HOSPITAL | Age: 54
Discharge: HOME | End: 2019-01-07
Payer: COMMERCIAL

## 2019-01-07 VITALS
RESPIRATION RATE: 18 BRPM | SYSTOLIC BLOOD PRESSURE: 111 MMHG | WEIGHT: 126.54 LBS | BODY MASS INDEX: 21.6 KG/M2 | HEART RATE: 73 BPM | TEMPERATURE: 98.1 F | HEIGHT: 64 IN | DIASTOLIC BLOOD PRESSURE: 68 MMHG

## 2019-01-07 PROCEDURE — 96417 CHEMO IV INFUS EACH ADDL SEQ: CPT

## 2019-01-07 PROCEDURE — 96375 TX/PRO/DX INJ NEW DRUG ADDON: CPT

## 2019-01-07 PROCEDURE — 96411 CHEMO IV PUSH ADDL DRUG: CPT

## 2019-01-07 PROCEDURE — G0498 CHEMO EXTEND IV INFUS W/PUMP: HCPCS

## 2019-01-07 PROCEDURE — 96413 CHEMO IV INFUSION 1 HR: CPT

## 2019-01-07 PROCEDURE — 96415 CHEMO IV INFUSION ADDL HR: CPT

## 2019-01-07 PROCEDURE — 96367 TX/PROPH/DG ADDL SEQ IV INF: CPT

## 2019-01-07 PROCEDURE — 96368 THER/DIAG CONCURRENT INF: CPT

## 2019-01-07 RX ADMIN — LEUCOVORIN CALCIUM 640 MG: 350 INJECTION, POWDER, LYOPHILIZED, FOR SOLUTION INTRAMUSCULAR; INTRAVENOUS at 10:33

## 2019-01-07 RX ADMIN — DEXTROSE MONOHYDRATE 20 ML/HR: 50 INJECTION, SOLUTION INTRAVENOUS at 10:30

## 2019-01-07 RX ADMIN — OXALIPLATIN 136 MG: 5 INJECTION, SOLUTION INTRAVENOUS at 10:33

## 2019-01-07 RX ADMIN — IRON SUCROSE 200 MG: 20 INJECTION, SOLUTION INTRAVENOUS at 10:21

## 2019-01-07 RX ADMIN — DEXAMETHASONE SODIUM PHOSPHATE: 10 INJECTION, SOLUTION INTRAMUSCULAR; INTRAVENOUS at 09:43

## 2019-01-07 RX ADMIN — BEVACIZUMAB 281 MG: 100 INJECTION, SOLUTION INTRAVENOUS at 12:46

## 2019-01-07 RX ADMIN — FLUOROURACIL 640 MG: 50 INJECTION, SOLUTION INTRAVENOUS at 14:30

## 2019-01-07 RX ADMIN — SODIUM CHLORIDE 20 ML/HR: 9 INJECTION, SOLUTION INTRAVENOUS at 09:20

## 2019-01-07 NOTE — PLAN OF CARE
Problem: Potential for Falls  Goal: Patient will remain free of falls  INTERVENTIONS:  - Assess patient frequently for physical needs  -  Identify cognitive and physical deficits and behaviors that affect risk of falls    -  [place] fall precautions as indicated by assessment   - Educate patient/family on patient safety including physical limitations  - Instruct patient to call for assistance with activity based on assessment  - Modify environment to reduce risk of injury  - Consider OT/PT consult to assist with strengthening/mobility   Outcome: Progressing

## 2019-01-07 NOTE — PROGRESS NOTES
Pt here for chemo today with first time AVastin  Tolerated well without complications  Patient connected to CADD pump and aware to return Wed at [phone]    Patient given AVS

## 2019-01-08 RX ORDER — SODIUM CHLORIDE 9 MG/ML
20 INJECTION, SOLUTION INTRAVENOUS ONCE
Status: DISCONTINUED | OUTPATIENT
Start: 2019-01-09 | End: 2019-01-13 | Stop reason: HOSPADM

## 2019-01-09 ENCOUNTER — HOSPITAL ENCOUNTER (OUTPATIENT)
Dept: INFUSION CENTER | Facility: HOSPITAL | Age: 54
Discharge: HOME | End: 2019-01-09

## 2019-01-09 VITALS — SYSTOLIC BLOOD PRESSURE: 129 MMHG | DIASTOLIC BLOOD PRESSURE: 67 MMHG

## 2019-01-18 ENCOUNTER — APPOINTMENT (OUTPATIENT)
Dept: LAB | Age: 54
End: 2019-01-18
Payer: COMMERCIAL

## 2019-01-18 DIAGNOSIS — C78.7 COLON CANCER METASTASIZED TO LIVER (HCC): ICD-10-CM

## 2019-01-18 DIAGNOSIS — C18.9 COLON CANCER METASTASIZED TO LIVER (HCC): ICD-10-CM

## 2019-01-18 LAB
ALBUMIN SERPL BCP-MCNC: 3.2 G/DL (ref 3.5–5)
ALP SERPL-CCNC: 261 U/L (ref 46–116)
ALT SERPL W P-5'-P-CCNC: 67 U/L (ref 12–78)
ANION GAP SERPL CALCULATED.3IONS-SCNC: 5 MMOL/L (ref 4–13)
AST SERPL W P-5'-P-CCNC: 52 U/L (ref 5–45)
BASOPHILS # BLD AUTO: 0.03 THOUSANDS/ΜL (ref 0–0.1)
BASOPHILS NFR BLD AUTO: 1 % (ref 0–1)
BILIRUB SERPL-MCNC: 0.5 MG/DL (ref 0.2–1)
BUN SERPL-MCNC: 12 MG/DL (ref 5–25)
CALCIUM SERPL-MCNC: 8.9 MG/DL (ref 8.3–10.1)
CEA SERPL-MCNC: 38.2 NG/ML (ref 0–3)
CHLORIDE SERPL-SCNC: 106 MMOL/L (ref 100–108)
CO2 SERPL-SCNC: 28 MMOL/L (ref 21–32)
CREAT SERPL-MCNC: 0.61 MG/DL (ref 0.6–1.3)
EOSINOPHIL # BLD AUTO: 0.18 THOUSAND/ΜL (ref 0–0.61)
EOSINOPHIL NFR BLD AUTO: 4 % (ref 0–6)
GFR SERPL CREATININE-BSD FRML MDRD: 104 ML/MIN/1.73SQ M
GLUCOSE SERPL-MCNC: 91 MG/DL (ref 65–140)
HCT VFR BLD AUTO: 39.4 % (ref 34.8–46.1)
HGB BLD-MCNC: 12.3 G/DL (ref 11.5–15.4)
IMM GRANULOCYTES # BLD AUTO: 0.01 THOUSAND/UL (ref 0–0.2)
IMM GRANULOCYTES NFR BLD AUTO: 0 % (ref 0–2)
LYMPHOCYTES # BLD AUTO: 1.46 THOUSANDS/ΜL (ref 0.6–4.47)
LYMPHOCYTES NFR BLD AUTO: 30 % (ref 14–44)
MCH RBC QN AUTO: 28.2 PG (ref 26.8–34.3)
MCHC RBC AUTO-ENTMCNC: 31.2 G/DL (ref 31.4–37.4)
MCV RBC AUTO: 90 FL (ref 82–98)
MONOCYTES # BLD AUTO: 0.61 THOUSAND/ΜL (ref 0.17–1.22)
MONOCYTES NFR BLD AUTO: 13 % (ref 4–12)
NEUTROPHILS # BLD AUTO: 2.57 THOUSANDS/ΜL (ref 1.85–7.62)
NEUTS SEG NFR BLD AUTO: 52 % (ref 43–75)
NRBC BLD AUTO-RTO: 0 /100 WBCS
PLATELET # BLD AUTO: 203 THOUSANDS/UL (ref 149–390)
PMV BLD AUTO: 8.9 FL (ref 8.9–12.7)
POTASSIUM SERPL-SCNC: 3.5 MMOL/L (ref 3.5–5.3)
PROT SERPL-MCNC: 7.3 G/DL (ref 6.4–8.2)
RBC # BLD AUTO: 4.36 MILLION/UL (ref 3.81–5.12)
SODIUM SERPL-SCNC: 139 MMOL/L (ref 136–145)
WBC # BLD AUTO: 4.86 THOUSAND/UL (ref 4.31–10.16)

## 2019-01-18 PROCEDURE — 36415 COLL VENOUS BLD VENIPUNCTURE: CPT | Performed by: INTERNAL MEDICINE

## 2019-01-18 PROCEDURE — 85025 COMPLETE CBC W/AUTO DIFF WBC: CPT | Performed by: INTERNAL MEDICINE

## 2019-01-18 PROCEDURE — 80053 COMPREHEN METABOLIC PANEL: CPT | Performed by: INTERNAL MEDICINE

## 2019-01-18 PROCEDURE — 82378 CARCINOEMBRYONIC ANTIGEN: CPT

## 2019-01-18 RX ORDER — FLUOROURACIL 50 MG/ML
640 INJECTION, SOLUTION INTRAVENOUS ONCE
Status: COMPLETED | OUTPATIENT
Start: 2019-01-21 | End: 2019-01-21

## 2019-01-18 RX ORDER — SODIUM CHLORIDE 9 MG/ML
20 INJECTION, SOLUTION INTRAVENOUS CONTINUOUS
Status: DISCONTINUED | OUTPATIENT
Start: 2019-01-21 | End: 2019-01-22

## 2019-01-18 RX ORDER — DEXTROSE MONOHYDRATE 50 MG/ML
20 INJECTION, SOLUTION INTRAVENOUS CONTINUOUS
Status: DISCONTINUED | OUTPATIENT
Start: 2019-01-21 | End: 2019-01-25 | Stop reason: HOSPADM

## 2019-01-21 ENCOUNTER — HOSPITAL ENCOUNTER (OUTPATIENT)
Dept: INFUSION CENTER | Facility: HOSPITAL | Age: 54
Discharge: HOME | End: 2019-01-21
Payer: COMMERCIAL

## 2019-01-21 VITALS
WEIGHT: 126.54 LBS | DIASTOLIC BLOOD PRESSURE: 74 MMHG | BODY MASS INDEX: 21.6 KG/M2 | TEMPERATURE: 98.1 F | HEART RATE: 89 BPM | SYSTOLIC BLOOD PRESSURE: 119 MMHG | RESPIRATION RATE: 16 BRPM | HEIGHT: 64 IN

## 2019-01-21 PROCEDURE — 96411 CHEMO IV PUSH ADDL DRUG: CPT

## 2019-01-21 PROCEDURE — 96415 CHEMO IV INFUSION ADDL HR: CPT

## 2019-01-21 PROCEDURE — 96413 CHEMO IV INFUSION 1 HR: CPT

## 2019-01-21 PROCEDURE — 96417 CHEMO IV INFUS EACH ADDL SEQ: CPT

## 2019-01-21 PROCEDURE — 96367 TX/PROPH/DG ADDL SEQ IV INF: CPT

## 2019-01-21 PROCEDURE — G0498 CHEMO EXTEND IV INFUS W/PUMP: HCPCS

## 2019-01-21 PROCEDURE — 96375 TX/PRO/DX INJ NEW DRUG ADDON: CPT

## 2019-01-21 PROCEDURE — 96368 THER/DIAG CONCURRENT INF: CPT

## 2019-01-21 RX ADMIN — DEXAMETHASONE SODIUM PHOSPHATE: 10 INJECTION, SOLUTION INTRAMUSCULAR; INTRAVENOUS at 10:51

## 2019-01-21 RX ADMIN — BEVACIZUMAB 281 MG: 100 INJECTION, SOLUTION INTRAVENOUS at 11:20

## 2019-01-21 RX ADMIN — DEXTROSE MONOHYDRATE 20 ML/HR: 50 INJECTION, SOLUTION INTRAVENOUS at 12:32

## 2019-01-21 RX ADMIN — FLUOROURACIL 640 MG: 50 INJECTION, SOLUTION INTRAVENOUS at 14:46

## 2019-01-21 RX ADMIN — LEUCOVORIN CALCIUM 640 MG: 350 INJECTION, POWDER, LYOPHILIZED, FOR SOLUTION INTRAMUSCULAR; INTRAVENOUS at 12:37

## 2019-01-21 RX ADMIN — IRON SUCROSE 200 MG: 20 INJECTION, SOLUTION INTRAVENOUS at 10:43

## 2019-01-21 RX ADMIN — OXALIPLATIN 136 MG: 5 INJECTION, SOLUTION INTRAVENOUS at 12:37

## 2019-01-21 RX ADMIN — SODIUM CHLORIDE 20 ML/HR: 9 INJECTION, SOLUTION INTRAVENOUS at 10:24

## 2019-01-21 NOTE — PROGRESS NOTES
Pt here for cycle 6 today  Patient states she will have 2 more in Feb   Patient states for past 4-5 days she's had stuffy nose (clear mucus), chest congestion (better with Mucinex D), denies fever/chills and states she is feeling better  Sara Marcial RN/Dr Arron Mendoza notified, ok for treatment today  Patient aware to call if becomes worse or develops fever  Patient also states her tongue has been irritated and dry and Dr Arron Mendoza is aware and wanted her to get Biotene, which patient states she will get today  Patient tolerated treatment well today without complications  Aware to return 1300 on Wed

## 2019-01-22 ENCOUNTER — OFFICE VISIT (OUTPATIENT)
Dept: FAMILY MEDICINE CLINIC | Facility: CLINIC | Age: 54
End: 2019-01-22
Payer: COMMERCIAL

## 2019-01-22 VITALS
TEMPERATURE: 98.6 F | RESPIRATION RATE: 16 BRPM | WEIGHT: 129 LBS | SYSTOLIC BLOOD PRESSURE: 118 MMHG | OXYGEN SATURATION: 98 % | BODY MASS INDEX: 22.02 KG/M2 | DIASTOLIC BLOOD PRESSURE: 72 MMHG | HEART RATE: 105 BPM

## 2019-01-22 DIAGNOSIS — M06.041 RHEUMATOID ARTHRITIS INVOLVING BOTH HANDS WITH NEGATIVE RHEUMATOID FACTOR (HCC): ICD-10-CM

## 2019-01-22 DIAGNOSIS — C18.9 COLON CANCER METASTASIZED TO LIVER (HCC): ICD-10-CM

## 2019-01-22 DIAGNOSIS — M06.042 RHEUMATOID ARTHRITIS INVOLVING BOTH HANDS WITH NEGATIVE RHEUMATOID FACTOR (HCC): ICD-10-CM

## 2019-01-22 DIAGNOSIS — J06.9 ACUTE UPPER RESPIRATORY INFECTION: Primary | ICD-10-CM

## 2019-01-22 DIAGNOSIS — C78.7 COLON CANCER METASTASIZED TO LIVER (HCC): ICD-10-CM

## 2019-01-22 DIAGNOSIS — D64.9 SEVERE ANEMIA: ICD-10-CM

## 2019-01-22 PROCEDURE — 99214 OFFICE O/P EST MOD 30 MIN: CPT | Performed by: FAMILY MEDICINE

## 2019-01-22 PROCEDURE — 1036F TOBACCO NON-USER: CPT | Performed by: FAMILY MEDICINE

## 2019-01-22 RX ORDER — SODIUM CHLORIDE 9 MG/ML
20 INJECTION, SOLUTION INTRAVENOUS ONCE
Status: DISCONTINUED | OUTPATIENT
Start: 2019-01-23 | End: 2019-01-27 | Stop reason: HOSPADM

## 2019-01-22 RX ORDER — AMOXICILLIN 500 MG/1
500 TABLET, FILM COATED ORAL 3 TIMES DAILY
Qty: 21 TABLET | Refills: 0 | Status: SHIPPED | OUTPATIENT
Start: 2019-01-22 | End: 2019-01-22 | Stop reason: SDUPTHER

## 2019-01-22 RX ORDER — AMOXICILLIN 500 MG/1
500 TABLET, FILM COATED ORAL 3 TIMES DAILY
Qty: 21 TABLET | Refills: 0 | Status: SHIPPED | OUTPATIENT
Start: 2019-01-22 | End: 2019-01-29

## 2019-01-22 NOTE — PROGRESS NOTES
Assessment/Plan:    59-year-old woman with:  Acute URI, metastatic colon cancer, rheumatoid arthritis and anemia  Discussed workup and treatment options with risks and benefits  Continue current medication encouraged close follow-up with her oncologist along with chemo  Discussed supportive care return parameters and will give paper script for amoxicillin to fill in case symptoms are not improving or worsening  Follow-up at next scheduled appointment    No problem-specific Assessment & Plan notes found for this encounter  Diagnoses and all orders for this visit:    Acute upper respiratory infection  -     Discontinue: amoxicillin (AMOXIL) 500 MG tablet; Take 1 tablet (500 mg total) by mouth 3 (three) times a day for 7 days  -     amoxicillin (AMOXIL) 500 MG tablet; Take 1 tablet (500 mg total) by mouth 3 (three) times a day for 7 days    Colon cancer metastasized to liver (HCC)    Rheumatoid arthritis involving both hands with negative rheumatoid factor (HCC)    Severe anemia          Subjective:     Chief Complaint   Patient presents with    Results     review lab results, into the 6th treatment chemo        Patient ID: Anastasia Santoyo is a 48 y o  female  Patient is a 59-year-old woman who presents for follow-up on colon cancer metastasized to liver, severe rheumatoid arthritis, history of anemia  Patient admits that she is going through chemotherapy and admits being stable and asymptomatic on her current regimen  She continues to follow with her oncologist   No fevers chills nausea vomiting at this time  She does admit some cough and congestion for several days with some sore throat  Tolerating p o  intake  No other complaints at this time          The following portions of the patient's history were reviewed and updated as appropriate: allergies, current medications, past family history, past medical history, past social history, past surgical history and problem list     Review of Systems Constitutional: Negative  HENT: Positive for congestion and sore throat  Eyes: Negative  Respiratory: Positive for cough  Cardiovascular: Negative  Gastrointestinal: Negative  Endocrine: Negative  Genitourinary: Negative  Musculoskeletal: Negative  Allergic/Immunologic: Negative  Neurological: Negative  Hematological: Negative  Psychiatric/Behavioral: Negative  All other systems reviewed and are negative  Objective:      /72 (BP Location: Left arm, Patient Position: Sitting, Cuff Size: Standard)   Pulse 105   Temp 98 6 °F (37 °C) (Tympanic)   Resp 16   Wt 58 5 kg (129 lb)   SpO2 98%   BMI 22 02 kg/m²          Physical Exam   Constitutional: She is oriented to person, place, and time  She appears well-developed and well-nourished  HENT:   Head: Atraumatic  Right Ear: External ear normal    Left Ear: External ear normal    Eyes: Pupils are equal, round, and reactive to light  Conjunctivae and EOM are normal    Neck: Normal range of motion  Cardiovascular: Normal rate, regular rhythm and normal heart sounds  Pulmonary/Chest: Effort normal and breath sounds normal  No respiratory distress  Abdominal: Soft  She exhibits no distension  There is no tenderness  There is no rebound and no guarding  Musculoskeletal: Normal range of motion  Neurological: She is alert and oriented to person, place, and time  No cranial nerve deficit  Skin: Skin is warm and dry  Psychiatric: She has a normal mood and affect   Her behavior is normal  Judgment and thought content normal

## 2019-01-23 ENCOUNTER — HOSPITAL ENCOUNTER (OUTPATIENT)
Dept: INFUSION CENTER | Facility: HOSPITAL | Age: 54
Discharge: HOME | End: 2019-01-23

## 2019-01-23 NOTE — PROGRESS NOTES
Pt here for CADD pump disconnect  Offers no complaints  CADD pump disconnected and port flushed per protocol    Confirmed next appt and declined AVS

## 2019-01-23 NOTE — PLAN OF CARE
Problem: Potential for Falls  Goal: Patient will remain free of falls  INTERVENTIONS:  - Assess patient frequently for physical needs  -  Identify cognitive and physical deficits and behaviors that affect risk of falls    -  [place] fall precautions as indicated by assessment   - Educate patient/family on patient safety including physical limitations  - Instruct patient to call for assistance with activity based on assessment  - Modify environment to reduce risk of injury  - Consider OT/PT consult to assist with strengthening/mobility   Outcome: Progressing

## 2019-01-29 ENCOUNTER — HOSPITAL ENCOUNTER (OUTPATIENT)
Dept: CT IMAGING | Facility: HOSPITAL | Age: 54
Discharge: HOME | End: 2019-01-29
Attending: INTERNAL MEDICINE
Payer: COMMERCIAL

## 2019-01-29 DIAGNOSIS — C18.9 COLON CANCER METASTASIZED TO LIVER (HCC): ICD-10-CM

## 2019-01-29 DIAGNOSIS — C78.7 COLON CANCER METASTASIZED TO LIVER (HCC): ICD-10-CM

## 2019-01-29 PROCEDURE — 71260 CT THORAX DX C+: CPT

## 2019-01-29 PROCEDURE — 74177 CT ABD & PELVIS W/CONTRAST: CPT

## 2019-01-29 RX ADMIN — IOHEXOL 100 ML: 350 INJECTION, SOLUTION INTRAVENOUS at 10:00

## 2019-02-01 ENCOUNTER — APPOINTMENT (OUTPATIENT)
Dept: LAB | Age: 54
End: 2019-02-01
Payer: COMMERCIAL

## 2019-02-01 ENCOUNTER — OFFICE VISIT (OUTPATIENT)
Dept: HEMATOLOGY ONCOLOGY | Facility: CLINIC | Age: 54
End: 2019-02-01
Payer: COMMERCIAL

## 2019-02-01 VITALS
OXYGEN SATURATION: 98 % | WEIGHT: 128 LBS | SYSTOLIC BLOOD PRESSURE: 144 MMHG | HEART RATE: 88 BPM | HEIGHT: 64 IN | RESPIRATION RATE: 16 BRPM | DIASTOLIC BLOOD PRESSURE: 80 MMHG | BODY MASS INDEX: 21.85 KG/M2 | TEMPERATURE: 97.8 F

## 2019-02-01 DIAGNOSIS — C78.7 COLON CANCER METASTASIZED TO LIVER (HCC): Primary | ICD-10-CM

## 2019-02-01 DIAGNOSIS — C18.9 COLON CANCER METASTASIZED TO LIVER (HCC): Primary | ICD-10-CM

## 2019-02-01 LAB
ALBUMIN SERPL BCP-MCNC: 3.4 G/DL (ref 3.5–5)
ALP SERPL-CCNC: 250 U/L (ref 46–116)
ALT SERPL W P-5'-P-CCNC: 59 U/L (ref 12–78)
ANION GAP SERPL CALCULATED.3IONS-SCNC: 4 MMOL/L (ref 4–13)
AST SERPL W P-5'-P-CCNC: 42 U/L (ref 5–45)
BASOPHILS # BLD AUTO: 0.05 THOUSANDS/ΜL (ref 0–0.1)
BASOPHILS NFR BLD AUTO: 1 % (ref 0–1)
BILIRUB SERPL-MCNC: 0.34 MG/DL (ref 0.2–1)
BUN SERPL-MCNC: 13 MG/DL (ref 5–25)
CALCIUM SERPL-MCNC: 9.1 MG/DL (ref 8.3–10.1)
CHLORIDE SERPL-SCNC: 107 MMOL/L (ref 100–108)
CO2 SERPL-SCNC: 27 MMOL/L (ref 21–32)
CREAT SERPL-MCNC: 0.67 MG/DL (ref 0.6–1.3)
EOSINOPHIL # BLD AUTO: 0.07 THOUSAND/ΜL (ref 0–0.61)
EOSINOPHIL NFR BLD AUTO: 1 % (ref 0–6)
ERYTHROCYTE [DISTWIDTH] IN BLOOD BY AUTOMATED COUNT: 23.3 % (ref 11.6–15.1)
GFR SERPL CREATININE-BSD FRML MDRD: 101 ML/MIN/1.73SQ M
GLUCOSE SERPL-MCNC: 86 MG/DL (ref 65–140)
HCT VFR BLD AUTO: 38.8 % (ref 34.8–46.1)
HGB BLD-MCNC: 12.5 G/DL (ref 11.5–15.4)
IMM GRANULOCYTES # BLD AUTO: 0.02 THOUSAND/UL (ref 0–0.2)
IMM GRANULOCYTES NFR BLD AUTO: 0 % (ref 0–2)
LYMPHOCYTES # BLD AUTO: 2.19 THOUSANDS/ΜL (ref 0.6–4.47)
LYMPHOCYTES NFR BLD AUTO: 35 % (ref 14–44)
MCH RBC QN AUTO: 29.8 PG (ref 26.8–34.3)
MCHC RBC AUTO-ENTMCNC: 32.2 G/DL (ref 31.4–37.4)
MCV RBC AUTO: 92 FL (ref 82–98)
MONOCYTES # BLD AUTO: 0.61 THOUSAND/ΜL (ref 0.17–1.22)
MONOCYTES NFR BLD AUTO: 10 % (ref 4–12)
NEUTROPHILS # BLD AUTO: 3.27 THOUSANDS/ΜL (ref 1.85–7.62)
NEUTS SEG NFR BLD AUTO: 53 % (ref 43–75)
NRBC BLD AUTO-RTO: 0 /100 WBCS
PLATELET # BLD AUTO: 185 THOUSANDS/UL (ref 149–390)
PMV BLD AUTO: 9.3 FL (ref 8.9–12.7)
POTASSIUM SERPL-SCNC: 4 MMOL/L (ref 3.5–5.3)
PROT SERPL-MCNC: 7.4 G/DL (ref 6.4–8.2)
RBC # BLD AUTO: 4.2 MILLION/UL (ref 3.81–5.12)
SODIUM SERPL-SCNC: 138 MMOL/L (ref 136–145)
WBC # BLD AUTO: 6.21 THOUSAND/UL (ref 4.31–10.16)

## 2019-02-01 PROCEDURE — 36415 COLL VENOUS BLD VENIPUNCTURE: CPT | Performed by: INTERNAL MEDICINE

## 2019-02-01 PROCEDURE — 80053 COMPREHEN METABOLIC PANEL: CPT | Performed by: INTERNAL MEDICINE

## 2019-02-01 PROCEDURE — 85025 COMPLETE CBC W/AUTO DIFF WBC: CPT | Performed by: INTERNAL MEDICINE

## 2019-02-01 PROCEDURE — 99214 OFFICE O/P EST MOD 30 MIN: CPT | Performed by: INTERNAL MEDICINE

## 2019-02-01 RX ORDER — DEXTROSE MONOHYDRATE 50 MG/ML
20 INJECTION, SOLUTION INTRAVENOUS CONTINUOUS
Status: DISCONTINUED | OUTPATIENT
Start: 2019-02-04 | End: 2019-02-08 | Stop reason: HOSPADM

## 2019-02-01 RX ORDER — SODIUM CHLORIDE 9 MG/ML
20 INJECTION, SOLUTION INTRAVENOUS CONTINUOUS
Status: DISCONTINUED | OUTPATIENT
Start: 2019-02-04 | End: 2019-02-08 | Stop reason: HOSPADM

## 2019-02-01 RX ORDER — FLUOROURACIL 50 MG/ML
640 INJECTION, SOLUTION INTRAVENOUS ONCE
Status: COMPLETED | OUTPATIENT
Start: 2019-02-04 | End: 2019-02-04

## 2019-02-01 NOTE — PROGRESS NOTES
Hematology / Oncology Outpatient Follow Up Note    Evertt Sandhoff 48 y o  female :1965 [id number]         Date:  2019    Assessment / Plan:  A 29-year-old female with metastatic colon cancer  She has extensive liver metastasis  Molecular testing could not be performed based on the liver biopsy specimen  She is currently on bevacizumab with FOLFOX with excellent tolerance  She recently underwent CT scan of chest abdomen pelvis for disease evaluation which showed partial response  I recommended her to continue with current regimen every 2 weeks  She is in agreement with my recommendation  I will see her again in a month for routine follow-up                                                             Subjective:      HPI:  A 29-year-old female who was found to have severe microcytic anemia in 2018   Therefore, she was advised to be hospitalized  Johnnie Hanson was given transfusion  Kyle Dk was performed which did not show any major pathology   As outpatient basis, she underwent CT scan of abdomen pelvis which showed transverse colon mass as well as multiple liver metastatic disease   She subsequently underwent liver biopsy which showed metastatic adenocarcinoma, consistent with colorectal primary   She presents today to discuss treatment options   Since 2018, she lost 25 pound  Johnnie Hanson has mildly anorexic  [facility], she has no complaint of pain   She denied any respiratory symptoms    She has normal bowel movement    Prior to the hospitalization, she had slowly progressive fatigue as well as some exertional shortness of breath   She is currently on oral iron 3 times per day  Johnnie Hanson has rheumatoid arthritis for which she is on weekly methotrexate and folic acid   She has no family history of colorectal cancer  Johnnie Hanson is a lifetime never smoker   Her performance status is normal            Interval History:   A 48year-old female with metastatic colon cancer with extensive liver metastasis   She started systemic chemotherapy with FOLFOX-6 which she tolerated very well  We were notified by pathology Department that molecular testing cannot be performed on liver biopsy tissue  After several cycle treatment, we added bevacizumab  She has 6 cycle of chemotherapy, so far with minimal toxicity  She recently underwent CT scan of chest abdomen pelvis which showed decreased liver metastasis  Her tumor in the transverse colon is stable  Her CEA has been declining  She feels well  She has no nausea vomiting  She denied diarrhea  She has no complaint abdominal pain  She has mild neuropathy in upper extremities  She is maintaining her weight  Her performance status is normal                                                   Objective:      Primary Diagnosis:     Metastatic colon cancer   Diagnosed in October 2018      Cancer Staging:  Cancer Staging  No matching staging information was found for the patient         Previous Hematologic/ Oncologic Treatment:            Current Hematologic/ Oncologic Treatment:       Bevacizumab with FOLFOX-6    7th cycle to be given in February 4, 2019      Disease Status:      Partial response      Test Results:     Pathology:     Liver biopsy showed metastatic adenocarcinoma, consistent with colorectal primary      Radiology:     CT scan of chest abdomen pelvis in January 29, 2019 showed stable transverse colon mass in decreased size of liver metastasis      Laboratory:     See below      Physical Exam:        General Appearance:    Alert, oriented          Eyes:    PERRL   Ears:    Normal external ear canals, both ears   Nose:   Nares normal, septum midline   Throat:   Mucosa moist  Pharynx without injection      Neck:   Supple         Lungs:     Clear to auscultation bilaterally   Chest Wall:    No tenderness or deformity    Heart:    Regular rate and rhythm         Abdomen:     Soft, non-tender, bowel sounds +, no organomegaly               Extremities:   Extremities no cyanosis or edema         Skin:   no rash or icterus  Lymph nodes:   Cervical, supraclavicular, and axillary nodes normal   Neurologic:   CNII-XII intact, normal strength, sensation and reflexes     Throughout             Breast exam:  NA           ROS: Review of Systems   Neurological:        Upper extremity neuropathy  All other systems reviewed and are negative  Imaging: Ct Chest Abdomen Pelvis W Contrast    Result Date: 1/30/2019  Narrative: CT CHEST, ABDOMEN AND PELVIS WITH IV CONTRAST INDICATION:   C18 9: Malignant neoplasm of colon, unspecified C78 7: Secondary malignant neoplasm of liver and intrahepatic bile duct  COMPARISON:  10/11/2018 TECHNIQUE: CT examination of the chest, abdomen and pelvis was performed  Axial, sagittal, and coronal 2D reformatted images were created from the source data and submitted for interpretation  Radiation dose length product (DLP) for this visit:  525 mGy-cm   This examination, like all CT scans performed in the [facility], was performed utilizing techniques to minimize radiation dose exposure, including the use of iterative reconstruction and automated exposure control  IV Contrast:  100 mL of iohexol (OMNIPAQUE) Enteric Contrast: Enteric contrast was administered  FINDINGS: CHEST IJ approach central venous catheter injection port seen with its tip at the junction of the SVC and the right atrium  LUNGS:  4 mm nodule in the right lower lobe (2:31)     There is no tracheal or endobronchial lesion  PLEURA:  Unremarkable  HEART/GREAT VESSELS:  Unremarkable for patient's age  MEDIASTINUM AND ANTHONY:  Unremarkable  CHEST WALL AND LOWER NECK:   Unremarkable  ABDOMEN LIVER/BILIARY TREE:  Innumerable hepatic metastasis are still noted however they are smaller in size and appear slightly less enhancing  For example, a right lobe lesion previously measured 2 8 x 2 4 cm and on the current exam measures 2 2 x 1 8 cm    Another right lower lobe lesion measured 2 1 x 2 4 cm and now measures 1 8 x 1 7 cm  A left lobe lesion measured 6 3 x 3 0 cm and now measures 4 6 x 1 8 cm  Wilber Sharma GALLBLADDER:  There are gallstone(s) within the gallbladder, without pericholecystic inflammatory changes  SPLEEN:  Unremarkable  PANCREAS:  Unremarkable  ADRENAL GLANDS:  Unremarkable  KIDNEYS/URETERS:  A 1 5 cm lesion in the upper pole the left kidney measures 25 Hounsfield units, above normal limits for a simple cyst however suspect this is due to pseudoenhancement secondary to artifact related to IV contrast; is unchanged from the prior exam   Consider elective ultrasound for confirmation of benignity    No hydronephrosis  STOMACH AND BOWEL:  Previously seen transverse colonic mass is again noted unchanged  Previously seen appearance of intussusception is again noted and is unchanged  No evidence for obstruction  Mural thickening and loss of haustration of the rectum ends sigmoid colon suggesting colitis  Recommend clinical correlation  As the patient undergone radiation therapy? APPENDIX:  No findings to suggest appendicitis  ABDOMINOPELVIC CAVITY:  No ascites or free intraperitoneal air  No lymphadenopathy  VESSELS:  Unremarkable for patient's age  PELVIS REPRODUCTIVE ORGANS:  Unremarkable for patient's age  URINARY BLADDER:  Unremarkable  ABDOMINAL WALL/INGUINAL REGIONS:  There is a small fat-containing umbilical hernia  OSSEOUS STRUCTURES:  No acute fracture or destructive osseous lesion  Impression: Left renal upper pole cystic lesion does not meet criteria for simple cyst (likely secondary to artifact)  Consider elective ultrasound for confirmation of benignity  Previously seen transverse colonic mass is again noted unchanged  Previously seen appearance of short segment intussusception is again noted and is unchanged  No evidence for obstruction  Innumerable hepatic metastasis are still noted however they are smaller in size and appear slightly less enhancing    For example, a right lobe lesion previously measured 2 8 x 2 4 cm Workstation performed: [id number]         Labs:   Lab Results   Component Value Date    WBC 4 86 01/18/2019    HGB 12 3 01/18/2019    HCT 39 4 01/18/2019    MCV 90 01/18/2019     01/18/2019     Lab Results   Component Value Date    K 3 5 01/18/2019     01/18/2019    CO2 28 01/18/2019    BUN 12 01/18/2019    CREATININE 0 61 01/18/2019    GLUF 106 (H) 11/09/2018    CALCIUM 8 9 01/18/2019    AST 52 (H) 01/18/2019    ALT 67 01/18/2019    ALKPHOS 261 (H) 01/18/2019    EGFR 104 01/18/2019         Lab Results   Component Value Date     (H) 09/24/2018         Lab Results   Component Value Date    CEA 38 2 (H) 01/18/2019       Lab Results   Component Value Date    IRON 11 (L) 09/13/2018    TIBC 302 09/13/2018    FERRITIN 15 09/13/2018         Lab Results   Component Value Date    FOLATE 11 4 09/13/2018         Current Medications: Reviewed  Allergies: Reviewed  PMH/FH/SH:  Reviewed      Vital Sign:    Body surface area is 1 62 meters squared      Wt Readings from Last 3 Encounters:   02/01/19 58 1 kg (128 lb)   01/22/19 58 5 kg (129 lb)   01/21/19 57 4 kg (126 lb 8 7 oz)        Temp Readings from Last 3 Encounters:   02/01/19 97 8 °F (36 6 °C) (Tympanic)   01/22/19 98 6 °F (37 °C) (Tympanic)   01/21/19 98 1 °F (36 7 °C) (Temporal)        BP Readings from Last 3 Encounters:   02/01/19 144/80   01/22/19 118/72   01/21/19 119/74         Pulse Readings from Last 3 Encounters:   02/01/19 88   01/22/19 105   01/21/19 89     @LASTSAO2(3)@

## 2019-02-04 ENCOUNTER — HOSPITAL ENCOUNTER (OUTPATIENT)
Dept: INFUSION CENTER | Facility: HOSPITAL | Age: 54
Discharge: HOME | End: 2019-02-04
Payer: COMMERCIAL

## 2019-02-04 VITALS
HEART RATE: 83 BPM | HEIGHT: 64 IN | RESPIRATION RATE: 16 BRPM | WEIGHT: 129.19 LBS | TEMPERATURE: 97.8 F | SYSTOLIC BLOOD PRESSURE: 129 MMHG | DIASTOLIC BLOOD PRESSURE: 75 MMHG | BODY MASS INDEX: 22.06 KG/M2

## 2019-02-04 PROCEDURE — G0498 CHEMO EXTEND IV INFUS W/PUMP: HCPCS

## 2019-02-04 PROCEDURE — 96417 CHEMO IV INFUS EACH ADDL SEQ: CPT

## 2019-02-04 PROCEDURE — 96413 CHEMO IV INFUSION 1 HR: CPT

## 2019-02-04 PROCEDURE — 96415 CHEMO IV INFUSION ADDL HR: CPT

## 2019-02-04 PROCEDURE — 96411 CHEMO IV PUSH ADDL DRUG: CPT

## 2019-02-04 PROCEDURE — 96368 THER/DIAG CONCURRENT INF: CPT

## 2019-02-04 PROCEDURE — 96367 TX/PROPH/DG ADDL SEQ IV INF: CPT

## 2019-02-04 RX ADMIN — FLUOROURACIL 640 MG: 50 INJECTION, SOLUTION INTRAVENOUS at 12:48

## 2019-02-04 RX ADMIN — DEXTROSE MONOHYDRATE 20 ML/HR: 50 INJECTION, SOLUTION INTRAVENOUS at 10:36

## 2019-02-04 RX ADMIN — BEVACIZUMAB 281 MG: 100 INJECTION, SOLUTION INTRAVENOUS at 09:55

## 2019-02-04 RX ADMIN — DEXAMETHASONE SODIUM PHOSPHATE: 10 INJECTION, SOLUTION INTRAMUSCULAR; INTRAVENOUS at 09:28

## 2019-02-04 RX ADMIN — LEUCOVORIN CALCIUM 640 MG: 350 INJECTION, POWDER, LYOPHILIZED, FOR SOLUTION INTRAMUSCULAR; INTRAVENOUS at 10:38

## 2019-02-04 RX ADMIN — OXALIPLATIN 136 MG: 5 INJECTION, SOLUTION INTRAVENOUS at 10:42

## 2019-02-04 RX ADMIN — SODIUM CHLORIDE 20 ML/HR: 9 INJECTION, SOLUTION INTRAVENOUS at 09:28

## 2019-02-04 NOTE — PROGRESS NOTES
Tolerated all medications well  No adverse reaction noted  5fu cadd pump connected  All sites secured with tape  discharged ambulatory with coworker

## 2019-02-04 NOTE — PROGRESS NOTES
Pt admitted to infusion department today for chemotherapy  No recent changes in health per pt  Port accessed without difficulty  Excellent blood return noted

## 2019-02-05 RX ORDER — SODIUM CHLORIDE 9 MG/ML
20 INJECTION, SOLUTION INTRAVENOUS CONTINUOUS
Status: DISCONTINUED | OUTPATIENT
Start: 2019-02-06 | End: 2019-02-10 | Stop reason: HOSPADM

## 2019-02-06 ENCOUNTER — HOSPITAL ENCOUNTER (OUTPATIENT)
Dept: INFUSION CENTER | Facility: HOSPITAL | Age: 54
Discharge: HOME | End: 2019-02-06

## 2019-02-06 NOTE — PLAN OF CARE
Problem: Potential for Falls  Goal: Patient will remain free of falls  INTERVENTIONS:  - Assess patient frequently for physical needs  -  Identify cognitive and physical deficits and behaviors that affect risk of falls    -  [place] fall precautions as indicated by assessment   - Educate patient/family on patient safety including physical limitations  - Instruct patient to call for assistance with activity based on assessment  - Modify environment to reduce risk of injury  - Consider OT/PT consult to assist with strengthening/mobility   Outcome: Progressing

## 2019-02-06 NOTE — PROGRESS NOTES
Pt here for CADD disconnect  Presents with no complaints  Patient CADD connected and port flushed per protocol    AVS provided and appts confirmed

## 2019-02-14 ENCOUNTER — TELEPHONE (OUTPATIENT)
Dept: HEMATOLOGY ONCOLOGY | Facility: CLINIC | Age: 54
End: 2019-02-14

## 2019-02-14 NOTE — TELEPHONE ENCOUNTER
[facility] called with an auth # [id number] for her to go to [place] for a second opnion   Registration Phone # [phone] for Sanya mims

## 2019-02-15 ENCOUNTER — APPOINTMENT (OUTPATIENT)
Dept: LAB | Age: 54
End: 2019-02-15
Payer: COMMERCIAL

## 2019-02-15 RX ORDER — FLUOROURACIL 50 MG/ML
652 INJECTION, SOLUTION INTRAVENOUS ONCE
Status: COMPLETED | OUTPATIENT
Start: 2019-02-18 | End: 2019-02-18

## 2019-02-15 RX ORDER — SODIUM CHLORIDE 9 MG/ML
20 INJECTION, SOLUTION INTRAVENOUS CONTINUOUS
Status: DISCONTINUED | OUTPATIENT
Start: 2019-02-18 | End: 2019-02-22 | Stop reason: HOSPADM

## 2019-02-15 RX ORDER — DEXTROSE MONOHYDRATE 50 MG/ML
20 INJECTION, SOLUTION INTRAVENOUS ONCE
Status: COMPLETED | OUTPATIENT
Start: 2019-02-18 | End: 2019-02-18

## 2019-02-18 ENCOUNTER — HOSPITAL ENCOUNTER (OUTPATIENT)
Dept: INFUSION CENTER | Facility: HOSPITAL | Age: 54
Discharge: HOME | End: 2019-02-18
Payer: COMMERCIAL

## 2019-02-18 VITALS
HEART RATE: 78 BPM | SYSTOLIC BLOOD PRESSURE: 131 MMHG | WEIGHT: 130.07 LBS | BODY MASS INDEX: 22.21 KG/M2 | RESPIRATION RATE: 18 BRPM | DIASTOLIC BLOOD PRESSURE: 85 MMHG | HEIGHT: 64 IN | TEMPERATURE: 99 F

## 2019-02-18 PROCEDURE — 96415 CHEMO IV INFUSION ADDL HR: CPT

## 2019-02-18 PROCEDURE — G0498 CHEMO EXTEND IV INFUS W/PUMP: HCPCS

## 2019-02-18 PROCEDURE — 96417 CHEMO IV INFUS EACH ADDL SEQ: CPT

## 2019-02-18 PROCEDURE — 96411 CHEMO IV PUSH ADDL DRUG: CPT

## 2019-02-18 PROCEDURE — 96367 TX/PROPH/DG ADDL SEQ IV INF: CPT

## 2019-02-18 PROCEDURE — 96368 THER/DIAG CONCURRENT INF: CPT

## 2019-02-18 PROCEDURE — 96413 CHEMO IV INFUSION 1 HR: CPT

## 2019-02-18 RX ADMIN — DEXTROSE MONOHYDRATE 20 ML/HR: 50 INJECTION, SOLUTION INTRAVENOUS at 10:20

## 2019-02-18 RX ADMIN — BEVACIZUMAB 293 MG: 100 INJECTION, SOLUTION INTRAVENOUS at 09:40

## 2019-02-18 RX ADMIN — DEXAMETHASONE SODIUM PHOSPHATE: 10 INJECTION, SOLUTION INTRAMUSCULAR; INTRAVENOUS at 09:10

## 2019-02-18 RX ADMIN — LEUCOVORIN CALCIUM 652 MG: 350 INJECTION, POWDER, LYOPHILIZED, FOR SOLUTION INTRAMUSCULAR; INTRAVENOUS at 10:22

## 2019-02-18 RX ADMIN — OXALIPLATIN 139 MG: 5 INJECTION, SOLUTION INTRAVENOUS at 10:22

## 2019-02-18 RX ADMIN — FLUOROURACIL 652 MG: 50 INJECTION, SOLUTION INTRAVENOUS at 12:29

## 2019-02-18 RX ADMIN — SODIUM CHLORIDE 20 ML/HR: 9 INJECTION, SOLUTION INTRAVENOUS at 09:04

## 2019-02-18 NOTE — PLAN OF CARE
Problem: Potential for Falls  Goal: Patient will remain free of falls  Description  INTERVENTIONS:  - Assess patient frequently for physical needs  -  Identify cognitive and physical deficits and behaviors that affect risk of falls    -  [place] fall precautions as indicated by assessment   - Educate patient/family on patient safety including physical limitations  - Instruct patient to call for assistance with activity based on assessment  - Modify environment to reduce risk of injury  - Consider OT/PT consult to assist with strengthening/mobility  Outcome: Progressing

## 2019-02-18 NOTE — PROGRESS NOTES
Pt here for chemo today  Offers no new complaints other than a recent mouth sore that is better after using biotene mouthwash  Tolerated treatment well without complications    Confirmed CADD pump removal on Wed at 10:40am

## 2019-02-19 RX ORDER — SODIUM CHLORIDE 9 MG/ML
20 INJECTION, SOLUTION INTRAVENOUS ONCE
Status: DISCONTINUED | OUTPATIENT
Start: 2019-02-20 | End: 2019-02-24 | Stop reason: HOSPADM

## 2019-02-20 ENCOUNTER — HOSPITAL ENCOUNTER (OUTPATIENT)
Dept: INFUSION CENTER | Facility: HOSPITAL | Age: 54
Discharge: HOME | End: 2019-02-20

## 2019-03-01 ENCOUNTER — APPOINTMENT (OUTPATIENT)
Dept: LAB | Age: 54
End: 2019-03-01
Payer: COMMERCIAL

## 2019-03-01 ENCOUNTER — OFFICE VISIT (OUTPATIENT)
Dept: HEMATOLOGY ONCOLOGY | Facility: CLINIC | Age: 54
End: 2019-03-01
Payer: COMMERCIAL

## 2019-03-01 VITALS
WEIGHT: 132 LBS | DIASTOLIC BLOOD PRESSURE: 72 MMHG | OXYGEN SATURATION: 99 % | BODY MASS INDEX: 22.53 KG/M2 | SYSTOLIC BLOOD PRESSURE: 120 MMHG | RESPIRATION RATE: 16 BRPM | TEMPERATURE: 98.2 F | HEART RATE: 79 BPM | HEIGHT: 64 IN

## 2019-03-01 DIAGNOSIS — C78.7 COLON CANCER METASTASIZED TO LIVER (HCC): ICD-10-CM

## 2019-03-01 DIAGNOSIS — C18.9 COLON CANCER METASTASIZED TO LIVER (HCC): Primary | ICD-10-CM

## 2019-03-01 DIAGNOSIS — C18.9 COLON CANCER METASTASIZED TO LIVER (HCC): ICD-10-CM

## 2019-03-01 DIAGNOSIS — C78.7 COLON CANCER METASTASIZED TO LIVER (HCC): Primary | ICD-10-CM

## 2019-03-01 LAB — CEA SERPL-MCNC: 30.9 NG/ML (ref 0–3)

## 2019-03-01 PROCEDURE — 99214 OFFICE O/P EST MOD 30 MIN: CPT | Performed by: INTERNAL MEDICINE

## 2019-03-01 PROCEDURE — 82378 CARCINOEMBRYONIC ANTIGEN: CPT

## 2019-03-01 RX ORDER — FLUOROURACIL 50 MG/ML
652 INJECTION, SOLUTION INTRAVENOUS ONCE
Status: COMPLETED | OUTPATIENT
Start: 2019-03-04 | End: 2019-03-04

## 2019-03-01 RX ORDER — SODIUM CHLORIDE 9 MG/ML
20 INJECTION, SOLUTION INTRAVENOUS ONCE
Status: COMPLETED | OUTPATIENT
Start: 2019-03-04 | End: 2019-03-04

## 2019-03-01 RX ORDER — DEXTROSE MONOHYDRATE 50 MG/ML
20 INJECTION, SOLUTION INTRAVENOUS ONCE
Status: COMPLETED | OUTPATIENT
Start: 2019-03-04 | End: 2019-03-04

## 2019-03-01 NOTE — PROGRESS NOTES
Hematology / Oncology Outpatient Follow Up Note    Scott Gaming 48 y o  female :1965 [id number]         Date:  3/1/2019    Assessment / Plan:  A 51-year-old female with metastatic colon cancer  She has extensive liver metastasis  Molecular testing could not be performed based on the liver biopsy specimen  She is currently on bevacizumab with FOLFOX with excellent tolerance  She has partial response, based on a CT scan a month ago  Clinically, she is doing very well with no tumor related toxicity or side effects from chemotherapy  I recommended her to continue with bevacizumab and FOLFOX every 2 weeks  She is in agreement with my recommendation  I will see her again in mid 2019 at which time I would order CT scan of chest abdomen pelvis for disease monitoring                                             Subjective:      HPI:  A 51-year-old female who was found to have severe microcytic anemia in 2018   Therefore, she was advised to be hospitalized  Maria De Jesus Dorsey was given transfusion  Cirilo Nutley was performed which did not show any major pathology   As outpatient basis, she underwent CT scan of abdomen pelvis which showed transverse colon mass as well as multiple liver metastatic disease   She subsequently underwent liver biopsy which showed metastatic adenocarcinoma, consistent with colorectal primary   She presents today to discuss treatment options   Since 2018, she lost 25 pound  Maria De Jesus Dorsey has mildly anorexic  Haroon Gasman, she has no complaint of pain   She denied any respiratory symptoms    She has normal bowel movement    Prior to the hospitalization, she had slowly progressive fatigue as well as some exertional shortness of breath   She is currently on oral iron 3 times per day  Maria De Jesus Dorsey has rheumatoid arthritis for which she is on weekly methotrexate and folic acid   She has no family history of colorectal cancer  Maria De Jesus Dorsey is a lifetime never smoker   Her performance status is normal            Interval History:   A 48year-old female with metastatic colon cancer with extensive liver metastasis   She started systemic chemotherapy with FOLFOX-6 which she tolerated very well  We were notified by pathology Department that molecular testing cannot be performed on liver biopsy tissue  After several cycle treatment, we added bevacizumab  Interim CT scan showed responding disease  She has some decline of CEA  She is going to have 9 cycle of systemic chemotherapy early next week  She has no new complaint  She denied any pain  She has good appetite, resulting in stable weight  She has no respiratory symptoms  She denied nausea, vomiting or diarrhea  She has no neuropathy  Her performance status is normal                                                                                  Objective:      Primary Diagnosis:     Metastatic colon cancer   Diagnosed in October 2018      Cancer Staging:  Cancer Staging  No matching staging information was found for the patient         Previous Hematologic/ Oncologic Treatment:            Current Hematologic/ Oncologic Treatment:       Bevacizumab with FOLFOX-6  9th cycle to be given in March 4, 2019      Disease Status:      Partial response      Test Results:     Pathology:     Liver biopsy showed metastatic adenocarcinoma, consistent with colorectal primary      Radiology:     CT scan of chest abdomen pelvis in January 29, 2019 showed stable transverse colon mass in decreased size of liver metastasis      Laboratory:     See below      Physical Exam:        General Appearance:    Alert, oriented          Eyes:    PERRL   Ears:    Normal external ear canals, both ears   Nose:   Nares normal, septum midline   Throat:   Mucosa moist  Pharynx without injection      Neck:   Supple         Lungs:     Clear to auscultation bilaterally   Chest Wall:    No tenderness or deformity    Heart:    Regular rate and rhythm         Abdomen:     Soft, non-tender, bowel sounds +, no organomegaly               Extremities:   Extremities no cyanosis or edema         Skin:   no rash or icterus  Lymph nodes:   Cervical, supraclavicular, and axillary nodes normal   Neurologic:   CNII-XII intact, normal strength, sensation and reflexes     Throughout             Breast exam:  NA                ROS: Review of Systems   All other systems reviewed and are negative  Imaging: No results found  Labs:   Lab Results   Component Value Date    WBC 5 07 02/15/2019    HGB 12 4 02/15/2019    HCT 38 2 02/15/2019    MCV 96 02/15/2019     02/15/2019     Lab Results   Component Value Date    K 3 6 02/15/2019     02/15/2019    CO2 27 02/15/2019    BUN 12 02/15/2019    CREATININE 0 70 02/15/2019    GLUF 106 (H) 11/09/2018    CALCIUM 9 3 02/15/2019    AST 45 02/15/2019    ALT 76 02/15/2019    ALKPHOS 271 (H) 02/15/2019    EGFR 99 02/15/2019       No components found for: CA27 29    No components found for:     Lab Results   Component Value Date     (H) 09/24/2018       Lab Results   Component Value Date    CEA 38 2 (H) 01/18/2019       Lab Results   Component Value Date    IRON 11 (L) 09/13/2018    TIBC 302 09/13/2018    FERRITIN 15 09/13/2018         Lab Results   Component Value Date    FOLATE 11 4 09/13/2018         Current Medications: Reviewed  Allergies: Reviewed  PMH/FH/SH:  Reviewed      Vital Sign:    Body surface area is 1 64 meters squared      Wt Readings from Last 3 Encounters:   03/01/19 59 9 kg (132 lb)   02/18/19 59 kg (130 lb 1 1 oz)   02/04/19 58 6 kg (129 lb 3 oz)        Temp Readings from Last 3 Encounters:   03/01/19 98 2 °F (36 8 °C) (Tympanic)   02/18/19 99 °F (37 2 °C) (Temporal)   02/04/19 97 8 °F (36 6 °C) (Temporal)        BP Readings from Last 3 Encounters:   03/01/19 120/72   02/18/19 131/85   02/04/19 129/75         Pulse Readings from Last 3 Encounters:   03/01/19 79   02/18/19 78   02/04/19 83     @LASTSAO2(3)@

## 2019-03-04 ENCOUNTER — HOSPITAL ENCOUNTER (OUTPATIENT)
Dept: INFUSION CENTER | Facility: HOSPITAL | Age: 54
Discharge: HOME | End: 2019-03-04
Payer: COMMERCIAL

## 2019-03-04 VITALS
HEART RATE: 81 BPM | RESPIRATION RATE: 18 BRPM | SYSTOLIC BLOOD PRESSURE: 126 MMHG | TEMPERATURE: 97.6 F | WEIGHT: 132.72 LBS | HEIGHT: 64 IN | BODY MASS INDEX: 22.66 KG/M2 | DIASTOLIC BLOOD PRESSURE: 75 MMHG

## 2019-03-04 PROCEDURE — 96368 THER/DIAG CONCURRENT INF: CPT

## 2019-03-04 PROCEDURE — 96367 TX/PROPH/DG ADDL SEQ IV INF: CPT

## 2019-03-04 PROCEDURE — 96417 CHEMO IV INFUS EACH ADDL SEQ: CPT

## 2019-03-04 PROCEDURE — G0498 CHEMO EXTEND IV INFUS W/PUMP: HCPCS

## 2019-03-04 PROCEDURE — 96415 CHEMO IV INFUSION ADDL HR: CPT

## 2019-03-04 PROCEDURE — 96411 CHEMO IV PUSH ADDL DRUG: CPT

## 2019-03-04 PROCEDURE — 96413 CHEMO IV INFUSION 1 HR: CPT

## 2019-03-04 RX ADMIN — LEUCOVORIN CALCIUM 652 MG: 350 INJECTION, POWDER, LYOPHILIZED, FOR SOLUTION INTRAMUSCULAR; INTRAVENOUS at 10:31

## 2019-03-04 RX ADMIN — BEVACIZUMAB 293 MG: 100 INJECTION, SOLUTION INTRAVENOUS at 09:47

## 2019-03-04 RX ADMIN — DEXAMETHASONE SODIUM PHOSPHATE: 10 INJECTION, SOLUTION INTRAMUSCULAR; INTRAVENOUS at 09:13

## 2019-03-04 RX ADMIN — OXALIPLATIN 139 MG: 5 INJECTION, SOLUTION INTRAVENOUS at 10:33

## 2019-03-04 RX ADMIN — FLUOROURACIL 652 MG: 50 INJECTION, SOLUTION INTRAVENOUS at 12:41

## 2019-03-04 RX ADMIN — SODIUM CHLORIDE 20 ML/HR: 9 INJECTION, SOLUTION INTRAVENOUS at 09:05

## 2019-03-04 RX ADMIN — DEXTROSE MONOHYDRATE 20 ML/HR: 50 INJECTION, SOLUTION INTRAVENOUS at 10:26

## 2019-03-04 NOTE — PLAN OF CARE
Problem: Potential for Falls  Goal: Patient will remain free of falls  Description  INTERVENTIONS:  - Assess patient frequently for physical needs  -  Identify cognitive and physical deficits and behaviors that affect risk of falls    -  [place] fall precautions as indicated by assessment   - Educate patient/family on patient safety including physical limitations  - Instruct patient to call for assistance with activity based on assessment  - Modify environment to reduce risk of injury  - Consider OT/PT consult to assist with strengthening/mobility  Outcome: Progressing

## 2019-03-04 NOTE — PROGRESS NOTES
Pt here for chemo/CADD pump today  Offers no new complaints  Tolerated well without complications  Patient aware to return Wed 3-6-19 at 11:00am for CADD disconnect  AVS provided

## 2019-03-06 ENCOUNTER — HOSPITAL ENCOUNTER (OUTPATIENT)
Dept: INFUSION CENTER | Facility: HOSPITAL | Age: 54
Discharge: HOME | End: 2019-03-06

## 2019-03-06 NOTE — PLAN OF CARE
Problem: Potential for Falls  Goal: Patient will remain free of falls  Description  INTERVENTIONS:  - Assess patient frequently for physical needs  -  Identify cognitive and physical deficits and behaviors that affect risk of falls    -  [place] fall precautions as indicated by assessment   - Educate patient/family on patient safety including physical limitations  - Instruct patient to call for assistance with activity based on assessment  - Modify environment to reduce risk of injury  - Consider OT/PT consult to assist with strengthening/mobility  Outcome: Progressing

## 2019-03-06 NOTE — PROGRESS NOTES
Pt here for CADD disconnect  Offers no complaints  CADD disconnected and port flushed per protocol  Patient aware and confirmed next appts and AVS declined

## 2019-03-15 ENCOUNTER — APPOINTMENT (OUTPATIENT)
Dept: LAB | Age: 54
End: 2019-03-15
Payer: COMMERCIAL

## 2019-03-15 LAB
ALBUMIN SERPL BCP-MCNC: 3.4 G/DL (ref 3.5–5)
ALP SERPL-CCNC: 294 U/L (ref 46–116)
ALT SERPL W P-5'-P-CCNC: 103 U/L (ref 12–78)
ANION GAP SERPL CALCULATED.3IONS-SCNC: 5 MMOL/L (ref 4–13)
AST SERPL W P-5'-P-CCNC: 70 U/L (ref 5–45)
BASOPHILS # BLD AUTO: 0.04 THOUSANDS/ΜL (ref 0–0.1)
BASOPHILS NFR BLD AUTO: 1 % (ref 0–1)
BILIRUB SERPL-MCNC: 0.57 MG/DL (ref 0.2–1)
BUN SERPL-MCNC: 10 MG/DL (ref 5–25)
CALCIUM SERPL-MCNC: 8.9 MG/DL (ref 8.3–10.1)
CHLORIDE SERPL-SCNC: 106 MMOL/L (ref 100–108)
CO2 SERPL-SCNC: 29 MMOL/L (ref 21–32)
CREAT SERPL-MCNC: 0.66 MG/DL (ref 0.6–1.3)
EOSINOPHIL # BLD AUTO: 0.07 THOUSAND/ΜL (ref 0–0.61)
EOSINOPHIL NFR BLD AUTO: 1 % (ref 0–6)
ERYTHROCYTE [DISTWIDTH] IN BLOOD BY AUTOMATED COUNT: 15.3 % (ref 11.6–15.1)
GFR SERPL CREATININE-BSD FRML MDRD: 101 ML/MIN/1.73SQ M
GLUCOSE P FAST SERPL-MCNC: 108 MG/DL (ref 65–99)
HCT VFR BLD AUTO: 39.6 % (ref 34.8–46.1)
HGB BLD-MCNC: 13 G/DL (ref 11.5–15.4)
IMM GRANULOCYTES # BLD AUTO: 0.01 THOUSAND/UL (ref 0–0.2)
IMM GRANULOCYTES NFR BLD AUTO: 0 % (ref 0–2)
LYMPHOCYTES # BLD AUTO: 1.71 THOUSANDS/ΜL (ref 0.6–4.47)
LYMPHOCYTES NFR BLD AUTO: 33 % (ref 14–44)
MCH RBC QN AUTO: 33.2 PG (ref 26.8–34.3)
MCHC RBC AUTO-ENTMCNC: 32.8 G/DL (ref 31.4–37.4)
MCV RBC AUTO: 101 FL (ref 82–98)
MONOCYTES # BLD AUTO: 0.55 THOUSAND/ΜL (ref 0.17–1.22)
MONOCYTES NFR BLD AUTO: 11 % (ref 4–12)
NEUTROPHILS # BLD AUTO: 2.76 THOUSANDS/ΜL (ref 1.85–7.62)
NEUTS SEG NFR BLD AUTO: 54 % (ref 43–75)
NRBC BLD AUTO-RTO: 0 /100 WBCS
PLATELET # BLD AUTO: 160 THOUSANDS/UL (ref 149–390)
PMV BLD AUTO: 9.3 FL (ref 8.9–12.7)
POTASSIUM SERPL-SCNC: 4 MMOL/L (ref 3.5–5.3)
PROT SERPL-MCNC: 7.5 G/DL (ref 6.4–8.2)
RBC # BLD AUTO: 3.91 MILLION/UL (ref 3.81–5.12)
SODIUM SERPL-SCNC: 140 MMOL/L (ref 136–145)
WBC # BLD AUTO: 5.14 THOUSAND/UL (ref 4.31–10.16)

## 2019-03-15 PROCEDURE — 85025 COMPLETE CBC W/AUTO DIFF WBC: CPT | Performed by: INTERNAL MEDICINE

## 2019-03-15 PROCEDURE — 36415 COLL VENOUS BLD VENIPUNCTURE: CPT | Performed by: INTERNAL MEDICINE

## 2019-03-15 PROCEDURE — 80053 COMPREHEN METABOLIC PANEL: CPT | Performed by: INTERNAL MEDICINE

## 2019-03-15 RX ORDER — DEXTROSE MONOHYDRATE 50 MG/ML
20 INJECTION, SOLUTION INTRAVENOUS ONCE
Status: COMPLETED | OUTPATIENT
Start: 2019-03-18 | End: 2019-03-18

## 2019-03-15 RX ORDER — FLUOROURACIL 50 MG/ML
652 INJECTION, SOLUTION INTRAVENOUS ONCE
Status: COMPLETED | OUTPATIENT
Start: 2019-03-18 | End: 2019-03-18

## 2019-03-15 RX ORDER — SODIUM CHLORIDE 9 MG/ML
20 INJECTION, SOLUTION INTRAVENOUS ONCE
Status: COMPLETED | OUTPATIENT
Start: 2019-03-18 | End: 2019-03-18

## 2019-03-18 ENCOUNTER — HOSPITAL ENCOUNTER (OUTPATIENT)
Dept: INFUSION CENTER | Facility: HOSPITAL | Age: 54
Discharge: HOME | End: 2019-03-18
Payer: COMMERCIAL

## 2019-03-18 VITALS
SYSTOLIC BLOOD PRESSURE: 132 MMHG | RESPIRATION RATE: 18 BRPM | HEIGHT: 64 IN | HEART RATE: 68 BPM | DIASTOLIC BLOOD PRESSURE: 75 MMHG | TEMPERATURE: 97.3 F | WEIGHT: 133.16 LBS | BODY MASS INDEX: 22.73 KG/M2

## 2019-03-18 PROCEDURE — 96413 CHEMO IV INFUSION 1 HR: CPT

## 2019-03-18 PROCEDURE — G0498 CHEMO EXTEND IV INFUS W/PUMP: HCPCS

## 2019-03-18 PROCEDURE — 96417 CHEMO IV INFUS EACH ADDL SEQ: CPT

## 2019-03-18 PROCEDURE — 96368 THER/DIAG CONCURRENT INF: CPT

## 2019-03-18 PROCEDURE — 96415 CHEMO IV INFUSION ADDL HR: CPT

## 2019-03-18 PROCEDURE — 96367 TX/PROPH/DG ADDL SEQ IV INF: CPT

## 2019-03-18 PROCEDURE — 96411 CHEMO IV PUSH ADDL DRUG: CPT

## 2019-03-18 RX ADMIN — BEVACIZUMAB 293 MG: 400 INJECTION, SOLUTION INTRAVENOUS at 09:51

## 2019-03-18 RX ADMIN — SODIUM CHLORIDE 20 ML/HR: 9 INJECTION, SOLUTION INTRAVENOUS at 09:13

## 2019-03-18 RX ADMIN — DEXTROSE MONOHYDRATE 20 ML/HR: 50 INJECTION, SOLUTION INTRAVENOUS at 10:29

## 2019-03-18 RX ADMIN — OXALIPLATIN 139 MG: 5 INJECTION, SOLUTION INTRAVENOUS at 10:34

## 2019-03-18 RX ADMIN — DEXAMETHASONE SODIUM PHOSPHATE: 10 INJECTION, SOLUTION INTRAMUSCULAR; INTRAVENOUS at 09:15

## 2019-03-18 RX ADMIN — FLUOROURACIL 652 MG: 50 INJECTION, SOLUTION INTRAVENOUS at 12:41

## 2019-03-18 RX ADMIN — LEUCOVORIN CALCIUM 652 MG: 350 INJECTION, POWDER, LYOPHILIZED, FOR SOLUTION INTRAMUSCULAR; INTRAVENOUS at 10:29

## 2019-03-18 NOTE — PROGRESS NOTES
Pt tolerated oxaliplatin, leucovorin, avastin, 5fu ivp, and Cadd pump is in place for the next 46 hours  Pt is familiar with device   Discharged ambulatory deferring avs

## 2019-03-19 RX ORDER — SODIUM CHLORIDE 9 MG/ML
20 INJECTION, SOLUTION INTRAVENOUS ONCE
Status: DISCONTINUED | OUTPATIENT
Start: 2019-03-20 | End: 2019-03-24 | Stop reason: HOSPADM

## 2019-03-20 ENCOUNTER — HOSPITAL ENCOUNTER (OUTPATIENT)
Dept: INFUSION CENTER | Facility: HOSPITAL | Age: 54
Discharge: HOME | End: 2019-03-20

## 2019-03-20 NOTE — PROGRESS NOTES
Pt here for cadd pump dc, 0 ml res volume noted at end, pt offers no complaints today, good blood return post disconnect

## 2019-03-29 ENCOUNTER — APPOINTMENT (OUTPATIENT)
Dept: LAB | Age: 54
End: 2019-03-29
Payer: COMMERCIAL

## 2019-03-29 RX ORDER — DEXTROSE MONOHYDRATE 50 MG/ML
20 INJECTION, SOLUTION INTRAVENOUS ONCE
Status: DISCONTINUED | OUTPATIENT
Start: 2019-04-01 | End: 2019-03-29

## 2019-03-29 RX ORDER — SODIUM CHLORIDE 9 MG/ML
20 INJECTION, SOLUTION INTRAVENOUS ONCE
Status: DISCONTINUED | OUTPATIENT
Start: 2019-04-01 | End: 2019-03-29

## 2019-03-29 RX ORDER — FLUOROURACIL 50 MG/ML
652 INJECTION, SOLUTION INTRAVENOUS ONCE
Status: DISCONTINUED | OUTPATIENT
Start: 2019-04-01 | End: 2019-03-29

## 2019-03-29 RX ORDER — DEXTROSE MONOHYDRATE 50 MG/ML
20 INJECTION, SOLUTION INTRAVENOUS ONCE
Status: COMPLETED | OUTPATIENT
Start: 2019-04-01 | End: 2019-04-01

## 2019-03-29 RX ORDER — FLUOROURACIL 50 MG/ML
652 INJECTION, SOLUTION INTRAVENOUS ONCE
Status: COMPLETED | OUTPATIENT
Start: 2019-04-01 | End: 2019-04-01

## 2019-03-29 RX ORDER — SODIUM CHLORIDE 9 MG/ML
20 INJECTION, SOLUTION INTRAVENOUS ONCE
Status: COMPLETED | OUTPATIENT
Start: 2019-04-01 | End: 2019-04-01

## 2019-04-01 ENCOUNTER — HOSPITAL ENCOUNTER (OUTPATIENT)
Dept: INFUSION CENTER | Facility: HOSPITAL | Age: 54
Discharge: HOME | End: 2019-04-01
Payer: COMMERCIAL

## 2019-04-01 VITALS
SYSTOLIC BLOOD PRESSURE: 119 MMHG | WEIGHT: 133.16 LBS | HEIGHT: 64 IN | DIASTOLIC BLOOD PRESSURE: 74 MMHG | HEART RATE: 92 BPM | RESPIRATION RATE: 16 BRPM | TEMPERATURE: 97.3 F | BODY MASS INDEX: 22.73 KG/M2

## 2019-04-01 PROCEDURE — G0498 CHEMO EXTEND IV INFUS W/PUMP: HCPCS

## 2019-04-01 PROCEDURE — 96413 CHEMO IV INFUSION 1 HR: CPT

## 2019-04-01 PROCEDURE — 96415 CHEMO IV INFUSION ADDL HR: CPT

## 2019-04-01 PROCEDURE — 96367 TX/PROPH/DG ADDL SEQ IV INF: CPT

## 2019-04-01 PROCEDURE — 96411 CHEMO IV PUSH ADDL DRUG: CPT

## 2019-04-01 PROCEDURE — 96368 THER/DIAG CONCURRENT INF: CPT

## 2019-04-01 PROCEDURE — 96417 CHEMO IV INFUS EACH ADDL SEQ: CPT

## 2019-04-01 RX ADMIN — DEXAMETHASONE SODIUM PHOSPHATE: 10 INJECTION, SOLUTION INTRAMUSCULAR; INTRAVENOUS at 09:30

## 2019-04-01 RX ADMIN — OXALIPLATIN 139 MG: 5 INJECTION, SOLUTION INTRAVENOUS at 10:08

## 2019-04-01 RX ADMIN — FLUOROURACIL 652 MG: 50 INJECTION, SOLUTION INTRAVENOUS at 12:56

## 2019-04-01 RX ADMIN — DEXTROSE MONOHYDRATE 20 ML/HR: 50 INJECTION, SOLUTION INTRAVENOUS at 10:02

## 2019-04-01 RX ADMIN — LEUCOVORIN CALCIUM 652 MG: 350 INJECTION, POWDER, LYOPHILIZED, FOR SOLUTION INTRAMUSCULAR; INTRAVENOUS at 10:06

## 2019-04-01 RX ADMIN — BEVACIZUMAB 300 MG: 100 INJECTION, SOLUTION INTRAVENOUS at 12:16

## 2019-04-01 RX ADMIN — SODIUM CHLORIDE 20 ML/HR: 9 INJECTION, SOLUTION INTRAVENOUS at 09:05

## 2019-04-01 NOTE — PLAN OF CARE
Problem: Potential for Falls  Goal: Patient will remain free of falls  Description  INTERVENTIONS:  - Assess patient frequently for physical needs  -  Identify cognitive and physical deficits and behaviors that affect risk of falls    -  [place] fall precautions as indicated by assessment   - Educate patient/family on patient safety including physical limitations  - Instruct patient to call for assistance with activity based on assessment  - Modify environment to reduce risk of injury  - Consider OT/PT consult to assist with strengthening/mobility  Outcome: Progressing

## 2019-04-01 NOTE — PROGRESS NOTES
Patient arrived to unit today stating that she has a new onset of peripheral neuropathy to her feet  She states it is not constant and comes and goes  Dr Inderjit Lees made aware and patient ok for treatment today  Pt tolerated treatment well today without complications  CADD pump connected and patient aware to return Wednesday 4-3-19 at 11:00 for disconnect

## 2019-04-03 ENCOUNTER — HOSPITAL ENCOUNTER (OUTPATIENT)
Dept: INFUSION CENTER | Facility: HOSPITAL | Age: 54
Discharge: HOME | End: 2019-04-03

## 2019-04-03 NOTE — PROGRESS NOTES
Pt admitted to infusion center today for 5fu cadd pump discontinue  Tolerated treatment well  No adverse reactions at present  Port flushed and de-accessed per routine after cadd pump reservoir read 0 0ml remaining  Pt discharged ambulatory deferring avs  Pt is aware of next appt time

## 2019-04-12 ENCOUNTER — APPOINTMENT (OUTPATIENT)
Dept: LAB | Age: 54
End: 2019-04-12
Payer: COMMERCIAL

## 2019-04-12 ENCOUNTER — OFFICE VISIT (OUTPATIENT)
Dept: HEMATOLOGY ONCOLOGY | Facility: CLINIC | Age: 54
End: 2019-04-12
Payer: COMMERCIAL

## 2019-04-12 VITALS
RESPIRATION RATE: 18 BRPM | BODY MASS INDEX: 22.19 KG/M2 | HEIGHT: 65 IN | TEMPERATURE: 96.1 F | DIASTOLIC BLOOD PRESSURE: 74 MMHG | SYSTOLIC BLOOD PRESSURE: 110 MMHG | HEART RATE: 75 BPM | OXYGEN SATURATION: 99 % | WEIGHT: 133.16 LBS

## 2019-04-12 DIAGNOSIS — C78.7 COLON CANCER METASTASIZED TO LIVER (HCC): Primary | ICD-10-CM

## 2019-04-12 DIAGNOSIS — C18.9 COLON CANCER METASTASIZED TO LIVER (HCC): Primary | ICD-10-CM

## 2019-04-12 DIAGNOSIS — C78.7 COLON CANCER METASTASIZED TO LIVER (HCC): ICD-10-CM

## 2019-04-12 DIAGNOSIS — C18.9 COLON CANCER METASTASIZED TO LIVER (HCC): ICD-10-CM

## 2019-04-12 LAB
ALBUMIN SERPL BCP-MCNC: 3.3 G/DL (ref 3.5–5)
ALP SERPL-CCNC: 321 U/L (ref 46–116)
ALT SERPL W P-5'-P-CCNC: 100 U/L (ref 12–78)
ANION GAP SERPL CALCULATED.3IONS-SCNC: 5 MMOL/L (ref 4–13)
AST SERPL W P-5'-P-CCNC: 77 U/L (ref 5–45)
BASOPHILS # BLD AUTO: 0.03 THOUSANDS/ΜL (ref 0–0.1)
BASOPHILS NFR BLD AUTO: 1 % (ref 0–1)
BILIRUB SERPL-MCNC: 0.61 MG/DL (ref 0.2–1)
BUN SERPL-MCNC: 8 MG/DL (ref 5–25)
CALCIUM SERPL-MCNC: 8.8 MG/DL (ref 8.3–10.1)
CHLORIDE SERPL-SCNC: 105 MMOL/L (ref 100–108)
CO2 SERPL-SCNC: 26 MMOL/L (ref 21–32)
CREAT SERPL-MCNC: 0.7 MG/DL (ref 0.6–1.3)
EOSINOPHIL # BLD AUTO: 0.03 THOUSAND/ΜL (ref 0–0.61)
EOSINOPHIL NFR BLD AUTO: 1 % (ref 0–6)
ERYTHROCYTE [DISTWIDTH] IN BLOOD BY AUTOMATED COUNT: 15.5 % (ref 11.6–15.1)
GFR SERPL CREATININE-BSD FRML MDRD: 99 ML/MIN/1.73SQ M
GLUCOSE P FAST SERPL-MCNC: 95 MG/DL (ref 65–99)
HCT VFR BLD AUTO: 37.1 % (ref 34.8–46.1)
HGB BLD-MCNC: 12.3 G/DL (ref 11.5–15.4)
IMM GRANULOCYTES # BLD AUTO: 0.01 THOUSAND/UL (ref 0–0.2)
IMM GRANULOCYTES NFR BLD AUTO: 0 % (ref 0–2)
LYMPHOCYTES # BLD AUTO: 1.47 THOUSANDS/ΜL (ref 0.6–4.47)
LYMPHOCYTES NFR BLD AUTO: 38 % (ref 14–44)
MCH RBC QN AUTO: 34.8 PG (ref 26.8–34.3)
MCHC RBC AUTO-ENTMCNC: 33.2 G/DL (ref 31.4–37.4)
MCV RBC AUTO: 105 FL (ref 82–98)
MONOCYTES # BLD AUTO: 0.45 THOUSAND/ΜL (ref 0.17–1.22)
MONOCYTES NFR BLD AUTO: 12 % (ref 4–12)
NEUTROPHILS # BLD AUTO: 1.85 THOUSANDS/ΜL (ref 1.85–7.62)
NEUTS SEG NFR BLD AUTO: 48 % (ref 43–75)
NRBC BLD AUTO-RTO: 0 /100 WBCS
PLATELET # BLD AUTO: 106 THOUSANDS/UL (ref 149–390)
PMV BLD AUTO: 9.7 FL (ref 8.9–12.7)
POTASSIUM SERPL-SCNC: 4.1 MMOL/L (ref 3.5–5.3)
PROT SERPL-MCNC: 7.5 G/DL (ref 6.4–8.2)
RBC # BLD AUTO: 3.53 MILLION/UL (ref 3.81–5.12)
SODIUM SERPL-SCNC: 136 MMOL/L (ref 136–145)
WBC # BLD AUTO: 3.84 THOUSAND/UL (ref 4.31–10.16)

## 2019-04-12 PROCEDURE — 36415 COLL VENOUS BLD VENIPUNCTURE: CPT

## 2019-04-12 PROCEDURE — 85025 COMPLETE CBC W/AUTO DIFF WBC: CPT

## 2019-04-12 PROCEDURE — 80053 COMPREHEN METABOLIC PANEL: CPT

## 2019-04-12 PROCEDURE — 99214 OFFICE O/P EST MOD 30 MIN: CPT | Performed by: INTERNAL MEDICINE

## 2019-04-12 RX ORDER — DEXTROSE MONOHYDRATE 50 MG/ML
20 INJECTION, SOLUTION INTRAVENOUS ONCE
Status: COMPLETED | OUTPATIENT
Start: 2019-04-15 | End: 2019-04-15

## 2019-04-12 RX ORDER — FLUOROURACIL 50 MG/ML
660 INJECTION, SOLUTION INTRAVENOUS ONCE
Status: COMPLETED | OUTPATIENT
Start: 2019-04-15 | End: 2019-04-15

## 2019-04-12 RX ORDER — IRON,CARBONYL/ASCORBIC ACID 100-250 MG
TABLET ORAL
COMMUNITY
End: 2019-08-07

## 2019-04-12 RX ORDER — METHOTREXATE 2.5 MG/1
TABLET ORAL
COMMUNITY
End: 2019-04-12 | Stop reason: SDUPTHER

## 2019-04-12 RX ORDER — SODIUM CHLORIDE 9 MG/ML
20 INJECTION, SOLUTION INTRAVENOUS ONCE
Status: COMPLETED | OUTPATIENT
Start: 2019-04-15 | End: 2019-04-15

## 2019-04-15 ENCOUNTER — HOSPITAL ENCOUNTER (OUTPATIENT)
Dept: INFUSION CENTER | Facility: HOSPITAL | Age: 54
Discharge: HOME | End: 2019-04-15
Payer: COMMERCIAL

## 2019-04-15 VITALS
DIASTOLIC BLOOD PRESSURE: 76 MMHG | SYSTOLIC BLOOD PRESSURE: 114 MMHG | HEART RATE: 84 BPM | RESPIRATION RATE: 16 BRPM | HEIGHT: 64 IN | WEIGHT: 132.28 LBS | TEMPERATURE: 98.5 F | BODY MASS INDEX: 22.58 KG/M2

## 2019-04-15 PROCEDURE — 96411 CHEMO IV PUSH ADDL DRUG: CPT

## 2019-04-15 PROCEDURE — 96367 TX/PROPH/DG ADDL SEQ IV INF: CPT

## 2019-04-15 PROCEDURE — 96368 THER/DIAG CONCURRENT INF: CPT

## 2019-04-15 PROCEDURE — 96415 CHEMO IV INFUSION ADDL HR: CPT

## 2019-04-15 PROCEDURE — G0498 CHEMO EXTEND IV INFUS W/PUMP: HCPCS

## 2019-04-15 PROCEDURE — 96413 CHEMO IV INFUSION 1 HR: CPT

## 2019-04-15 PROCEDURE — 96417 CHEMO IV INFUS EACH ADDL SEQ: CPT

## 2019-04-15 RX ADMIN — BEVACIZUMAB 300 MG: 400 INJECTION, SOLUTION INTRAVENOUS at 10:26

## 2019-04-15 RX ADMIN — SODIUM CHLORIDE 20 ML/HR: 9 INJECTION, SOLUTION INTRAVENOUS at 09:29

## 2019-04-15 RX ADMIN — LEUCOVORIN CALCIUM 660 MG: 350 INJECTION, POWDER, LYOPHILIZED, FOR SOLUTION INTRAMUSCULAR; INTRAVENOUS at 11:12

## 2019-04-15 RX ADMIN — OXALIPLATIN 140 MG: 5 INJECTION, SOLUTION INTRAVENOUS at 11:15

## 2019-04-15 RX ADMIN — DEXTROSE MONOHYDRATE 20 ML/HR: 50 INJECTION, SOLUTION INTRAVENOUS at 11:06

## 2019-04-15 RX ADMIN — FLUOROURACIL 660 MG: 50 INJECTION, SOLUTION INTRAVENOUS at 13:13

## 2019-04-15 RX ADMIN — DEXAMETHASONE SODIUM PHOSPHATE: 10 INJECTION, SOLUTION INTRAMUSCULAR; INTRAVENOUS at 09:42

## 2019-04-15 NOTE — PLAN OF CARE
Problem: Potential for Falls  Goal: Patient will remain free of falls  Description  INTERVENTIONS:  - Assess patient frequently for physical needs  -  Identify cognitive and physical deficits and behaviors that affect risk of falls    -  [place] fall precautions as indicated by assessment   - Educate patient/family on patient safety including physical limitations  - Instruct patient to call for assistance with activity based on assessment  - Modify environment to reduce risk of injury  - Consider OT/PT consult to assist with strengthening/mobility  Outcome: Progressing

## 2019-04-15 NOTE — PROGRESS NOTES
Patient tolerated chemotherapy, offers no complaints  Avs reviewed and provided to patient  Discharged with CADD pump intact , patent and running

## 2019-04-16 RX ORDER — SODIUM CHLORIDE 9 MG/ML
20 INJECTION, SOLUTION INTRAVENOUS ONCE
Status: DISCONTINUED | OUTPATIENT
Start: 2019-04-17 | End: 2019-04-21 | Stop reason: HOSPADM

## 2019-04-17 ENCOUNTER — HOSPITAL ENCOUNTER (OUTPATIENT)
Dept: INFUSION CENTER | Facility: HOSPITAL | Age: 54
Discharge: HOME | End: 2019-04-17

## 2019-04-17 NOTE — PROGRESS NOTES
Pt here for CADD disconnect  Patient offers no complaints  CADD disconnected and port flushed per protocol  Confirmed next appt for Avastin and AVS declined

## 2019-04-17 NOTE — PLAN OF CARE
Problem: Potential for Falls  Goal: Patient will remain free of falls  Description  INTERVENTIONS:  - Assess patient frequently for physical needs  -  Identify cognitive and physical deficits and behaviors that affect risk of falls    -  [place] fall precautions as indicated by assessment   - Educate patient/family on patient safety including physical limitations  - Instruct patient to call for assistance with activity based on assessment  - Modify environment to reduce risk of injury  - Consider OT/PT consult to assist with strengthening/mobility  Outcome: Progressing

## 2019-04-19 DIAGNOSIS — C18.9 COLON CANCER METASTASIZED TO LIVER (HCC): ICD-10-CM

## 2019-04-19 DIAGNOSIS — C78.7 COLON CANCER METASTASIZED TO LIVER (HCC): ICD-10-CM

## 2019-04-19 RX ORDER — SODIUM CHLORIDE 9 MG/ML
20 INJECTION, SOLUTION INTRAVENOUS ONCE
Status: CANCELLED | OUTPATIENT
Start: 2019-05-20

## 2019-04-26 RX ORDER — SODIUM CHLORIDE 9 MG/ML
20 INJECTION, SOLUTION INTRAVENOUS ONCE
Status: COMPLETED | OUTPATIENT
Start: 2019-04-29 | End: 2019-04-29

## 2019-04-27 ENCOUNTER — TRANSCRIBE ORDERS (OUTPATIENT)
Dept: ADMINISTRATIVE | Facility: HOSPITAL | Age: 54
End: 2019-04-27

## 2019-04-27 DIAGNOSIS — Z12.31 VISIT FOR SCREENING MAMMOGRAM: Primary | ICD-10-CM

## 2019-04-29 ENCOUNTER — HOSPITAL ENCOUNTER (OUTPATIENT)
Dept: INFUSION CENTER | Facility: HOSPITAL | Age: 54
Discharge: HOME | End: 2019-04-29
Payer: COMMERCIAL

## 2019-04-29 VITALS
TEMPERATURE: 98 F | BODY MASS INDEX: 22.58 KG/M2 | HEART RATE: 80 BPM | SYSTOLIC BLOOD PRESSURE: 125 MMHG | DIASTOLIC BLOOD PRESSURE: 70 MMHG | WEIGHT: 132.28 LBS | RESPIRATION RATE: 16 BRPM

## 2019-04-29 PROCEDURE — 96413 CHEMO IV INFUSION 1 HR: CPT

## 2019-04-29 RX ADMIN — SODIUM CHLORIDE 20 ML/HR: 9 INJECTION, SOLUTION INTRAVENOUS at 09:14

## 2019-04-29 RX ADMIN — BEVACIZUMAB 453 MG: 400 INJECTION, SOLUTION INTRAVENOUS at 09:28

## 2019-04-29 NOTE — PLAN OF CARE
Problem: Potential for Falls  Goal: Patient will remain free of falls  Description  INTERVENTIONS:  - Assess patient frequently for physical needs  -  Identify cognitive and physical deficits and behaviors that affect risk of falls    -  [place] fall precautions as indicated by assessment   - Educate patient/family on patient safety including physical limitations  - Instruct patient to call for assistance with activity based on assessment  - Modify environment to reduce risk of injury  - Consider OT/PT consult to assist with strengthening/mobility  Outcome: Progressing

## 2019-04-29 NOTE — PLAN OF CARE
Problem: Potential for Falls  Goal: Patient will remain free of falls  Description  INTERVENTIONS:  - Assess patient frequently for physical needs  -  Identify cognitive and physical deficits and behaviors that affect risk of falls    -  [place] fall precautions as indicated by assessment   - Educate patient/family on patient safety including physical limitations  - Instruct patient to call for assistance with activity based on assessment  - Modify environment to reduce risk of injury  - Consider OT/PT consult to assist with strengthening/mobility  4/29/2019 0915 by Mahendra Kennedy RN  Outcome: Progressing  4/29/2019 0915 by Mahendra Kennedy RN  Outcome: Progressing yes... yes... yes... yes... yes...

## 2019-04-30 ENCOUNTER — HOSPITAL ENCOUNTER (OUTPATIENT)
Dept: RADIOLOGY | Facility: HOSPITAL | Age: 54
Discharge: HOME | End: 2019-04-30
Attending: OBSTETRICS & GYNECOLOGY
Payer: COMMERCIAL

## 2019-04-30 VITALS — HEIGHT: 64 IN | WEIGHT: 132 LBS | BODY MASS INDEX: 22.53 KG/M2

## 2019-04-30 DIAGNOSIS — Z12.31 VISIT FOR SCREENING MAMMOGRAM: ICD-10-CM

## 2019-04-30 PROCEDURE — 77067 SCR MAMMO BI INCL CAD: CPT

## 2019-05-20 ENCOUNTER — HOSPITAL ENCOUNTER (OUTPATIENT)
Dept: INFUSION CENTER | Facility: HOSPITAL | Age: 54
Discharge: HOME | End: 2019-05-20
Payer: COMMERCIAL

## 2019-05-20 VITALS
SYSTOLIC BLOOD PRESSURE: 119 MMHG | DIASTOLIC BLOOD PRESSURE: 68 MMHG | TEMPERATURE: 98.4 F | RESPIRATION RATE: 16 BRPM | WEIGHT: 134.04 LBS | HEART RATE: 86 BPM | BODY MASS INDEX: 22.88 KG/M2

## 2019-05-20 DIAGNOSIS — C18.9 COLON CANCER METASTASIZED TO LIVER (HCC): Primary | ICD-10-CM

## 2019-05-20 DIAGNOSIS — C78.7 COLON CANCER METASTASIZED TO LIVER (HCC): Primary | ICD-10-CM

## 2019-05-20 PROCEDURE — 96413 CHEMO IV INFUSION 1 HR: CPT

## 2019-05-20 RX ORDER — SODIUM CHLORIDE 9 MG/ML
20 INJECTION, SOLUTION INTRAVENOUS ONCE
Status: COMPLETED | OUTPATIENT
Start: 2019-05-20 | End: 2019-05-20

## 2019-05-20 RX ADMIN — SODIUM CHLORIDE 20 ML/HR: 9 INJECTION, SOLUTION INTRAVENOUS at 08:33

## 2019-05-20 RX ADMIN — BEVACIZUMAB 450 MG: 400 INJECTION, SOLUTION INTRAVENOUS at 09:03

## 2019-05-20 NOTE — PROGRESS NOTES
Pt here for every 3 week Avastin  Patient's treatment plan states labs and urine today  OK per [employer] Fernando to not do labs as this was discussed between patient and Dr Stu Fuller that no labs needed until her next visit with him  Also, Avastin ordered for 60 min and suppose to be 30 min  This was discovered after orders release and unable to change  Took verbal from Maria T to give Avastin over 30 minutes today  Patient tolerated treatment well today without complications  Confirmed next appt and AVS declined

## 2019-06-03 DIAGNOSIS — C78.7 COLON CANCER METASTASIZED TO LIVER (HCC): ICD-10-CM

## 2019-06-03 DIAGNOSIS — C18.9 COLON CANCER METASTASIZED TO LIVER (HCC): ICD-10-CM

## 2019-06-03 RX ORDER — SODIUM CHLORIDE 9 MG/ML
20 INJECTION, SOLUTION INTRAVENOUS ONCE
Status: CANCELLED | OUTPATIENT
Start: 2019-06-10

## 2019-06-03 RX ORDER — SODIUM CHLORIDE 9 MG/ML
20 INJECTION, SOLUTION INTRAVENOUS ONCE
Status: CANCELLED | OUTPATIENT
Start: 2019-07-22

## 2019-06-03 RX ORDER — SODIUM CHLORIDE 9 MG/ML
20 INJECTION, SOLUTION INTRAVENOUS ONCE
Status: CANCELLED | OUTPATIENT
Start: 2019-07-01

## 2019-06-05 ENCOUNTER — APPOINTMENT (OUTPATIENT)
Dept: LAB | Age: 54
End: 2019-06-05
Payer: COMMERCIAL

## 2019-06-05 ENCOUNTER — TRANSCRIBE ORDERS (OUTPATIENT)
Dept: ADMINISTRATIVE | Facility: HOSPITAL | Age: 54
End: 2019-06-05

## 2019-06-05 DIAGNOSIS — M06.89 OTHER SPECIFIED RHEUMATOID ARTHRITIS, MULTIPLE SITES (HCC): ICD-10-CM

## 2019-06-05 DIAGNOSIS — Z79.899 ENCOUNTER FOR LONG-TERM (CURRENT) USE OF OTHER MEDICATIONS: ICD-10-CM

## 2019-06-05 DIAGNOSIS — Z79.899 ENCOUNTER FOR LONG-TERM (CURRENT) USE OF OTHER MEDICATIONS: Primary | ICD-10-CM

## 2019-06-05 LAB
ALBUMIN SERPL BCP-MCNC: 3 G/DL (ref 3.5–5)
ALP SERPL-CCNC: 408 U/L (ref 46–116)
ALT SERPL W P-5'-P-CCNC: 98 U/L (ref 12–78)
ANION GAP SERPL CALCULATED.3IONS-SCNC: 4 MMOL/L (ref 4–13)
AST SERPL W P-5'-P-CCNC: 92 U/L (ref 5–45)
BASOPHILS # BLD AUTO: 0.04 THOUSANDS/ΜL (ref 0–0.1)
BASOPHILS NFR BLD AUTO: 1 % (ref 0–1)
BILIRUB SERPL-MCNC: 0.62 MG/DL (ref 0.2–1)
BUN SERPL-MCNC: 11 MG/DL (ref 5–25)
CALCIUM SERPL-MCNC: 8.9 MG/DL (ref 8.3–10.1)
CHLORIDE SERPL-SCNC: 109 MMOL/L (ref 100–108)
CO2 SERPL-SCNC: 25 MMOL/L (ref 21–32)
CREAT SERPL-MCNC: 0.66 MG/DL (ref 0.6–1.3)
CRP SERPL QL: 9.6 MG/L
EOSINOPHIL # BLD AUTO: 0.18 THOUSAND/ΜL (ref 0–0.61)
EOSINOPHIL NFR BLD AUTO: 3 % (ref 0–6)
ERYTHROCYTE [DISTWIDTH] IN BLOOD BY AUTOMATED COUNT: 12.5 % (ref 11.6–15.1)
ERYTHROCYTE [SEDIMENTATION RATE] IN BLOOD: 88 MM/HOUR (ref 0–20)
GFR SERPL CREATININE-BSD FRML MDRD: 101 ML/MIN/1.73SQ M
GLUCOSE P FAST SERPL-MCNC: 104 MG/DL (ref 65–99)
HCT VFR BLD AUTO: 36.2 % (ref 34.8–46.1)
HGB BLD-MCNC: 11.3 G/DL (ref 11.5–15.4)
IMM GRANULOCYTES # BLD AUTO: 0.01 THOUSAND/UL (ref 0–0.2)
IMM GRANULOCYTES NFR BLD AUTO: 0 % (ref 0–2)
LYMPHOCYTES # BLD AUTO: 1.21 THOUSANDS/ΜL (ref 0.6–4.47)
LYMPHOCYTES NFR BLD AUTO: 21 % (ref 14–44)
MCH RBC QN AUTO: 32.7 PG (ref 26.8–34.3)
MCHC RBC AUTO-ENTMCNC: 31.2 G/DL (ref 31.4–37.4)
MCV RBC AUTO: 105 FL (ref 82–98)
MONOCYTES # BLD AUTO: 0.46 THOUSAND/ΜL (ref 0.17–1.22)
MONOCYTES NFR BLD AUTO: 8 % (ref 4–12)
NEUTROPHILS # BLD AUTO: 3.77 THOUSANDS/ΜL (ref 1.85–7.62)
NEUTS SEG NFR BLD AUTO: 67 % (ref 43–75)
NRBC BLD AUTO-RTO: 0 /100 WBCS
PLATELET # BLD AUTO: 145 THOUSANDS/UL (ref 149–390)
PMV BLD AUTO: 8.7 FL (ref 8.9–12.7)
POTASSIUM SERPL-SCNC: 4 MMOL/L (ref 3.5–5.3)
PROT SERPL-MCNC: 7.4 G/DL (ref 6.4–8.2)
RBC # BLD AUTO: 3.46 MILLION/UL (ref 3.81–5.12)
SODIUM SERPL-SCNC: 138 MMOL/L (ref 136–145)
WBC # BLD AUTO: 5.67 THOUSAND/UL (ref 4.31–10.16)

## 2019-06-05 PROCEDURE — 85652 RBC SED RATE AUTOMATED: CPT

## 2019-06-05 PROCEDURE — 86140 C-REACTIVE PROTEIN: CPT

## 2019-06-05 PROCEDURE — 85025 COMPLETE CBC W/AUTO DIFF WBC: CPT

## 2019-06-05 PROCEDURE — 80053 COMPREHEN METABOLIC PANEL: CPT

## 2019-06-05 PROCEDURE — 36415 COLL VENOUS BLD VENIPUNCTURE: CPT

## 2019-06-10 ENCOUNTER — HOSPITAL ENCOUNTER (OUTPATIENT)
Dept: INFUSION CENTER | Facility: HOSPITAL | Age: 54
Discharge: HOME | End: 2019-06-10
Payer: COMMERCIAL

## 2019-06-10 VITALS
TEMPERATURE: 96.4 F | SYSTOLIC BLOOD PRESSURE: 119 MMHG | BODY MASS INDEX: 22.81 KG/M2 | RESPIRATION RATE: 18 BRPM | DIASTOLIC BLOOD PRESSURE: 72 MMHG | HEART RATE: 85 BPM | WEIGHT: 133.6 LBS | OXYGEN SATURATION: 98 %

## 2019-06-10 DIAGNOSIS — C78.7 COLON CANCER METASTASIZED TO LIVER (HCC): Primary | ICD-10-CM

## 2019-06-10 DIAGNOSIS — C18.9 COLON CANCER METASTASIZED TO LIVER (HCC): Primary | ICD-10-CM

## 2019-06-10 PROCEDURE — 96413 CHEMO IV INFUSION 1 HR: CPT

## 2019-06-10 RX ORDER — SODIUM CHLORIDE 9 MG/ML
20 INJECTION, SOLUTION INTRAVENOUS ONCE
Status: COMPLETED | OUTPATIENT
Start: 2019-06-10 | End: 2019-06-10

## 2019-06-10 RX ADMIN — BEVACIZUMAB 450 MG: 400 INJECTION, SOLUTION INTRAVENOUS at 08:37

## 2019-06-10 RX ADMIN — SODIUM CHLORIDE 20 ML/HR: 9 INJECTION, SOLUTION INTRAVENOUS at 08:14

## 2019-06-10 NOTE — PLAN OF CARE
Problem: Potential for Falls  Goal: Patient will remain free of falls  Description  INTERVENTIONS:  - Assess patient frequently for physical needs  -  Identify cognitive and physical deficits and behaviors that affect risk of falls    -  [place] fall precautions as indicated by assessment   - Educate patient/family on patient safety including physical limitations  - Instruct patient to call for assistance with activity based on assessment  - Modify environment to reduce risk of injury  - Consider OT/PT consult to assist with strengthening/mobility  Outcome: Progressing

## 2019-06-10 NOTE — PROGRESS NOTES
Pt here for every 3 weeks AVastin  Tolerated well without complications  confirmed next appts and AVS provided

## 2019-06-12 ENCOUNTER — OFFICE VISIT (OUTPATIENT)
Dept: FAMILY MEDICINE CLINIC | Facility: CLINIC | Age: 54
End: 2019-06-12
Payer: COMMERCIAL

## 2019-06-12 VITALS
DIASTOLIC BLOOD PRESSURE: 76 MMHG | SYSTOLIC BLOOD PRESSURE: 122 MMHG | WEIGHT: 133 LBS | HEIGHT: 64 IN | BODY MASS INDEX: 22.71 KG/M2 | TEMPERATURE: 98.2 F

## 2019-06-12 DIAGNOSIS — M06.042 RHEUMATOID ARTHRITIS INVOLVING BOTH HANDS WITH NEGATIVE RHEUMATOID FACTOR (HCC): Primary | ICD-10-CM

## 2019-06-12 DIAGNOSIS — D53.1 MEGALOBLASTIC ANEMIA: ICD-10-CM

## 2019-06-12 DIAGNOSIS — C18.9 COLON CANCER METASTASIZED TO LIVER (HCC): ICD-10-CM

## 2019-06-12 DIAGNOSIS — C78.7 COLON CANCER METASTASIZED TO LIVER (HCC): ICD-10-CM

## 2019-06-12 DIAGNOSIS — M06.041 RHEUMATOID ARTHRITIS INVOLVING BOTH HANDS WITH NEGATIVE RHEUMATOID FACTOR (HCC): Primary | ICD-10-CM

## 2019-06-12 PROCEDURE — 1036F TOBACCO NON-USER: CPT | Performed by: FAMILY MEDICINE

## 2019-06-12 PROCEDURE — 3008F BODY MASS INDEX DOCD: CPT | Performed by: FAMILY MEDICINE

## 2019-06-12 PROCEDURE — 99214 OFFICE O/P EST MOD 30 MIN: CPT | Performed by: FAMILY MEDICINE

## 2019-07-01 ENCOUNTER — HOSPITAL ENCOUNTER (OUTPATIENT)
Dept: INFUSION CENTER | Facility: HOSPITAL | Age: 54
Discharge: HOME | End: 2019-07-01
Payer: COMMERCIAL

## 2019-07-01 VITALS
HEART RATE: 83 BPM | DIASTOLIC BLOOD PRESSURE: 77 MMHG | TEMPERATURE: 98 F | RESPIRATION RATE: 18 BRPM | WEIGHT: 133.6 LBS | HEIGHT: 65 IN | BODY MASS INDEX: 22.26 KG/M2 | SYSTOLIC BLOOD PRESSURE: 123 MMHG

## 2019-07-01 DIAGNOSIS — C78.7 COLON CANCER METASTASIZED TO LIVER (HCC): Primary | ICD-10-CM

## 2019-07-01 DIAGNOSIS — C18.9 COLON CANCER METASTASIZED TO LIVER (HCC): Primary | ICD-10-CM

## 2019-07-01 PROCEDURE — 96413 CHEMO IV INFUSION 1 HR: CPT

## 2019-07-01 RX ORDER — SODIUM CHLORIDE 9 MG/ML
20 INJECTION, SOLUTION INTRAVENOUS ONCE
Status: COMPLETED | OUTPATIENT
Start: 2019-07-01 | End: 2019-07-01

## 2019-07-01 RX ORDER — MULTIVITAMIN
1 CAPSULE ORAL DAILY
COMMUNITY

## 2019-07-01 RX ADMIN — SODIUM CHLORIDE 20 ML/HR: 9 INJECTION, SOLUTION INTRAVENOUS at 08:40

## 2019-07-01 RX ADMIN — BEVACIZUMAB 450 MG: 400 INJECTION, SOLUTION INTRAVENOUS at 08:48

## 2019-07-01 NOTE — PROGRESS NOTES
Pt tolerated avastin without issue  Port with excellent blood return before and after infusion   Discharged ambulatory deferring avs

## 2019-07-15 ENCOUNTER — APPOINTMENT (OUTPATIENT)
Dept: LAB | Facility: HOSPITAL | Age: 54
End: 2019-07-15
Attending: INTERNAL MEDICINE
Payer: COMMERCIAL

## 2019-07-15 ENCOUNTER — HOSPITAL ENCOUNTER (OUTPATIENT)
Dept: CT IMAGING | Facility: HOSPITAL | Age: 54
Discharge: HOME | End: 2019-07-15
Attending: INTERNAL MEDICINE
Payer: COMMERCIAL

## 2019-07-15 DIAGNOSIS — C78.7 COLON CANCER METASTASIZED TO LIVER (HCC): ICD-10-CM

## 2019-07-15 DIAGNOSIS — C18.9 COLON CANCER METASTASIZED TO LIVER (HCC): ICD-10-CM

## 2019-07-15 LAB
ALBUMIN SERPL BCP-MCNC: 2.7 G/DL (ref 3.5–5)
ALP SERPL-CCNC: 300 U/L (ref 46–116)
ALT SERPL W P-5'-P-CCNC: 27 U/L (ref 12–78)
ANION GAP SERPL CALCULATED.3IONS-SCNC: 8 MMOL/L (ref 4–13)
AST SERPL W P-5'-P-CCNC: 29 U/L (ref 5–45)
BASOPHILS # BLD AUTO: 0.04 THOUSANDS/ΜL (ref 0–0.1)
BASOPHILS NFR BLD AUTO: 1 % (ref 0–1)
BILIRUB SERPL-MCNC: 0.3 MG/DL (ref 0.2–1)
BUN SERPL-MCNC: 6 MG/DL (ref 5–25)
CALCIUM SERPL-MCNC: 9.5 MG/DL (ref 8.3–10.1)
CEA SERPL-MCNC: 7.1 NG/ML (ref 0–3)
CHLORIDE SERPL-SCNC: 104 MMOL/L (ref 100–108)
CO2 SERPL-SCNC: 28 MMOL/L (ref 21–32)
CREAT SERPL-MCNC: 0.68 MG/DL (ref 0.6–1.3)
CREAT UR-MCNC: 204 MG/DL
EOSINOPHIL # BLD AUTO: 0.18 THOUSAND/ΜL (ref 0–0.61)
EOSINOPHIL NFR BLD AUTO: 2 % (ref 0–6)
ERYTHROCYTE [DISTWIDTH] IN BLOOD BY AUTOMATED COUNT: 13.2 % (ref 11.6–15.1)
GFR SERPL CREATININE-BSD FRML MDRD: 100 ML/MIN/1.73SQ M
GLUCOSE P FAST SERPL-MCNC: 105 MG/DL (ref 65–99)
HCT VFR BLD AUTO: 34.6 % (ref 34.8–46.1)
HGB BLD-MCNC: 10.6 G/DL (ref 11.5–15.4)
IMM GRANULOCYTES # BLD AUTO: 0.03 THOUSAND/UL (ref 0–0.2)
IMM GRANULOCYTES NFR BLD AUTO: 0 % (ref 0–2)
LYMPHOCYTES # BLD AUTO: 1.3 THOUSANDS/ΜL (ref 0.6–4.47)
LYMPHOCYTES NFR BLD AUTO: 16 % (ref 14–44)
MCH RBC QN AUTO: 30.3 PG (ref 26.8–34.3)
MCHC RBC AUTO-ENTMCNC: 30.6 G/DL (ref 31.4–37.4)
MCV RBC AUTO: 99 FL (ref 82–98)
MONOCYTES # BLD AUTO: 0.39 THOUSAND/ΜL (ref 0.17–1.22)
MONOCYTES NFR BLD AUTO: 5 % (ref 4–12)
NEUTROPHILS # BLD AUTO: 6 THOUSANDS/ΜL (ref 1.85–7.62)
NEUTS SEG NFR BLD AUTO: 76 % (ref 43–75)
NRBC BLD AUTO-RTO: 0 /100 WBCS
PLATELET # BLD AUTO: 180 THOUSANDS/UL (ref 149–390)
PMV BLD AUTO: 8.9 FL (ref 8.9–12.7)
POTASSIUM SERPL-SCNC: 3.8 MMOL/L (ref 3.5–5.3)
PROT SERPL-MCNC: 7.4 G/DL (ref 6.4–8.2)
PROT UR-MCNC: 20 MG/DL
PROT/CREAT UR: 0.1 MG/G{CREAT} (ref 0–0.1)
RBC # BLD AUTO: 3.5 MILLION/UL (ref 3.81–5.12)
SODIUM SERPL-SCNC: 140 MMOL/L (ref 136–145)
WBC # BLD AUTO: 7.94 THOUSAND/UL (ref 4.31–10.16)

## 2019-07-15 PROCEDURE — 82570 ASSAY OF URINE CREATININE: CPT

## 2019-07-15 PROCEDURE — 80053 COMPREHEN METABOLIC PANEL: CPT

## 2019-07-15 PROCEDURE — 74177 CT ABD & PELVIS W/CONTRAST: CPT

## 2019-07-15 PROCEDURE — 36415 COLL VENOUS BLD VENIPUNCTURE: CPT

## 2019-07-15 PROCEDURE — 71260 CT THORAX DX C+: CPT

## 2019-07-15 PROCEDURE — 85025 COMPLETE CBC W/AUTO DIFF WBC: CPT

## 2019-07-15 PROCEDURE — 84156 ASSAY OF PROTEIN URINE: CPT

## 2019-07-15 PROCEDURE — 82378 CARCINOEMBRYONIC ANTIGEN: CPT

## 2019-07-15 RX ADMIN — IODIXANOL 100 ML: 320 INJECTION, SOLUTION INTRAVASCULAR at 08:05

## 2019-07-19 ENCOUNTER — OFFICE VISIT (OUTPATIENT)
Dept: HEMATOLOGY ONCOLOGY | Facility: CLINIC | Age: 54
End: 2019-07-19
Payer: COMMERCIAL

## 2019-07-19 ENCOUNTER — CONSULT (OUTPATIENT)
Dept: SURGICAL ONCOLOGY | Facility: CLINIC | Age: 54
End: 2019-07-19
Payer: COMMERCIAL

## 2019-07-19 VITALS
BODY MASS INDEX: 21.83 KG/M2 | TEMPERATURE: 98.6 F | DIASTOLIC BLOOD PRESSURE: 88 MMHG | WEIGHT: 131 LBS | SYSTOLIC BLOOD PRESSURE: 126 MMHG | OXYGEN SATURATION: 97 % | RESPIRATION RATE: 18 BRPM | HEIGHT: 65 IN | HEART RATE: 87 BPM

## 2019-07-19 DIAGNOSIS — C18.9 COLON CANCER METASTASIZED TO LIVER (HCC): Primary | ICD-10-CM

## 2019-07-19 DIAGNOSIS — C78.7 COLON CANCER METASTASIZED TO LIVER (HCC): Primary | ICD-10-CM

## 2019-07-19 PROCEDURE — 99215 OFFICE O/P EST HI 40 MIN: CPT | Performed by: INTERNAL MEDICINE

## 2019-07-19 PROCEDURE — 99244 OFF/OP CNSLTJ NEW/EST MOD 40: CPT | Performed by: SURGERY

## 2019-07-19 NOTE — PATIENT INSTRUCTIONS
Colectomy   WHAT YOU NEED TO KNOW:   A colectomy is surgery to remove part or all of your colon  The colon, or large intestine, is the long tube that connects your intestine with your anus  DISCHARGE INSTRUCTIONS:   Medicines: You may need any of the following:  · Prescription pain medicine  may be given  Ask your healthcare provider how to take this medicine safely  · Bowel movement softeners  make it easier for you to have a bowel movement  You may need this medicine to prevent constipation  · Blood thinners    help prevent blood clots  Examples of blood thinners include heparin and warfarin  Clots can cause strokes, heart attacks, and death  The following are general safety guidelines to follow while you are taking a blood thinner:    ¨ Watch for bleeding and bruising while you take blood thinners  Watch for bleeding from your gums or nose  Watch for blood in your urine and bowel movements  Use a soft washcloth on your skin, and a soft toothbrush to brush your teeth  This can keep your skin and gums from bleeding  If you shave, use an electric shaver  Do not play contact sports  ¨ Tell your dentist and other healthcare providers that you take anticoagulants  Wear a bracelet or necklace that says you take this medicine  ¨ Do not start or stop any medicines unless your healthcare provider tells you to  Many medicines cannot be used with blood thinners  ¨ Tell your healthcare provider right away if you forget to take the medicine, or if you take too much  ¨ Warfarin  is a blood thinner that you may need to take  The following are things you should be aware of if you take warfarin  § Foods and medicines can affect the amount of warfarin in your blood  Do not make major changes to your diet while you take warfarin  Warfarin works best when you eat about the same amount of vitamin K every day  Vitamin K is found in green leafy vegetables and certain other foods   Ask for more information about what to eat when you are taking warfarin  § You will need to see your healthcare provider for follow-up visits when you are on warfarin  You will need regular blood tests  These tests are used to decide how much medicine you need  · Antibiotics  help prevent an infection caused by bacteria  · Take your medicine as directed  Contact your healthcare provider if you think your medicine is not helping or if you have side effects  Tell him or her if you are allergic to any medicine  Keep a list of the medicines, vitamins, and herbs you take  Include the amounts, and when and why you take them  Bring the list or the pill bottles to follow-up visits  Carry your medicine list with you in case of an emergency  Follow up with your healthcare provider as directed: You may need to return for tests or to have your stitches or staples removed  Write down your questions so you remember to ask them during your visits  Eat a variety of low-fiber foods: Low-fiber, or low-residue, foods are easy to digest  Good choices include eggs, white bread, creamy peanut butter, and macaroni  This will help slow down your bowel movements and prevent trauma to your colon  Do not eat whole-wheat breads or pastas, raw fruits or vegetables, or nuts  Return to your daily activities as directed: It may take 2 or 3 weeks to return to your usual activities  You may need to avoid lifting anything over 20 pounds for 6 weeks  Contact your healthcare provider if:   · You have a fever  · Your wound is red, swollen, or draining pus  · You have nausea or are vomiting  · You have trouble urinating or feel burning when you urinate  · You have questions or concerns about your condition or care  Seek care immediately or call 911 if:   · You feel lightheaded, short of breath, and have chest pain  · You cough up blood  · Your arm or leg feels warm, tender, and painful  It may look swollen and red      · Blood soaks through your bandage  · Your stitches come apart  · You have severe pain  · You are unable to have a bowel movement or pass gas  · Your abdomen is swollen and hard  · You cannot eat without vomiting  · You see blood in your bowel movement or on your toilet paper  © 2017 [address] Information is for End User's use only and may not be sold, redistributed or otherwise used for commercial purposes  All illustrations and images included in CareNotes® are the copyrighted property of [employer]  or Nito Arriaga  The above information is an  only  It is not intended as medical advice for individual conditions or treatments  Talk to your doctor, nurse or pharmacist before following any medical regimen to see if it is safe and effective for you  Colectomy Diet   WHAT YOU NEED TO KNOW:   You will need to make changes to the foods you eat for about 6 weeks after surgery  These changes will help your colon heal and prevent certain problems that can occur when you have an ostomy  These problems include odor, gas, diarrhea, or obstruction (blockage in your intestines)  After you heal, you can eat the foods you regularly ate before surgery  DISCHARGE INSTRUCTIONS:   Contact your healthcare provider if:   · You are urinating less than usual or your urine is dark  · You feel dizzy when you stand  · You feel extremely tired  · You have abdominal cramps  · You have questions or concerns about your condition or care  Foods you can eat after surgery: You will be given clear liquids right after surgery  Examples include clear juices, coffee or tea (with no cream or milk), gelatin, and broth  Next, you will be allowed to eat low-fiber foods  Your healthcare provider may recommend that you limit fiber to 8 to 13 grams each day  · Grains:  Choose grains that have less than 2 grams of fiber in each serving   Examples include the following:     ¨ Cream of wheat and finely ground grits    ¨ Dry cereal made from rice     ¨ White bread, white pasta, and white rice    ¨ Crackers, bagels, and rolls made from white or refined flour    · Fruits and vegetables:      ¨ Canned and well-cooked fruit without skins or seeds, and juice without pulp    ¨ Ripe bananas and soft melon    ¨ Canned and well-cooked vegetables without skins or seeds, and strained vegetable juice    ¨ Potatoes without skin     ¨ Shredded lettuce on a sandwich    · Dairy:     ¨ Cow's milk, lactose-free milk, soy milk, and rice milk    ¨ Yogurt without nuts, fruit, or granola    · Protein:      ¨ Eggs, fish, and tender, well-cooked poultry (such as chicken and turkey) and beef     ¨ Tofu and smooth peanut butter  Foods you should avoid after surgery:  Do not eat high-fiber foods right after surgery because they are harder to digest  Avoid foods that cause gas, odors, and diarrhea  Do not eat foods that may cause a blockage    · Foods that are high in fiber:      ¨ Whole-grain foods such as whole-wheat breads, brown rice, or oats    ¨ Raw fruits and vegetables    ¨ Dried fruit    ¨ Dried beans    · Foods that may cause blockage:      ¨ Vegetable and fruit skins    ¨ Apples, dried fruit, grapes, and pineapple    ¨ Celery, corn, cucumber, green peppers, peas, and bean sprouts    ¨ Salad greens, cabbage, coleslaw, and spinach    ¨ Casing on sausage and tough, fibrous meats such as steaks    ¨ Nuts (such as almonds and pecans) and peanuts    · Foods that may cause gas or odor:      ¨ Apples, bananas, grapes, prunes, and melons    ¨ Asparagus, broccoli, brussels sprouts, cabbage, cauliflower, and corn    ¨ Onions, garlic, or leeks    ¨ Cucumber, green pepper, onions, radishes, and turnips    ¨ Alcohol    ¨ Cheese, peanuts, dried beans and peas, eggs, and fish    ¨ Carbonated drinks such as sodas    · Foods that may cause diarrhea:      ¨ Alcohol    ¨ Apricots, plums, peaches, prunes, and fresh or dried fruit    ¨ Fruit juice    ¨ Beans, fried meats, fish, poultry (chicken or turkey), nuts, or seeds    ¨ Broccoli, brussels sprouts, cabbage, corn peas, tomatoes, turnip greens, and green leafy vegetables    ¨ Bran, wheat, and other whole grains    ¨ Licorice and sugar-free substitutes     ¨ Spicy foods    ¨ Drinks with caffeine    ¨ Foods high in fat and sugar  Nutrition guidelines you should follow after surgery:   · Drink plenty of liquids as directed  Your dietitian or healthcare provider may recommend that you have at least 8 to 10 eight-ounce cups of liquid each day  · Take small bites of food and chew them well  This will allow your body to better digest and absorb nutrients  This will also help to prevent a blockage and decrease gas  · Eat small amounts of food every 2 to 4 hours  Your appetite may be lower than normal right after surgery  Eat regularly throughout the day to get enough nutrients  Regular meals and snacks will also help decrease gas  · Eat your largest meals in the middle of the day  This will decrease the amount of bowel movement that comes from your stoma at night  · Avoid acidic, spicy, high-sugar, and high-fat foods  These foods can cause diarrhea  Acidic foods include citrus fruits such as oranges  · Avoid chewing gum, drinking with straws, smoking, and chewing tobacco   This will help to decrease gas  · Take vitamin or mineral supplements as directed  Chewable or liquid forms are the best types  © 2017 [address] Information is for End User's use only and may not be sold, redistributed or otherwise used for commercial purposes  All illustrations and images included in CareNotes® are the copyrighted property of [employer]  or Nito Arriaga  The above information is an  only  It is not intended as medical advice for individual conditions or treatments   Talk to your doctor, nurse or pharmacist before following any medical regimen to see if it is safe and effective for you

## 2019-07-19 NOTE — PROGRESS NOTES
Surgical Oncology Follow Up       [address] [facility] [place]  [facility] [place] Worth[place] [address]    Yumiko Silva  1965  [id number]  [facility] SURGICAL ONCOLOGY [place]  [place] [address]    Chief Complaint   Patient presents with    Colon Cancer       Assessment/Plan:    No problem-specific Assessment & Plan notes found for this encounter  Diagnoses and all orders for this visit:    Colon cancer metastasized to liver [facility])  -     Case request operating room: LAPAROTOMY EXPLORATORY W/ BOWEL RESECTION, transverse colon; Standing  -     EKG 12 lead; Future  -     Type and screen; Future  -     APTT; Future  -     Protime-INR; Future  -     Case request operating room: LAPAROTOMY EXPLORATORY W/ BOWEL RESECTION, transverse colon    Other orders  -     Incentive spirometry; Standing  -     Insert and maintain IV line; Standing  -     Void On-Call to O R ; Standing  -     Place sequential compression device; Standing        Advance Care Planning/Advance Directives:  Discussed disease status, cancer treatment plans and/or cancer treatment goals with the patient  Colon cancer metastasized to liver ([facility] )    11/14/2018 Initial Diagnosis     Colon cancer metastasized to liver ([facility] )      4/19/2019 -  Chemotherapy     bevacizumab (AVASTIN) 900 mg in sodium chloride 0 9 % 100 mL IVPB, 15 mg/kg, Intravenous, Once, 4 of 4 cycles  Dose modification: 7 5 mg/kg (original dose 15 mg/kg, Cycle 2, Reason: Other (See Comments), Comment: regimen)  Administration: 450 mg (5/20/2019), 450 mg (6/10/2019), 450 mg (7/1/2019)         History of Present Illness:  Patient is a 26-year-old woman with metastatic colon cancer presently being treated with chemotherapy including Avastin  She has had issues with bleeding/anemia and evidence of tumor progression in the transverse colon    Her liver metastases appear to be responding to therapy however, and therefore continued treatment with Avastin and chemotherapy is anticipated  She has been referred to discuss palliative tumor resection  She has had no symptoms of obstruction  Review of Systems   Constitutional: Negative  HENT: Negative  Eyes: Negative  Respiratory: Negative  Cardiovascular: Negative  Gastrointestinal: Negative  Endocrine: Negative  Genitourinary: Negative  Musculoskeletal: Negative  Skin: Negative  Allergic/Immunologic: Negative  Neurological: Negative  Hematological: Negative  Psychiatric/Behavioral: Negative  Patient Active Problem List   Diagnosis    Arthritis    Hay fever    Herpes zoster    Seasonal allergies    Anxiety    Hot flashes    Impaired fasting glucose    Fatigue    Rheumatoid arthritis involving both hands with negative rheumatoid factor (HCC)    Other hyperlipidemia    Megaloblastic anemia    Severe anemia    Liver masses    Colon cancer metastasized to liver [facility])     Past Medical History:   Diagnosis Date    Anxious mood     Cancer ([facility] )     Colon cancer ([facility] )     History of chemotherapy     [facility]      Past Surgical History:   Procedure Laterality Date    APPENDECTOMY      ESOPHAGOGASTRODUODENOSCOPY N/A 9/14/2018    Procedure: ESOPHAGOGASTRODUODENOSCOPY (EGD) with polypectomy;  Surgeon: Yaquelin Abel MD;  Location: [facility];   Service: Gastroenterology    IR IMAGE GUIDED BIOPSY/ASPIRATION LIVER  10/25/2018    IR PORT PLACEMENT  11/7/2018    TUBAL LIGATION      resolved 2007    WISDOM TOOTH EXTRACTION      resolved 1990     Family History   Problem Relation Age of Onset    Anxiety disorder Mother     Hypertension Father     Diabetes Father     Heart attack Maternal Grandmother         acute MI     Social History     Socioeconomic History    Marital status: Single     Spouse name: Not on file    Number of children: Not on file    Years of education: Not on file    Highest education level: Not on file   Occupational History    Occupation: logistics   Social Needs    Financial resource strain: Not on file    Food insecurity:     Worry: Not on file     Inability: Not on file    Transportation needs:     Medical: Not on file     Non-medical: Not on file   Tobacco Use    Smoking status: Former Smoker    Smokeless tobacco: Never Used   Substance and Sexual Activity    Alcohol use: No    Drug use: No    Sexual activity: Yes     Partners: Male     Birth control/protection: None     Comment: patient reports tubal    Lifestyle    Physical activity:     Days per week: Not on file     Minutes per session: Not on file    Stress: Not on file   Relationships    Social connections:     Talks on phone: Not on file     Gets together: Not on file     Attends Mosque service: Not on file     Active member of club or organization: Not on file     Attends meetings of clubs or organizations: Not on file     Relationship status: Not on file    Intimate partner violence:     Fear of current or ex partner: Not on file     Emotionally abused: Not on file     Physically abused: Not on file     Forced sexual activity: Not on file   Other Topics Concern    Not on file   Social History Narrative    Not on file       Current Outpatient Medications:     ferrous sulfate 325 (65 Fe) mg tablet, Take 1 tablet (325 mg total) by mouth 3 (three) times a day with meals, Disp: 90 tablet, Rfl: 0    folic acid (FOLVITE) 1 mg tablet, 1 mg, Disp: , Rfl:     Iron-Vitamin C (IRON 100/C) 100-250 MG TABS, iron, Disp: , Rfl:     methotrexate 2 5 mg tablet, 2 5 mg, Disp: , Rfl:     Multiple Vitamin (MULTIVITAMIN) capsule, Take 1 capsule by mouth daily, Disp: , Rfl:     Omega-3 Fatty Acids (FISH OIL PO), Take by mouth, Disp: , Rfl:   No Known Allergies  There were no vitals filed for this visit  Physical Exam   Constitutional: She is oriented to person, place, and time   She appears well-developed and well-nourished  HENT:   Head: Normocephalic and atraumatic  Right Ear: External ear normal    Left Ear: External ear normal    Eyes: Pupils are equal, round, and reactive to light  EOM are normal    Neck: Normal range of motion  Neck supple  Cardiovascular: Normal rate, regular rhythm and normal heart sounds  Pulmonary/Chest: Effort normal and breath sounds normal    Abdominal: Soft  Bowel sounds are normal  She exhibits no distension and no mass  There is no tenderness  There is no rebound and no guarding  Musculoskeletal: Normal range of motion  Neurological: She is alert and oriented to person, place, and time  Skin: Skin is warm and dry  Psychiatric: She has a normal mood and affect  Her behavior is normal        Pathology:    Case Report   Surgical Pathology Report                         Case: [id number]                                    Authorizing Provider: Justin Ovalle MD         Collected:           10/25/2018 0855               Ordering Location:     [facility]      Received:            10/25/2018 [address] [place] [address] [facility]                                                    Pathologist:           Wendy Valadez MD                                                                Specimen:    Liver, Liver Mass X7 Cores                                                                 Final Diagnosis   A  Liver mass (core needle biopsy):  - Metastatic adenocarcinoma with extensive necrosis      Comment:  - The tumor cells stain for CDX2 diffusely with focal CK20 expression and absent CK7 staining consistent with a colorectal primary  - Intradepartmental consultation concurs with the diagnosis of adenocarcinoma   - Dr Dian Robbins was notified of the diagnosis on 10/29/18 at 2:42 pm via Epic messaging/email    - Tissue is INSUFFICIENT for molecular studies      Electronically signed by Wendy Valadez MD on 10/29/2018 at  2:44 PM Labs:  Lab Results   Component Value Date    CEA 7 1 (H) 07/15/2019     Lab Results   Component Value Date    WBC 7 94 07/15/2019    HGB 10 6 (L) 07/15/2019    HCT 34 6 (L) 07/15/2019    MCV 99 (H) 07/15/2019     07/15/2019         Imaging  Ct Chest Abdomen Pelvis W Contrast    Result Date: 7/17/2019  Narrative: CT CHEST, ABDOMEN AND PELVIS WITH IV CONTRAST INDICATION:   C18 9: Malignant neoplasm of colon, unspecified C78 7: Secondary malignant neoplasm of liver and intrahepatic bile duct  COMPARISON:  1/29/2019 TECHNIQUE: CT examination of the chest, abdomen and pelvis was performed  Axial, sagittal, and coronal 2D reformatted images were created from the source data and submitted for interpretation  Radiation dose length product (DLP) for this visit:  509 mGy-cm   This examination, like all CT scans performed in the [facility], was performed utilizing techniques to minimize radiation dose exposure, including the use of iterative reconstruction and automated exposure control  IV Contrast:  100 mL of iodixanol (VISIPAQUE) Enteric Contrast: Enteric contrast was administered  FINDINGS: CHEST LUNGS:  Lungs are clear  There is no tracheal or endobronchial lesion  PLEURA:  There are new low density foci along the right hemidiaphragm measuring 0 9 x 3 5 cm and along the right heart border measuring 1 3 x 2 2 cm  HEART/GREAT VESSELS:  Unremarkable for patient's age  MEDIASTINUM AND ANTHONY:  Unremarkable  CHEST WALL AND LOWER NECK:   Unremarkable  ABDOMEN LIVER/BILIARY TREE:  Again seen are numerous liver metastasis  Lesions have decreased in size compared to the most recent prior study  A lesion in the lateral segment of the left lobe measures 2 0 x 2 2 cm, previously 2 4 x 2 5 cm  A lesion posteriorly at the dome measures 1 6 x 1 8 cm, previously 2 0 x 2 1 cm  A lesion peripherally in the anterior segment of the right lobe measures 1 1 x 1 2 cm, previously 1 9 x 2 4 cm   GALLBLADDER: There are gallstone(s) within the gallbladder, without pericholecystic inflammatory changes  SPLEEN:  The spleen is enlarged measuring up to 14 cm, previously 10 cm when measured in a similar fashion  This is likely secondary to portal hypertension as the main portal vein is mildly increased in size and there are esophageal varices  There is no portal vein thrombosis, however  PANCREAS:  Unremarkable  ADRENAL GLANDS:  Unremarkable  KIDNEYS/URETERS:  There are left renal cysts  No hydronephrosis  STOMACH AND BOWEL:  There has been interval enlargement of the transverse colonic mass measuring 4 8 x 8 7 cm, previously 4 3 x 6 8 cm  There is new surrounding stranding which likely represents neoplastic infiltration of the surrounding mesentery  The  remainder of the bowel is unremarkable  There is no evidence of obstruction  APPENDIX:  No findings to suggest appendicitis  ABDOMINOPELVIC CAVITY:  No ascites or free intraperitoneal air  No lymphadenopathy  VESSELS:  Unremarkable for patient's age  PELVIS REPRODUCTIVE ORGANS:  Unremarkable for patient's age  URINARY BLADDER:  Unremarkable  ABDOMINAL WALL/INGUINAL REGIONS:  There is a stable fat-containing ventral hernia  OSSEOUS STRUCTURES:  No acute fracture or destructive osseous lesion  Impression: 1  Interval improvement in hepatic metastasis  2   Interval enlargement of primary transverse colonic mass with new mesenteric stranding likely due to neoplastic infiltration  No obstruction  3   New low density foci along the right hemidiaphragm and right heart border  While these measure fluid attenuation and could represent loculated effusions, pleural metastatic deposits are not excluded  4   Development of portal hypertension with splenomegaly and esophageal varices  Workstation performed: [id number]     I reviewed the above laboratory and imaging data  Discussion/Summary:  Enlarging primary transverse colon cancer, on chemotherapy    Plan for palliative resection given progressive enlargement and risk for bleeding  Rationale for this along with risks and benefits of surgery including infection, bleeding, abscess, leak, discussed with patient  All questions answered  Consents signed at this visit for transverse colectomy

## 2019-07-19 NOTE — LETTER
July 19, 2019     Dima Mandujano, [address]    Patient: Ardelia Boeck   YOB: 1965   Date of Visit: 7/19/2019       Dear Dr Kenia Espitia: Thank you for referring Pascualpete Branden to me for evaluation  Below are my notes for this consultation  If you have questions, please do not hesitate to call me  I look forward to following your patient along with you  Sincerely,        Caridad Chanel MD        CC: TERRY Matias MD  7/19/2019 12:46 PM  Sign at close encounter               Surgical Oncology Follow Up       [address]  1965  [id number]  [facility] [place] [address]    Chief Complaint   Patient presents with    Colon Cancer       Assessment/Plan:    No problem-specific Assessment & Plan notes found for this encounter  Diagnoses and all orders for this visit:    Colon cancer metastasized to liver [facility])  -     Case request operating room: LAPAROTOMY EXPLORATORY W/ BOWEL RESECTION, transverse colon; Standing  -     EKG 12 lead; Future  -     Type and screen; Future  -     APTT; Future  -     Protime-INR; Future  -     Case request operating room: LAPAROTOMY EXPLORATORY W/ BOWEL RESECTION, transverse colon    Other orders  -     Incentive spirometry; Standing  -     Insert and maintain IV line; Standing  -     Void On-Call to O R ; Standing  -     Place sequential compression device; Standing        Advance Care Planning/Advance Directives:  Discussed disease status, cancer treatment plans and/or cancer treatment goals with the patient          Colon cancer metastasized to liver ([facility] [address] )    11/14/2018 Initial Diagnosis     Colon cancer metastasized to liver ([facility] [address] )      4/19/2019 -  Chemotherapy     bevacizumab (AVASTIN) 900 mg in sodium chloride 0 9 % 100 mL IVPB, 15 mg/kg, Intravenous, Once, 4 of 4 cycles  Dose modification: 7 5 mg/kg (original dose 15 mg/kg, Cycle 2, Reason: Other (See Comments), Comment: regimen)  Administration: 450 mg (5/20/2019), 450 mg (6/10/2019), 450 mg (7/1/2019)         History of Present Illness:  Patient is a 26-year-old woman with metastatic colon cancer presently being treated with chemotherapy including Avastin  She has had issues with bleeding/anemia and evidence of tumor progression in the transverse colon  Her liver metastases appear to be responding to therapy however, and therefore continued treatment with Avastin and chemotherapy is anticipated  She has been referred to discuss palliative tumor resection  She has had no symptoms of obstruction  Review of Systems   Constitutional: Negative  HENT: Negative  Eyes: Negative  Respiratory: Negative  Cardiovascular: Negative  Gastrointestinal: Negative  Endocrine: Negative  Genitourinary: Negative  Musculoskeletal: Negative  Skin: Negative  Allergic/Immunologic: Negative  Neurological: Negative  Hematological: Negative  Psychiatric/Behavioral: Negative            Patient Active Problem List   Diagnosis    Arthritis    Hay fever    Herpes zoster    Seasonal allergies    Anxiety    Hot flashes    Impaired fasting glucose    Fatigue    Rheumatoid arthritis involving both hands with negative rheumatoid factor (HCC)    Other hyperlipidemia    Megaloblastic anemia    Severe anemia    Liver masses    Colon cancer metastasized to liver [facility])     Past Medical History:   Diagnosis Date    Anxious mood     Cancer ([facility] [address] )     Colon cancer ([facility] [address] )     History of chemotherapy     Rheumatoid arthritis ([facility] [address] )      Past Surgical History:   Procedure Laterality Date    APPENDECTOMY      ESOPHAGOGASTRODUODENOSCOPY N/A 9/14/2018    Procedure: ESOPHAGOGASTRODUODENOSCOPY (EGD) with polypectomy;  Surgeon: Rivka Craven MD;  Location: [facility];  Service: Gastroenterology    IR IMAGE GUIDED [id number] [facility] LIVER  10/25/2018    IR PORT PLACEMENT  11/7/2018    TUBAL LIGATION      resolved 2007    WISDOM TOOTH EXTRACTION      resolved 1990     Family History   Problem Relation Age of Onset    Anxiety disorder Mother     Hypertension Father     Diabetes Father     Heart attack Maternal Grandmother         acute MI     Social History     Socioeconomic History    Marital status: Single     Spouse name: Not on file    Number of children: Not on file    Years of education: Not on file    Highest education level: Not on file   Occupational History    Occupation: logistics   Social Needs    Financial resource strain: Not on file    Food insecurity:     Worry: Not on file     Inability: Not on file    Transportation needs:     Medical: Not on file     Non-medical: Not on file   Tobacco Use    Smoking status: Former Smoker    Smokeless tobacco: Never Used   Substance and Sexual Activity    Alcohol use: No    Drug use: No    Sexual activity: Yes     Partners: Male     Birth control/protection: None     Comment: patient reports tubal    Lifestyle    Physical activity:     Days per week: Not on file     Minutes per session: Not on file    Stress: Not on file   Relationships    Social connections:     Talks on phone: Not on file     Gets together: Not on file     Attends [employer] service: Not on file     Active member of club or organization: Not on file     Attends meetings of clubs or organizations: Not on file     Relationship status: Not on file    Intimate partner violence:     Fear of current or ex partner: Not on file     Emotionally abused: Not on file     Physically abused: Not on file     Forced sexual activity: Not on file   Other Topics Concern    Not on file   Social History Narrative    Not on file       Current Outpatient Medications:     ferrous sulfate 325 (65 Fe) mg tablet, Take 1 tablet (325 mg total) by mouth 3 (three) times a day with meals, Disp: 90 tablet, Rfl: 0    folic acid (FOLVITE) 1 mg tablet, 1 mg, Disp: , Rfl:     Iron-Vitamin C (IRON 100/C) 100-250 MG TABS, iron, Disp: , Rfl:     methotrexate 2 5 mg tablet, 2 5 mg, Disp: , Rfl:     Multiple Vitamin (MULTIVITAMIN) capsule, Take 1 capsule by mouth daily, Disp: , Rfl:     Omega-3 Fatty Acids (FISH OIL PO), Take by mouth, Disp: , Rfl:   No Known Allergies  There were no vitals filed for this visit  Physical Exam   Constitutional: She is oriented to person, place, and time  She appears well-developed and well-nourished  HENT:   Head: Normocephalic and atraumatic  Right Ear: External ear normal    Left Ear: External ear normal    Eyes: Pupils are equal, round, and reactive to light  EOM are normal    Neck: Normal range of motion  Neck supple  Cardiovascular: Normal rate, regular rhythm and normal heart sounds  Pulmonary/Chest: Effort normal and breath sounds normal    Abdominal: Soft  Bowel sounds are normal  She exhibits no distension and no mass  There is no tenderness  There is no rebound and no guarding  Musculoskeletal: Normal range of motion  Neurological: She is alert and oriented to person, place, and time  Skin: Skin is warm and dry  Psychiatric: She has a normal mood and affect   Her behavior is normal        Pathology:    Case Report   Surgical Pathology Report                         Case: [id number]                                    Authorizing Provider: Abdiaziz Rico MD         Collected:           10/25/2018 0855               Ordering Location:     [facility]      Received:            10/25/2018 [address] [place] [address] Short Stay Center                                                    Pathologist:           Ellyn Swenson MD                                                                Specimen:    Liver, Liver Mass X7 Cores                                                           Final Diagnosis   A  Liver mass (core needle biopsy):  - Metastatic adenocarcinoma with extensive necrosis      Comment:  - The tumor cells stain for CDX2 diffusely with focal CK20 expression and absent CK7 staining consistent with a colorectal primary  - Intradepartmental consultation concurs with the diagnosis of adenocarcinoma   - Dr Aliza Leos was notified of the diagnosis on 10/29/18 at 2:42 pm via Epic messaging/email    - Tissue is INSUFFICIENT for molecular studies  Electronically signed by Garrett Avila MD on 10/29/2018 at  2:44 PM       Labs:  Lab Results   Component Value Date    CEA 7 1 (H) 07/15/2019     Lab Results   Component Value Date    WBC 7 94 07/15/2019    HGB 10 6 (L) 07/15/2019    HCT 34 6 (L) 07/15/2019    MCV 99 (H) 07/15/2019     07/15/2019         Imaging  Ct Chest Abdomen Pelvis W Contrast    Result Date: 7/17/2019  Narrative: CT CHEST, ABDOMEN AND PELVIS WITH IV CONTRAST INDICATION:   C18 9: Malignant neoplasm of colon, unspecified C78 7: Secondary malignant neoplasm of liver and intrahepatic bile duct  COMPARISON:  1/29/2019 TECHNIQUE: CT examination of the chest, abdomen and pelvis was performed  Axial, sagittal, and coronal 2D reformatted images were created from the source data and submitted for interpretation  Radiation dose length product (DLP) for this visit:  509 mGy-cm   This examination, like all CT scans performed in the [facility], was performed utilizing techniques to minimize radiation dose exposure, including the use of iterative reconstruction and automated exposure control  IV Contrast:  100 mL of iodixanol (VISIPAQUE) Enteric Contrast: Enteric contrast was administered  FINDINGS: CHEST LUNGS:  Lungs are clear  There is no tracheal or endobronchial lesion  PLEURA:  There are new low density foci along the right hemidiaphragm measuring 0 9 x 3 5 cm and along the right heart border measuring 1 3 x 2 2 cm   HEART/GREAT VESSELS:  Unremarkable for patient's age  MEDIASTINUM AND ANTHONY:  Unremarkable  CHEST WALL AND LOWER NECK:   Unremarkable  ABDOMEN LIVER/BILIARY TREE:  Again seen are numerous liver metastasis  Lesions have decreased in size compared to the most recent prior study  A lesion in the lateral segment of the left lobe measures 2 0 x 2 2 cm, previously 2 4 x 2 5 cm  A lesion posteriorly at the dome measures 1 6 x 1 8 cm, previously 2 0 x 2 1 cm  A lesion peripherally in the anterior segment of the right lobe measures 1 1 x 1 2 cm, previously 1 9 x 2 4 cm  GALLBLADDER:  There are gallstone(s) within the gallbladder, without pericholecystic inflammatory changes  SPLEEN:  The spleen is enlarged measuring up to 14 cm, previously 10 cm when measured in a similar fashion  This is likely secondary to portal hypertension as the main portal vein is mildly increased in size and there are esophageal varices  There is no portal vein thrombosis, however  PANCREAS:  Unremarkable  ADRENAL GLANDS:  Unremarkable  KIDNEYS/URETERS:  There are left renal cysts  No hydronephrosis  STOMACH AND BOWEL:  There has been interval enlargement of the transverse colonic mass measuring 4 8 x 8 7 cm, previously 4 3 x 6 8 cm  There is new surrounding stranding which likely represents neoplastic infiltration of the surrounding mesentery  The  remainder of the bowel is unremarkable  There is no evidence of obstruction  APPENDIX:  No findings to suggest appendicitis  ABDOMINOPELVIC CAVITY:  No ascites or free intraperitoneal air  No lymphadenopathy  VESSELS:  Unremarkable for patient's age  PELVIS REPRODUCTIVE ORGANS:  Unremarkable for patient's age  URINARY BLADDER:  Unremarkable  ABDOMINAL WALL/INGUINAL REGIONS:  There is a stable fat-containing ventral hernia  OSSEOUS STRUCTURES:  No acute fracture or destructive osseous lesion  Impression: 1  Interval improvement in hepatic metastasis    2   Interval enlargement of primary transverse colonic mass with new mesenteric stranding likely due to neoplastic infiltration  No obstruction  3   New low density foci along the right hemidiaphragm and right heart border  While these measure fluid attenuation and could represent loculated effusions, pleural metastatic deposits are not excluded  4   Development of portal hypertension with splenomegaly and esophageal varices  Workstation performed: [id number]     I reviewed the above laboratory and imaging data  Discussion/Summary:  Enlarging primary transverse colon cancer, on chemotherapy  Plan for palliative resection given progressive enlargement and risk for bleeding  Rationale for this along with risks and benefits of surgery including infection, bleeding, abscess, leak, discussed with patient  All questions answered  Consents signed at this visit for transverse colectomy

## 2019-07-19 NOTE — PROGRESS NOTES
Hematology / Oncology Outpatient Follow Up Note    Ann Sanderson 48 y o  female :1965 [facility]:[id number]         Date:  2019    Assessment / Plan:  A 54-year-old female with metastatic colon cancer  She has extensive liver metastasis   Molecular testing could not be performed based on the liver biopsy specimen  She had 12 cycle of FOLFOX with bevacizumab with excellent tolerance, resulting in partial response  Since 2018, she has been on maintenance bevacizumab monotherapy  Her CEA has been steadily declining  However, recent CT scan showed continuous decrease of liver metastasis but slight enlargement of primary transverse colon mass  She has occasional lower GI bleeding which I assumed from primary colon mass  I spoke with Dr Alpesh Miller regarding palliative resection of transverse colon mass, to avoid further bleeding  I had long discussion with patient regarding this approach which is strict treated palliative  She is in agreement  In order to have surgery done safely, we have to hold bevacizumab for 4-6 weeks prior to the surgery  Possibly, 4-6 weeks after the surgery, we may put her back on bevacizumab  She is in agreement with my recommendation  She is going to be seen by Dr Alpesh Miller today  I will see her again in approximately 3 weeks after the surgery    All the patient questions were answered to her satisfaction         Subjective:      HPI:  A 54-year-old female who was found to have severe microcytic anemia in 2018   Therefore, she was advised to be hospitalized  Butt Javiero was given transfusion  Mercedes Volin was performed which did not show any major pathology   As outpatient basis, she underwent CT scan of abdomen pelvis which showed transverse colon mass as well as multiple liver metastatic disease   She subsequently underwent liver biopsy which showed metastatic adenocarcinoma, consistent with colorectal primary   She presents today to discuss treatment options   Since 2018, she lost 25 pound  Fabienne Maradiaga has mildly anorexic  Christiano Ramirez, she has no complaint of pain  She denied any respiratory symptoms    She has normal bowel movement    Prior to the hospitalization, she had slowly progressive fatigue as well as some exertional shortness of breath   She is currently on oral iron 3 times per day  Fabienne Maradiaga has rheumatoid arthritis for which she is on weekly methotrexate and folic acid   She has no family history of colorectal cancer  Fabienne Maradiaga is a lifetime never smoker   Her performance status is normal            Interval History:   A 48year-old female with metastatic colon cancer with extensive liver metastasis   She started systemic chemotherapy with FOLFOX-6 which she tolerated very well  We were notified by pathology Department that molecular testing cannot be performed on liver biopsy tissue  She was treated with 12 cycle of FOLFOX with bevacizumab with excellent tolerance  She had good partial response  Since April 2019, she has been on maintenance bevacizumab monotherapy  She is tolerating treatment very well  She presents today for follow-up  Her CEA continued to decline  Recent CT scan showed continued shrinkage of liver metastasis  However, primary tumor in transverse colon seems to be slightly larger  Since the beginning of bevacizumab monotherapy, she has occasional lower GI bleeding on the week of bevacizumab treatment  She has no abdominal pain  She denied any nausea vomiting  Her weight is stable  She has no respiratory symptoms  Her performance status is normal                                                                                 Objective:      Primary Diagnosis:     Metastatic colon cancer   Diagnosed in October 2018      Cancer Staging:  Cancer Staging  No matching staging information was found for the patient         Previous Hematologic/ Oncologic Treatment:       Bevacizumab with FOLFOX-6  X12 cycle    Completed in April 2019       Current Hematologic/ Oncologic Treatment:       Bevacizumab monotherapy since April 2019       Disease Status:      Partial response      Test Results:     Pathology:     Liver biopsy showed metastatic adenocarcinoma, consistent with colorectal primary  Molecular testing could not be performed per pathology department      Radiology:     CT scan of chest abdomen pelvis in July 2019 showed continued decrease of multiple liver metastasis  Slight enlargement of transverse colon mass  Laboratory:     See below      Physical Exam:        General Appearance:    Alert, oriented          Eyes:    PERRL   Ears:    Normal external ear canals, both ears   Nose:   Nares normal, septum midline   Throat:   Mucosa moist  Pharynx without injection  Neck:   Supple         Lungs:     Clear to auscultation bilaterally   Chest Wall:    No tenderness or deformity    Heart:    Regular rate and rhythm         Abdomen:     Soft, non-tender, bowel sounds +, no organomegaly               Extremities:   Extremities no cyanosis or edema         Skin:   no rash or icterus  Lymph nodes:   Cervical, supraclavicular, and axillary nodes normal   Neurologic:   CNII-XII intact, normal strength, sensation and reflexes     Throughout             Breast exam:  NA           ROS: Review of Systems   Gastrointestinal:        Occasional lower GI bleeding  All other systems reviewed and are negative  Imaging: Ct Chest Abdomen Pelvis W Contrast    Result Date: 7/17/2019  Narrative: CT CHEST, ABDOMEN AND PELVIS WITH IV CONTRAST INDICATION:   C18 9: Malignant neoplasm of colon, unspecified C78 7: Secondary malignant neoplasm of liver and intrahepatic bile duct  COMPARISON:  1/29/2019 TECHNIQUE: CT examination of the chest, abdomen and pelvis was performed  Axial, sagittal, and coronal 2D reformatted images were created from the source data and submitted for interpretation  Radiation dose length product (DLP) for this visit:  509 mGy-cm     This examination, like all CT scans performed in the [place], was performed utilizing techniques to minimize radiation dose exposure, including the use of iterative reconstruction and automated exposure control  IV Contrast:  100 mL of iodixanol (VISIPAQUE) Enteric Contrast: Enteric contrast was administered  FINDINGS: CHEST LUNGS:  Lungs are clear  There is no tracheal or endobronchial lesion  PLEURA:  There are new low density foci along the right hemidiaphragm measuring 0 9 x 3 5 cm and along the right heart border measuring 1 3 x 2 2 cm  HEART/GREAT VESSELS:  Unremarkable for patient's age  MEDIASTINUM AND ANTHONY:  Unremarkable  CHEST WALL AND LOWER NECK:   Unremarkable  ABDOMEN LIVER/BILIARY TREE:  Again seen are numerous liver metastasis  Lesions have decreased in size compared to the most recent prior study  A lesion in the lateral segment of the left lobe measures 2 0 x 2 2 cm, previously 2 4 x 2 5 cm  A lesion posteriorly at the dome measures 1 6 x 1 8 cm, previously 2 0 x 2 1 cm  A lesion peripherally in the anterior segment of the right lobe measures 1 1 x 1 2 cm, previously 1 9 x 2 4 cm  GALLBLADDER:  There are gallstone(s) within the gallbladder, without pericholecystic inflammatory changes  SPLEEN:  The spleen is enlarged measuring up to 14 cm, previously 10 cm when measured in a similar fashion  This is likely secondary to portal hypertension as the main portal vein is mildly increased in size and there are esophageal varices  There is no portal vein thrombosis, however  PANCREAS:  Unremarkable  ADRENAL GLANDS:  Unremarkable  KIDNEYS/URETERS:  There are left renal cysts  No hydronephrosis  STOMACH AND BOWEL:  There has been interval enlargement of the transverse colonic mass measuring 4 8 x 8 7 cm, previously 4 3 x 6 8 cm  There is new surrounding stranding which likely represents neoplastic infiltration of the surrounding mesentery  The  remainder of the bowel is unremarkable    There is no evidence of obstruction  APPENDIX:  No findings to suggest appendicitis  ABDOMINOPELVIC CAVITY:  No ascites or free intraperitoneal air  No lymphadenopathy  VESSELS:  Unremarkable for patient's age  PELVIS REPRODUCTIVE ORGANS:  Unremarkable for patient's age  URINARY BLADDER:  Unremarkable  ABDOMINAL WALL/INGUINAL REGIONS:  There is a stable fat-containing ventral hernia  OSSEOUS STRUCTURES:  No acute fracture or destructive osseous lesion  Impression: 1  Interval improvement in hepatic metastasis  2   Interval enlargement of primary transverse colonic mass with new mesenteric stranding likely due to neoplastic infiltration  No obstruction  3   New low density foci along the right hemidiaphragm and right heart border  While these measure fluid attenuation and could represent loculated effusions, pleural metastatic deposits are not excluded  4   Development of portal hypertension with splenomegaly and esophageal varices  Workstation performed: [id number]         Labs:   Lab Results   Component Value Date    WBC 7 94 07/15/2019    HGB 10 6 (L) 07/15/2019    HCT 34 6 (L) 07/15/2019    MCV 99 (H) 07/15/2019     07/15/2019     Lab Results   Component Value Date    K 3 8 07/15/2019     07/15/2019    CO2 28 07/15/2019    BUN 6 07/15/2019    CREATININE 0 68 07/15/2019    GLUF 105 (H) 07/15/2019    CALCIUM 9 5 07/15/2019    AST 29 07/15/2019    ALT 27 07/15/2019    ALKPHOS 300 (H) 07/15/2019    EGFR 100 07/15/2019         Lab Results   Component Value Date     (H) 09/24/2018         Lab Results   Component Value Date    CEA 7 1 (H) 07/15/2019           Lab Results   Component Value Date    IRON 11 (L) 09/13/2018    TIBC 302 09/13/2018    FERRITIN 15 09/13/2018         Lab Results   Component Value Date    FOLATE 11 4 09/13/2018         Current Medications: Reviewed  Allergies: Reviewed  PMH/FH/SH:  Reviewed      Vital Sign:    Body surface area is 1 65 meters squared      Wt Readings from Last 3 Encounters:   07/19/19 59 4 kg (131 lb)   07/01/19 60 6 kg (133 lb 9 6 oz)   06/12/19 60 3 kg (133 lb)        Temp Readings from Last 3 Encounters:   07/19/19 98 6 °F (37 °C) (Tympanic Core)   07/01/19 98 °F (36 7 °C) (Temporal)   06/12/19 98 2 °F (36 8 °C) (Tympanic)        BP Readings from Last 3 Encounters:   07/19/19 126/88   07/01/19 123/77   06/12/19 122/76         Pulse Readings from Last 3 Encounters:   07/19/19 87   07/01/19 83   06/10/19 85     @LASTSAO2(3)@

## 2019-07-19 NOTE — LETTER
2019     Chasity Rao, [address]    Patient: Jeb Mattson   YOB: 1965   Date of Visit: 2019       Dear Dr Jennifer Betts: Thank you for referring Irwin Hogan to me for evaluation  Below are my notes for this consultation  If you have questions, please do not hesitate to call me  I look forward to following your patient along with you  Sincerely,        Marielena Kearns MD        CC: MD Marielena Galvan MD  2019 12:26 PM  Sign at close encounter  Hematology / Oncology Outpatient Follow Up Note    Jeb Mattson 48 y o  female :1965 [id number]:[id number]         Date:  2019    Assessment / Plan:  A 79-year-old female with metastatic colon cancer  She has extensive liver metastasis   Molecular testing could not be performed based on the liver biopsy specimen  She had 12 cycle of FOLFOX with bevacizumab with excellent tolerance, resulting in partial response  Since 2018, she has been on maintenance bevacizumab monotherapy  Her CEA has been steadily declining  However, recent CT scan showed continuous decrease of liver metastasis but slight enlargement of primary transverse colon mass  She has occasional lower GI bleeding which I assumed from primary colon mass  I spoke with Dr Erwin Justice regarding palliative resection of transverse colon mass, to avoid further bleeding  I had long discussion with patient regarding this approach which is strict treated palliative  She is in agreement  In order to have surgery done safely, we have to hold bevacizumab for 4-6 weeks prior to the surgery  Possibly, 4-6 weeks after the surgery, we may put her back on bevacizumab  She is in agreement with my recommendation  She is going to be seen by Dr Erwin Justice today  I will see her again in approximately 3 weeks after the surgery    All the patient questions were answered to her satisfaction         Subjective:      HPI:  A 49-year-old female who was found to have severe microcytic anemia in September 2018   Therefore, she was advised to be hospitalized  Lisette Gale was given transfusion  Hettie Veronica was performed which did not show any major pathology   As outpatient basis, she underwent CT scan of abdomen pelvis which showed transverse colon mass as well as multiple liver metastatic disease   She subsequently underwent liver biopsy which showed metastatic adenocarcinoma, consistent with colorectal primary   She presents today to discuss treatment options   Since January 2018, she lost 25 pound  Lisette Gale has mildly anorexic  [facility], she has no complaint of pain  She denied any respiratory symptoms    She has normal bowel movement    Prior to the hospitalization, she had slowly progressive fatigue as well as some exertional shortness of breath   She is currently on oral iron 3 times per day  Lisette Gale has rheumatoid arthritis for which she is on weekly methotrexate and folic acid   She has no family history of colorectal cancer  Lisette Gale is a lifetime never smoker   Her performance status is normal            Interval History:   A 48year-old female with metastatic colon cancer with extensive liver metastasis   She started systemic chemotherapy with FOLFOX-6 which she tolerated very well  We were notified by pathology Department that molecular testing cannot be performed on liver biopsy tissue  She was treated with 12 cycle of FOLFOX with bevacizumab with excellent tolerance  She had good partial response  Since April 2019, she has been on maintenance bevacizumab monotherapy  She is tolerating treatment very well  She presents today for follow-up  Her CEA continued to decline  Recent CT scan showed continued shrinkage of liver metastasis  However, primary tumor in transverse colon seems to be slightly larger  Since the beginning of bevacizumab monotherapy, she has occasional lower GI bleeding on the week of bevacizumab treatment    She has no abdominal pain   She denied any nausea vomiting  Her weight is stable  She has no respiratory symptoms  Her performance status is normal                                                                                 Objective:      Primary Diagnosis:     Metastatic colon cancer   Diagnosed in October 2018      Cancer Staging:  Cancer Staging  No matching staging information was found for the patient         Previous Hematologic/ Oncologic Treatment:       Bevacizumab with FOLFOX-6  X12 cycle  Completed in April 2019       Current Hematologic/ Oncologic Treatment:       Bevacizumab monotherapy since April 2019       Disease Status:      Partial response      Test Results:     Pathology:     Liver biopsy showed metastatic adenocarcinoma, consistent with colorectal primary  Molecular testing could not be performed per pathology department      Radiology:     CT scan of chest abdomen pelvis in July 2019 showed continued decrease of multiple liver metastasis  Slight enlargement of transverse colon mass  Laboratory:     See below      Physical Exam:        General Appearance:    Alert, oriented          Eyes:    PERRL   Ears:    Normal external ear canals, both ears   Nose:   Nares normal, septum midline   Throat:   Mucosa moist  Pharynx without injection  Neck:   Supple         Lungs:     Clear to auscultation bilaterally   Chest Wall:    No tenderness or deformity    Heart:    Regular rate and rhythm         Abdomen:     Soft, non-tender, bowel sounds +, no organomegaly               Extremities:   Extremities no cyanosis or edema         Skin:   no rash or icterus  Lymph nodes:   Cervical, supraclavicular, and axillary nodes normal   Neurologic:   CNII-XII intact, normal strength, sensation and reflexes     Throughout             Breast exam:  NA           ROS: Review of Systems   Gastrointestinal:        Occasional lower GI bleeding  All other systems reviewed and are negative            Imaging: Ct Chest Abdomen Pelvis W Contrast    Result Date: 7/17/2019  Narrative: CT CHEST, ABDOMEN AND PELVIS WITH IV CONTRAST INDICATION:   C18 9: Malignant neoplasm of colon, unspecified C78 7: Secondary malignant neoplasm of liver and intrahepatic bile duct  COMPARISON:  1/29/2019 TECHNIQUE: CT examination of the chest, abdomen and pelvis was performed  Axial, sagittal, and coronal 2D reformatted images were created from the source data and submitted for interpretation  Radiation dose length product (DLP) for this visit:  509 mGy-cm   This examination, like all CT scans performed in the [place], was performed utilizing techniques to minimize radiation dose exposure, including the use of iterative reconstruction and automated exposure control  IV Contrast:  100 mL of iodixanol (VISIPAQUE) Enteric Contrast: Enteric contrast was administered  FINDINGS: CHEST LUNGS:  Lungs are clear  There is no tracheal or endobronchial lesion  PLEURA:  There are new low density foci along the right hemidiaphragm measuring 0 9 x 3 5 cm and along the right heart border measuring 1 3 x 2 2 cm  HEART/GREAT VESSELS:  Unremarkable for patient's age  MEDIASTINUM AND ANTHONY:  Unremarkable  CHEST WALL AND LOWER NECK:   Unremarkable  ABDOMEN LIVER/BILIARY TREE:  Again seen are numerous liver metastasis  Lesions have decreased in size compared to the most recent prior study  A lesion in the lateral segment of the left lobe measures 2 0 x 2 2 cm, previously 2 4 x 2 5 cm  A lesion posteriorly at the dome measures 1 6 x 1 8 cm, previously 2 0 x 2 1 cm  A lesion peripherally in the anterior segment of the right lobe measures 1 1 x 1 2 cm, previously 1 9 x 2 4 cm  GALLBLADDER:  There are gallstone(s) within the gallbladder, without pericholecystic inflammatory changes  SPLEEN:  The spleen is enlarged measuring up to 14 cm, previously 10 cm when measured in a similar fashion    This is likely secondary to portal hypertension as the main portal vein is mildly increased in size and there are esophageal varices  There is no portal vein thrombosis, however  PANCREAS:  Unremarkable  ADRENAL GLANDS:  Unremarkable  KIDNEYS/URETERS:  There are left renal cysts  No hydronephrosis  STOMACH AND BOWEL:  There has been interval enlargement of the transverse colonic mass measuring 4 8 x 8 7 cm, previously 4 3 x 6 8 cm  There is new surrounding stranding which likely represents neoplastic infiltration of the surrounding mesentery  The  remainder of the bowel is unremarkable  There is no evidence of obstruction  APPENDIX:  No findings to suggest appendicitis  ABDOMINOPELVIC CAVITY:  No ascites or free intraperitoneal air  No lymphadenopathy  VESSELS:  Unremarkable for patient's age  PELVIS REPRODUCTIVE ORGANS:  Unremarkable for patient's age  URINARY BLADDER:  Unremarkable  ABDOMINAL WALL/INGUINAL REGIONS:  There is a stable fat-containing ventral hernia  OSSEOUS STRUCTURES:  No acute fracture or destructive osseous lesion  Impression: 1  Interval improvement in hepatic metastasis  2   Interval enlargement of primary transverse colonic mass with new mesenteric stranding likely due to neoplastic infiltration  No obstruction  3   New low density foci along the right hemidiaphragm and right heart border  While these measure fluid attenuation and could represent loculated effusions, pleural metastatic deposits are not excluded  4   Development of portal hypertension with splenomegaly and esophageal varices   Workstation performed: [id number]         Labs:   Lab Results   Component Value Date    WBC 7 94 07/15/2019    HGB 10 6 (L) 07/15/2019    HCT 34 6 (L) 07/15/2019    MCV 99 (H) 07/15/2019     07/15/2019     Lab Results   Component Value Date    K 3 8 07/15/2019     07/15/2019    CO2 28 07/15/2019    BUN 6 07/15/2019    CREATININE 0 68 07/15/2019    GLUF 105 (H) 07/15/2019    CALCIUM 9 5 07/15/2019    AST 29 07/15/2019    ALT 27 07/15/2019 ALKPHOS 300 (H) 07/15/2019    EGFR 100 07/15/2019         Lab Results   Component Value Date     (H) 09/24/2018         Lab Results   Component Value Date    CEA 7 1 (H) 07/15/2019           Lab Results   Component Value Date    IRON 11 (L) 09/13/2018    TIBC 302 09/13/2018    FERRITIN 15 09/13/2018         Lab Results   Component Value Date    FOLATE 11 4 09/13/2018         Current Medications: Reviewed  Allergies: Reviewed  PMH/FH/SH:  Reviewed      Vital Sign:    Body surface area is 1 65 meters squared      Wt Readings from Last 3 Encounters:   07/19/19 59 4 kg (131 lb)   07/01/19 60 6 kg (133 lb 9 6 oz)   06/12/19 60 3 kg (133 lb)        Temp Readings from Last 3 Encounters:   07/19/19 98 6 °F (37 °C) (Tympanic Core)   07/01/19 98 °F (36 7 °C) (Temporal)   06/12/19 98 2 °F (36 8 °C) (Tympanic)        BP Readings from Last 3 Encounters:   07/19/19 126/88   07/01/19 123/77   06/12/19 122/76         Pulse Readings from Last 3 Encounters:   07/19/19 87   07/01/19 83   06/10/19 85     @LASTSAO2(3)@

## 2019-07-22 ENCOUNTER — HOSPITAL ENCOUNTER (OUTPATIENT)
Dept: INFUSION CENTER | Facility: HOSPITAL | Age: 54
End: 2019-07-22

## 2019-08-07 RX ORDER — LANOLIN ALCOHOL/MO/W.PET/CERES
CREAM (GRAM) TOPICAL DAILY
COMMUNITY
End: 2021-01-01 | Stop reason: HOSPADM

## 2019-08-07 NOTE — PRE-PROCEDURE INSTRUCTIONS
Pre-Surgery Instructions:   Medication Instructions    cyanocobalamin (VITAMIN B-12) 1,000 mcg tablet Instructed patient per Anesthesia Guidelines   ferrous sulfate 325 (65 Fe) mg tablet Instructed patient per Anesthesia Guidelines   folic acid (FOLVITE) 1 mg tablet Instructed patient per Anesthesia Guidelines   methotrexate 2 5 mg tablet Instructed patient per Anesthesia Guidelines   Multiple Vitamin (MULTIVITAMIN) capsule Instructed patient per Anesthesia Guidelines   Omega-3 Fatty Acids (FISH OIL PO) Instructed patient per Anesthesia Guidelines  Continue to take this medication on your normal schedule  If this is an oral medication and you take it in the morning, then you may take this medicine with a sip of water

## 2019-08-07 NOTE — PRE-PROCEDURE INSTRUCTIONS
Pre-Surgery Instructions:   Medication Instructions    cyanocobalamin (VITAMIN B-12) 1,000 mcg tablet Instructed patient per Anesthesia Guidelines   ferrous sulfate 325 (65 Fe) mg tablet Instructed patient per Anesthesia Guidelines   folic acid (FOLVITE) 1 mg tablet Instructed patient per Anesthesia Guidelines   methotrexate 2 5 mg tablet Instructed patient per Anesthesia Guidelines   Multiple Vitamin (MULTIVITAMIN) capsule Instructed patient per Anesthesia Guidelines   Omega-3 Fatty Acids (FISH OIL PO) Instructed patient per Anesthesia Guidelines

## 2019-08-19 ENCOUNTER — CLINICAL SUPPORT (OUTPATIENT)
Dept: URGENT CARE | Age: 54
End: 2019-08-19
Payer: COMMERCIAL

## 2019-08-19 ENCOUNTER — APPOINTMENT (OUTPATIENT)
Dept: LAB | Age: 54
End: 2019-08-19
Payer: COMMERCIAL

## 2019-08-19 DIAGNOSIS — C78.7 COLON CANCER METASTASIZED TO LIVER (HCC): ICD-10-CM

## 2019-08-19 DIAGNOSIS — C18.9 COLON CANCER METASTASIZED TO LIVER (HCC): ICD-10-CM

## 2019-08-19 LAB
ABO GROUP BLD: NORMAL
APTT PPP: 29 SECONDS (ref 23–37)
BLD GP AB SCN SERPL QL: NEGATIVE
INR PPP: 1.09 (ref 0.84–1.19)
PROTHROMBIN TIME: 13.7 SECONDS (ref 11.6–14.5)
RH BLD: POSITIVE
SPECIMEN EXPIRATION DATE: NORMAL

## 2019-08-19 PROCEDURE — 86900 BLOOD TYPING SEROLOGIC ABO: CPT

## 2019-08-19 PROCEDURE — 86850 RBC ANTIBODY SCREEN: CPT

## 2019-08-19 PROCEDURE — 85610 PROTHROMBIN TIME: CPT

## 2019-08-19 PROCEDURE — 85730 THROMBOPLASTIN TIME PARTIAL: CPT

## 2019-08-19 PROCEDURE — 36415 COLL VENOUS BLD VENIPUNCTURE: CPT

## 2019-08-19 PROCEDURE — 86901 BLOOD TYPING SEROLOGIC RH(D): CPT

## 2019-08-20 LAB
ATRIAL RATE: 85 BPM
P AXIS: 54 DEGREES
PR INTERVAL: 166 MS
QRS AXIS: 63 DEGREES
QRSD INTERVAL: 96 MS
QT INTERVAL: 392 MS
QTC INTERVAL: 466 MS
T WAVE AXIS: 44 DEGREES
VENTRICULAR RATE: 85 BPM

## 2019-08-20 PROCEDURE — 93010 ELECTROCARDIOGRAM REPORT: CPT | Performed by: INTERNAL MEDICINE

## 2019-08-28 ENCOUNTER — ANESTHESIA EVENT (OUTPATIENT)
Dept: PERIOP | Facility: HOSPITAL | Age: 54
DRG: 330 | End: 2019-08-28
Payer: COMMERCIAL

## 2019-08-29 ENCOUNTER — ANESTHESIA (OUTPATIENT)
Dept: PERIOP | Facility: HOSPITAL | Age: 54
DRG: 330 | End: 2019-08-29
Payer: COMMERCIAL

## 2019-08-29 ENCOUNTER — HOSPITAL ENCOUNTER (INPATIENT)
Facility: HOSPITAL | Age: 54
LOS: 7 days | Discharge: HOME | DRG: 330 | End: 2019-09-05
Attending: SURGERY | Admitting: SURGERY
Payer: COMMERCIAL

## 2019-08-29 DIAGNOSIS — C18.9 COLON CANCER METASTASIZED TO LIVER (HCC): ICD-10-CM

## 2019-08-29 DIAGNOSIS — C78.7 COLON CANCER METASTASIZED TO LIVER (HCC): ICD-10-CM

## 2019-08-29 LAB
ANION GAP SERPL CALCULATED.3IONS-SCNC: 8 MMOL/L (ref 4–13)
BUN SERPL-MCNC: 7 MG/DL (ref 5–25)
CALCIUM SERPL-MCNC: 7.5 MG/DL (ref 8.3–10.1)
CHLORIDE SERPL-SCNC: 109 MMOL/L (ref 100–108)
CO2 SERPL-SCNC: 24 MMOL/L (ref 21–32)
CREAT SERPL-MCNC: 0.52 MG/DL (ref 0.6–1.3)
ERYTHROCYTE [DISTWIDTH] IN BLOOD BY AUTOMATED COUNT: 14.8 % (ref 11.6–15.1)
GFR SERPL CREATININE-BSD FRML MDRD: 109 ML/MIN/1.73SQ M
GLUCOSE SERPL-MCNC: 119 MG/DL (ref 65–140)
GLUCOSE SERPL-MCNC: 205 MG/DL (ref 65–140)
HCT VFR BLD AUTO: 28.7 % (ref 34.8–46.1)
HGB BLD-MCNC: 8.6 G/DL (ref 11.5–15.4)
MCH RBC QN AUTO: 27.6 PG (ref 26.8–34.3)
MCHC RBC AUTO-ENTMCNC: 30 G/DL (ref 31.4–37.4)
MCV RBC AUTO: 92 FL (ref 82–98)
PLATELET # BLD AUTO: 184 THOUSANDS/UL (ref 149–390)
PMV BLD AUTO: 8.2 FL (ref 8.9–12.7)
POTASSIUM SERPL-SCNC: 3.3 MMOL/L (ref 3.5–5.3)
RBC # BLD AUTO: 3.12 MILLION/UL (ref 3.81–5.12)
SODIUM SERPL-SCNC: 141 MMOL/L (ref 136–145)
WBC # BLD AUTO: 13.76 THOUSAND/UL (ref 4.31–10.16)

## 2019-08-29 PROCEDURE — 88309 TISSUE EXAM BY PATHOLOGIST: CPT | Performed by: PATHOLOGY

## 2019-08-29 PROCEDURE — 88341 IMHCHEM/IMCYTCHM EA ADD ANTB: CPT | Performed by: PATHOLOGY

## 2019-08-29 PROCEDURE — 44140 PARTIAL REMOVAL OF COLON: CPT | Performed by: SURGERY

## 2019-08-29 PROCEDURE — 0DBV0ZZ EXCISION OF MESENTERY, OPEN APPROACH: ICD-10-PCS | Performed by: SURGERY

## 2019-08-29 PROCEDURE — 0WBF0ZZ EXCISION OF ABDOMINAL WALL, OPEN APPROACH: ICD-10-PCS | Performed by: SURGERY

## 2019-08-29 PROCEDURE — 88342 IMHCHEM/IMCYTCHM 1ST ANTB: CPT | Performed by: PATHOLOGY

## 2019-08-29 PROCEDURE — 82948 REAGENT STRIP/BLOOD GLUCOSE: CPT

## 2019-08-29 PROCEDURE — 85027 COMPLETE CBC AUTOMATED: CPT | Performed by: SURGERY

## 2019-08-29 PROCEDURE — 0DBL0ZZ EXCISION OF TRANSVERSE COLON, OPEN APPROACH: ICD-10-PCS | Performed by: SURGERY

## 2019-08-29 PROCEDURE — 0DBK0ZZ EXCISION OF ASCENDING COLON, OPEN APPROACH: ICD-10-PCS | Performed by: SURGERY

## 2019-08-29 PROCEDURE — 07BB0ZZ EXCISION OF MESENTERIC LYMPHATIC, OPEN APPROACH: ICD-10-PCS | Performed by: SURGERY

## 2019-08-29 PROCEDURE — 44120 REMOVAL OF SMALL INTESTINE: CPT | Performed by: SURGERY

## 2019-08-29 PROCEDURE — 88302 TISSUE EXAM BY PATHOLOGIST: CPT | Performed by: PATHOLOGY

## 2019-08-29 PROCEDURE — 0DBB0ZZ EXCISION OF ILEUM, OPEN APPROACH: ICD-10-PCS | Performed by: SURGERY

## 2019-08-29 PROCEDURE — 88363 XM ARCHIVE TISSUE MOLEC ANAL: CPT | Performed by: PATHOLOGY

## 2019-08-29 PROCEDURE — 80048 BASIC METABOLIC PNL TOTAL CA: CPT | Performed by: SURGERY

## 2019-08-29 PROCEDURE — 0DBU0ZZ EXCISION OF OMENTUM, OPEN APPROACH: ICD-10-PCS | Performed by: SURGERY

## 2019-08-29 RX ORDER — PROPOFOL 10 MG/ML
INJECTION, EMULSION INTRAVENOUS AS NEEDED
Status: DISCONTINUED | OUTPATIENT
Start: 2019-08-29 | End: 2019-08-29 | Stop reason: SURG

## 2019-08-29 RX ORDER — ONDANSETRON 2 MG/ML
4 INJECTION INTRAMUSCULAR; INTRAVENOUS EVERY 4 HOURS PRN
Status: DISCONTINUED | OUTPATIENT
Start: 2019-08-29 | End: 2019-08-30

## 2019-08-29 RX ORDER — ONDANSETRON 2 MG/ML
4 INJECTION INTRAMUSCULAR; INTRAVENOUS EVERY 6 HOURS PRN
Status: DISCONTINUED | OUTPATIENT
Start: 2019-08-29 | End: 2019-09-05 | Stop reason: HOSPADM

## 2019-08-29 RX ORDER — MAGNESIUM HYDROXIDE 1200 MG/15ML
LIQUID ORAL AS NEEDED
Status: DISCONTINUED | OUTPATIENT
Start: 2019-08-29 | End: 2019-08-29 | Stop reason: HOSPADM

## 2019-08-29 RX ORDER — SODIUM CHLORIDE, SODIUM LACTATE, POTASSIUM CHLORIDE, CALCIUM CHLORIDE 600; 310; 30; 20 MG/100ML; MG/100ML; MG/100ML; MG/100ML
INJECTION, SOLUTION INTRAVENOUS CONTINUOUS PRN
Status: DISCONTINUED | OUTPATIENT
Start: 2019-08-29 | End: 2019-08-29 | Stop reason: SURG

## 2019-08-29 RX ORDER — LABETALOL 20 MG/4 ML (5 MG/ML) INTRAVENOUS SYRINGE
5
Status: DISCONTINUED | OUTPATIENT
Start: 2019-08-29 | End: 2019-08-29 | Stop reason: HOSPADM

## 2019-08-29 RX ORDER — ROCURONIUM BROMIDE 10 MG/ML
INJECTION, SOLUTION INTRAVENOUS AS NEEDED
Status: DISCONTINUED | OUTPATIENT
Start: 2019-08-29 | End: 2019-08-29 | Stop reason: SURG

## 2019-08-29 RX ORDER — MIDAZOLAM HYDROCHLORIDE 1 MG/ML
INJECTION INTRAMUSCULAR; INTRAVENOUS AS NEEDED
Status: DISCONTINUED | OUTPATIENT
Start: 2019-08-29 | End: 2019-08-29 | Stop reason: SURG

## 2019-08-29 RX ORDER — DEXAMETHASONE SODIUM PHOSPHATE 10 MG/ML
INJECTION, SOLUTION INTRAMUSCULAR; INTRAVENOUS AS NEEDED
Status: DISCONTINUED | OUTPATIENT
Start: 2019-08-29 | End: 2019-08-29 | Stop reason: SURG

## 2019-08-29 RX ORDER — FENTANYL CITRATE 50 UG/ML
INJECTION, SOLUTION INTRAMUSCULAR; INTRAVENOUS AS NEEDED
Status: DISCONTINUED | OUTPATIENT
Start: 2019-08-29 | End: 2019-08-29 | Stop reason: SURG

## 2019-08-29 RX ORDER — HYDROMORPHONE HCL/PF 1 MG/ML
0.5 SYRINGE (ML) INJECTION EVERY 2 HOUR PRN
Status: DISCONTINUED | OUTPATIENT
Start: 2019-08-29 | End: 2019-09-05 | Stop reason: HOSPADM

## 2019-08-29 RX ORDER — SODIUM CHLORIDE 9 MG/ML
INJECTION, SOLUTION INTRAVENOUS CONTINUOUS PRN
Status: DISCONTINUED | OUTPATIENT
Start: 2019-08-29 | End: 2019-08-29 | Stop reason: SURG

## 2019-08-29 RX ORDER — FENTANYL CITRATE/PF 50 MCG/ML
25 SYRINGE (ML) INJECTION
Status: DISCONTINUED | OUTPATIENT
Start: 2019-08-29 | End: 2019-08-29 | Stop reason: HOSPADM

## 2019-08-29 RX ORDER — HEPARIN SODIUM 5000 [USP'U]/ML
5000 INJECTION, SOLUTION INTRAVENOUS; SUBCUTANEOUS EVERY 12 HOURS SCHEDULED
Status: DISCONTINUED | OUTPATIENT
Start: 2019-08-29 | End: 2019-09-02

## 2019-08-29 RX ORDER — CEFAZOLIN SODIUM 1 G/50ML
SOLUTION INTRAVENOUS AS NEEDED
Status: DISCONTINUED | OUTPATIENT
Start: 2019-08-29 | End: 2019-08-29 | Stop reason: SURG

## 2019-08-29 RX ORDER — SODIUM CHLORIDE 9 MG/ML
125 INJECTION, SOLUTION INTRAVENOUS CONTINUOUS
Status: DISCONTINUED | OUTPATIENT
Start: 2019-08-29 | End: 2019-08-30

## 2019-08-29 RX ORDER — ONDANSETRON 2 MG/ML
4 INJECTION INTRAMUSCULAR; INTRAVENOUS ONCE AS NEEDED
Status: DISCONTINUED | OUTPATIENT
Start: 2019-08-29 | End: 2019-08-29 | Stop reason: HOSPADM

## 2019-08-29 RX ORDER — DIPHENHYDRAMINE HYDROCHLORIDE 50 MG/ML
25 INJECTION INTRAMUSCULAR; INTRAVENOUS EVERY 6 HOURS PRN
Status: DISCONTINUED | OUTPATIENT
Start: 2019-08-29 | End: 2019-09-05 | Stop reason: HOSPADM

## 2019-08-29 RX ORDER — ALBUMIN, HUMAN INJ 5% 5 %
SOLUTION INTRAVENOUS CONTINUOUS PRN
Status: DISCONTINUED | OUTPATIENT
Start: 2019-08-29 | End: 2019-08-29 | Stop reason: SURG

## 2019-08-29 RX ORDER — LIDOCAINE HYDROCHLORIDE 10 MG/ML
0.5 INJECTION, SOLUTION EPIDURAL; INFILTRATION; INTRACAUDAL; PERINEURAL ONCE AS NEEDED
Status: COMPLETED | OUTPATIENT
Start: 2019-08-29 | End: 2019-08-29

## 2019-08-29 RX ORDER — MEPERIDINE HYDROCHLORIDE 25 MG/ML
12.5 INJECTION INTRAMUSCULAR; INTRAVENOUS; SUBCUTANEOUS AS NEEDED
Status: DISCONTINUED | OUTPATIENT
Start: 2019-08-29 | End: 2019-08-29 | Stop reason: HOSPADM

## 2019-08-29 RX ORDER — HYDROMORPHONE HCL/PF 1 MG/ML
SYRINGE (ML) INJECTION AS NEEDED
Status: DISCONTINUED | OUTPATIENT
Start: 2019-08-29 | End: 2019-08-29 | Stop reason: SURG

## 2019-08-29 RX ORDER — PROMETHAZINE HYDROCHLORIDE 25 MG/ML
12.5 INJECTION, SOLUTION INTRAMUSCULAR; INTRAVENOUS ONCE AS NEEDED
Status: DISCONTINUED | OUTPATIENT
Start: 2019-08-29 | End: 2019-08-29 | Stop reason: HOSPADM

## 2019-08-29 RX ORDER — GLYCOPYRROLATE 0.2 MG/ML
INJECTION INTRAMUSCULAR; INTRAVENOUS AS NEEDED
Status: DISCONTINUED | OUTPATIENT
Start: 2019-08-29 | End: 2019-08-29 | Stop reason: SURG

## 2019-08-29 RX ORDER — ONDANSETRON 2 MG/ML
INJECTION INTRAMUSCULAR; INTRAVENOUS AS NEEDED
Status: DISCONTINUED | OUTPATIENT
Start: 2019-08-29 | End: 2019-08-29 | Stop reason: SURG

## 2019-08-29 RX ORDER — LIDOCAINE HYDROCHLORIDE AND EPINEPHRINE 15; 5 MG/ML; UG/ML
INJECTION, SOLUTION EPIDURAL
Status: COMPLETED | OUTPATIENT
Start: 2019-08-29 | End: 2019-08-29

## 2019-08-29 RX ORDER — SODIUM CHLORIDE, SODIUM LACTATE, POTASSIUM CHLORIDE, CALCIUM CHLORIDE 600; 310; 30; 20 MG/100ML; MG/100ML; MG/100ML; MG/100ML
125 INJECTION, SOLUTION INTRAVENOUS CONTINUOUS
Status: DISCONTINUED | OUTPATIENT
Start: 2019-08-29 | End: 2019-08-29

## 2019-08-29 RX ORDER — NEOSTIGMINE METHYLSULFATE 1 MG/ML
INJECTION INTRAVENOUS AS NEEDED
Status: DISCONTINUED | OUTPATIENT
Start: 2019-08-29 | End: 2019-08-29 | Stop reason: SURG

## 2019-08-29 RX ORDER — LIDOCAINE HYDROCHLORIDE 10 MG/ML
INJECTION, SOLUTION INFILTRATION; PERINEURAL AS NEEDED
Status: DISCONTINUED | OUTPATIENT
Start: 2019-08-29 | End: 2019-08-29 | Stop reason: SURG

## 2019-08-29 RX ORDER — ALBUTEROL SULFATE 2.5 MG/3ML
2.5 SOLUTION RESPIRATORY (INHALATION) ONCE AS NEEDED
Status: DISCONTINUED | OUTPATIENT
Start: 2019-08-29 | End: 2019-08-29 | Stop reason: HOSPADM

## 2019-08-29 RX ORDER — HYDROMORPHONE HCL/PF 1 MG/ML
0.5 SYRINGE (ML) INJECTION
Status: DISCONTINUED | OUTPATIENT
Start: 2019-08-29 | End: 2019-08-29 | Stop reason: HOSPADM

## 2019-08-29 RX ADMIN — PHENYLEPHRINE HYDROCHLORIDE 100 MCG: 10 INJECTION INTRAVENOUS at 17:03

## 2019-08-29 RX ADMIN — FENTANYL CITRATE 25 MCG: 50 INJECTION, SOLUTION INTRAMUSCULAR; INTRAVENOUS at 20:36

## 2019-08-29 RX ADMIN — LIDOCAINE HYDROCHLORIDE 50 MG: 10 INJECTION, SOLUTION INFILTRATION; PERINEURAL at 16:43

## 2019-08-29 RX ADMIN — ALBUMIN (HUMAN): 12.5 SOLUTION INTRAVENOUS at 17:03

## 2019-08-29 RX ADMIN — SODIUM CHLORIDE 125 ML/HR: 0.9 INJECTION, SOLUTION INTRAVENOUS at 20:39

## 2019-08-29 RX ADMIN — SODIUM CHLORIDE, SODIUM LACTATE, POTASSIUM CHLORIDE, AND CALCIUM CHLORIDE: .6; .31; .03; .02 INJECTION, SOLUTION INTRAVENOUS at 16:37

## 2019-08-29 RX ADMIN — FENTANYL CITRATE 50 MCG: 50 INJECTION, SOLUTION INTRAMUSCULAR; INTRAVENOUS at 16:43

## 2019-08-29 RX ADMIN — HYDROMORPHONE HYDROCHLORIDE 0.5 MG: 1 INJECTION, SOLUTION INTRAMUSCULAR; INTRAVENOUS; SUBCUTANEOUS at 19:49

## 2019-08-29 RX ADMIN — ALBUMIN (HUMAN): 12.5 SOLUTION INTRAVENOUS at 16:56

## 2019-08-29 RX ADMIN — PHENYLEPHRINE HYDROCHLORIDE 30 MCG/MIN: 10 INJECTION INTRAVENOUS at 17:00

## 2019-08-29 RX ADMIN — MIDAZOLAM 2 MG: 1 INJECTION INTRAMUSCULAR; INTRAVENOUS at 15:11

## 2019-08-29 RX ADMIN — ROCURONIUM BROMIDE 40 MG: 10 INJECTION INTRAVENOUS at 16:44

## 2019-08-29 RX ADMIN — HEPARIN SODIUM 5000 UNITS: 5000 INJECTION INTRAVENOUS; SUBCUTANEOUS at 22:44

## 2019-08-29 RX ADMIN — SODIUM CHLORIDE: 0.9 INJECTION, SOLUTION INTRAVENOUS at 16:50

## 2019-08-29 RX ADMIN — LIDOCAINE HYDROCHLORIDE AND EPINEPHRINE 3 ML: 15; 5 INJECTION, SOLUTION EPIDURAL at 15:13

## 2019-08-29 RX ADMIN — NEOSTIGMINE METHYLSULFATE 3 MG: 1 INJECTION, SOLUTION INTRAVENOUS at 19:45

## 2019-08-29 RX ADMIN — SODIUM CHLORIDE, SODIUM LACTATE, POTASSIUM CHLORIDE, AND CALCIUM CHLORIDE 125 ML/HR: .6; .31; .03; .02 INJECTION, SOLUTION INTRAVENOUS at 13:19

## 2019-08-29 RX ADMIN — Medication 500 MG: at 16:47

## 2019-08-29 RX ADMIN — ROCURONIUM BROMIDE 10 MG: 10 INJECTION INTRAVENOUS at 18:43

## 2019-08-29 RX ADMIN — ROCURONIUM BROMIDE 10 MG: 10 INJECTION INTRAVENOUS at 17:03

## 2019-08-29 RX ADMIN — DEXAMETHASONE SODIUM PHOSPHATE 10 MG: 10 INJECTION, SOLUTION INTRAMUSCULAR; INTRAVENOUS at 16:51

## 2019-08-29 RX ADMIN — PHENYLEPHRINE HYDROCHLORIDE 100 MCG: 10 INJECTION INTRAVENOUS at 16:51

## 2019-08-29 RX ADMIN — FENTANYL CITRATE 50 MCG: 50 INJECTION, SOLUTION INTRAMUSCULAR; INTRAVENOUS at 19:49

## 2019-08-29 RX ADMIN — CEFAZOLIN SODIUM 1000 MG: 1 SOLUTION INTRAVENOUS at 16:47

## 2019-08-29 RX ADMIN — GLYCOPYRROLATE 0.4 MG: 0.2 INJECTION, SOLUTION INTRAMUSCULAR; INTRAVENOUS at 19:45

## 2019-08-29 RX ADMIN — LIDOCAINE HYDROCHLORIDE 0.5 ML: 10 INJECTION, SOLUTION EPIDURAL; INFILTRATION; INTRACAUDAL; PERINEURAL at 13:20

## 2019-08-29 RX ADMIN — ROPIVACAINE HYDROCHLORIDE: 5 INJECTION, SOLUTION EPIDURAL; INFILTRATION; PERINEURAL at 20:15

## 2019-08-29 RX ADMIN — ONDANSETRON 4 MG: 2 INJECTION INTRAMUSCULAR; INTRAVENOUS at 19:30

## 2019-08-29 RX ADMIN — FENTANYL CITRATE 25 MCG: 50 INJECTION, SOLUTION INTRAMUSCULAR; INTRAVENOUS at 20:32

## 2019-08-29 RX ADMIN — PROPOFOL 150 MG: 10 INJECTION, EMULSION INTRAVENOUS at 16:44

## 2019-08-29 NOTE — PERIOPERATIVE NURSING NOTE
SPOKE WITH   WITH UPDATE PROCEDURE GOING WELL  DR Chase Hernandez WOULD BE OUT TO SPEAK TO HIM WHEN PROCEDURE COMPLETED

## 2019-08-29 NOTE — ANESTHESIA PREPROCEDURE EVALUATION
Review of Systems/Medical History          Cardiovascular  Exercise tolerance (METS): >4,  Hyperlipidemia, COFFEY,    Pulmonary  Shortness of breath,        GI/Hepatic  Negative GI/hepatic ROS   Liver disease ,        Negative  ROS        Endo/Other  Negative endo/other ROS      GYN  Negative gynecology ROS          Hematology  Anemia ,     Musculoskeletal  Rheumatoid arthritis Severity: severe,   Arthritis     Neurology  Negative neurology ROS      Psychology   Anxiety,              Physical Exam    Airway    Mallampati score: I  TM Distance: >3 FB  Neck ROM: full     Dental   No notable dental hx     Cardiovascular  Rhythm: regular, Rate: normal, Cardiovascular exam normal    Pulmonary  Pulmonary exam normal Breath sounds clear to auscultation,     Other Findings        Anesthesia Plan  ASA Score- 3     Anesthesia Type- general with ASA Monitors  Additional Monitors:   Airway Plan: ETT  Comment: Patient seen and examined  History reviewed  Patient to be done under general anesthesia with ETT and routine monitors  Epidural for post op pain control  Risks discussed with the patient  Consent obtained        Plan Factors-  Patient did not smoke on day of surgery  Induction- intravenous  Postoperative Plan- Plan for postoperative opioid use  Planned trial extubation    Informed Consent- Anesthetic plan and risks discussed with patient  I personally reviewed this patient with the CRNA  Discussed and agreed on the Anesthesia Plan with the CRNA  Ethan Willams

## 2019-08-29 NOTE — ANESTHESIA PROCEDURE NOTES
Epidural Block    Patient location during procedure: holding area  Start time: 8/29/2019 3:00 PM  Reason for block: at surgeon's request and post-op pain management  Staffing  Anesthesiologist: Lena Anderson MD  Performed: anesthesiologist   Preanesthetic Checklist  Completed: patient identified, site marked, surgical consent, pre-op evaluation, timeout performed, IV checked, risks and benefits discussed and monitors and equipment checked  Epidural  Patient position: sitting  Prep: ChloraPrep  Patient monitoring: cardiac monitor, frequent blood pressure checks, continuous pulse ox and heart rate  Approach: midline  Location: thoracic (1-12)  Injection technique: KWAME air  Needle  Needle type: Tuohy   Needle gauge: 18 G  Catheter type: side hole  Catheter size: 20 G  Catheter at skin depth: 9 cm  Test dose: negativelidocaine 1 5% with epinephrine 1:200,000 test dose, 3 mLnegative aspiration for CSF, negative aspiration for heme and no paresthesia on injection  patient tolerated the procedure well with no immediate complications

## 2019-08-29 NOTE — H&P
[facility]  1965  [id number]  [address]  [facility] [address]         Chief Complaint   Patient presents with    Colon Cancer         Assessment/Plan:     No problem-specific Assessment & Plan notes found for this encounter          Diagnoses and all orders for this visit:     Colon cancer metastasized to liver [facility])  -     Case request operating room: LAPAROTOMY EXPLORATORY W/ BOWEL RESECTION, transverse colon; Standing  -     EKG 12 lead; Future  -     Type and screen; Future  -     APTT; Future  -     Protime-INR; Future  -     Case request operating room: LAPAROTOMY EXPLORATORY W/ BOWEL RESECTION, transverse colon     Other orders  -     Incentive spirometry; Standing  -     Insert and maintain IV line; Standing  -     Void On-Call to O R ; Standing  -     Place sequential compression device; Standing         Advance Care Planning/Advance Directives:  Discussed disease status, cancer treatment plans and/or cancer treatment goals with the patient                Colon cancer metastasized to liver [facility])     11/14/2018 Initial Diagnosis       Colon cancer metastasized to [facility])        4/19/2019 -  Chemotherapy       bevacizumab (AVASTIN) 900 mg in sodium chloride 0 9 % 100 mL IVPB, 15 mg/kg, Intravenous, Once, 4 of 4 cycles  Dose modification: 7 5 mg/kg (original dose 15 mg/kg, Cycle 2, Reason: Other (See Comments), Comment: regimen)  Administration: 450 mg (5/20/2019), 450 mg (6/10/2019), 450 mg (7/1/2019)            History of Present Illness:  Patient is a 59-year-old woman with metastatic colon cancer presently being treated with chemotherapy including Avastin  She has had issues with bleeding/anemia and evidence of tumor progression in the transverse colon  Her liver metastases appear to be responding to therapy however, and therefore continued treatment with Avastin and chemotherapy is anticipated    She has been referred to discuss palliative tumor resection  She has had no symptoms of obstruction      Review of Systems   Constitutional: Negative  HENT: Negative  Eyes: Negative  Respiratory: Negative  Cardiovascular: Negative  Gastrointestinal: Negative  Endocrine: Negative  Genitourinary: Negative  Musculoskeletal: Negative  Skin: Negative  Allergic/Immunologic: Negative  Neurological: Negative  Hematological: Negative  Psychiatric/Behavioral: Negative              Patient Active Problem List   Diagnosis    Arthritis    Hay fever    Herpes zoster    Seasonal allergies    Anxiety    Hot flashes    Impaired fasting glucose    Fatigue    Rheumatoid arthritis involving both hands with negative rheumatoid factor (HCC)    Other hyperlipidemia    Megaloblastic anemia    Severe anemia    Liver masses    Colon cancer metastasized to liver [facility])      Medical History        Past Medical History:   Diagnosis Date    Anxious mood      [facility]      Colon cancer ([facility]      History of chemotherapy      [facility]           Surgical History         Past Surgical History:   Procedure Laterality Date    APPENDECTOMY        ESOPHAGOGASTRODUODENOSCOPY N/A 9/14/2018     Procedure: ESOPHAGOGASTRODUODENOSCOPY (EGD) with polypectomy;  Surgeon: Ning Miller MD;  [facility];   Service: Gastroenterology    IR IMAGE GUIDED BIOPSY/ASPIRATION LIVER   10/25/2018    IR PORT PLACEMENT   11/7/2018    TUBAL LIGATION         resolved 2007    WISDOM TOOTH EXTRACTION         resolved 1990               Family History   Problem Relation Age of Onset    Anxiety disorder Mother      Hypertension Father      Diabetes Father      Heart attack Maternal Grandmother           acute MI      Social History               Socioeconomic History    Marital status: Single       Spouse name: Not on file    Number of children: Not on file    Years of education: Not on file    Highest education level: Not on file   Occupational History    Occupation: logistics   Social Needs    Financial resource strain: Not on file    Food insecurity:       Worry: Not on file       Inability: Not on file    Transportation needs:       Medical: Not on file       Non-medical: Not on file   Tobacco Use    Smoking status: Former Smoker    Smokeless tobacco: Never Used   Substance and Sexual Activity    Alcohol use: No    Drug use: No    Sexual activity: Yes       Partners: Male       Birth control/protection: None       Comment: patient reports tubal    Lifestyle    Physical activity:       Days per week: Not on file       Minutes per session: Not on file    Stress: Not on file   Relationships    Social connections:       Talks on phone: Not on file       Gets together: Not on file       Attends [employer]ism service: Not on file       Active member of club or organization: Not on file       Attends meetings of clubs or organizations: Not on file       Relationship status: Not on file    Intimate partner violence:       Fear of current or ex partner: Not on file       Emotionally abused: Not on file       Physically abused: Not on file       Forced sexual activity: Not on file   Other Topics Concern    Not on file   Social History Narrative    Not on file            Current Outpatient Medications:     ferrous sulfate 325 (65 Fe) mg tablet, Take 1 tablet (325 mg total) by mouth 3 (three) times a day with meals, Disp: 90 tablet, Rfl: 0    folic acid (FOLVITE) 1 mg tablet, 1 mg, Disp: , Rfl:     Iron-Vitamin C (IRON 100/C) 100-250 MG TABS, iron, Disp: , Rfl:     methotrexate 2 5 mg tablet, 2 5 mg, Disp: , Rfl:     Multiple Vitamin (MULTIVITAMIN) capsule, Take 1 capsule by mouth daily, Disp: , Rfl:     Omega-3 Fatty Acids (FISH OIL PO), Take by mouth, Disp: , Rfl:   No Known Allergies  There were no vitals filed for this visit      Physical Exam     /78   Pulse 98   Temp 98 8 °F (37 1 °C) (Tympanic)   Resp 16   Ht 5' 5" (1 651 m)   Wt 59 4 kg (131 lb)   LMP 09/28/2018 (Exact Date)   SpO2 96%   BMI 21 80 kg/m²     Constitutional: She is oriented to person, place, and time  She appears well-developed and well-nourished  HENT:   Head: Normocephalic and atraumatic  Right Ear: External ear normal    Left Ear: External ear normal    Eyes: Pupils are equal, round, and reactive to light  EOM are normal    Neck: Normal range of motion  Neck supple  Cardiovascular: Normal rate, regular rhythm and normal heart sounds  Pulmonary/Chest: Effort normal and breath sounds normal    Abdominal: Soft  Bowel sounds are normal  She exhibits no distension and no mass  There is no tenderness  There is no rebound and no guarding  Musculoskeletal: Normal range of motion  Neurological: She is alert and oriented to person, place, and time  Skin: Skin is warm and dry  Psychiatric: She has a normal mood and affect  Her behavior is normal          Pathology:     Case Report   Surgical Pathology Report                         Case: [id number]                                    Authorizing Provider: Joon Whaley MD         Collected:           10/25/2018 0855               Ordering Location:     [facility]      Received:            10/25/2018 [address] [place] [address] Short Stay Center                                                    Pathologist:           Iain Block MD                                                                Specimen:    Liver, Liver Mass X7 Cores                                                                  Final Diagnosis   A  Liver mass (core needle biopsy):  - Metastatic adenocarcinoma with extensive necrosis      Comment:  - The tumor cells stain for CDX2 diffusely with focal CK20 expression and absent CK7 staining consistent with a colorectal primary     - Intradepartmental consultation concurs with the diagnosis of adenocarcinoma   -   Maurisio Christie was notified of the diagnosis on 10/29/18 at 2:42 pm via Epic messaging/email    - Tissue is INSUFFICIENT for molecular studies  Electronically signed by Diego Morfin MD on 10/29/2018 at  2:44 PM         Labs:        Lab Results   Component Value Date     CEA 7 1 (H) 07/15/2019            Lab Results   Component Value Date     WBC 7 94 07/15/2019     HGB 10 6 (L) 07/15/2019     HCT 34 6 (L) 07/15/2019     MCV 99 (H) 07/15/2019      07/15/2019            Imaging  Ct Chest Abdomen Pelvis W Contrast     Result Date: 7/17/2019  Narrative: CT CHEST, ABDOMEN AND PELVIS WITH IV CONTRAST INDICATION:   C18 9: Malignant neoplasm of colon, unspecified C78 7: Secondary malignant neoplasm of liver and intrahepatic bile duct  COMPARISON:  1/29/2019 TECHNIQUE: CT examination of the chest, abdomen and pelvis was performed  Axial, sagittal, and coronal 2D reformatted images were created from the source data and submitted for interpretation  Radiation dose length product (DLP) for this visit:  509 mGy-cm   This examination, like all CT scans performed in the [facility], was performed utilizing techniques to minimize radiation dose exposure, including the use of iterative reconstruction and automated exposure control  IV Contrast:  100 mL of iodixanol (VISIPAQUE) Enteric Contrast: Enteric contrast was administered  FINDINGS: CHEST LUNGS:  Lungs are clear  There is no tracheal or endobronchial lesion  PLEURA:  There are new low density foci along the right hemidiaphragm measuring 0 9 x 3 5 cm and along the right heart border measuring 1 3 x 2 2 cm  HEART/GREAT VESSELS:  Unremarkable for patient's age  MEDIASTINUM AND ANTHONY:  Unremarkable  CHEST WALL AND LOWER NECK:   Unremarkable  ABDOMEN LIVER/BILIARY TREE:  Again seen are numerous liver metastasis  Lesions have decreased in size compared to the most recent prior study    A lesion in the lateral segment of the left lobe measures 2 0 x 2 2 cm, previously 2 4 x 2 5 cm  A lesion posteriorly at the dome measures 1 6 x 1 8 cm, previously 2 0 x 2 1 cm  A lesion peripherally in the anterior segment of the right lobe measures 1 1 x 1 2 cm, previously 1 9 x 2 4 cm  GALLBLADDER:  There are gallstone(s) within the gallbladder, without pericholecystic inflammatory changes  SPLEEN:  The spleen is enlarged measuring up to 14 cm, previously 10 cm when measured in a similar fashion  This is likely secondary to portal hypertension as the main portal vein is mildly increased in size and there are esophageal varices  There is no portal vein thrombosis, however  PANCREAS:  Unremarkable  ADRENAL GLANDS:  Unremarkable  KIDNEYS/URETERS:  There are left renal cysts  No hydronephrosis  STOMACH AND BOWEL:  There has been interval enlargement of the transverse colonic mass measuring 4 8 x 8 7 cm, previously 4 3 x 6 8 cm  There is new surrounding stranding which likely represents neoplastic infiltration of the surrounding mesentery  The  remainder of the bowel is unremarkable  There is no evidence of obstruction  APPENDIX:  No findings to suggest appendicitis  ABDOMINOPELVIC CAVITY:  No ascites or free intraperitoneal air  No lymphadenopathy  VESSELS:  Unremarkable for patient's age  PELVIS REPRODUCTIVE ORGANS:  Unremarkable for patient's age  URINARY BLADDER:  Unremarkable  ABDOMINAL WALL/INGUINAL REGIONS:  There is a stable fat-containing ventral hernia  OSSEOUS STRUCTURES:  No acute fracture or destructive osseous lesion       Impression: 1  Interval improvement in hepatic metastasis  2   Interval enlargement of primary transverse colonic mass with new mesenteric stranding likely due to neoplastic infiltration  No obstruction  3   New low density foci along the right hemidiaphragm and right heart border  While these measure fluid attenuation and could represent loculated effusions, pleural metastatic deposits are not excluded   4   Development of portal hypertension with splenomegaly and esophageal varices  Workstation performed: [id number]      I reviewed the above laboratory and imaging data      Discussion/Summary:  Enlarging primary transverse colon cancer, on chemotherapy  Plan for palliative resection given progressive enlargement and risk for bleeding  Rationale for this along with risks and benefits of surgery including infection, bleeding, abscess, leak, discussed with patient  All questions answered

## 2019-08-30 LAB
ANION GAP SERPL CALCULATED.3IONS-SCNC: 4 MMOL/L (ref 4–13)
BASOPHILS # BLD AUTO: 0 THOUSANDS/ΜL (ref 0–0.1)
BASOPHILS NFR BLD AUTO: 0 % (ref 0–1)
BUN SERPL-MCNC: 4 MG/DL (ref 5–25)
CALCIUM SERPL-MCNC: 7.7 MG/DL (ref 8.3–10.1)
CHLORIDE SERPL-SCNC: 110 MMOL/L (ref 100–108)
CO2 SERPL-SCNC: 27 MMOL/L (ref 21–32)
CREAT SERPL-MCNC: 0.49 MG/DL (ref 0.6–1.3)
EOSINOPHIL # BLD AUTO: 0 THOUSAND/ΜL (ref 0–0.61)
EOSINOPHIL NFR BLD AUTO: 0 % (ref 0–6)
ERYTHROCYTE [DISTWIDTH] IN BLOOD BY AUTOMATED COUNT: 14.6 % (ref 11.6–15.1)
GFR SERPL CREATININE-BSD FRML MDRD: 111 ML/MIN/1.73SQ M
GLUCOSE SERPL-MCNC: 119 MG/DL (ref 65–140)
HCT VFR BLD AUTO: 24.1 % (ref 34.8–46.1)
HGB BLD-MCNC: 7.1 G/DL (ref 11.5–15.4)
IMM GRANULOCYTES # BLD AUTO: 0.04 THOUSAND/UL (ref 0–0.2)
IMM GRANULOCYTES NFR BLD AUTO: 1 % (ref 0–2)
LYMPHOCYTES # BLD AUTO: 0.64 THOUSANDS/ΜL (ref 0.6–4.47)
LYMPHOCYTES NFR BLD AUTO: 9 % (ref 14–44)
MAGNESIUM SERPL-MCNC: 1.9 MG/DL (ref 1.6–2.6)
MCH RBC QN AUTO: 27.7 PG (ref 26.8–34.3)
MCHC RBC AUTO-ENTMCNC: 29.5 G/DL (ref 31.4–37.4)
MCV RBC AUTO: 94 FL (ref 82–98)
MONOCYTES # BLD AUTO: 0.3 THOUSAND/ΜL (ref 0.17–1.22)
MONOCYTES NFR BLD AUTO: 4 % (ref 4–12)
NEUTROPHILS # BLD AUTO: 6.15 THOUSANDS/ΜL (ref 1.85–7.62)
NEUTS SEG NFR BLD AUTO: 86 % (ref 43–75)
NRBC BLD AUTO-RTO: 0 /100 WBCS
PLATELET # BLD AUTO: 139 THOUSANDS/UL (ref 149–390)
PMV BLD AUTO: 8.6 FL (ref 8.9–12.7)
POTASSIUM SERPL-SCNC: 3.6 MMOL/L (ref 3.5–5.3)
RBC # BLD AUTO: 2.56 MILLION/UL (ref 3.81–5.12)
SODIUM SERPL-SCNC: 141 MMOL/L (ref 136–145)
WBC # BLD AUTO: 7.13 THOUSAND/UL (ref 4.31–10.16)

## 2019-08-30 PROCEDURE — G8989 SELF CARE D/C STATUS: HCPCS

## 2019-08-30 PROCEDURE — G8988 SELF CARE GOAL STATUS: HCPCS

## 2019-08-30 PROCEDURE — 83735 ASSAY OF MAGNESIUM: CPT | Performed by: SURGERY

## 2019-08-30 PROCEDURE — G8979 MOBILITY GOAL STATUS: HCPCS

## 2019-08-30 PROCEDURE — 97166 OT EVAL MOD COMPLEX 45 MIN: CPT

## 2019-08-30 PROCEDURE — 80048 BASIC METABOLIC PNL TOTAL CA: CPT | Performed by: SURGERY

## 2019-08-30 PROCEDURE — G8978 MOBILITY CURRENT STATUS: HCPCS

## 2019-08-30 PROCEDURE — 85025 COMPLETE CBC W/AUTO DIFF WBC: CPT | Performed by: SURGERY

## 2019-08-30 PROCEDURE — 99024 POSTOP FOLLOW-UP VISIT: CPT | Performed by: SURGERY

## 2019-08-30 PROCEDURE — G8987 SELF CARE CURRENT STATUS: HCPCS

## 2019-08-30 PROCEDURE — 97162 PT EVAL MOD COMPLEX 30 MIN: CPT

## 2019-08-30 RX ORDER — POTASSIUM CHLORIDE 14.9 MG/ML
20 INJECTION INTRAVENOUS
Status: COMPLETED | OUTPATIENT
Start: 2019-08-30 | End: 2019-08-31

## 2019-08-30 RX ORDER — DEXTROSE, SODIUM CHLORIDE, AND POTASSIUM CHLORIDE 5; .45; .15 G/100ML; G/100ML; G/100ML
50 INJECTION INTRAVENOUS CONTINUOUS
Status: DISCONTINUED | OUTPATIENT
Start: 2019-08-30 | End: 2019-09-02

## 2019-08-30 RX ORDER — NALBUPHINE HCL 10 MG/ML
5 AMPUL (ML) INJECTION
Status: ACTIVE | OUTPATIENT
Start: 2019-08-30 | End: 2019-09-02

## 2019-08-30 RX ORDER — MAGNESIUM SULFATE 1 G/100ML
1 INJECTION INTRAVENOUS ONCE
Status: COMPLETED | OUTPATIENT
Start: 2019-08-30 | End: 2019-08-30

## 2019-08-30 RX ORDER — ONDANSETRON 2 MG/ML
4 INJECTION INTRAMUSCULAR; INTRAVENOUS EVERY 4 HOURS PRN
Status: DISCONTINUED | OUTPATIENT
Start: 2019-08-30 | End: 2019-09-05 | Stop reason: HOSPADM

## 2019-08-30 RX ADMIN — HEPARIN SODIUM 5000 UNITS: 5000 INJECTION INTRAVENOUS; SUBCUTANEOUS at 08:25

## 2019-08-30 RX ADMIN — SODIUM CHLORIDE 125 ML/HR: 0.9 INJECTION, SOLUTION INTRAVENOUS at 11:15

## 2019-08-30 RX ADMIN — HYDROMORPHONE HYDROCHLORIDE 0.5 MG: 1 INJECTION, SOLUTION INTRAMUSCULAR; INTRAVENOUS; SUBCUTANEOUS at 15:27

## 2019-08-30 RX ADMIN — MAGNESIUM SULFATE HEPTAHYDRATE 1 G: 1 INJECTION, SOLUTION INTRAVENOUS at 14:08

## 2019-08-30 RX ADMIN — DEXTROSE, SODIUM CHLORIDE, AND POTASSIUM CHLORIDE 100 ML/HR: 5; .45; .15 INJECTION INTRAVENOUS at 13:06

## 2019-08-30 RX ADMIN — ROPIVACAINE HYDROCHLORIDE: 5 INJECTION, SOLUTION EPIDURAL; INFILTRATION; PERINEURAL at 14:13

## 2019-08-30 RX ADMIN — SODIUM CHLORIDE 125 ML/HR: 0.9 INJECTION, SOLUTION INTRAVENOUS at 03:55

## 2019-08-30 RX ADMIN — HEPARIN SODIUM 5000 UNITS: 5000 INJECTION INTRAVENOUS; SUBCUTANEOUS at 20:46

## 2019-08-30 RX ADMIN — SODIUM CHLORIDE, SODIUM LACTATE, POTASSIUM CHLORIDE, AND CALCIUM CHLORIDE 1000 ML: .6; .31; .03; .02 INJECTION, SOLUTION INTRAVENOUS at 23:29

## 2019-08-30 RX ADMIN — POTASSIUM CHLORIDE 20 MEQ: 200 INJECTION, SOLUTION INTRAVENOUS at 08:51

## 2019-08-30 RX ADMIN — POTASSIUM CHLORIDE 20 MEQ: 200 INJECTION, SOLUTION INTRAVENOUS at 06:32

## 2019-08-30 RX ADMIN — SODIUM CHLORIDE, SODIUM LACTATE, POTASSIUM CHLORIDE, AND CALCIUM CHLORIDE 1000 ML: .6; .31; .03; .02 INJECTION, SOLUTION INTRAVENOUS at 02:17

## 2019-08-30 NOTE — ANESTHESIA POSTPROCEDURE EVALUATION
Post-Op Assessment Note    CV Status:  Stable  Pain Score: 0    Pain management: adequate     Mental Status:  Awake   Hydration Status:  Stable   PONV Controlled:  None   Airway Patency:  Patent   Post Op Vitals Reviewed: Yes      Staff: Anesthesiologist, CRNA     Post-op block assessment: sterile dressing applied and no complications        BP   112/66   Temp   97 6   Pulse  81   Resp   16   SpO2   99

## 2019-08-30 NOTE — OCCUPATIONAL THERAPY NOTE
[address]  Evaluation     Patient Name: Karen PARISH Date: 8/30/2019  Problem List  Patient Active Problem List   Diagnosis    Arthritis    Hay fever    Herpes zoster    Seasonal allergies    Anxiety    Hot flashes    Impaired fasting glucose    Fatigue    Rheumatoid arthritis involving both hands with negative rheumatoid factor (HCC)    Other hyperlipidemia    Megaloblastic anemia    Severe anemia    Liver masses    Colon cancer metastasized [facility] liver [facility])     Past Medical History  Past Medical History:   Diagnosis Date    Anxious mood     [facility]     Colon cancer [facility]     History of chemotherapy     Rheumatoid arthritis ([facility] )      Past Surgical History  Past Surgical History:   Procedure Laterality Date    APPENDECTOMY      ESOPHAGOGASTRODUODENOSCOPY N/A 9/14/2018    Procedure: ESOPHAGOGASTRODUODENOSCOPY (EGD) with polypectomy;  Surgeon: Carlos Lovell MD;  Location: [facility]; Service: Gastroenterology    IR IMAGE GUIDED [address] [place] [address]  10/25/2018    IR PORT PLACEMENT  11/7/2018    r chest    MI PART REMOVAL COLON W ANASTOMOSIS N/A 8/29/2019    Procedure: EXTENDED RIGHT COLON RESECTION;  Surgeon: Brenton Guerrero MD;  Location:  MAIN OR;  Service: Surgical Oncology    TUBAL LIGATION      resolved 2007    WISDOM TOOTH EXTRACTION      resolved 1990 08/30/19 1515   Note Type   Note type Eval only   Restrictions/Precautions   Weight Bearing Precautions Per Order No   Other Precautions Multiple lines; Fall Risk;Pain   Pain Assessment   Pain Assessment 0-10   Pain Score 5   Pain Type Acute pain   Pain Location Shoulder   Pain Orientation Left   Hospital Pain Intervention(s) Repositioned; Ambulation/increased activity; Emotional support   Response to Interventions tolerated   Home Living   Type of [address] Two level;Performs ADLs on one level; Able to live on main level with bedroom/bathroom  (1st floor set up, 2 NELSON)   Bathroom Shower/Tub Tub/shower unit   Bathroom Toilet Standard   Bathroom Equipment   (none)   Home Equipment   (none)   Prior Function   Level of [place] Independent with ADLs and functional mobility   Lives With Spouse; Son   Sarita Namt Help From Family   ADL Assistance Independent   IADLs Independent   Falls in the last 6 months 0   Vocational Full time employment   Comments Pt reports her spouse works during day; however her mother will be staying with her and able to assist throughout the day as needed  Lifestyle   Autonomy At baseline pt was completing all ADLs/IADLs IND, ambulating IND w/o an AD, (+) driving, (+) working FT for [employer]  Reciprocal Relationships supportive family   Service to Others works for [address] in [employer] pt enjoys spending time with her son   Psychosocial   Psychosocial (WDL) WDL   ADL   Eating Assistance Unable to assess  (currently NPO)   Grooming Assistance 5  Supervision/Setup   UB Bathing Assistance 5  Supervision/Setup   LB Bathing Assistance 4  Minimal Assistance   [address] 5  Supervision/Setup    [address] 4  Minimal Assistance   LB Dressing Deficit Steadying;Supervision/safety; Increased time to complete   Toileting Assistance  5  Supervision/Setup   Bed Mobility   Supine to Sit 5  Supervision   Additional items Verbal cues   Additional Comments Pt educated on log roll and demo good technique   Transfers   Sit to Stand 5  Supervision   Additional items Increased time required   Stand to Sit 5  Supervision   Additional items Increased time required   Functional Mobility   Functional Mobility 5  Supervision   Additional Comments pt demo ability to ambulate household distances   Additional items   (IV pole)   Balance   Static Sitting Good   Dynamic Sitting Good   Static Standing Fair   Dynamic Standing Fair   Activity Tolerance   Activity Tolerance Patient limited by pain; Patient limited by fatigue   Nurse Made Aware Spoke to RN - pt appropraite to be seen   RUE Assessment   RUE Assessment WFL   LUE Assessment   LUE Assessment WFL  (mildly limited LUE AROM (limited by lines & pain))   Hand Function   Gross Motor Coordination Functional   Fine Motor Coordination Functional  (increased time - reports baseline neuropathy and RA)   Cognition   Overall Cognitive Status [facility]   Arousal/Participation Alert; Cooperative   Attention Within functional limits   Orientation Level Oriented X4   Memory Within functional limits   Following Commands Follows all commands and directions without difficulty   Assessment   Assessment Pt is a 47 y o  female who was [facility] to [facility] on 8/29/2019 with Colon cancer metastasized to liver ([facility] [address] [facility] )   Pt s/p "EXTENDED RIGHT COLON RESECTION (N/A); EN BLOC RESECTION OF ILEUM; REPAIR OF PREVIOUS INCISIONAL HERNIA" 8/29/2019  Pt's problem list also includes PMH of arthritis, anxiety, RA, HLD, megaloblastic, liver masses  At baseline pt was completing all ADLs/IADLs IND, ambulating IND w/o an AD, (+) driving, (+) working FT for [employer]  Pt lives with her spouse and 15year old son in a 2  with 1st floor set up and 2 NELSON  Currently pt requires SUP-MIN A for overall ADLS and for functional mobility/transfers  Pt reports her spouse works during day; however her mother will be staying with her and able to assist throughout the day as needed  Pt reports no further questions/concerns and has adequate support at home  From OT standpoint, recommend HOME WITH FAMILY SUPPORT upon D/C  No further acute OT services recommended at this time  Recommend pt continue to participate in ADLs/mobility with nursing and restorative staff     Goals   Patient Goals to go home   Plan   OT Frequency Eval only   Recommendation   OT Discharge Recommendation Home with family support   Equipment Recommended Tub seat with back   Barthel Index   Feeding 0  (NPO)   Bathing 0   Grooming Score 5   Dressing Score 5 Bladder Score 10   Bowels Score 10   Toilet Use Score 5   Transfers (Bed/Chair) Score 10   Mobility (Level Surface) Score 10   Stairs Score 0   Barthel Index Score 55   Modified Reji Scale   Modified [place] Scale 3          Rosa Maria Singh, OT

## 2019-08-30 NOTE — CONSULTS
Consultation - Acute Pain Service   Viola Loomis 47 y o  female MRN: [id number]  Unit/Bed#: [facility] 910-01 Encounter: [id number]               Assessment/Plan     Assessment:   47 yr old F POD 1 s/p Exlap, extended R colon resection with en bloc resection small bowel, repair of umbilical hernia, with thoracic epidural for postop pain control    Opioid naiive patient, tolerates tylenol and ibuprofen products in the past    Pain has so far been well controlled with epidural  She does use PCEA button for breakthrough pain relief  She does complain of left shoulder referred pain, to which she is aware that she may ask for IV dilaudid PRN  She did have pruritis overnight of the chest area, but she says is tolerable now  She has been OOB to chair earlier today without much issue  She tolerates sips of clears for now  Upon inspection of backside, epidural is well secured, clean, dry, intact  Alligator clip is well secured on left shoulder area  Plan:   - continue epidural at its current settings for 3-5 days time, as per surgical team planning   - continue dilaudid 0 5 mg IV Q 2 hrs PRN severe breakthrough pain  - will order IV nubain for pruritis PRN    APS will continue to follow; please contact APS ( btn [phone]) with any further questions    History of Present Illness    Admit Date:  8/29/2019  Hospital Day:  1 day  Primary Service:  Oncology-Surgical  Attending Provider:  Graciela Nice MD  Reason for Consult / Principal Problem: acute postop pain, with epidural for pain control, opioid naiive  Hx and PE limited by: none  HPI: Viola Loomis is a 47y o  year old female who presents with (see above assessment section)    Inpatient consult to Acute Pain Service  Consult performed by:  Beena Bronson MD  Consult ordered by: Adwoa Finney MD          Review of Systems    Historical Information   Past Medical History:   Diagnosis Date    Anxious mood     Cancer [facility])     [facility] cancer [facility] [address] )     History of chemotherapy     Rheumatoid arthritis ([facility] [address] )      Past Surgical History:   Procedure Laterality Date    APPENDECTOMY      ESOPHAGOGASTRODUODENOSCOPY N/A 2018    Procedure: ESOPHAGOGASTRODUODENOSCOPY (EGD) with polypectomy;  Surgeon: Carmelina Levy MD;  Location: [facility];   Service: Gastroenterology    IR IMAGE GUIDED [id number] [place] LIVER  10/25/2018    IR PORT PLACEMENT  2018    r chest    TUBAL LIGATION      resolved     WISDOM TOOTH EXTRACTION      resolved      Social History   Social History     Substance and Sexual Activity   Alcohol Use Not Currently    Alcohol/week: 0 0 standard drinks    Frequency: Never    Drinks per session: Patient refused    Binge frequency: Never     Social History     Substance and Sexual Activity   Drug Use No     Social History     Tobacco Use   Smoking Status Former Smoker    Packs/day: 0 00    Years: 0 00    Pack years: 0 00    Last attempt to quit: 2000    Years since quittin 0   Smokeless Tobacco Never Used     Family History: non-contributory    Meds/Allergies   all current active meds have been reviewed, current meds:   Current Facility-Administered Medications   Medication Dose Route Frequency    diphenhydrAMINE (BENADRYL) injection 25 mg  25 mg Intravenous Q6H PRN    heparin (porcine) subcutaneous injection 5,000 Units  5,000 Units Subcutaneous Q12H [facility]    HYDROmorphone (DILAUDID) injection 0 5 mg  0 5 mg Intravenous Q2H PRN    lactated ringers bolus 1,000 mL  1,000 mL Intravenous Once PRN    And    lactated ringers bolus 1,000 mL  1,000 mL Intravenous Once PRN    ondansetron (ZOFRAN) injection 4 mg  4 mg Intravenous Q6H PRN    ondansetron (ZOFRAN) injection 4 mg  4 mg Intravenous Q4H PRN    potassium chloride 20 mEq IVPB (premix)  20 mEq Intravenous Q2H    ropivacaine 0 1% and fentaNYL 2 mcg/mL PCEA   Epidural Continuous    sodium chloride 0 9 % bolus 1,000 mL  1,000 mL Intravenous Once PRN    And    sodium chloride 0 9 % bolus 1,000 mL  1,000 mL Intravenous Once PRN    sodium chloride 0 9 % infusion  125 mL/hr Intravenous Continuous    and PTA meds:   Prior to Admission Medications   Prescriptions Last Dose Informant Patient Reported? Taking? Multiple Vitamin (MULTIVITAMIN) capsule 2019 Self Yes Yes   Sig: Take 1 capsule by mouth daily   Omega-3 Fatty Acids (FISH OIL PO) 2019  Yes Yes   Sig: Take by mouth daily    cyanocobalamin (VITAMIN B-12) 1,000 mcg tablet 2019  Yes Yes   Sig: Take by mouth daily   ferrous sulfate 325 (65 Fe) mg tablet 2019 Self No Yes   Sig: Take 1 tablet (325 mg total) by mouth 3 (three) times a day with meals   folic acid (FOLVITE) 1 mg tablet 2019 Self Yes Yes   Si mg   methotrexate 2 5 mg tablet 2019 Self Yes Yes   Sig: 15 mg once a week       Facility-Administered Medications: None       No Known Allergies    Objective   Temp:  [97 1 °F (36 2 °C)-98 8 °F (37 1 °C)] 97 5 °F (36 4 °C)  HR:  [68-98] 70  Resp:  [16-20] 18  BP: ()/(52-85) 92/55    Intake/Output Summary (Last 24 hours) at 2019 1009  Last data filed at 2019 0900  Gross per 24 hour   Intake 4358 1 ml   Output 985 ml   Net 3373 1 ml       Physical Exam    Lab Results:   I have personally reviewed pertinent labs  , CBC:   Lab Results   Component Value Date    WBC 7 13 2019    HGB 7 1 (L) 2019    HCT 24 1 (L) 2019    MCV 94 2019     (L) 2019    MCH 27 7 2019    MCHC 29 5 (L) 2019    RDW 14 6 2019    MPV 8 6 (L) 2019    NRBC 0 2019   , CMP:   Lab Results   Component Value Date    SODIUM 141 2019    K 3 6 2019     (H) 2019    CO2 27 2019    BUN 4 (L) 2019    CREATININE 0 49 (L) 2019    CALCIUM 7 7 (L) 2019    EGFR 111 2019     Imaging Studies: I have personally reviewed pertinent reports      EKG, Pathology, and Other Studies: I have personally reviewed pertinent reports  Counseling / Coordination of Care  Total floor / unit time spent today Level 1 = 20 minutes  Greater than 50% of total time was spent with the patient and / or family counseling and / or coordination of care   A description of the counseling / coordination of care:

## 2019-08-30 NOTE — SOCIAL WORK
Met with pt and explained CM program/CM role  Resides with spouse and children in a house, 2 [address], bed/bathroom main floor  Independent prior to admission for ADL's/ambulation  She drive  PCP Dr Trina Bond  Has prescription plan, uses [facility]  Denies utilization DME/HHC/IP Rehab  Denies mental health illness, IP or OP psyche care, drug and/or alcohol abuse  Primary contact is  Yung Herzog, [phone]  No POA/Living Will   will provide transport home    CM reviewed d/c planning process including the following: identifying help at home, patient preference for d/c planning needs, Discharge Lounge, [facility] Meds to Bed program, availability of treatment team to discuss questions or concerns patient and/or family may have regarding understanding medications and recognizing signs and symptoms once discharged  CM also encouraged patient to follow up with all recommended appointments after discharge  Patient advised of importance for patient and family to participate in managing patients medical well being  Patient/caregiver received discharge checklist  Content reviewed  Patient/caregiver encouraged to participate in discharge plan of care prior to discharge home

## 2019-08-30 NOTE — UTILIZATION REVIEW
Notification of Inpatient Admission/Inpatient Authorization Request  This is a Notification of Inpatient Admission/Request for Inpatient Authorization for our facility [place] [address]  Be advised that this patient was admitted to our facility under Inpatient Status  Please contact the Utilization Review Department where the patient is receiving care services for additional admission information  Place of Service Code: 24   Place of Service Name: Inpatient Hospital  Presentation Date & Time: 8/29/2019 12:27 PM  Inpatient Admission Date & Time: 8/29/19 2011  Discharge Date & Time: No discharge date for patient encounter  Discharge Disposition (if discharged): Home/Self Care  Attending Physician: Petar Rodriguez Md [[id number]]   [address] [facility]  Phone 1: [phone]  Phone 2:   Fax: [phone]  Admission Orders (From admission, onward)     Ordered        08/29/19 2011  Inpatient Admission  Once                   Facility: [address])  [facility] Utilization Review Department  Phone: [phone]; Fax [phone]  [email or web address]  ATTENTION: Please call with any questions or concerns to [phone]  and carefully listen to the prompts so that you are directed to the right person  Send all requests for admission clinical reviews, approved or denied determinations and any other requests to fax [phone]   All voicemails are confidential

## 2019-08-30 NOTE — UTILIZATION REVIEW
Initial Clinical Review    Elective surgical procedure  Age/Sex: 47 y o  female  Surgery Date: 08/29/2019  Procedure: EXTENDED RIGHT COLON RESECTION (N/A)   EN BLOC RESECTION OF ILEUM  REPAIR OF PREVIOUS INCISIONAL HERNIA  Anesthesia: General  Operative Findings: Large bulky transverse colon tumor, with involvement and adherence of jejunum and ascending colon prompting transverse colectomy on bloc with ileal resection    Patient also with prior incisional hernia  Admission Orders: Date/Time/Statement: Inpatient Admission Orders (From admission, onward)     Ordered        08/29/19 2011  Inpatient Admission  Once                   Orders Placed This Encounter   Procedures    Inpatient Admission     Standing Status:   Standing     Number of Occurrences:   1     Order Specific Question:   Admitting Physician     Answer:   Ramos Parra     Order Specific Question:   Level of Care     Answer:   Med Surg [16]     Order Specific Question:   Estimated length of stay     Answer:   More than 2 Midnights     Order Specific Question:   Certification     Answer:   I certify that inpatient services are medically necessary for this patient for a duration of greater than two midnights  See H&P and MD Progress Notes for additional information about the patient's course of treatment       Vital Signs: BP 92/55   Pulse 70   Temp 97 5 °F (36 4 °C)   Resp 18   Ht 5' 5" (1 651 m)   Wt 59 4 kg (131 lb)   LMP 09/28/2018 (Exact Date)   SpO2 99%   BMI 21 80 kg/m²   Diet: NPO / sips with meds  Mobility: Up as tolerated  DVT Prophylaxis: Marko SCDs  Elevate HOB   Medications/Pain Control:   Current Facility-Administered Medications:  diphenhydrAMINE 25 mg Intravenous Q6H PRN   heparin (porcine) 5,000 Units Subcutaneous Q12H [address]   HYDROmorphone 0 5 mg Intravenous Q2H PRN   lactated ringers 1,000 mL Intravenous Once PRN   And      lactated ringers 1,000 mL Intravenous Once PRN   ondansetron 4 mg Intravenous Q6H PRN   ondansetron 4 mg Intravenous Q4H PRN   potassium chloride 20 mEq Intravenous Q2H   ropivacaine 0 1% and fentaNYL 2 mcg/mL  Epidural Continuous   sodium chloride 1,000 mL Intravenous Once PRN   And      sodium chloride 1,000 mL Intravenous Once PRN   sodium chloride 125 mL/hr Intravenous Continuous       Network Utilization Review Department  Phone: [phone]; Fax [phone]  [email or web address]  ATTENTION: Please call with any questions or concerns to [phone]  and carefully listen to the prompts so that you are directed to the right person  Send all requests for admission clinical reviews, approved or denied determinations and any other requests to fax [phone]   All voicemails are confidential

## 2019-08-30 NOTE — OP NOTE
OPERATIVE REPORT  PATIENT NAME: Radha Thurston    :  1965  MRN: [id number]  Pt Location: BE OR ROOM 07    SURGERY DATE: 2019    Surgeon(s) and Role:     * Bienvenido Gibbs MD - Primary     * uT Santacruz MD - Assisting    Preop Diagnosis:  Colon cancer metastasized to liver ([facility] [address] ) [C18 9, C78 7]    Post-Op Diagnosis Codes:     * Colon cancer metastasized to liver ([address] ) [C18 9, C78 7]    Procedure(s) (LRB):  EXTENDED RIGHT COLON RESECTION (N/A)   EN BLOC RESECTION OF ILEUM  REPAIR OF PREVIOUS INCISIONAL HERNIA    Specimen(s):  ID Type Source Tests Collected by Time Destination   1 : HERNIA SAC Tissue Other TISSUE EXAM Bienvenido Gibbs MD 2019 1719    2 : RIGHT COLON AND EN BLOC SMALL BOWEL Tissue Other TISSUE EXAM Bienvenido Gibbs MD 2019 1842        Estimated Blood Loss:   Minimal    Drains:  Negative Pressure Wound Therapy (V A C ) Abdomen Medial (Active)   Blue foam- # applied 1 2019  7:50 PM   Number of days: 0       Urethral Catheter Latex 16 Fr  (Active)   Number of days: 0       Anesthesia Type:   General    Operative Indications:  Colon cancer metastasized to liver ([facility] [address] ) [C18 9, C78 7]  Large bulky transverse colon tumor    Operative Findings:  Large bulky transverse colon tumor, with involvement and adherence of jejunum and ascending colon prompting transverse colectomy on bloc with ileal resection  Patient also with prior incisional hernia    Complications:   None    Procedure and Technique:  The patient is a 49-year-old woman with metastatic colon cancer  She has a large bulky transverse colon tumor which has enlarged despite being on chemotherapy  There is some concern about anemia S, bleeding, and or obstruction and she therefore comes in for debulking  She was brought to the OR identified by proper time-out, then was intubated by the anesthesia team   Ty catheter was then placed  The patient was then prepped and draped with the abdomen exposed    A midline incision was made  Cautery was used to dissect down to the fascia  There was a incisional hernia along the inferior aspect of the incision at the level of the umbilicus  The hernia sac was entered, and the hernia sac excised using cautery by taking it all the way down to good fascia on both sides of the incision  Bookwalter retractor was then placed  We then proceeded to inspect the transverse colon  The tumor was large and bulky involve the proximal aspect of the transverse colon  We mobilized the gastrocolic ligament and the greater omentum off the transverse mesocolon  We observed that the mid to distal portion of the transverse colon was free  We proceeded to mobilize the hepatic flexure  In the process, we noticed that the transverse colon was adherent to distal ileum and ascending colon  In order for us to resect the tumor completely, it would be necessary to resect distal ileum on bloc with the ascending colon and proximal transverse colon  We therefore transected the transverse colon just distal to the middle colic pedicle  This was done with a HARRY stapler  We then took down the mesentery with EnSeal device  The larger vascular pedicles including the middle colic as well as the right colic and ileocolic pedicles were ultimately taken with 2 0 Prolene sutures to for suture ligation  We selected the jejunum just as it went into the mass  We then transected here with a GI stable  We proceeded to take the mesentery feeding the jejunum, ascending colon, and transverse colon with the EnSeal device  In the process, we mobilized the ascending colon off the lateral peritoneal attachments by taking down the white line of Toldt  We took the mesentery all way down to the duodenum to enable complete on bloc resection  The specimen was then removed and sent to pathology for processing  We then reapproximated the jejunal and transverse colon limbs in side-to-side fashion    This was done with 3-0 silk sutures  We then created an enterotomy in both limbs and fired a 75 mm stapler to create a side-to-side anastomosis  We then used a TA 60 stapler to close the enterotomies  In the process, we noticed that the resultant limb of small bowel going towards the small bowel/colon anastomosis was narrowed by the TA 60 stapler  We therefore removed the stapler on the small bowel side and essentially performed a stricture plasty using 3-0 PDS to prevent narrowing of the small bowel as it went towards the anastomosis  We then closed the small bowel mesentery with interrupted 3-0 silk sutures  We then irrigated the abdomen copiously, then closed with 1  Nonlooped PDS placed in running fashion to close the fascia followed by 3-0 Vicryl to close the dermis followed by VAC dressing  The sponge and instrument counts were correct at the end the case  Patient was then awakened and transferred to the recovery room in stable condition      I was present for the entire procedure    Patient Disposition:  PACU     SIGNATURE: Juan M Whitmore MD  DATE: August 29, 2019  TIME: 8:07 PM

## 2019-08-30 NOTE — ASSESSMENT & PLAN NOTE
50yo female s/p Exlap, extended R colon resection with en bloc resection small bowel, repair of umbilical hernia on 7/12/39    Sips of clears  IV fluids  Remove negron  Maintain prevena VAC  Incentive spirometry  Encourage ambulation  DVT prophylaxis  Analgesia with epidural  Monitor and replete electrolytes

## 2019-08-30 NOTE — PLAN OF CARE
Problem: PHYSICAL THERAPY ADULT  Goal: Performs mobility at highest level of function for planned discharge setting  See evaluation for individualized goals  Description    Note:   Prognosis: Excellent  Problem List: Decreased strength, Decreased endurance, Impaired balance, Decreased mobility, Pain  Assessment: Pt is a 48 yo female admitted to [place] [address] on 8/29/2019 s/p surgery on 8/29/2019 for extended R colon resection, EN bloc resection of ileum, repair of previous incisional hernia  DX: colon cancer metastasized to liver  Two patient identifiers were used to confirm  Pt lives with  and son in a 2 story home with 2 [facility]  Pt has the option of a [facility]  Pt was I for ADL's and mobility prior to admission  Pt works full time  Pt's impairments include decreased mobility, and increased pain  These impairments limit the ability of the patient to perform mobility without increased assistance, return to PLOF and participate in everyday life activities  Pt would benefit from continued skilled therapy while in the hospital to improve mobility and trial elevations, and trial AD  Recommend discharge to home with family assist  At the end of the session the patient was left in supine position with call bell and phone within reach  Barriers to Discharge: Inaccessible home environment     Recommendation: Home with family support     PT - OK to Discharge: No    See flowsheet documentation for full assessment

## 2019-08-30 NOTE — PROGRESS NOTES
Postop check    Procedure: Exlap, extended R colon resection with en bloc resection small bowel, repair of umbilical hernia    Subjective:  No acute distress  Resting comfortably in bed    Objective  Vitals:    08/29/19 2030 08/29/19 2045 08/29/19 2119 08/29/19 2240   BP: 120/80 129/80 127/85 123/83   Pulse: 78 86 88 85   Resp: 16 16 18 20   Temp:  98 1 °F (36 7 °C) (!) 97 2 °F (36 2 °C) 97 7 °F (36 5 °C)   TempSrc:  Temporal     SpO2: 99% 99% 100% 100%   Weight:       Height:             No acute distress  Regular rate and rhythm  Nonlabored respirations   Abdomen soft, minimally tender, nondistended    Incisions clean dry and intact    Assessment and plan:  Status post above listed procedure    NPO  IV fluids  Keep negron  Incentive spirometry  Encourage ambulation  DVT prophylaxis  Analgesia with epidural  Monitor and replete electrolytes    Scott Montoya MD

## 2019-08-30 NOTE — PLAN OF CARE
Problem: PAIN - ADULT  Goal: Verbalizes/displays adequate comfort level or baseline comfort level  Description  Interventions:  - Encourage patient to monitor pain and request assistance  - Assess pain using appropriate pain scale  - Administer analgesics based on type and severity of pain and evaluate response  - Implement non-pharmacological measures as appropriate and evaluate response  - Consider cultural and social influences on pain and pain management  - Notify physician/advanced practitioner if interventions unsuccessful or patient reports new pain  Outcome: Progressing     Problem: INFECTION - ADULT  Goal: Absence or prevention of progression during hospitalization  Description  INTERVENTIONS:  - Assess and monitor for signs and symptoms of infection  - Monitor lab/diagnostic results  - Monitor all insertion sites, i e  indwelling lines, tubes, and drains  - Monitor endotracheal if appropriate and nasal secretions for changes in amount and color  - [place] appropriate cooling/warming therapies per order  - Administer medications as ordered  - Instruct and encourage patient and family to use good hand hygiene technique  - Identify and instruct in appropriate isolation precautions for identified infection/condition  Outcome: Progressing  Goal: Absence of fever/infection during neutropenic period  Description  INTERVENTIONS:  - Monitor WBC    Outcome: Progressing     Problem: SAFETY ADULT  Goal: Patient will remain free of falls  Description  INTERVENTIONS:  - Assess patient frequently for physical needs  -  Identify cognitive and physical deficits and behaviors that affect risk of falls    -  [place] fall precautions as indicated by assessment   - Educate patient/family on patient safety including physical limitations  - Instruct patient to call for assistance with activity based on assessment  - Modify environment to reduce risk of injury  - Consider OT/PT consult to assist with strengthening/mobility  Outcome: Progressing  Goal: Maintain or return to baseline ADL function  Description  INTERVENTIONS:  -  Assess patient's ability to carry out ADLs; assess patient's baseline for ADL function and identify physical deficits which impact ability to perform ADLs (bathing, care of mouth/teeth, toileting, grooming, dressing, etc )  - Assess/evaluate cause of self-care deficits   - Assess range of motion  - Assess patient's mobility; develop plan if impaired  - Assess patient's need for assistive devices and provide as appropriate  - Encourage maximum independence but intervene and supervise when necessary  - Involve family in performance of ADLs  - Assess for home care needs following discharge   - Consider OT consult to assist with ADL evaluation and planning for discharge  - Provide patient education as appropriate  Outcome: Progressing  Goal: Maintain or return mobility status to optimal level  Description  INTERVENTIONS:  - Assess patient's baseline mobility status (ambulation, transfers, stairs, etc )    - Identify cognitive and physical deficits and behaviors that affect mobility  - Identify mobility aids required to assist with transfers and/or ambulation (gait belt, sit-to-stand, lift, walker, cane, etc )  - [place] fall precautions as indicated by assessment  - Record patient progress and toleration of activity level on Mobility SBAR; progress patient to next Phase/Stage  - Instruct patient to call for assistance with activity based on assessment  - Consider rehabilitation consult to assist with strengthening/weightbearing, etc   Outcome: Progressing     Problem: DISCHARGE PLANNING  Goal: Discharge to home or other facility with appropriate resources  Description  INTERVENTIONS:  - Identify barriers to discharge w/patient and caregiver  - Arrange for needed discharge resources and transportation as appropriate  - Identify discharge learning needs (meds, wound care, etc )  - Arrange for interpretive services to assist at discharge as needed  - Refer to Case Management Department for coordinating discharge planning if the patient needs post-hospital services based on physician/advanced practitioner order or complex needs related to functional status, cognitive ability, or social support system  Outcome: Progressing     Problem: Knowledge Deficit  Goal: Patient/family/caregiver demonstrates understanding of disease process, treatment plan, medications, and discharge instructions  Description  Complete learning assessment and assess knowledge base  Interventions:  - Provide teaching at level of understanding  - Provide teaching via preferred learning methods  Outcome: Progressing     Problem: Nutrition/Hydration-ADULT  Goal: Nutrient/Hydration intake appropriate for improving, restoring or maintaining nutritional needs  Description  Monitor and assess patient's nutrition/hydration status for malnutrition  Collaborate with interdisciplinary team and initiate plan and interventions as ordered  Monitor patient's weight and dietary intake as ordered or per policy  Utilize nutrition screening tool and intervene as necessary  Determine patient's food preferences and provide high-protein, high-caloric foods as appropriate       INTERVENTIONS:  - Monitor oral intake, urinary output, labs, and treatment plans  - Assess nutrition and hydration status and recommend course of action  - Evaluate amount of meals eaten  - Assist patient with eating if necessary   - Allow adequate time for meals  - Recommend/ encourage appropriate diets, oral nutritional supplements, and vitamin/mineral supplements  - Order, calculate, and assess calorie counts as needed  - Recommend, monitor, and adjust tube feedings and TPN/PPN based on assessed needs  - Assess need for intravenous fluids  - Provide specific nutrition/hydration education as appropriate  - Include patient/family/caregiver in decisions related to nutrition  Outcome: Progressing     Problem: Prexisting or High Potential for Compromised Skin Integrity  Goal: Skin integrity is maintained or improved  Description  INTERVENTIONS:  - Identify patients at risk for skin breakdown  - Assess and monitor skin integrity  - Assess and monitor nutrition and hydration status  - Monitor labs   - Assess for incontinence   - Turn and reposition patient  - Assist with mobility/ambulation  - Relieve pressure over bony prominences  - Avoid friction and shearing  - Provide appropriate hygiene as needed including keeping skin clean and dry  - Evaluate need for skin moisturizer/barrier cream  - Collaborate with interdisciplinary team   - Patient/family teaching  - Consider wound care consult   Outcome: Progressing

## 2019-08-30 NOTE — PHYSICAL THERAPY NOTE
Physical Therapy Evaluation Note     Patient Name: Lor Pitts    BHXGG'Y Date: 8/30/2019     Problem List  Patient Active Problem List   Diagnosis    Arthritis    Hay fever    Herpes zoster    Seasonal allergies    Anxiety    Hot flashes    Impaired fasting glucose    Fatigue    Rheumatoid arthritis involving both hands with negative rheumatoid factor (HCC)    Other hyperlipidemia    Megaloblastic anemia    Severe anemia    Liver masses    Colon cancer metastasized to liver [facility])        Past Medical History  Past Medical History:   Diagnosis Date    Anxious mood     [facility] [address] [facility]     [facility]     History of chemotherapy     Rheumatoid arthritis ([facility] )         Past Surgical History  Past Surgical History:   Procedure Laterality Date    APPENDECTOMY      ESOPHAGOGASTRODUODENOSCOPY N/A 9/14/2018    Procedure: ESOPHAGOGASTRODUODENOSCOPY (EGD) with polypectomy;  Surgeon: Mynor Levy MD;  Location: [facility]; Service: Gastroenterology    IR IMAGE GUIDED [id number] [place] [address]  10/25/2018    IR PORT PLACEMENT  11/7/2018    r chest    AL PART REMOVAL COLON W ANASTOMOSIS N/A 8/29/2019    Procedure: EXTENDED RIGHT COLON RESECTION;  Surgeon: Magen Monae MD;  [facility]: [facility];  Service: Surgical Oncology    TUBAL LIGATION      resolved 2007    WISDOM TOOTH EXTRACTION      resolved 1990 08/30/19 1726   Note Type   Note type Eval only   Pain Assessment   Pain Assessment 0-10   Pain Score 2   Home Living   Type of [address] Two level   Additional Comments Pt lives at home with her  and her son  Pt reported that her mom will be staying with her to help  Pt has 2 NELSON  Pt has the option of a FFSU  Pt is currently full time employed at [employer]  Pt reports that her son can assist if needed  PT does not own an DME     Prior Function   Level of [place] Independent with ADLs and functional mobility Lives With Spouse; Son   ADL Assistance Independent   IADLs Independent   Falls in the last 6 months 0   Vocational Full time employment   Restrictions/Precautions   Weight Bearing Precautions Per Order No   Other Precautions Multiple lines;Pain   General   Family/Caregiver Present Yes   Cognition   Overall Cognitive Status WFL   Attention Within functional limits   Orientation Level Oriented X4   Memory Within functional limits   Following Commands Follows all commands and directions without difficulty   RLE Assessment   RLE Assessment WFL   LLE Assessment   LLE Assessment WFL   Coordination   Movements are Fluid and Coordinated 1   Sensation WFL   Light Touch   RLE Light Touch Grossly intact   LLE Light Touch Grossly intact   Transfers   Sit to Stand 5  Supervision   Stand to Sit 5  Supervision   Ambulation/Elevation   Gait pattern Antalgic   Gait Assistance 5  Supervision   Assistive Device   (IV pole )   Distance [id number]   Balance   Static Sitting Good   Dynamic Sitting Good   Static Standing Fair +   Dynamic Standing Fair +   Ambulatory Fair +   Endurance Deficit   Endurance Deficit Yes   Activity Tolerance   Activity Tolerance Patient limited by pain; Patient limited by fatigue   Nurse Made Aware nurse approved therapy session   Assessment   Prognosis Excellent   Problem List Decreased strength;Decreased endurance; Impaired balance;Decreased mobility;Pain   Assessment Pt is a 48 yo female admitted to [place] [id number] on 8/29/2019 s/p surgery on 8/29/2019 for extended R colon resection, EN bloc resection of ileum, repair of previous incisional hernia  DX: colon cancer metastasized to liver  Two patient identifiers were used to confirm  Pt lives with  and son in a 2 story home with 2 NELSON  [facility]  Pt was I for ADL's and mobility prior to admission  Pt works full time  Pt's impairments include decreased mobility, and increased pain   These impairments limit the ability of the patient to perform mobility without increased assistance, return to PLOF and participate in everyday life activities  Pt would benefit from continued skilled therapy while in the hospital to improve mobility and trial elevations, and trial AD  Recommend discharge to home with family assist  At the end of the session the patient was left in supine position with call bell and phone within reach  Barriers to Discharge Inaccessible home environment   Goals   STG Expiration Date 09/09/19   Short Term Goal #1 STG 1: Pt will perform transfers at a MI/I level to return to PLOF  STG 2: Pt will perform bed mobility at a I level to reduce level of assistance needed upon d c home  STG 3: Pt will ambulate with least restrictive device at a MI/I level for 300ft to improve mobility  Treatment Day 0   Plan   Treatment/Interventions Functional transfer training;LE strengthening/ROM; Therapeutic exercise; Endurance training;Bed mobility;Gait training   PT Frequency   (3-5X WK)   Recommendation   Recommendation Home with family support   PT - OK to Discharge No   Additional Comments Need to trial steps and an AD   Modified [place] Scale   Modified [place]ji Scale 2   Barthel Index   Feeding 10   Bathing 0   Grooming Score 0   Dressing Score 5   Bladder Score 10   Bowels Score 10   Toilet Use Score 5   Transfers (Bed/Chair) Score 10   Mobility (Level Surface) Score 10   Stairs Score 0   Barthel Index Score 60   Noemí Duran, Pt, DPT

## 2019-08-30 NOTE — PROGRESS NOTES
Progress Note - Deepti Mean 1965, 47 y o  female MRN: [id number]    Unit/Bed#: [facility] 910-01 Encounter: [id number]    Primary Care Provider: Rajinder Oro MD   Date and time admitted to hospital: 8/29/2019 12:27 PM        * Colon cancer metastasized to liver [facility])  Assessment & Plan  50yo female s/p Exlap, extended R colon resection with en bloc resection small bowel, repair of umbilical hernia on 8/04/03    Sips of clears  IV fluids  Remove negron  Maintain prevena VAC  Incentive spirometry  Encourage ambulation  DVT prophylaxis  Analgesia with epidural  Monitor and replete electrolytes                Subjective/Objective   Chief Complaint:     Subjective: No events overnight  Pain controlled    Objective:     Blood pressure 92/55, pulse 70, temperature 97 5 °F (36 4 °C), resp  rate 18, height 5' 5" (1 651 m), weight 59 4 kg (131 lb), last menstrual period 09/28/2018, SpO2 99 %, not currently breastfeeding  ,Body mass index is 21 8 kg/m²        Intake/Output Summary (Last 24 hours) at 8/30/2019 0812  Last data filed at 8/30/2019 0719  Gross per 24 hour   Intake 4358 1 ml   Output 985 ml   Net 3373 1 ml       Invasive Devices     Central Venous Catheter Line            Port A Cath 11/05/18 Right Subclavian 297 days          Peripheral Intravenous Line            Peripheral IV 08/29/19 Left Arm less than 1 day    Peripheral IV 08/29/19 Right Wrist less than 1 day          Epidural Line            Epidural Catheter 08/29/19 less than 1 day          Drain            Negative Pressure Wound Therapy (V A C ) Abdomen Medial less than 1 day    Urethral Catheter Latex 16 Fr  less than 1 day                Physical Exam:   NAD  RRR  nonlabored respirations on RA  Abdomen soft, VAC in place    Lab, Imaging and other studies:  CBC:   Lab Results   Component Value Date    WBC 7 13 08/30/2019    HGB 7 1 (L) 08/30/2019    HCT 24 1 (L) 08/30/2019    MCV 94 08/30/2019     (L) 08/30/2019    MCH 27 7 08/30/2019 MCHC 29 5 (L) 08/30/2019    RDW 14 6 08/30/2019    MPV 8 6 (L) 08/30/2019    NRBC 0 08/30/2019   , CMP:   Lab Results   Component Value Date    SODIUM 141 08/30/2019    K 3 6 08/30/2019     (H) 08/30/2019    CO2 27 08/30/2019    BUN 4 (L) 08/30/2019    CREATININE 0 49 (L) 08/30/2019    CALCIUM 7 7 (L) 08/30/2019    EGFR 111 08/30/2019   , Coagulation: No results found for: PT, INR, APTT, Urinalysis: No results found for: COLORU, CLARITYU, SPECGRAV, PHUR, LEUKOCYTESUR, NITRITE, PROTEINUA, GLUCOSEU, KETONESU, BILIRUBINUR, BLOODU, Amylase: No results found for: AMYLASE, Lipase: No results found for: LIPASE  VTE Pharmacologic Prophylaxis: Sequential compression device (Venodyne)   VTE Mechanical Prophylaxis: sequential compression device

## 2019-08-31 LAB
ANION GAP SERPL CALCULATED.3IONS-SCNC: 4 MMOL/L (ref 4–13)
BASOPHILS # BLD AUTO: 0.01 THOUSANDS/ΜL (ref 0–0.1)
BASOPHILS NFR BLD AUTO: 0 % (ref 0–1)
BUN SERPL-MCNC: 3 MG/DL (ref 5–25)
CALCIUM SERPL-MCNC: 7.9 MG/DL (ref 8.3–10.1)
CHLORIDE SERPL-SCNC: 110 MMOL/L (ref 100–108)
CO2 SERPL-SCNC: 29 MMOL/L (ref 21–32)
CREAT SERPL-MCNC: 0.54 MG/DL (ref 0.6–1.3)
EOSINOPHIL # BLD AUTO: 0.03 THOUSAND/ΜL (ref 0–0.61)
EOSINOPHIL NFR BLD AUTO: 1 % (ref 0–6)
ERYTHROCYTE [DISTWIDTH] IN BLOOD BY AUTOMATED COUNT: 14.6 % (ref 11.6–15.1)
GFR SERPL CREATININE-BSD FRML MDRD: 107 ML/MIN/1.73SQ M
GLUCOSE SERPL-MCNC: 107 MG/DL (ref 65–140)
HCT VFR BLD AUTO: 23.6 % (ref 34.8–46.1)
HGB BLD-MCNC: 6.9 G/DL (ref 11.5–15.4)
IMM GRANULOCYTES # BLD AUTO: 0.01 THOUSAND/UL (ref 0–0.2)
IMM GRANULOCYTES NFR BLD AUTO: 0 % (ref 0–2)
LYMPHOCYTES # BLD AUTO: 1.08 THOUSANDS/ΜL (ref 0.6–4.47)
LYMPHOCYTES NFR BLD AUTO: 20 % (ref 14–44)
MAGNESIUM SERPL-MCNC: 2.2 MG/DL (ref 1.6–2.6)
MCH RBC QN AUTO: 27.5 PG (ref 26.8–34.3)
MCHC RBC AUTO-ENTMCNC: 29.2 G/DL (ref 31.4–37.4)
MCV RBC AUTO: 94 FL (ref 82–98)
MONOCYTES # BLD AUTO: 0.36 THOUSAND/ΜL (ref 0.17–1.22)
MONOCYTES NFR BLD AUTO: 7 % (ref 4–12)
NEUTROPHILS # BLD AUTO: 3.87 THOUSANDS/ΜL (ref 1.85–7.62)
NEUTS SEG NFR BLD AUTO: 72 % (ref 43–75)
NRBC BLD AUTO-RTO: 0 /100 WBCS
PLATELET # BLD AUTO: 144 THOUSANDS/UL (ref 149–390)
PMV BLD AUTO: 9.2 FL (ref 8.9–12.7)
POTASSIUM SERPL-SCNC: 4.1 MMOL/L (ref 3.5–5.3)
RBC # BLD AUTO: 2.51 MILLION/UL (ref 3.81–5.12)
SODIUM SERPL-SCNC: 143 MMOL/L (ref 136–145)
WBC # BLD AUTO: 5.36 THOUSAND/UL (ref 4.31–10.16)

## 2019-08-31 PROCEDURE — 83735 ASSAY OF MAGNESIUM: CPT | Performed by: PHYSICIAN ASSISTANT

## 2019-08-31 PROCEDURE — 85025 COMPLETE CBC W/AUTO DIFF WBC: CPT | Performed by: PHYSICIAN ASSISTANT

## 2019-08-31 PROCEDURE — 99024 POSTOP FOLLOW-UP VISIT: CPT | Performed by: SURGERY

## 2019-08-31 PROCEDURE — 80048 BASIC METABOLIC PNL TOTAL CA: CPT | Performed by: PHYSICIAN ASSISTANT

## 2019-08-31 RX ADMIN — HEPARIN SODIUM 5000 UNITS: 5000 INJECTION INTRAVENOUS; SUBCUTANEOUS at 20:26

## 2019-08-31 RX ADMIN — HYDROMORPHONE HYDROCHLORIDE 0.5 MG: 1 INJECTION, SOLUTION INTRAMUSCULAR; INTRAVENOUS; SUBCUTANEOUS at 09:36

## 2019-08-31 RX ADMIN — HYDROMORPHONE HYDROCHLORIDE 0.5 MG: 1 INJECTION, SOLUTION INTRAMUSCULAR; INTRAVENOUS; SUBCUTANEOUS at 22:20

## 2019-08-31 RX ADMIN — DEXTROSE, SODIUM CHLORIDE, AND POTASSIUM CHLORIDE 100 ML/HR: 5; .45; .15 INJECTION INTRAVENOUS at 19:11

## 2019-08-31 RX ADMIN — ROPIVACAINE HYDROCHLORIDE: 5 INJECTION, SOLUTION EPIDURAL; INFILTRATION; PERINEURAL at 09:33

## 2019-08-31 RX ADMIN — HEPARIN SODIUM 5000 UNITS: 5000 INJECTION INTRAVENOUS; SUBCUTANEOUS at 08:49

## 2019-08-31 RX ADMIN — DEXTROSE, SODIUM CHLORIDE, AND POTASSIUM CHLORIDE 100 ML/HR: 5; .45; .15 INJECTION INTRAVENOUS at 08:53

## 2019-08-31 NOTE — PROGRESS NOTES
Progress Note - Acute Pain Service [facility] Follow Up  Lori Parker 47 y o  female MRN: [id number]  Unit/Bed#: [facility] 910-01 Encounter: [id number]      SURGERY DATE: 8/29/2019  Post-Op Diagnosis Codes:     * Colon cancer metastasized to liver ([address] ) [C18 9, C78 7]    Assessment:   47 yr old F POD 2  s/p Exlap, extended R colon resection with en bloc resection small bowel, repair of umbilical hernia, with thoracic epidural for postop pain control  Pt continues to do well with epidural for pain control  She can get OOB to chair, ambulate, and diet well tolerated (clears)  She does have left referred shoulder pain, and has used IV dilaudid with good relief  Epidural continues to look well secured, clean, dry, intact  May anticipate epidural for another 1-2 days time   Will discuss with surgical team    Plan:   - continue epidural at its current settings  - continue dilaudid IV order   - continue nubain order for prurits    [facility] will continue to follow; please contact [facility] ( btn [phone]) with any further questions    Subjective:  Patient states I am doing very well    Meds/Allergies     all current active meds have been reviewed, current meds:   Current Facility-Administered Medications   Medication Dose Route Frequency    dextrose 5 % and sodium chloride 0 45 % with KCl 20 mEq/L infusion  100 mL/hr Intravenous Continuous    diphenhydrAMINE (BENADRYL) injection 25 mg  25 mg Intravenous Q6H PRN    heparin (porcine) subcutaneous injection 5,000 Units  5,000 Units Subcutaneous Q12H [address]    HYDROmorphone (DILAUDID) injection 0 5 mg  0 5 mg Intravenous Q2H PRN    nalbuphine (NUBAIN) injection 5 mg  5 mg Intravenous Q3H PRN    ondansetron (ZOFRAN) injection 4 mg  4 mg Intravenous Q6H PRN    ondansetron (ZOFRAN) injection 4 mg  4 mg Intravenous Q4H PRN    ropivacaine 0 1% and fentaNYL 2 mcg/mL PCEA   Epidural Continuous    and PTA meds:   Prior to Admission Medications   Prescriptions Last Dose Informant Patient Reported? Taking?    Multiple Vitamin (MULTIVITAMIN) capsule 2019 Self Yes Yes   Sig: Take 1 capsule by mouth daily   Omega-3 Fatty Acids (FISH OIL PO) 2019  Yes Yes   Sig: Take by mouth daily    cyanocobalamin (VITAMIN B-12) 1,000 mcg tablet 2019  Yes Yes   Sig: Take by mouth daily   ferrous sulfate 325 (65 Fe) mg tablet 2019 Self No Yes   Sig: Take 1 tablet (325 mg total) by mouth 3 (three) times a day with meals   folic acid (FOLVITE) 1 mg tablet 2019 Self Yes Yes   Si mg   methotrexate 2 5 mg tablet 2019 Self Yes Yes   Sig: 15 mg once a week       Facility-Administered Medications: None       No Known Allergies    Objective     Temp:  [97 7 °F (36 5 °C)-98 4 °F (36 9 °C)] 98 4 °F (36 9 °C)  HR:  [78-91] 91  Resp:  [18-20] 18  BP: ()/(50-78) 123/78    Physical Exam

## 2019-08-31 NOTE — PLAN OF CARE
Problem: PAIN - ADULT  Goal: Verbalizes/displays adequate comfort level or baseline comfort level  Description  Interventions:  - Encourage patient to monitor pain and request assistance  - Assess pain using appropriate pain scale  - Administer analgesics based on type and severity of pain and evaluate response  - Implement non-pharmacological measures as appropriate and evaluate response  - Consider cultural and social influences on pain and pain management  - Notify physician/advanced practitioner if interventions unsuccessful or patient reports new pain  Outcome: Progressing     Problem: INFECTION - ADULT  Goal: Absence or prevention of progression during hospitalization  Description  INTERVENTIONS:  - Assess and monitor for signs and symptoms of infection  - Monitor lab/diagnostic results  - Monitor all insertion sites, i e  indwelling lines, tubes, and drains  - Monitor endotracheal if appropriate and nasal secretions for changes in amount and color  - [place] appropriate cooling/warming therapies per order  - Administer medications as ordered  - Instruct and encourage patient and family to use good hand hygiene technique  - Identify and instruct in appropriate isolation precautions for identified infection/condition  Outcome: Progressing  Goal: Absence of fever/infection during neutropenic period  Description  INTERVENTIONS:  - Monitor WBC    Outcome: Progressing     Problem: SAFETY ADULT  Goal: Patient will remain free of falls  Description  INTERVENTIONS:  - Assess patient frequently for physical needs  -  Identify cognitive and physical deficits and behaviors that affect risk of falls    -  [place] fall precautions as indicated by assessment   - Educate patient/family on patient safety including physical limitations  - Instruct patient to call for assistance with activity based on assessment  - Modify environment to reduce risk of injury  - Consider OT/PT consult to assist with strengthening/mobility  Outcome: Progressing  Goal: Maintain or return to baseline ADL function  Description  INTERVENTIONS:  -  Assess patient's ability to carry out ADLs; assess patient's baseline for ADL function and identify physical deficits which impact ability to perform ADLs (bathing, care of mouth/teeth, toileting, grooming, dressing, etc )  - Assess/evaluate cause of self-care deficits   - Assess range of motion  - Assess patient's mobility; develop plan if impaired  - Assess patient's need for assistive devices and provide as appropriate  - Encourage maximum independence but intervene and supervise when necessary  - Involve family in performance of ADLs  - Assess for home care needs following discharge   - Consider OT consult to assist with ADL evaluation and planning for discharge  - Provide patient education as appropriate  Outcome: Progressing  Goal: Maintain or return mobility status to optimal level  Description  INTERVENTIONS:  - Assess patient's baseline mobility status (ambulation, transfers, stairs, etc )    - Identify cognitive and physical deficits and behaviors that affect mobility  - Identify mobility aids required to assist with transfers and/or ambulation (gait belt, sit-to-stand, lift, walker, cane, etc )  - [place] fall precautions as indicated by assessment  - Record patient progress and toleration of activity level on Mobility SBAR; progress patient to next Phase/Stage  - Instruct patient to call for assistance with activity based on assessment  - Consider rehabilitation consult to assist with strengthening/weightbearing, etc   Outcome: Progressing     Problem: DISCHARGE PLANNING  Goal: Discharge to home or other facility with appropriate resources  Description  INTERVENTIONS:  - Identify barriers to discharge w/patient and caregiver  - Arrange for needed discharge resources and transportation as appropriate  - Identify discharge learning needs (meds, wound care, etc )  - Arrange for interpretive services to assist at discharge as needed  - Refer to Case Management Department for coordinating discharge planning if the patient needs post-hospital services based on physician/advanced practitioner order or complex needs related to functional status, cognitive ability, or social support system  Outcome: Progressing     Problem: Knowledge Deficit  Goal: Patient/family/caregiver demonstrates understanding of disease process, treatment plan, medications, and discharge instructions  Description  Complete learning assessment and assess knowledge base  Interventions:  - Provide teaching at level of understanding  - Provide teaching via preferred learning methods  Outcome: Progressing     Problem: Nutrition/Hydration-ADULT  Goal: Nutrient/Hydration intake appropriate for improving, restoring or maintaining nutritional needs  Description  Monitor and assess patient's nutrition/hydration status for malnutrition  Collaborate with interdisciplinary team and initiate plan and interventions as ordered  Monitor patient's weight and dietary intake as ordered or per policy  Utilize nutrition screening tool and intervene as necessary  Determine patient's food preferences and provide high-protein, high-caloric foods as appropriate       INTERVENTIONS:  - Monitor oral intake, urinary output, labs, and treatment plans  - Assess nutrition and hydration status and recommend course of action  - Evaluate amount of meals eaten  - Assist patient with eating if necessary   - Allow adequate time for meals  - Recommend/ encourage appropriate diets, oral nutritional supplements, and vitamin/mineral supplements  - Order, calculate, and assess calorie counts as needed  - Recommend, monitor, and adjust tube feedings and TPN/PPN based on assessed needs  - Assess need for intravenous fluids  - Provide specific nutrition/hydration education as appropriate  - Include patient/family/caregiver in decisions related to nutrition  Outcome: Progressing     Problem: Prexisting or High Potential for Compromised Skin Integrity  Goal: Skin integrity is maintained or improved  Description  INTERVENTIONS:  - Identify patients at risk for skin breakdown  - Assess and monitor skin integrity  - Assess and monitor nutrition and hydration status  - Monitor labs   - Assess for incontinence   - Turn and reposition patient  - Assist with mobility/ambulation  - Relieve pressure over bony prominences  - Avoid friction and shearing  - Provide appropriate hygiene as needed including keeping skin clean and dry  - Evaluate need for skin moisturizer/barrier cream  - Collaborate with interdisciplinary team   - Patient/family teaching  - Consider wound care consult   Outcome: Progressing     Problem: Potential for Falls  Goal: Patient will remain free of falls  Description  INTERVENTIONS:  - Assess patient frequently for physical needs  -  Identify cognitive and physical deficits and behaviors that affect risk of falls    -  [place] fall precautions as indicated by assessment   - Educate patient/family on patient safety including physical limitations  - Instruct patient to call for assistance with activity based on assessment  - Modify environment to reduce risk of injury  - Consider OT/PT consult to assist with strengthening/mobility  Outcome: Progressing

## 2019-08-31 NOTE — PROGRESS NOTES
Progress Note - Bren Medeiros 1965, 47 y o  female MRN: [id number]    Unit/Bed#: [facility] 910-01 Encounter: [id number]    Primary Care Provider: Chavo Choi MD   Date and time admitted to hospital: 8/29/2019 12:27 PM        * Colon cancer metastasized to liver [facility])  Assessment & Plan  50yo female s/p Exlap, extended R colon resection with en bloc resection small bowel, repair of umbilical hernia on 5/64/24    Sips of clears  IV fluids  Maintain prevena VAC  Incentive spirometry  Encourage ambulation  DVT prophylaxis  Analgesia with epidural per APS  Monitor and replete electrolytes                Subjective/Objective     Subjective:   Hypotensive to 86/51 overnight  Received 1L bolus with appropriate response  -F/-BM  Pain well-controlled  Objective:     Blood pressure 95/59, pulse 79, temperature 98 1 °F (36 7 °C), resp  rate 18, height 5' 5" (1 651 m), weight 59 4 kg (131 lb), last menstrual period 09/28/2018, SpO2 100 %, not currently breastfeeding  ,Body mass index is 21 8 kg/m²  Intake/Output Summary (Last 24 hours) at 8/31/2019 0959  Last data filed at 8/31/2019 0932  Gross per 24 hour   Intake 3789 12 ml   Output 3750 ml   Net 39 12 ml       Invasive Devices     Central Venous Catheter Line            Port A Cath 11/05/18 Right Subclavian 299 days          Peripheral Intravenous Line            Peripheral IV 08/29/19 Left Arm 1 day    Peripheral IV 08/29/19 Right Wrist 1 day          Epidural Line            Epidural Catheter 08/29/19 1 day          Drain            Negative Pressure Wound Therapy (V A C ) Abdomen Medial 1 day                Physical Exam:   Gen: NAD, resting in bed  HEENT: MMM, EOMI  CV: RRR  Lungs: nl effort  Abd: soft, NT/ND, incisional VAC in place (no output)  Ext: no CCE  Skin: no rashes  Neuro: A&Ox3, motor and sensation grossly intact  Psych: appropriate     Lab, Imaging and other studies:  I have personally reviewed pertinent lab results    , CBC:   Lab Results Component Value Date    WBC 5 36 08/31/2019    HGB 6 9 (LL) 08/31/2019    HCT 23 6 (L) 08/31/2019    MCV 94 08/31/2019     (L) 08/31/2019    MCH 27 5 08/31/2019    MCHC 29 2 (L) 08/31/2019    RDW 14 6 08/31/2019    MPV 9 2 08/31/2019    NRBC 0 08/31/2019   , CMP:   Lab Results   Component Value Date    SODIUM 143 08/31/2019    K 4 1 08/31/2019     (H) 08/31/2019    CO2 29 08/31/2019    BUN 3 (L) 08/31/2019    CREATININE 0 54 (L) 08/31/2019    CALCIUM 7 9 (L) 08/31/2019    EGFR 107 08/31/2019     VTE Pharmacologic Prophylaxis: Heparin  VTE Mechanical Prophylaxis: sequential compression device

## 2019-08-31 NOTE — ASSESSMENT & PLAN NOTE
50yo female s/p Exlap, extended R colon resection with en bloc resection small bowel, repair of umbilical hernia on 6/20/06    Sips of clears  IV fluids  Remove negron  Maintain prevena VAC  Incentive spirometry  Encourage ambulation  DVT prophylaxis  Analgesia with epidural  Monitor and replete electrolytes

## 2019-09-01 LAB
ANION GAP SERPL CALCULATED.3IONS-SCNC: 7 MMOL/L (ref 4–13)
BASOPHILS # BLD AUTO: 0.03 THOUSANDS/ΜL (ref 0–0.1)
BASOPHILS NFR BLD AUTO: 1 % (ref 0–1)
BUN SERPL-MCNC: 2 MG/DL (ref 5–25)
CALCIUM SERPL-MCNC: 8.6 MG/DL (ref 8.3–10.1)
CHLORIDE SERPL-SCNC: 110 MMOL/L (ref 100–108)
CO2 SERPL-SCNC: 28 MMOL/L (ref 21–32)
CREAT SERPL-MCNC: 0.53 MG/DL (ref 0.6–1.3)
EOSINOPHIL # BLD AUTO: 0.18 THOUSAND/ΜL (ref 0–0.61)
EOSINOPHIL NFR BLD AUTO: 3 % (ref 0–6)
ERYTHROCYTE [DISTWIDTH] IN BLOOD BY AUTOMATED COUNT: 14.4 % (ref 11.6–15.1)
GFR SERPL CREATININE-BSD FRML MDRD: 108 ML/MIN/1.73SQ M
GLUCOSE SERPL-MCNC: 93 MG/DL (ref 65–140)
HCT VFR BLD AUTO: 25 % (ref 34.8–46.1)
HGB BLD-MCNC: 7.3 G/DL (ref 11.5–15.4)
IMM GRANULOCYTES # BLD AUTO: 0.02 THOUSAND/UL (ref 0–0.2)
IMM GRANULOCYTES NFR BLD AUTO: 0 % (ref 0–2)
LYMPHOCYTES # BLD AUTO: 1.41 THOUSANDS/ΜL (ref 0.6–4.47)
LYMPHOCYTES NFR BLD AUTO: 24 % (ref 14–44)
MCH RBC QN AUTO: 27.5 PG (ref 26.8–34.3)
MCHC RBC AUTO-ENTMCNC: 29.2 G/DL (ref 31.4–37.4)
MCV RBC AUTO: 94 FL (ref 82–98)
MONOCYTES # BLD AUTO: 0.38 THOUSAND/ΜL (ref 0.17–1.22)
MONOCYTES NFR BLD AUTO: 7 % (ref 4–12)
NEUTROPHILS # BLD AUTO: 3.86 THOUSANDS/ΜL (ref 1.85–7.62)
NEUTS SEG NFR BLD AUTO: 65 % (ref 43–75)
NRBC BLD AUTO-RTO: 0 /100 WBCS
PLATELET # BLD AUTO: 164 THOUSANDS/UL (ref 149–390)
PMV BLD AUTO: 8.8 FL (ref 8.9–12.7)
POTASSIUM SERPL-SCNC: 4 MMOL/L (ref 3.5–5.3)
RBC # BLD AUTO: 2.65 MILLION/UL (ref 3.81–5.12)
SODIUM SERPL-SCNC: 145 MMOL/L (ref 136–145)
WBC # BLD AUTO: 5.88 THOUSAND/UL (ref 4.31–10.16)

## 2019-09-01 PROCEDURE — 99024 POSTOP FOLLOW-UP VISIT: CPT | Performed by: SURGERY

## 2019-09-01 PROCEDURE — 85025 COMPLETE CBC W/AUTO DIFF WBC: CPT | Performed by: SURGERY

## 2019-09-01 PROCEDURE — 80048 BASIC METABOLIC PNL TOTAL CA: CPT | Performed by: SURGERY

## 2019-09-01 RX ADMIN — HEPARIN SODIUM 5000 UNITS: 5000 INJECTION INTRAVENOUS; SUBCUTANEOUS at 08:29

## 2019-09-01 RX ADMIN — DEXTROSE, SODIUM CHLORIDE, AND POTASSIUM CHLORIDE 50 ML/HR: 5; .45; .15 INJECTION INTRAVENOUS at 21:05

## 2019-09-01 RX ADMIN — ROPIVACAINE HYDROCHLORIDE: 5 INJECTION, SOLUTION EPIDURAL; INFILTRATION; PERINEURAL at 08:26

## 2019-09-01 RX ADMIN — HEPARIN SODIUM 5000 UNITS: 5000 INJECTION INTRAVENOUS; SUBCUTANEOUS at 21:05

## 2019-09-01 NOTE — PROGRESS NOTES
Progress Note - Teresa England 1965, 47 y o  female MRN: [id number]    [facility] Encounter: [id number]    Primary Care Provider: Joao Cabrera MD   Date and time admitted to hospital: 8/29/2019 12:27 PM        * Colon cancer metastasized to liver [facility])  Assessment & Plan  48yo female s/p Exlap, extended R colon resection with en bloc resection small bowel, repair of umbilical hernia on 3/92/80    Plan:  Advance to clear liquid diet  Decrease MIVF  Maintain prevena VAC  Incentive spirometry  Encourage ambulation  DVT prophylaxis  Analgesia with epidural per APS  Monitor and replete electrolytes                Subjective/Objective     Subjective:   C/o L shoulder pain  Occasional burping, but only while ambulating  -F/-BM  No N/V  Objective:     Blood pressure 94/63, pulse 78, temperature 98 1 °F (36 7 °C), resp  rate 18, height 5' 5" (1 651 m), weight 59 4 kg (131 lb), last menstrual period 09/28/2018, SpO2 99 %, not currently breastfeeding  ,Body mass index is 21 8 kg/m²  Intake/Output Summary (Last 24 hours) at 9/1/2019 0824  Last data filed at 9/1/2019 0078  Gross per 24 hour   Intake 1999 77 ml   Output 2725 ml   Net -725 23 ml       Invasive Devices     Central Venous Catheter Line            Port A Cath 11/05/18 Right Subclavian 299 days          Peripheral Intravenous Line            Peripheral IV 08/29/19 Left Arm 2 days    Peripheral IV 08/29/19 Right Wrist 2 days          Epidural Line            Epidural Catheter 08/29/19 2 days          Drain            Negative Pressure Wound Therapy (V A C ) Abdomen Medial 2 days                Physical Exam:   Gen: NAD, resting in bed  HEENT: MMM, EOMI  CV: RRR  Lungs: nl effort  Abd: soft, NT/ND, VAC in place  Ext: no CCE  Skin: no rashes  Neuro: A&Ox3, motor and sensation grossly intact  Psych: appropriate     Lab, Imaging and other studies:  I have personally reviewed pertinent lab results    , CBC:   Lab Results   Component Value Date WBC 5 88 09/01/2019    HGB 7 3 (L) 09/01/2019    HCT 25 0 (L) 09/01/2019    MCV 94 09/01/2019     09/01/2019    MCH 27 5 09/01/2019    MCHC 29 2 (L) 09/01/2019    RDW 14 4 09/01/2019    MPV 8 8 (L) 09/01/2019    NRBC 0 09/01/2019   , CMP:   Lab Results   Component Value Date    SODIUM 145 09/01/2019    K 4 0 09/01/2019     (H) 09/01/2019    CO2 28 09/01/2019    BUN 2 (L) 09/01/2019    CREATININE 0 53 (L) 09/01/2019    CALCIUM 8 6 09/01/2019    EGFR 108 09/01/2019     VTE Pharmacologic Prophylaxis: Heparin  VTE Mechanical Prophylaxis: sequential compression device

## 2019-09-01 NOTE — PROGRESS NOTES
Progress Note - [facility] Follow Up  Ainsley Dow 47 y o  female MRN: [id number]  Unit/Bed#: [facility] 910-01 Encounter: [id number]      SURGERY DATE: 8/29/2019  Post-Op Diagnosis Codes:     * Colon cancer metastasized to liver ([address] ) [C18 9, C78 7]    Assessment:   47 yr old F POD 3  s/p Exlap, extended R colon resection with en bloc resection small bowel, repair of umbilical hernia, with thoracic epidural for postop pain control  Pt continues to have good pain control with epidural in place  She trials IV dilaudid for referred pain and she says it does help  She also uses a pillow to elevate her shoulder for comfort  She has not required IV nubain for pruritis  She tolerates her clears diet at this time and is OOB and ambulating well    Epidural remains well secured at backside  Alligator clip also well secured at left shoulder  Plan:   - continue epidural at its current settings  May anticipate removal of epidural tomorrow or POD 5   Will discuss with surgical team  Please hold Hep SQ AM dose prior to epidural removal    - continue IV dilaudid order PRN pain of left shoulder (referred)  - continue IV nubain PRN pruritis      [facility] will continue to follow; please contact APS ( btn [phone]) with any further questions    Subjective:  Patient states I  Continue to have little to mild pain    Meds/Allergies     all current active meds have been reviewed, current meds:   Current Facility-Administered Medications   Medication Dose Route Frequency    dextrose 5 % and sodium chloride 0 45 % with KCl 20 mEq/L infusion  50 mL/hr Intravenous Continuous    diphenhydrAMINE (BENADRYL) injection 25 mg  25 mg Intravenous Q6H PRN    heparin (porcine) subcutaneous injection 5,000 Units  5,000 Units Subcutaneous Q12H [address]    HYDROmorphone (DILAUDID) injection 0 5 mg  0 5 mg Intravenous Q2H PRN    nalbuphine (NUBAIN) injection 5 mg  5 mg Intravenous Q3H PRN    ondansetron (ZOFRAN) injection 4 mg  4 mg Intravenous Q6H PRN    ondansetron (ZOFRAN) injection 4 mg  4 mg Intravenous Q4H PRN    ropivacaine 0 1% and fentaNYL 2 mcg/mL PCEA   Epidural Continuous    and PTA meds:   Prior to Admission Medications   Prescriptions Last Dose Informant Patient Reported? Taking?    Multiple Vitamin (MULTIVITAMIN) capsule 2019 Self Yes Yes   Sig: Take 1 capsule by mouth daily   Omega-3 Fatty Acids (FISH OIL PO) 2019  Yes Yes   Sig: Take by mouth daily    cyanocobalamin (VITAMIN B-12) 1,000 mcg tablet 2019  Yes Yes   Sig: Take by mouth daily   ferrous sulfate 325 (65 Fe) mg tablet 2019 Self No Yes   Sig: Take 1 tablet (325 mg total) by mouth 3 (three) times a day with meals   folic acid (FOLVITE) 1 mg tablet 2019 Self Yes Yes   Si mg   methotrexate 2 5 mg tablet 2019 Self Yes Yes   Sig: 15 mg once a week       Facility-Administered Medications: None       No Known Allergies    Objective     Temp:  [98 1 °F (36 7 °C)-98 8 °F (37 1 °C)] 98 1 °F (36 7 °C)  HR:  [74-99] 78  Resp:  [16-18] 18  BP: ()/(61-78) 94/63    Physical Exam

## 2019-09-01 NOTE — ASSESSMENT & PLAN NOTE
48yo female s/p Exlap, extended R colon resection with en bloc resection small bowel, repair of umbilical hernia on 5/47/58    Plan:  Clear liquir diet  IV fluids  Maintain prevena VAC  Incentive spirometry  Encourage ambulation  DVT prophylaxis  Analgesia with epidural  Monitor and replete electrolytes

## 2019-09-01 NOTE — PLAN OF CARE
Problem: PAIN - ADULT  Goal: Verbalizes/displays adequate comfort level or baseline comfort level  Description  Interventions:  - Encourage patient to monitor pain and request assistance  - Assess pain using appropriate pain scale  - Administer analgesics based on type and severity of pain and evaluate response  - Implement non-pharmacological measures as appropriate and evaluate response  - Consider cultural and social influences on pain and pain management  - Notify physician/advanced practitioner if interventions unsuccessful or patient reports new pain  Outcome: Progressing     Problem: INFECTION - ADULT  Goal: Absence or prevention of progression during hospitalization  Description  INTERVENTIONS:  - Assess and monitor for signs and symptoms of infection  - Monitor lab/diagnostic results  - Monitor all insertion sites, i e  indwelling lines, tubes, and drains  - Monitor endotracheal if appropriate and nasal secretions for changes in amount and color  - [place] appropriate cooling/warming therapies per order  - Administer medications as ordered  - Instruct and encourage patient and family to use good hand hygiene technique  - Identify and instruct in appropriate isolation precautions for identified infection/condition  Outcome: Progressing  Goal: Absence of fever/infection during neutropenic period  Description  INTERVENTIONS:  - Monitor WBC    Outcome: Progressing     Problem: SAFETY ADULT  Goal: Patient will remain free of falls  Description  INTERVENTIONS:  - Assess patient frequently for physical needs  -  Identify cognitive and physical deficits and behaviors that affect risk of falls    -  [place] fall precautions as indicated by assessment   - Educate patient/family on patient safety including physical limitations  - Instruct patient to call for assistance with activity based on assessment  - Modify environment to reduce risk of injury  - Consider OT/PT consult to assist with strengthening/mobility  Outcome: Progressing  Goal: Maintain or return to baseline ADL function  Description  INTERVENTIONS:  -  Assess patient's ability to carry out ADLs; assess patient's baseline for ADL function and identify physical deficits which impact ability to perform ADLs (bathing, care of mouth/teeth, toileting, grooming, dressing, etc )  - Assess/evaluate cause of self-care deficits   - Assess range of motion  - Assess patient's mobility; develop plan if impaired  - Assess patient's need for assistive devices and provide as appropriate  - Encourage maximum independence but intervene and supervise when necessary  - Involve family in performance of ADLs  - Assess for home care needs following discharge   - Consider OT consult to assist with ADL evaluation and planning for discharge  - Provide patient education as appropriate  Outcome: Progressing  Goal: Maintain or return mobility status to optimal level  Description  INTERVENTIONS:  - Assess patient's baseline mobility status (ambulation, transfers, stairs, etc )    - Identify cognitive and physical deficits and behaviors that affect mobility  - Identify mobility aids required to assist with transfers and/or ambulation (gait belt, sit-to-stand, lift, walker, cane, etc )  - [place] fall precautions as indicated by assessment  - Record patient progress and toleration of activity level on Mobility SBAR; progress patient to next Phase/Stage  - Instruct patient to call for assistance with activity based on assessment  - Consider rehabilitation consult to assist with strengthening/weightbearing, etc   Outcome: Progressing     Problem: DISCHARGE PLANNING  Goal: Discharge to home or other facility with appropriate resources  Description  INTERVENTIONS:  - Identify barriers to discharge w/patient and caregiver  - Arrange for needed discharge resources and transportation as appropriate  - Identify discharge learning needs (meds, wound care, etc )  - Arrange for interpretive services to assist at discharge as needed  - Refer to Case Management Department for coordinating discharge planning if the patient needs post-hospital services based on physician/advanced practitioner order or complex needs related to functional status, cognitive ability, or social support system  Outcome: Progressing     Problem: Knowledge Deficit  Goal: Patient/family/caregiver demonstrates understanding of disease process, treatment plan, medications, and discharge instructions  Description  Complete learning assessment and assess knowledge base  Interventions:  - Provide teaching at level of understanding  - Provide teaching via preferred learning methods  Outcome: Progressing     Problem: Nutrition/Hydration-ADULT  Goal: Nutrient/Hydration intake appropriate for improving, restoring or maintaining nutritional needs  Description  Monitor and assess patient's nutrition/hydration status for malnutrition  Collaborate with interdisciplinary team and initiate plan and interventions as ordered  Monitor patient's weight and dietary intake as ordered or per policy  Utilize nutrition screening tool and intervene as necessary  Determine patient's food preferences and provide high-protein, high-caloric foods as appropriate       INTERVENTIONS:  - Monitor oral intake, urinary output, labs, and treatment plans  - Assess nutrition and hydration status and recommend course of action  - Evaluate amount of meals eaten  - Assist patient with eating if necessary   - Allow adequate time for meals  - Recommend/ encourage appropriate diets, oral nutritional supplements, and vitamin/mineral supplements  - Order, calculate, and assess calorie counts as needed  - Recommend, monitor, and adjust tube feedings and TPN/PPN based on assessed needs  - Assess need for intravenous fluids  - Provide specific nutrition/hydration education as appropriate  - Include patient/family/caregiver in decisions related to nutrition  Outcome: Progressing     Problem: Prexisting or High Potential for Compromised Skin Integrity  Goal: Skin integrity is maintained or improved  Description  INTERVENTIONS:  - Identify patients at risk for skin breakdown  - Assess and monitor skin integrity  - Assess and monitor nutrition and hydration status  - Monitor labs   - Assess for incontinence   - Turn and reposition patient  - Assist with mobility/ambulation  - Relieve pressure over bony prominences  - Avoid friction and shearing  - Provide appropriate hygiene as needed including keeping skin clean and dry  - Evaluate need for skin moisturizer/barrier cream  - Collaborate with interdisciplinary team   - Patient/family teaching  - Consider wound care consult   Outcome: Progressing     Problem: Potential for Falls  Goal: Patient will remain free of falls  Description  INTERVENTIONS:  - Assess patient frequently for physical needs  -  Identify cognitive and physical deficits and behaviors that affect risk of falls    -  [place] fall precautions as indicated by assessment   - Educate patient/family on patient safety including physical limitations  - Instruct patient to call for assistance with activity based on assessment  - Modify environment to reduce risk of injury  - Consider OT/PT consult to assist with strengthening/mobility  Outcome: Progressing

## 2019-09-02 LAB
ANION GAP SERPL CALCULATED.3IONS-SCNC: 6 MMOL/L (ref 4–13)
BUN SERPL-MCNC: 2 MG/DL (ref 5–25)
CALCIUM SERPL-MCNC: 8.3 MG/DL (ref 8.3–10.1)
CHLORIDE SERPL-SCNC: 108 MMOL/L (ref 100–108)
CO2 SERPL-SCNC: 29 MMOL/L (ref 21–32)
CREAT SERPL-MCNC: 0.61 MG/DL (ref 0.6–1.3)
ERYTHROCYTE [DISTWIDTH] IN BLOOD BY AUTOMATED COUNT: 14.4 % (ref 11.6–15.1)
ERYTHROCYTE [DISTWIDTH] IN BLOOD BY AUTOMATED COUNT: 14.6 % (ref 11.6–15.1)
GFR SERPL CREATININE-BSD FRML MDRD: 103 ML/MIN/1.73SQ M
GLUCOSE SERPL-MCNC: 101 MG/DL (ref 65–140)
HCT VFR BLD AUTO: 22.2 % (ref 34.8–46.1)
HCT VFR BLD AUTO: 28.4 % (ref 34.8–46.1)
HGB BLD-MCNC: 6.5 G/DL (ref 11.5–15.4)
HGB BLD-MCNC: 8.4 G/DL (ref 11.5–15.4)
MCH RBC QN AUTO: 26.8 PG (ref 26.8–34.3)
MCH RBC QN AUTO: 27.1 PG (ref 26.8–34.3)
MCHC RBC AUTO-ENTMCNC: 29.3 G/DL (ref 31.4–37.4)
MCHC RBC AUTO-ENTMCNC: 29.6 G/DL (ref 31.4–37.4)
MCV RBC AUTO: 90 FL (ref 82–98)
MCV RBC AUTO: 93 FL (ref 82–98)
PLATELET # BLD AUTO: 156 THOUSANDS/UL (ref 149–390)
PLATELET # BLD AUTO: 203 THOUSANDS/UL (ref 149–390)
PMV BLD AUTO: 7.7 FL (ref 8.9–12.7)
PMV BLD AUTO: 8.5 FL (ref 8.9–12.7)
POTASSIUM SERPL-SCNC: 3.9 MMOL/L (ref 3.5–5.3)
RBC # BLD AUTO: 2.4 MILLION/UL (ref 3.81–5.12)
RBC # BLD AUTO: 3.14 MILLION/UL (ref 3.81–5.12)
SODIUM SERPL-SCNC: 143 MMOL/L (ref 136–145)
WBC # BLD AUTO: 12.71 THOUSAND/UL (ref 4.31–10.16)
WBC # BLD AUTO: 5.57 THOUSAND/UL (ref 4.31–10.16)

## 2019-09-02 PROCEDURE — 85027 COMPLETE CBC AUTOMATED: CPT | Performed by: ORTHOPAEDIC SURGERY

## 2019-09-02 PROCEDURE — 80048 BASIC METABOLIC PNL TOTAL CA: CPT | Performed by: ORTHOPAEDIC SURGERY

## 2019-09-02 PROCEDURE — 99024 POSTOP FOLLOW-UP VISIT: CPT | Performed by: SURGERY

## 2019-09-02 PROCEDURE — 85027 COMPLETE CBC AUTOMATED: CPT | Performed by: SURGERY

## 2019-09-02 RX ORDER — OXYCODONE HYDROCHLORIDE AND ACETAMINOPHEN 5; 325 MG/1; MG/1
2 TABLET ORAL EVERY 4 HOURS PRN
Status: DISCONTINUED | OUTPATIENT
Start: 2019-09-02 | End: 2019-09-05 | Stop reason: HOSPADM

## 2019-09-02 RX ORDER — OXYCODONE HYDROCHLORIDE AND ACETAMINOPHEN 5; 325 MG/1; MG/1
1 TABLET ORAL EVERY 4 HOURS PRN
Status: DISCONTINUED | OUTPATIENT
Start: 2019-09-02 | End: 2019-09-05 | Stop reason: HOSPADM

## 2019-09-02 RX ORDER — HEPARIN SODIUM 5000 [USP'U]/ML
5000 INJECTION, SOLUTION INTRAVENOUS; SUBCUTANEOUS EVERY 8 HOURS SCHEDULED
Status: DISCONTINUED | OUTPATIENT
Start: 2019-09-02 | End: 2019-09-05 | Stop reason: HOSPADM

## 2019-09-02 RX ADMIN — OXYCODONE HYDROCHLORIDE AND ACETAMINOPHEN 1 TABLET: 5; 325 TABLET ORAL at 15:09

## 2019-09-02 RX ADMIN — HYDROMORPHONE HYDROCHLORIDE 0.5 MG: 1 INJECTION, SOLUTION INTRAMUSCULAR; INTRAVENOUS; SUBCUTANEOUS at 11:43

## 2019-09-02 RX ADMIN — OXYCODONE HYDROCHLORIDE AND ACETAMINOPHEN 2 TABLET: 5; 325 TABLET ORAL at 19:59

## 2019-09-02 RX ADMIN — HYDROMORPHONE HYDROCHLORIDE 0.5 MG: 1 INJECTION, SOLUTION INTRAMUSCULAR; INTRAVENOUS; SUBCUTANEOUS at 09:46

## 2019-09-02 RX ADMIN — HEPARIN SODIUM 5000 UNITS: 5000 INJECTION INTRAVENOUS; SUBCUTANEOUS at 21:42

## 2019-09-02 RX ADMIN — HEPARIN SODIUM 5000 UNITS: 5000 INJECTION INTRAVENOUS; SUBCUTANEOUS at 15:08

## 2019-09-02 NOTE — PLAN OF CARE
Problem: PAIN - ADULT  Goal: Verbalizes/displays adequate comfort level or baseline comfort level  Description  Interventions:  - Encourage patient to monitor pain and request assistance  - Assess pain using appropriate pain scale  - Administer analgesics based on type and severity of pain and evaluate response  - Implement non-pharmacological measures as appropriate and evaluate response  - Consider cultural and social influences on pain and pain management  - Notify physician/advanced practitioner if interventions unsuccessful or patient reports new pain  Outcome: Progressing     Problem: INFECTION - ADULT  Goal: Absence or prevention of progression during hospitalization  Description  INTERVENTIONS:  - Assess and monitor for signs and symptoms of infection  - Monitor lab/diagnostic results  - Monitor all insertion sites, i e  indwelling lines, tubes, and drains  - Monitor endotracheal if appropriate and nasal secretions for changes in amount and color  - [place] appropriate cooling/warming therapies per order  - Administer medications as ordered  - Instruct and encourage patient and family to use good hand hygiene technique  - Identify and instruct in appropriate isolation precautions for identified infection/condition  Outcome: Progressing  Goal: Absence of fever/infection during neutropenic period  Description  INTERVENTIONS:  - Monitor WBC    Outcome: Progressing     Problem: SAFETY ADULT  Goal: Patient will remain free of falls  Description  INTERVENTIONS:  - Assess patient frequently for physical needs  -  Identify cognitive and physical deficits and behaviors that affect risk of falls    -  [place] fall precautions as indicated by assessment   - Educate patient/family on patient safety including physical limitations  - Instruct patient to call for assistance with activity based on assessment  - Modify environment to reduce risk of injury  - Consider OT/PT consult to assist with strengthening/mobility  Outcome: Progressing  Goal: Maintain or return to baseline ADL function  Description  INTERVENTIONS:  -  Assess patient's ability to carry out ADLs; assess patient's baseline for ADL function and identify physical deficits which impact ability to perform ADLs (bathing, care of mouth/teeth, toileting, grooming, dressing, etc )  - Assess/evaluate cause of self-care deficits   - Assess range of motion  - Assess patient's mobility; develop plan if impaired  - Assess patient's need for assistive devices and provide as appropriate  - Encourage maximum independence but intervene and supervise when necessary  - Involve family in performance of ADLs  - Assess for home care needs following discharge   - Consider OT consult to assist with ADL evaluation and planning for discharge  - Provide patient education as appropriate  Outcome: Progressing  Goal: Maintain or return mobility status to optimal level  Description  INTERVENTIONS:  - Assess patient's baseline mobility status (ambulation, transfers, stairs, etc )    - Identify cognitive and physical deficits and behaviors that affect mobility  - Identify mobility aids required to assist with transfers and/or ambulation (gait belt, sit-to-stand, lift, walker, cane, etc )  - [place] fall precautions as indicated by assessment  - Record patient progress and toleration of activity level on Mobility SBAR; progress patient to next Phase/Stage  - Instruct patient to call for assistance with activity based on assessment  - Consider rehabilitation consult to assist with strengthening/weightbearing, etc   Outcome: Progressing     Problem: DISCHARGE PLANNING  Goal: Discharge to home or other facility with appropriate resources  Description  INTERVENTIONS:  - Identify barriers to discharge w/patient and caregiver  - Arrange for needed discharge resources and transportation as appropriate  - Identify discharge learning needs (meds, wound care, etc )  - Arrange for interpretive services to assist at discharge as needed  - Refer to Case Management Department for coordinating discharge planning if the patient needs post-hospital services based on physician/advanced practitioner order or complex needs related to functional status, cognitive ability, or social support system  Outcome: Progressing     Problem: Knowledge Deficit  Goal: Patient/family/caregiver demonstrates understanding of disease process, treatment plan, medications, and discharge instructions  Description  Complete learning assessment and assess knowledge base  Interventions:  - Provide teaching at level of understanding  - Provide teaching via preferred learning methods  Outcome: Progressing     Problem: Nutrition/Hydration-ADULT  Goal: Nutrient/Hydration intake appropriate for improving, restoring or maintaining nutritional needs  Description  Monitor and assess patient's nutrition/hydration status for malnutrition  Collaborate with interdisciplinary team and initiate plan and interventions as ordered  Monitor patient's weight and dietary intake as ordered or per policy  Utilize nutrition screening tool and intervene as necessary  Determine patient's food preferences and provide high-protein, high-caloric foods as appropriate       INTERVENTIONS:  - Monitor oral intake, urinary output, labs, and treatment plans  - Assess nutrition and hydration status and recommend course of action  - Evaluate amount of meals eaten  - Assist patient with eating if necessary   - Allow adequate time for meals  - Recommend/ encourage appropriate diets, oral nutritional supplements, and vitamin/mineral supplements  - Order, calculate, and assess calorie counts as needed  - Recommend, monitor, and adjust tube feedings and TPN/PPN based on assessed needs  - Assess need for intravenous fluids  - Provide specific nutrition/hydration education as appropriate  - Include patient/family/caregiver in decisions related to nutrition  Outcome: Progressing     Problem: Prexisting or High Potential for Compromised Skin Integrity  Goal: Skin integrity is maintained or improved  Description  INTERVENTIONS:  - Identify patients at risk for skin breakdown  - Assess and monitor skin integrity  - Assess and monitor nutrition and hydration status  - Monitor labs   - Assess for incontinence   - Turn and reposition patient  - Assist with mobility/ambulation  - Relieve pressure over bony prominences  - Avoid friction and shearing  - Provide appropriate hygiene as needed including keeping skin clean and dry  - Evaluate need for skin moisturizer/barrier cream  - Collaborate with interdisciplinary team   - Patient/family teaching  - Consider wound care consult   Outcome: Progressing     Problem: Potential for Falls  Goal: Patient will remain free of falls  Description  INTERVENTIONS:  - Assess patient frequently for physical needs  -  Identify cognitive and physical deficits and behaviors that affect risk of falls    -  [place] fall precautions as indicated by assessment   - Educate patient/family on patient safety including physical limitations  - Instruct patient to call for assistance with activity based on assessment  - Modify environment to reduce risk of injury  - Consider OT/PT consult to assist with strengthening/mobility  Outcome: Progressing

## 2019-09-02 NOTE — PROGRESS NOTES
Progress Note - Acute Pain Service Regional Follow Up  Eunice Andrade 47 y o  female MRN: [id number]  Unit/Bed#: [facility] 910-01 Encounter: [id number]      SURGERY DATE: 8/29/2019  Post-Op Diagnosis Codes:     * Colon cancer metastasized to liver ([address] ) [C18 9, C78 7]    Assessment:   47 yr old F POD 4  s/p Exlap, extended R colon resection with en bloc resection small bowel, repair of umbilical hernia, with thoracic epidural for postop pain control  Request by surgical team to have epidural removed  At 1104, epidural dc'd  Tip intact  Area cleaned and dressed with bandaid  Surgical team to provide PO/IV pain regimen  Pt prefers trial use of PO percocet (vicodin gives her change of mentation)    APS sign off  Thank you for the Consult  Please contact APS ( btwn [phone]) with any further questions    Subjective:  Patient states I am doing well    Meds/Allergies     all current active meds have been reviewed, current meds:   Current Facility-Administered Medications   Medication Dose Route Frequency    diphenhydrAMINE (BENADRYL) injection 25 mg  25 mg Intravenous Q6H PRN    heparin (porcine) subcutaneous injection 5,000 Units  5,000 Units Subcutaneous Q8H [facility]    HYDROmorphone (DILAUDID) injection 0 5 mg  0 5 mg Intravenous Q2H PRN    nalbuphine (NUBAIN) injection 5 mg  5 mg Intravenous Q3H PRN    ondansetron (ZOFRAN) injection 4 mg  4 mg Intravenous Q6H PRN    ondansetron (ZOFRAN) injection 4 mg  4 mg Intravenous Q4H PRN    oxyCODONE-acetaminophen (PERCOCET) 5-325 mg per tablet 1 tablet  1 tablet Oral Q4H PRN    oxyCODONE-acetaminophen (PERCOCET) 5-325 mg per tablet 2 tablet  2 tablet Oral Q4H PRN    ropivacaine 0 1% and fentaNYL 2 mcg/mL PCEA   Epidural Continuous    and PTA meds:   Prior to Admission Medications   Prescriptions Last Dose Informant Patient Reported? Taking?    Multiple Vitamin (MULTIVITAMIN) capsule 8/22/2019 Self Yes Yes   Sig: Take 1 capsule by mouth daily   Omega-3 Fatty Acids (FISH OIL PO) 2019  Yes Yes   Sig: Take by mouth daily    cyanocobalamin (VITAMIN B-12) 1,000 mcg tablet 2019  Yes Yes   Sig: Take by mouth daily   ferrous sulfate 325 (65 Fe) mg tablet 2019 Self No Yes   Sig: Take 1 tablet (325 mg total) by mouth 3 (three) times a day with meals   folic acid (FOLVITE) 1 mg tablet 2019 Self Yes Yes   Si mg   methotrexate 2 5 mg tablet 2019 Self Yes Yes   Sig: 15 mg once a week       Facility-Administered Medications: None       No Known Allergies    Objective     Temp:  [97 5 °F (36 4 °C)-98 5 °F (36 9 °C)] 98 2 °F (36 8 °C)  HR:  [79-98] 92  Resp:  [16-18] 16  BP: ()/(60-78) 103/67    Physical Exam

## 2019-09-02 NOTE — PLAN OF CARE
Problem: PAIN - ADULT  Goal: Verbalizes/displays adequate comfort level or baseline comfort level  Description  Interventions:  - Encourage patient to monitor pain and request assistance  - Assess pain using appropriate pain scale  - Administer analgesics based on type and severity of pain and evaluate response  - Implement non-pharmacological measures as appropriate and evaluate response  - Consider cultural and social influences on pain and pain management  - Notify physician/advanced practitioner if interventions unsuccessful or patient reports new pain  9/2/2019 1128 by Melody Ambrose RN  Outcome: Progressing  9/2/2019 1117 by Melody Ambrose RN  Outcome: Progressing     Problem: INFECTION - ADULT  Goal: Absence or prevention of progression during hospitalization  Description  INTERVENTIONS:  - Assess and monitor for signs and symptoms of infection  - Monitor lab/diagnostic results  - Monitor all insertion sites, i e  indwelling lines, tubes, and drains  - Monitor endotracheal if appropriate and nasal secretions for changes in amount and color  - [place] appropriate cooling/warming therapies per order  - Administer medications as ordered  - Instruct and encourage patient and family to use good hand hygiene technique  - Identify and instruct in appropriate isolation precautions for identified infection/condition  9/2/2019 1128 by Melody Ambrose RN  Outcome: Progressing  9/2/2019 1117 by Melody Ambrose RN  Outcome: Progressing  Goal: Absence of fever/infection during neutropenic period  Description  INTERVENTIONS:  - Monitor WBC    9/2/2019 1128 by Melody Ambrose RN  Outcome: Progressing  9/2/2019 1117 by Melody Ambrose RN  Outcome: Progressing     Problem: SAFETY ADULT  Goal: Patient will remain free of falls  Description  INTERVENTIONS:  - Assess patient frequently for physical needs  -  Identify cognitive and physical deficits and behaviors that affect risk of falls    -  [place] fall precautions as indicated by assessment   - Educate patient/family on patient safety including physical limitations  - Instruct patient to call for assistance with activity based on assessment  - Modify environment to reduce risk of injury  - Consider OT/PT consult to assist with strengthening/mobility  9/2/2019 1128 by Melody Ambrose RN  Outcome: Progressing  9/2/2019 1117 by Melody Ambrose RN  Outcome: Progressing  Goal: Maintain or return to baseline ADL function  Description  INTERVENTIONS:  -  Assess patient's ability to carry out ADLs; assess patient's baseline for ADL function and identify physical deficits which impact ability to perform ADLs (bathing, care of mouth/teeth, toileting, grooming, dressing, etc )  - Assess/evaluate cause of self-care deficits   - Assess range of motion  - Assess patient's mobility; develop plan if impaired  - Assess patient's need for assistive devices and provide as appropriate  - Encourage maximum independence but intervene and supervise when necessary  - Involve family in performance of ADLs  - Assess for home care needs following discharge   - Consider OT consult to assist with ADL evaluation and planning for discharge  - Provide patient education as appropriate  9/2/2019 1128 by Melody Ambrose RN  Outcome: Progressing  9/2/2019 1117 by Melody Ambrose RN  Outcome: Progressing  Goal: Maintain or return mobility status to optimal level  Description  INTERVENTIONS:  - Assess patient's baseline mobility status (ambulation, transfers, stairs, etc )    - Identify cognitive and physical deficits and behaviors that affect mobility  - Identify mobility aids required to assist with transfers and/or ambulation (gait belt, sit-to-stand, lift, walker, cane, etc )  - [place] fall precautions as indicated by assessment  - Record patient progress and toleration of activity level on Mobility SBAR; progress patient to next Phase/Stage  - Instruct patient to call for assistance with activity based on assessment  - Consider rehabilitation consult to assist with strengthening/weightbearing, etc   9/2/2019 1128 by Larry English RN  Outcome: Progressing  9/2/2019 1117 by Larry English RN  Outcome: Progressing     Problem: DISCHARGE PLANNING  Goal: Discharge to home or other facility with appropriate resources  Description  INTERVENTIONS:  - Identify barriers to discharge w/patient and caregiver  - Arrange for needed discharge resources and transportation as appropriate  - Identify discharge learning needs (meds, wound care, etc )  - Arrange for interpretive services to assist at discharge as needed  - Refer to Case Management Department for coordinating discharge planning if the patient needs post-hospital services based on physician/advanced practitioner order or complex needs related to functional status, cognitive ability, or social support system  9/2/2019 1128 by Larry English RN  Outcome: Progressing  9/2/2019 1117 by Larry English RN  Outcome: Progressing     Problem: Knowledge Deficit  Goal: Patient/family/caregiver demonstrates understanding of disease process, treatment plan, medications, and discharge instructions  Description  Complete learning assessment and assess knowledge base  Interventions:  - Provide teaching at level of understanding  - Provide teaching via preferred learning methods  9/2/2019 1128 by Larry English RN  Outcome: Progressing  9/2/2019 1117 by Larry English RN  Outcome: Progressing     Problem: Nutrition/Hydration-ADULT  Goal: Nutrient/Hydration intake appropriate for improving, restoring or maintaining nutritional needs  Description  Monitor and assess patient's nutrition/hydration status for malnutrition  Collaborate with interdisciplinary team and initiate plan and interventions as ordered  Monitor patient's weight and dietary intake as ordered or per policy   Utilize nutrition screening tool and intervene as necessary  Determine patient's food preferences and provide high-protein, high-caloric foods as appropriate       INTERVENTIONS:  - Monitor oral intake, urinary output, labs, and treatment plans  - Assess nutrition and hydration status and recommend course of action  - Evaluate amount of meals eaten  - Assist patient with eating if necessary   - Allow adequate time for meals  - Recommend/ encourage appropriate diets, oral nutritional supplements, and vitamin/mineral supplements  - Order, calculate, and assess calorie counts as needed  - Recommend, monitor, and adjust tube feedings and TPN/PPN based on assessed needs  - Assess need for intravenous fluids  - Provide specific nutrition/hydration education as appropriate  - Include patient/family/caregiver in decisions related to nutrition  9/2/2019 1128 by Rochelle Keller RN  Outcome: Progressing  9/2/2019 1117 by Rochelle Keller RN  Outcome: Progressing     Problem: Prexisting or High Potential for Compromised Skin Integrity  Goal: Skin integrity is maintained or improved  Description  INTERVENTIONS:  - Identify patients at risk for skin breakdown  - Assess and monitor skin integrity  - Assess and monitor nutrition and hydration status  - Monitor labs   - Assess for incontinence   - Turn and reposition patient  - Assist with mobility/ambulation  - Relieve pressure over bony prominences  - Avoid friction and shearing  - Provide appropriate hygiene as needed including keeping skin clean and dry  - Evaluate need for skin moisturizer/barrier cream  - Collaborate with interdisciplinary team   - Patient/family teaching  - Consider wound care consult   9/2/2019 1128 by Rochelle Keller RN  Outcome: Progressing  9/2/2019 1117 by Rochelle Keller RN  Outcome: Progressing     Problem: Potential for Falls  Goal: Patient will remain free of falls  Description  INTERVENTIONS:  - Assess patient frequently for physical needs  -  Identify cognitive and physical deficits and behaviors that affect risk of falls    -  [place] fall precautions as indicated by assessment   - Educate patient/family on patient safety including physical limitations  - Instruct patient to call for assistance with activity based on assessment  - Modify environment to reduce risk of injury  - Consider OT/PT consult to assist with strengthening/mobility  9/2/2019 1128 by Crane Pedro, RN  Outcome: Progressing  9/2/2019 1117 by Edward Vasquez, RN  Outcome: Progressing

## 2019-09-02 NOTE — PROGRESS NOTES
Progress Note - Chuy Mouse 47 y o  female MRN: [id number]    [facility] Encounter: [id number]      Assessment:  48yo female s/p Exlap, extended R colon resection with en bloc resection small bowel, repair of umbilical hernia on 9/21/99    VSS  Afebrile  Plan:  Cl liq diet  Maintain prevena VAC  Incentive spirometry  Encourage ambulation  DVT prophylaxis  Analgesia with epidural per APS  Monitor and replete electrolytes    Subjective:   No complaints, tolerating liquids    Objective:     Vitals: Blood pressure 111/72, pulse 85, temperature 97 5 °F (36 4 °C), resp  rate 18, height 5' 5" (1 651 m), weight 59 4 kg (131 lb), last menstrual period 09/28/2018, SpO2 100 %, not currently breastfeeding  ,Body mass index is 21 8 kg/m²  Intake/Output Summary (Last 24 hours) at 9/2/2019 0108  Last data filed at 9/2/2019 0003  Gross per 24 hour   Intake 934 6 ml   Output 3600 ml   Net -2665 4 ml       Physical Exam:   NAD, alert and oriented x3  Normocephalic, atraumatic  MMM, EOMI, PERRLA  Norm resp effort on RA  RRR  Abd soft, NT/ND, incision cdi  No calf tenderness or peripheral edema  Motor/sensation intact in distal extremities  CN grossly intact  -rash/lesions       Invasive Devices     Central Venous Catheter Line            Port A Cath 11/05/18 Right Subclavian 300 days          Peripheral Intravenous Line            Peripheral IV 08/29/19 Left Arm 3 days    Peripheral IV 08/29/19 Right Wrist 3 days          Epidural Line            Epidural Catheter 08/29/19 3 days          Drain            Negative Pressure Wound Therapy (V A C ) Abdomen Medial 3 days                Lab, Imaging and other studies: I have personally reviewed pertinent reports      VTE Pharmacologic Prophylaxis: Sequential compression device (Venodyne)   VTE Mechanical Prophylaxis: sequential compression device

## 2019-09-03 LAB
ANION GAP SERPL CALCULATED.3IONS-SCNC: 4 MMOL/L (ref 4–13)
BASOPHILS # BLD AUTO: 0.01 THOUSANDS/ΜL (ref 0–0.1)
BASOPHILS NFR BLD AUTO: 0 % (ref 0–1)
BUN SERPL-MCNC: 6 MG/DL (ref 5–25)
CALCIUM SERPL-MCNC: 8.6 MG/DL (ref 8.3–10.1)
CHLORIDE SERPL-SCNC: 104 MMOL/L (ref 100–108)
CO2 SERPL-SCNC: 31 MMOL/L (ref 21–32)
CREAT SERPL-MCNC: 0.52 MG/DL (ref 0.6–1.3)
EOSINOPHIL # BLD AUTO: 0.32 THOUSAND/ΜL (ref 0–0.61)
EOSINOPHIL NFR BLD AUTO: 5 % (ref 0–6)
ERYTHROCYTE [DISTWIDTH] IN BLOOD BY AUTOMATED COUNT: 14.5 % (ref 11.6–15.1)
GFR SERPL CREATININE-BSD FRML MDRD: 109 ML/MIN/1.73SQ M
GLUCOSE SERPL-MCNC: 98 MG/DL (ref 65–140)
HCT VFR BLD AUTO: 23.3 % (ref 34.8–46.1)
HGB BLD-MCNC: 7 G/DL (ref 11.5–15.4)
IMM GRANULOCYTES # BLD AUTO: 0.02 THOUSAND/UL (ref 0–0.2)
IMM GRANULOCYTES NFR BLD AUTO: 0 % (ref 0–2)
LYMPHOCYTES # BLD AUTO: 1.15 THOUSANDS/ΜL (ref 0.6–4.47)
LYMPHOCYTES NFR BLD AUTO: 18 % (ref 14–44)
MCH RBC QN AUTO: 27.6 PG (ref 26.8–34.3)
MCHC RBC AUTO-ENTMCNC: 30 G/DL (ref 31.4–37.4)
MCV RBC AUTO: 92 FL (ref 82–98)
MONOCYTES # BLD AUTO: 0.47 THOUSAND/ΜL (ref 0.17–1.22)
MONOCYTES NFR BLD AUTO: 8 % (ref 4–12)
NEUTROPHILS # BLD AUTO: 4.31 THOUSANDS/ΜL (ref 1.85–7.62)
NEUTS SEG NFR BLD AUTO: 69 % (ref 43–75)
NRBC BLD AUTO-RTO: 0 /100 WBCS
PLATELET # BLD AUTO: 190 THOUSANDS/UL (ref 149–390)
PMV BLD AUTO: 9.7 FL (ref 8.9–12.7)
POTASSIUM SERPL-SCNC: 3.9 MMOL/L (ref 3.5–5.3)
RBC # BLD AUTO: 2.54 MILLION/UL (ref 3.81–5.12)
SODIUM SERPL-SCNC: 139 MMOL/L (ref 136–145)
WBC # BLD AUTO: 6.28 THOUSAND/UL (ref 4.31–10.16)

## 2019-09-03 PROCEDURE — 85025 COMPLETE CBC W/AUTO DIFF WBC: CPT | Performed by: SURGERY

## 2019-09-03 PROCEDURE — 97116 GAIT TRAINING THERAPY: CPT

## 2019-09-03 PROCEDURE — 80048 BASIC METABOLIC PNL TOTAL CA: CPT | Performed by: SURGERY

## 2019-09-03 PROCEDURE — 99024 POSTOP FOLLOW-UP VISIT: CPT | Performed by: SURGERY

## 2019-09-03 PROCEDURE — 97110 THERAPEUTIC EXERCISES: CPT

## 2019-09-03 RX ORDER — POTASSIUM CHLORIDE 20 MEQ/1
20 TABLET, EXTENDED RELEASE ORAL ONCE
Status: COMPLETED | OUTPATIENT
Start: 2019-09-03 | End: 2019-09-03

## 2019-09-03 RX ADMIN — POTASSIUM CHLORIDE 20 MEQ: 1500 TABLET, EXTENDED RELEASE ORAL at 11:12

## 2019-09-03 RX ADMIN — OXYCODONE HYDROCHLORIDE AND ACETAMINOPHEN 1 TABLET: 5; 325 TABLET ORAL at 03:36

## 2019-09-03 RX ADMIN — HEPARIN SODIUM 5000 UNITS: 5000 INJECTION INTRAVENOUS; SUBCUTANEOUS at 05:58

## 2019-09-03 RX ADMIN — HEPARIN SODIUM 5000 UNITS: 5000 INJECTION INTRAVENOUS; SUBCUTANEOUS at 21:35

## 2019-09-03 RX ADMIN — HYDROMORPHONE HYDROCHLORIDE 0.5 MG: 1 INJECTION, SOLUTION INTRAMUSCULAR; INTRAVENOUS; SUBCUTANEOUS at 05:58

## 2019-09-03 RX ADMIN — HEPARIN SODIUM 5000 UNITS: 5000 INJECTION INTRAVENOUS; SUBCUTANEOUS at 14:10

## 2019-09-03 RX ADMIN — OXYCODONE HYDROCHLORIDE AND ACETAMINOPHEN 2 TABLET: 5; 325 TABLET ORAL at 19:26

## 2019-09-03 NOTE — PLAN OF CARE
Problem: PHYSICAL THERAPY ADULT  Goal: Performs mobility at highest level of function for planned discharge setting  See evaluation for individualized goals  Description    Outcome: Progressing  Note:   Prognosis: Excellent  Problem List: Decreased strength, Decreased endurance, Decreased mobility  Assessment: Pt presents to therapy today with decreased endurance and limited mobility  These impairments limit the patient by requiring rest breaks post activity and some assistance with mobility  Pt would benefit from continued skilled therapy while in the hospital to improve functional mobility and return to PLOF of being I  Recommend home with family assist  At end of session patient was left seated with call bell within reach  Barriers to Discharge: Inaccessible home environment     Recommendation: Home with family support     PT - OK to Discharge: Yes    See flowsheet documentation for full assessment

## 2019-09-03 NOTE — PLAN OF CARE
Problem: PAIN - ADULT  Goal: Verbalizes/displays adequate comfort level or baseline comfort level  Description  Interventions:  - Encourage patient to monitor pain and request assistance  - Assess pain using appropriate pain scale  - Administer analgesics based on type and severity of pain and evaluate response  - Implement non-pharmacological measures as appropriate and evaluate response  - Consider cultural and social influences on pain and pain management  - Notify physician/advanced practitioner if interventions unsuccessful or patient reports new pain  Outcome: Progressing     Problem: INFECTION - ADULT  Goal: Absence or prevention of progression during hospitalization  Description  INTERVENTIONS:  - Assess and monitor for signs and symptoms of infection  - Monitor lab/diagnostic results  - Monitor all insertion sites, i e  indwelling lines, tubes, and drains  - Monitor endotracheal if appropriate and nasal secretions for changes in amount and color  - [place] appropriate cooling/warming therapies per order  - Administer medications as ordered  - Instruct and encourage patient and family to use good hand hygiene technique  - Identify and instruct in appropriate isolation precautions for identified infection/condition  Outcome: Progressing  Goal: Absence of fever/infection during neutropenic period  Description  INTERVENTIONS:  - Monitor WBC    Outcome: Progressing     Problem: SAFETY ADULT  Goal: Patient will remain free of falls  Description  INTERVENTIONS:  - Assess patient frequently for physical needs  -  Identify cognitive and physical deficits and behaviors that affect risk of falls    -  [place] fall precautions as indicated by assessment   - Educate patient/family on patient safety including physical limitations  - Instruct patient to call for assistance with activity based on assessment  - Modify environment to reduce risk of injury  - Consider OT/PT consult to assist with strengthening/mobility  Outcome: Progressing  Goal: Maintain or return to baseline ADL function  Description  INTERVENTIONS:  -  Assess patient's ability to carry out ADLs; assess patient's baseline for ADL function and identify physical deficits which impact ability to perform ADLs (bathing, care of mouth/teeth, toileting, grooming, dressing, etc )  - Assess/evaluate cause of self-care deficits   - Assess range of motion  - Assess patient's mobility; develop plan if impaired  - Assess patient's need for assistive devices and provide as appropriate  - Encourage maximum independence but intervene and supervise when necessary  - Involve family in performance of ADLs  - Assess for home care needs following discharge   - Consider OT consult to assist with ADL evaluation and planning for discharge  - Provide patient education as appropriate  Outcome: Progressing  Goal: Maintain or return mobility status to optimal level  Description  INTERVENTIONS:  - Assess patient's baseline mobility status (ambulation, transfers, stairs, etc )    - Identify cognitive and physical deficits and behaviors that affect mobility  - Identify mobility aids required to assist with transfers and/or ambulation (gait belt, sit-to-stand, lift, walker, cane, etc )  - [place] fall precautions as indicated by assessment  - Record patient progress and toleration of activity level on Mobility SBAR; progress patient to next Phase/Stage  - Instruct patient to call for assistance with activity based on assessment  - Consider rehabilitation consult to assist with strengthening/weightbearing, etc   Outcome: Progressing     Problem: DISCHARGE PLANNING  Goal: Discharge to home or other facility with appropriate resources  Description  INTERVENTIONS:  - Identify barriers to discharge w/patient and caregiver  - Arrange for needed discharge resources and transportation as appropriate  - Identify discharge learning needs (meds, wound care, etc )  - Arrange for interpretive services to assist at discharge as needed  - Refer to Case Management Department for coordinating discharge planning if the patient needs post-hospital services based on physician/advanced practitioner order or complex needs related to functional status, cognitive ability, or social support system  Outcome: Progressing     Problem: Knowledge Deficit  Goal: Patient/family/caregiver demonstrates understanding of disease process, treatment plan, medications, and discharge instructions  Description  Complete learning assessment and assess knowledge base  Interventions:  - Provide teaching at level of understanding  - Provide teaching via preferred learning methods  Outcome: Progressing     Problem: Nutrition/Hydration-ADULT  Goal: Nutrient/Hydration intake appropriate for improving, restoring or maintaining nutritional needs  Description  Monitor and assess patient's nutrition/hydration status for malnutrition  Collaborate with interdisciplinary team and initiate plan and interventions as ordered  Monitor patient's weight and dietary intake as ordered or per policy  Utilize nutrition screening tool and intervene as necessary  Determine patient's food preferences and provide high-protein, high-caloric foods as appropriate       INTERVENTIONS:  - Monitor oral intake, urinary output, labs, and treatment plans  - Assess nutrition and hydration status and recommend course of action  - Evaluate amount of meals eaten  - Assist patient with eating if necessary   - Allow adequate time for meals  - Recommend/ encourage appropriate diets, oral nutritional supplements, and vitamin/mineral supplements  - Order, calculate, and assess calorie counts as needed  - Recommend, monitor, and adjust tube feedings and TPN/PPN based on assessed needs  - Assess need for intravenous fluids  - Provide specific nutrition/hydration education as appropriate  - Include patient/family/caregiver in decisions related to nutrition  Outcome: Progressing     Problem: Prexisting or High Potential for Compromised Skin Integrity  Goal: Skin integrity is maintained or improved  Description  INTERVENTIONS:  - Identify patients at risk for skin breakdown  - Assess and monitor skin integrity  - Assess and monitor nutrition and hydration status  - Monitor labs   - Assess for incontinence   - Turn and reposition patient  - Assist with mobility/ambulation  - Relieve pressure over bony prominences  - Avoid friction and shearing  - Provide appropriate hygiene as needed including keeping skin clean and dry  - Evaluate need for skin moisturizer/barrier cream  - Collaborate with interdisciplinary team   - Patient/family teaching  - Consider wound care consult   Outcome: Progressing     Problem: Potential for Falls  Goal: Patient will remain free of falls  Description  INTERVENTIONS:  - Assess patient frequently for physical needs  -  Identify cognitive and physical deficits and behaviors that affect risk of falls    -  [place] fall precautions as indicated by assessment   - Educate patient/family on patient safety including physical limitations  - Instruct patient to call for assistance with activity based on assessment  - Modify environment to reduce risk of injury  - Consider OT/PT consult to assist with strengthening/mobility  Outcome: Progressing

## 2019-09-03 NOTE — UTILIZATION REVIEW
Continued Stay Review    Date: 09/03/2019                      s/p Exlap, extended R colon resection with en bloc resection small bowel, repair of umbilical hernia on 2/90/78 (POD# 5)  Current Patient Class: Inpatient  Current Level of Care: Med/Surg    HPI:54 y o  female initially admitted on 08/29/2019     Assessment/Plan: Nausea feeling bloated, no flatus or BM  Liquid diet  Maintain prevena VAC  Incentive spirometry  Encourage ambulation  DVT prophylaxis  Analgesia with epidural per APS  Monitor and replete electrolytes        Pertinent Labs/Diagnostic Results:   Results from last 7 days   Lab Units 09/03/19  0513 09/02/19  2206 09/02/19  0626 09/01/19  0541 08/31/19  0612   WBC Thousand/uL 6 28 12 71* 5 57 5 88 5 36   HEMOGLOBIN g/dL 7 0* 8 4* 6 5* 7 3* 6 9*   HEMATOCRIT % 23 3* 28 4* 22 2* 25 0* 23 6*   PLATELETS Thousands/uL 190 203 156 164 144*   NEUTROS ABS Thousands/µL 4 31  --   --  3 86 3 87     Results from last 7 days   Lab Units 09/03/19  0513 09/02/19  0626 09/01/19  0541 08/31/19  0612 08/30/19  0545   SODIUM mmol/L 139 143 145 143 141   POTASSIUM mmol/L 3 9 3 9 4 0 4 1 3 6   CHLORIDE mmol/L 104 108 110* 110* 110*   CO2 mmol/L 31 29 28 29 27   ANION GAP mmol/L 4 6 7 4 4   BUN mg/dL 6 2* 2* 3* 4*   CREATININE mg/dL 0 52* 0 61 0 53* 0 54* 0 49*   EGFR ml/min/1 73sq m 109 103 108 107 111   CALCIUM mg/dL 8 6 8 3 8 6 7 9* 7 7*   MAGNESIUM mg/dL  --   --   --  2 2 1 9     Results from last 7 days   Lab Units 08/29/19  1256   POC GLUCOSE mg/dl 119     Results from last 7 days   Lab Units 09/03/19  0513 09/02/19  0626 09/01/19  0541 08/31/19  0612 08/30/19  0545 08/29/19  2027   GLUCOSE RANDOM mg/dL 98 101 93 107 119 205*     Vital Signs:   Date/Time  Temp  Pulse  Resp  BP  MAP (mmHg)  SpO2  O2 Device   09/03/19 0730              None (Room air)   09/03/19 07:04:14  98 4 °F (36 9 °C)  83  20  97/61  73  96 %     09/02/19 21:53:58  98 8 °F (37 1 °C)  90  18  105/70  82  99 %   Medications:   Scheduled Meds:   Current Facility-Administered Medications:  diphenhydrAMINE 25 mg Intravenous Q6H PRN   heparin (porcine) 5,000 Units Subcutaneous Q8H [facility] & long term   HYDROmorphone 0 5 mg Intravenous Q2H PRN   X 1 dose   ondansetron 4 mg Intravenous Q6H PRN   ondansetron 4 mg Intravenous Q4H PRN   oxyCODONE-acetaminophen 1 tablet Oral Q4H PRN   oxyCODONE-acetaminophen 2 tablet Oral Q4H PRN       Discharge Plan: TBD    Network Utilization Review Department  Phone: [phone]; Fax [phone]  [email or web address]  ATTENTION: Please call with any questions or concerns to [phone]  and carefully listen to the prompts so that you are directed to the right person  Send all requests for admission clinical reviews, approved or denied determinations and any other requests to fax [phone]   All voicemails are confidential

## 2019-09-03 NOTE — PROGRESS NOTES
Progress Note - Jeff  47 y o  female MRN: [id number]    Unit/Bed#: [facility] 910-01 Encounter: [id number]      Assessment:  48yo female s/p Exlap, extended R colon resection with en bloc resection small bowel, repair of umbilical hernia on 3/10/82    VSS  Afebrile  Plan:  Regular  Maintain prevena VAC  Incentive spirometry  Encourage ambulation  DVT prophylaxis  Analgesia with epidural per APS  Monitor and replete electrolytes    Subjective:   Nausea feeling bloated, no flatus or BM    Objective:     Vitals: Blood pressure 105/70, pulse 90, temperature 98 8 °F (37 1 °C), resp  rate 18, height 5' 5" (1 651 m), weight 59 4 kg (131 lb), last menstrual period 09/28/2018, SpO2 99 %, not currently breastfeeding  ,Body mass index is 21 8 kg/m²  Intake/Output Summary (Last 24 hours) at 9/3/2019 0610  Last data filed at 9/3/2019 8523  Gross per 24 hour   Intake 2297 5 ml   Output 2300 ml   Net -2 5 ml       Physical Exam:   NAD, alert and oriented x3  Normocephalic, atraumatic  MMM, EOMI, PERRLA  Norm resp effort on RA  RRR  Abd soft, NT, mild distension, incision cdi, prevena in place cdi  No calf tenderness or peripheral edema  Motor/sensation intact in distal extremities  CN grossly intact  -rash/lesions       Invasive Devices     Central Venous Catheter Line            Port A Cath 11/05/18 Right Subclavian 301 days          Peripheral Intravenous Line            Peripheral IV 09/02/19 Left Forearm less than 1 day          Drain            Negative Pressure Wound Therapy (V A C ) Abdomen Medial 4 days                Lab, Imaging and other studies: I have personally reviewed pertinent reports      VTE Pharmacologic Prophylaxis: Sequential compression device (Venodyne)   VTE Mechanical Prophylaxis: sequential compression device

## 2019-09-03 NOTE — PHYSICAL THERAPY NOTE
Physical Therapy Treatment note     Patient Name: Cammy Champagne    DKFXF'O Date: 9/3/2019     Problem List  Patient Active Problem List   Diagnosis    Arthritis    Hay fever    Herpes zoster    Seasonal allergies    Anxiety    Hot flashes    Impaired fasting glucose    Fatigue    Rheumatoid arthritis involving both hands with negative rheumatoid factor (HCC)    Other hyperlipidemia    Megaloblastic anemia    Severe anemia    Liver masses    Colon cancer metastasized [facility] liver [facility])        Past Medical History  Past Medical History:   Diagnosis Date    Anxious mood     [facility]     [facility]     History of chemotherapy     [facility]         Past Surgical History  Past Surgical History:   Procedure Laterality Date    APPENDECTOMY      ESOPHAGOGASTRODUODENOSCOPY N/A 9/14/2018    Procedure: ESOPHAGOGASTRODUODENOSCOPY (EGD) with polypectomy;  Surgeon: Velma Painter MD;  Location: [facility]; Service: Gastroenterology    IR IMAGE GUIDED [address] [place] [address]  10/25/2018    IR PORT PLACEMENT  11/7/2018    r chest    MS PART REMOVAL COLON W ANASTOMOSIS N/A 8/29/2019    Procedure: EXTENDED RIGHT COLON RESECTION;  Surgeon: Philipp Sol MD;  Location:  MAIN OR;  Service: Surgical Oncology    TUBAL LIGATION      resolved 2007    WISDOM TOOTH EXTRACTION      resolved 1990 09/03/19 0953   Pain Assessment   Pain Assessment Rosen-Baker FACES   Rosen-Baker FACES Pain Rating 0   Restrictions/Precautions   Weight Bearing Precautions Per Order No   Other Precautions Fall Risk  (wound vac )   General   Chart Reviewed Yes   Family/Caregiver Present No   Cognition   Overall Cognitive Status WFL   Arousal/Participation Alert; Cooperative   Attention Within functional limits   Orientation Level Oriented X4   Memory Within functional limits   Following Commands Follows all commands and directions without difficulty Transfers   Sit to Stand 7  Independent   Stand to Sit 7  Independent   Ambulation/Elevation   Gait pattern Shuffling; Foward flexed  (decreased speed )   Gait Assistance 5  Supervision   Assistive Device None   Distance >[id number], [id number]   Stair Management Assistance 5  Supervision   Additional items Assist x 1   Stair Management Technique No rails; One rail R   Number of Stairs 8   Balance   Static Sitting Good   Dynamic Sitting Good   Static Standing Fair +   Dynamic Standing Fair +   Ambulatory Fair +   Endurance Deficit   Endurance Deficit Yes   Activity Tolerance   Activity Tolerance Patient limited by fatigue   Nurse Made Aware nurse approved therapy session   Exercises   Knee PROM Flexion Sitting;Bilateral  (12 reps x 3 sets )   Knee AROM Long Arc Quad Sitting;Bilateral  (12 reps x 3 sets )   Ankle Pumps Sitting;Bilateral  (30 reps x 2 sets )   Squat Standing;Bilateral  (10 reps x 2 sets )   Assessment   Prognosis Excellent   Problem List Decreased strength;Decreased endurance;Decreased mobility   Assessment Pt presents to therapy today with decreased endurance and limited mobility  These impairments limit the patient by requiring rest breaks post activity and some assistance with mobility  Pt would benefit from continued skilled therapy while in the hospital to improve functional mobility and return to PLOF of being I  Recommend home with family assist  At end of session patient was left seated with call bell within reach  Goals   STG Expiration Date 09/09/19   Short Term Goal #1 STG 1: Pt will perform transfers at a MI/I level to return to PLOF  STG 2: Pt will perform bed mobility at a I level to reduce level of assistance needed upon d c home  STG 3: Pt will ambulate with least restrictive device at a MI/I level for 300ft to improve mobility  Treatment Day 1   Plan   Treatment/Interventions Functional transfer training;LE strengthening/ROM; Elevations; Therapeutic exercise; Endurance training; Bed mobility;Gait training   Progress Progressing toward goals   PT Frequency   (3-5x wk)   Recommendation   Recommendation Home with family support   PT - OK to Discharge Yes   rest breaks required throughout session  Rivka Otero, Pt, DPT

## 2019-09-04 DIAGNOSIS — Z71.89 COMPLEX CARE COORDINATION: Primary | ICD-10-CM

## 2019-09-04 LAB
ANION GAP SERPL CALCULATED.3IONS-SCNC: 5 MMOL/L (ref 4–13)
BASOPHILS # BLD AUTO: 0.03 THOUSANDS/ΜL (ref 0–0.1)
BASOPHILS NFR BLD AUTO: 1 % (ref 0–1)
BUN SERPL-MCNC: 5 MG/DL (ref 5–25)
CALCIUM SERPL-MCNC: 8.8 MG/DL (ref 8.3–10.1)
CHLORIDE SERPL-SCNC: 104 MMOL/L (ref 100–108)
CO2 SERPL-SCNC: 28 MMOL/L (ref 21–32)
CREAT SERPL-MCNC: 0.44 MG/DL (ref 0.6–1.3)
EOSINOPHIL # BLD AUTO: 0.32 THOUSAND/ΜL (ref 0–0.61)
EOSINOPHIL NFR BLD AUTO: 5 % (ref 0–6)
ERYTHROCYTE [DISTWIDTH] IN BLOOD BY AUTOMATED COUNT: 14.5 % (ref 11.6–15.1)
GFR SERPL CREATININE-BSD FRML MDRD: 115 ML/MIN/1.73SQ M
GLUCOSE SERPL-MCNC: 84 MG/DL (ref 65–140)
HCT VFR BLD AUTO: 25.1 % (ref 34.8–46.1)
HGB BLD-MCNC: 7.4 G/DL (ref 11.5–15.4)
IMM GRANULOCYTES # BLD AUTO: 0.03 THOUSAND/UL (ref 0–0.2)
IMM GRANULOCYTES NFR BLD AUTO: 1 % (ref 0–2)
LYMPHOCYTES # BLD AUTO: 1.42 THOUSANDS/ΜL (ref 0.6–4.47)
LYMPHOCYTES NFR BLD AUTO: 24 % (ref 14–44)
MCH RBC QN AUTO: 26.5 PG (ref 26.8–34.3)
MCHC RBC AUTO-ENTMCNC: 29.5 G/DL (ref 31.4–37.4)
MCV RBC AUTO: 90 FL (ref 82–98)
MONOCYTES # BLD AUTO: 0.57 THOUSAND/ΜL (ref 0.17–1.22)
MONOCYTES NFR BLD AUTO: 10 % (ref 4–12)
NEUTROPHILS # BLD AUTO: 3.66 THOUSANDS/ΜL (ref 1.85–7.62)
NEUTS SEG NFR BLD AUTO: 59 % (ref 43–75)
NRBC BLD AUTO-RTO: 0 /100 WBCS
PLATELET # BLD AUTO: 192 THOUSANDS/UL (ref 149–390)
PMV BLD AUTO: 8.5 FL (ref 8.9–12.7)
POTASSIUM SERPL-SCNC: 3.9 MMOL/L (ref 3.5–5.3)
RBC # BLD AUTO: 2.79 MILLION/UL (ref 3.81–5.12)
SODIUM SERPL-SCNC: 137 MMOL/L (ref 136–145)
WBC # BLD AUTO: 6.03 THOUSAND/UL (ref 4.31–10.16)

## 2019-09-04 PROCEDURE — 85025 COMPLETE CBC W/AUTO DIFF WBC: CPT | Performed by: SURGERY

## 2019-09-04 PROCEDURE — 97116 GAIT TRAINING THERAPY: CPT

## 2019-09-04 PROCEDURE — 80048 BASIC METABOLIC PNL TOTAL CA: CPT | Performed by: SURGERY

## 2019-09-04 PROCEDURE — 97110 THERAPEUTIC EXERCISES: CPT

## 2019-09-04 PROCEDURE — 99024 POSTOP FOLLOW-UP VISIT: CPT | Performed by: SURGERY

## 2019-09-04 RX ADMIN — HEPARIN SODIUM 5000 UNITS: 5000 INJECTION INTRAVENOUS; SUBCUTANEOUS at 06:15

## 2019-09-04 RX ADMIN — OXYCODONE HYDROCHLORIDE AND ACETAMINOPHEN 1 TABLET: 5; 325 TABLET ORAL at 17:19

## 2019-09-04 RX ADMIN — HEPARIN SODIUM 5000 UNITS: 5000 INJECTION INTRAVENOUS; SUBCUTANEOUS at 14:55

## 2019-09-04 RX ADMIN — HEPARIN SODIUM 5000 UNITS: 5000 INJECTION INTRAVENOUS; SUBCUTANEOUS at 21:30

## 2019-09-04 RX ADMIN — OXYCODONE HYDROCHLORIDE AND ACETAMINOPHEN 1 TABLET: 5; 325 TABLET ORAL at 09:38

## 2019-09-04 RX ADMIN — OXYCODONE HYDROCHLORIDE AND ACETAMINOPHEN 1 TABLET: 5; 325 TABLET ORAL at 21:30

## 2019-09-04 NOTE — PROGRESS NOTES
Progress Note - Surgical Oncology   Torie Dominguez 47 y o  female MRN: [id number]  Unit/Bed#: [facility] 910-01 Encounter: [id number]    Assessment/Plan:  47 y o  female with colon CA     * Colon cancer metastasized to liver [facility])  Assessment & Plan  48yo female s/p Exlap, extended R colon resection with en bloc resection small bowel, repair of umbilical hernia on 8/06/15    Plan:  Fulls, poss advance to regular today  Maintain prevena VAC  Incentive spirometry  Encourage ambulation  DVT prophylaxis  Analgesia PRN  Monitor and replete electrolytes        Subjective/Objective     Subjective: some nausea so only taking in a little PO, passing flatus but no BM  Objective:     Vitals: Temp:  [98 4 °F (36 9 °C)-98 6 °F (37 °C)] 98 4 °F (36 9 °C)  HR:  [83-87] 87  Resp:  [18-20] 18  BP: ()/(61-72) 97/70  Body mass index is 21 8 kg/m²  I/O       09/02 0701 - 09/03 0700 09/03 0701 - 09/04 0700    P  O  1420 460    I V  (mL/kg) 877 5 (14 8)     Total Intake(mL/kg) 2297 5 (38 7) 460 (7 7)    Urine (mL/kg/hr) 2300 (1 6) 2200 (1 5)    Drains 0 0    Total Output 2300 2200    Net -2 5 -1740                Physical Exam:   NAD, alert and oriented x3  Normocephalic, atraumatic  MMM, EOMI, PERRLA  Norm resp effort on RA  RRR  Abd soft, NT/ND, prevena taken down with staples cdi  No calf tenderness or peripheral edema  Motor/sensation intact in distal extremities  CN grossly intact  -rash/lesions    Lab, Imaging and other studies:   CBC with diff:   Lab Results   Component Value Date    WBC 6 28 09/03/2019    HGB 7 0 (L) 09/03/2019    HCT 23 3 (L) 09/03/2019    MCV 92 09/03/2019     09/03/2019    MCH 27 6 09/03/2019    MCHC 30 0 (L) 09/03/2019    RDW 14 5 09/03/2019    MPV 9 7 09/03/2019    NRBC 0 09/03/2019   , BMP/CMP:   Lab Results   Component Value Date    SODIUM 139 09/03/2019    K 3 9 09/03/2019     09/03/2019    CO2 31 09/03/2019    BUN 6 09/03/2019    CREATININE 0 52 (L) 09/03/2019    CALCIUM 8 6 09/03/2019 EGFR 109 09/03/2019     VTE Pharmacologic Prophylaxis: Heparin  VTE Mechanical Prophylaxis: sequential compression device

## 2019-09-04 NOTE — ASSESSMENT & PLAN NOTE
48yo female s/p Exlap, extended R colon resection with en bloc resection small bowel, repair of umbilical hernia on 2/46/94    Plan:  Fulls, poss advance to regular today  prevena VAC removed today on rounds  Incentive spirometry  Encourage ambulation  DVT prophylaxis  Analgesia PRN  Monitor and replete electrolytes

## 2019-09-04 NOTE — PLAN OF CARE
Problem: PAIN - ADULT  Goal: Verbalizes/displays adequate comfort level or baseline comfort level  Description  Interventions:  - Encourage patient to monitor pain and request assistance  - Assess pain using appropriate pain scale  - Administer analgesics based on type and severity of pain and evaluate response  - Implement non-pharmacological measures as appropriate and evaluate response  - Consider cultural and social influences on pain and pain management  - Notify physician/advanced practitioner if interventions unsuccessful or patient reports new pain  Outcome: Progressing     Problem: INFECTION - ADULT  Goal: Absence or prevention of progression during hospitalization  Description  INTERVENTIONS:  - Assess and monitor for signs and symptoms of infection  - Monitor lab/diagnostic results  - Monitor all insertion sites, i e  indwelling lines, tubes, and drains  - Monitor endotracheal if appropriate and nasal secretions for changes in amount and color  - [place] appropriate cooling/warming therapies per order  - Administer medications as ordered  - Instruct and encourage patient and family to use good hand hygiene technique  - Identify and instruct in appropriate isolation precautions for identified infection/condition  Outcome: Progressing  Goal: Absence of fever/infection during neutropenic period  Description  INTERVENTIONS:  - Monitor WBC    Outcome: Progressing     Problem: SAFETY ADULT  Goal: Patient will remain free of falls  Description  INTERVENTIONS:  - Assess patient frequently for physical needs  -  Identify cognitive and physical deficits and behaviors that affect risk of falls    -  [place] fall precautions as indicated by assessment   - Educate patient/family on patient safety including physical limitations  - Instruct patient to call for assistance with activity based on assessment  - Modify environment to reduce risk of injury  - Consider OT/PT consult to assist with strengthening/mobility  Outcome: Progressing  Goal: Maintain or return to baseline ADL function  Description  INTERVENTIONS:  -  Assess patient's ability to carry out ADLs; assess patient's baseline for ADL function and identify physical deficits which impact ability to perform ADLs (bathing, care of mouth/teeth, toileting, grooming, dressing, etc )  - Assess/evaluate cause of self-care deficits   - Assess range of motion  - Assess patient's mobility; develop plan if impaired  - Assess patient's need for assistive devices and provide as appropriate  - Encourage maximum independence but intervene and supervise when necessary  - Involve family in performance of ADLs  - Assess for home care needs following discharge   - Consider OT consult to assist with ADL evaluation and planning for discharge  - Provide patient education as appropriate  Outcome: Progressing  Goal: Maintain or return mobility status to optimal level  Description  INTERVENTIONS:  - Assess patient's baseline mobility status (ambulation, transfers, stairs, etc )    - Identify cognitive and physical deficits and behaviors that affect mobility  - Identify mobility aids required to assist with transfers and/or ambulation (gait belt, sit-to-stand, lift, walker, cane, etc )  - [place] fall precautions as indicated by assessment  - Record patient progress and toleration of activity level on Mobility SBAR; progress patient to next Phase/Stage  - Instruct patient to call for assistance with activity based on assessment  - Consider rehabilitation consult to assist with strengthening/weightbearing, etc   Outcome: Progressing     Problem: DISCHARGE PLANNING  Goal: Discharge to home or other facility with appropriate resources  Description  INTERVENTIONS:  - Identify barriers to discharge w/patient and caregiver  - Arrange for needed discharge resources and transportation as appropriate  - Identify discharge learning needs (meds, wound care, etc )  - Arrange for interpretive services to assist at discharge as needed  - Refer to Case Management Department for coordinating discharge planning if the patient needs post-hospital services based on physician/advanced practitioner order or complex needs related to functional status, cognitive ability, or social support system  Outcome: Progressing     Problem: Knowledge Deficit  Goal: Patient/family/caregiver demonstrates understanding of disease process, treatment plan, medications, and discharge instructions  Description  Complete learning assessment and assess knowledge base  Interventions:  - Provide teaching at level of understanding  - Provide teaching via preferred learning methods  Outcome: Progressing     Problem: Nutrition/Hydration-ADULT  Goal: Nutrient/Hydration intake appropriate for improving, restoring or maintaining nutritional needs  Description  Monitor and assess patient's nutrition/hydration status for malnutrition  Collaborate with interdisciplinary team and initiate plan and interventions as ordered  Monitor patient's weight and dietary intake as ordered or per policy  Utilize nutrition screening tool and intervene as necessary  Determine patient's food preferences and provide high-protein, high-caloric foods as appropriate       INTERVENTIONS:  - Monitor oral intake, urinary output, labs, and treatment plans  - Assess nutrition and hydration status and recommend course of action  - Evaluate amount of meals eaten  - Assist patient with eating if necessary   - Allow adequate time for meals  - Recommend/ encourage appropriate diets, oral nutritional supplements, and vitamin/mineral supplements  - Order, calculate, and assess calorie counts as needed  - Recommend, monitor, and adjust tube feedings and TPN/PPN based on assessed needs  - Assess need for intravenous fluids  - Provide specific nutrition/hydration education as appropriate  - Include patient/family/caregiver in decisions related to nutrition  Outcome: Progressing     Problem: Prexisting or High Potential for Compromised Skin Integrity  Goal: Skin integrity is maintained or improved  Description  INTERVENTIONS:  - Identify patients at risk for skin breakdown  - Assess and monitor skin integrity  - Assess and monitor nutrition and hydration status  - Monitor labs   - Assess for incontinence   - Turn and reposition patient  - Assist with mobility/ambulation  - Relieve pressure over bony prominences  - Avoid friction and shearing  - Provide appropriate hygiene as needed including keeping skin clean and dry  - Evaluate need for skin moisturizer/barrier cream  - Collaborate with interdisciplinary team   - Patient/family teaching  - Consider wound care consult   Outcome: Progressing     Problem: Potential for Falls  Goal: Patient will remain free of falls  Description  INTERVENTIONS:  - Assess patient frequently for physical needs  -  Identify cognitive and physical deficits and behaviors that affect risk of falls    -  [place] fall precautions as indicated by assessment   - Educate patient/family on patient safety including physical limitations  - Instruct patient to call for assistance with activity based on assessment  - Modify environment to reduce risk of injury  - Consider OT/PT consult to assist with strengthening/mobility  Outcome: Progressing

## 2019-09-04 NOTE — PHYSICAL THERAPY NOTE
Physical Therapy Treatment Note     Patient Name: Yumiko Silva    DNIMB'U Date: 9/4/2019     Problem List  Patient Active Problem List   Diagnosis    Arthritis    Hay fever    Herpes zoster    Seasonal allergies    Anxiety    Hot flashes    Impaired fasting glucose    Fatigue    Rheumatoid arthritis involving both hands with negative rheumatoid factor (HCC)    Other hyperlipidemia    Megaloblastic anemia    Severe anemia    Liver masses    Colon cancer metastasized [facility] liver [facility])        Past Medical History  Past Medical History:   Diagnosis Date    Anxious mood     Cancer ([facility]     [facility]     History of chemotherapy     Rheumatoid arthritis ([facility] )         Past Surgical History  Past Surgical History:   Procedure Laterality Date    APPENDECTOMY      ESOPHAGOGASTRODUODENOSCOPY N/A 9/14/2018    Procedure: ESOPHAGOGASTRODUODENOSCOPY (EGD) with polypectomy;  Surgeon: Rivka Craven MD;  Location: [facility]; Service: Gastroenterology    IR IMAGE GUIDED BIOPSY/ASPIRATION LIVER  10/25/2018    IR PORT PLACEMENT  11/7/2018    r chest    MT PART REMOVAL COLON W ANASTOMOSIS N/A 8/29/2019    Procedure: EXTENDED RIGHT COLON RESECTION;  Surgeon: Jeaneth Parish MD;  [facility];  Service: Surgical Oncology    TUBAL LIGATION      resolved 2007    WISDOM TOOTH EXTRACTION      resolved 1990 09/04/19 3664   Pain Assessment   Pain Assessment Rosen-Baker FACES   Rosen-Baker FACES Pain Rating 4   Pain Type Surgical pain   Pain Location Abdomen   Restrictions/Precautions   Weight Bearing Precautions Per Order No   Other Precautions Fall Risk   General   Chart Reviewed Yes   Family/Caregiver Present No   Cognition   Overall Cognitive Status WFL   Arousal/Participation Alert; Cooperative   Attention Within functional limits   Orientation Level Oriented X4   Memory Within functional limits   Following Commands Follows all commands and directions without difficulty   Transfers   Sit to Stand 7  Independent   Stand to Sit 7  Independent   Ambulation/Elevation   Gait pattern   (decreased B foot clearance )   Gait Assistance 7  Independent   Assistive Device None   Distance >800ft   Balance   Static Sitting Good   Dynamic Sitting Good   Static Standing Good   Dynamic Standing Good   Ambulatory Good   Endurance Deficit   Endurance Deficit Yes   Activity Tolerance   Activity Tolerance Patient limited by fatigue  (SOB noted )   Nurse Made Aware nurse approved therapy sesion   Exercises   Hip Flexion Standing;Bilateral  (12 reps x 2 sets )   Ankle Pumps Standing;Bilateral  (30 reps x 2 sets)   Squat Standing  (10 reps x 3 sets )   Assessment   Prognosis Excellent   Problem List Decreased mobility   Assessment Pt presents to therapy today with decreased mobility, decreased endurance  Pt is IO for mobility and ambulation  Pt requires some assistance on the steps but will have assistance upon d/c home  Pt will be d/c from therapy due to current functional status of being I  Recommend home with family support  At end of session patient was left seated with call bell within reach  Education about taking her time upon d/c home, staying on the first floor initially  Goals   STG Expiration Date 09/09/19   Short Term Goal #1 Pt met goal   Treatment Day 2   Plan   Treatment/Interventions LE strengthening/ROM; Functional transfer training; Therapeutic exercise; Endurance training;Gait training   Progress Progressing toward goals   Recommendation   Recommendation Home with family support   PT - OK to Discharge Yes   Ashok Davis, Pt, DPT

## 2019-09-04 NOTE — PLAN OF CARE
Problem: PHYSICAL THERAPY ADULT  Goal: Performs mobility at highest level of function for planned discharge setting  See evaluation for individualized goals  Description    Outcome: Progressing  Note:   Prognosis: Excellent  Problem List: Decreased mobility  Assessment: Pt presents to therapy today with decreased mobility, decreased endurance  Pt is IO for mobility and ambulation  Pt requires some assistance on the steps but will have assistance upon d/c home  Pt will be d/c from therapy due to current functional status of being I  Recommend home with family support  At end of session patient was left seated with call bell within reach  Education about taking her time upon d/c home, staying on the first floor initially  Barriers to Discharge: Inaccessible home environment     Recommendation: Home with family support     PT - OK to Discharge: Yes    See flowsheet documentation for full assessment

## 2019-09-05 VITALS
HEART RATE: 75 BPM | OXYGEN SATURATION: 96 % | WEIGHT: 131 LBS | SYSTOLIC BLOOD PRESSURE: 99 MMHG | HEIGHT: 65 IN | TEMPERATURE: 98.4 F | DIASTOLIC BLOOD PRESSURE: 67 MMHG | RESPIRATION RATE: 16 BRPM | BODY MASS INDEX: 21.83 KG/M2

## 2019-09-05 PROCEDURE — 99024 POSTOP FOLLOW-UP VISIT: CPT | Performed by: SURGERY

## 2019-09-05 RX ADMIN — HEPARIN SODIUM 5000 UNITS: 5000 INJECTION INTRAVENOUS; SUBCUTANEOUS at 05:30

## 2019-09-05 RX ADMIN — OXYCODONE HYDROCHLORIDE AND ACETAMINOPHEN 1 TABLET: 5; 325 TABLET ORAL at 11:04

## 2019-09-05 NOTE — DISCHARGE SUMMARY
Discharge Summary - Surgical Oncology  Jeff  47 y o  female MRN: [id number]  [facility] Encounter: [id number]    Admission Date: 8/29/2019     Discharge Date: 09/05/19    Admitting Diagnosis: Colon cancer metastasized to liver [facility]) [C18 9, C78 7]    Discharge Diagnosis:  Same    Attending: Dr Storm Velez Physician(s): Acute Pain service    Procedures Performed:   8/29 exploratory laparotomy, en bloc resection of right colon with small-bowel and umbilical hernia repair    Pathology:  Pending at the time of this dictation  Hospital Course:   Patient was taken to the operating room on 08/29 for the above-stated procedure with Dr Robbi English  Intraoperative findings demonstrated a large bulky transverse colon tumor involving/adhering an adjacent loop of distal ileum and ascending colon  The patient also had evidence of a prior incisional hernia which was repaired at the conclusion of her surgery  The procedure was without complication and the patient returned to the surgical floor to continue her convalescence with a Laurell Plant in place  For further details of the procedure please refer to the full operative report  Postoperatively, the patient's pain was controlled with an epidural that was managed by the acute Pain Service  This was removed on 9/2 without incident  Her diet was slowly advanced with the return of her bowel function and she was tolerating a regular diet by 9/4  The patient's Laurell Plant was discontinued on 9/4  On 9/5 her pain was well controlled without the use of IV pain medication, she was tolerating her diet and having bowel function, a voiding spontaneously and ambulating without assistance  She was discharged home with an appointment to follow up with her surgeon [facility] and a prescription for Percocet      Condition at Discharge: good     Discharge instructions/Information to patient and family:   See after visit summary for information provided to patient and family  Provisions for Follow-Up Care:  See after visit summary for information related to follow-up care and any pertinent home health orders  Disposition: Home    Planned Readmission: No    Discharge Statement   I spent 30 minutes discharging the patient  This time was spent on the day of discharge  I had direct contact with the patient on the day of discharge  Additional documentation is required if more than 30 minutes were spent on discharge  Discharge Medications:  See after visit summary for reconciled discharge medications provided to patient and family

## 2019-09-05 NOTE — PROGRESS NOTES
Progress Note - Torie Dominguez 1965, 47 y o  female MRN: [id number]    Unit/Bed#: [facility] 910-01 Encounter: [id number]    Primary Care Provider: Lenore Haley MD   Date and time admitted to hospital: 8/29/2019 12:27 PM        * Colon cancer metastasized to liver [facility])  Assessment & Plan  48yo female s/p Ex lap, extended R colon resection with en bloc resection small bowel, repair of umbilical hernia on 9/92/69    Plan:  Regular diet  Incentive spirometry  Encourage ambulation  DVT prophylaxis  Analgesia PRN  Monitor and replete electrolytes as needed                Subjective/Objective     Subjective: No acute events overnight  Feeling well  Passing flatus  Tolerating a regular diet w/o n/v       Objective:     Blood pressure 94/65, pulse 81, temperature 98 3 °F (36 8 °C), resp  rate 16, height 5' 5" (1 651 m), weight 59 4 kg (131 lb), last menstrual period 09/28/2018, SpO2 97 %, not currently breastfeeding  ,Body mass index is 21 8 kg/m²  Intake/Output Summary (Last 24 hours) at 9/5/2019 0433  Last data filed at 9/5/2019 0300  Gross per 24 hour   Intake 780 ml   Output 1150 ml   Net -370 ml       Invasive Devices     Central Venous Catheter Line            Port A Cath 11/05/18 Right Subclavian 303 days          Peripheral Intravenous Line            Peripheral IV 09/02/19 Left Forearm 2 days                Physical Exam:   Gen:  Well-developed, well-nourished female lying in bed in NAD    CV: RRR, distal pulses intact  Lungs: CTAB, normal respiratory effort  Abd: soft, nontender, nondistended, incisions clean dry and intact         Lab, Imaging and other studies:  CBC:   Lab Results   Component Value Date    WBC 6 03 09/04/2019    HGB 7 4 (L) 09/04/2019    HCT 25 1 (L) 09/04/2019    MCV 90 09/04/2019     09/04/2019    MCH 26 5 (L) 09/04/2019    MCHC 29 5 (L) 09/04/2019    RDW 14 5 09/04/2019    MPV 8 5 (L) 09/04/2019    NRBC 0 09/04/2019   , CMP:   Lab Results   Component Value Date    SODIUM 137 09/04/2019    K 3 9 09/04/2019     09/04/2019    CO2 28 09/04/2019    BUN 5 09/04/2019    CREATININE 0 44 (L) 09/04/2019    CALCIUM 8 8 09/04/2019    EGFR 115 09/04/2019     VTE Pharmacologic Prophylaxis: Heparin  VTE Mechanical Prophylaxis: sequential compression device

## 2019-09-05 NOTE — PLAN OF CARE
Problem: PAIN - ADULT  Goal: Verbalizes/displays adequate comfort level or baseline comfort level  Description  Interventions:  - Encourage patient to monitor pain and request assistance  - Assess pain using appropriate pain scale  - Administer analgesics based on type and severity of pain and evaluate response  - Implement non-pharmacological measures as appropriate and evaluate response  - Consider cultural and social influences on pain and pain management  - Notify physician/advanced practitioner if interventions unsuccessful or patient reports new pain  Outcome: Progressing     Problem: INFECTION - ADULT  Goal: Absence or prevention of progression during hospitalization  Description  INTERVENTIONS:  - Assess and monitor for signs and symptoms of infection  - Monitor lab/diagnostic results  - Monitor all insertion sites, i e  indwelling lines, tubes, and drains  - Monitor endotracheal if appropriate and nasal secretions for changes in amount and color  - [place] appropriate cooling/warming therapies per order  - Administer medications as ordered  - Instruct and encourage patient and family to use good hand hygiene technique  - Identify and instruct in appropriate isolation precautions for identified infection/condition  Outcome: Progressing  Goal: Absence of fever/infection during neutropenic period  Description  INTERVENTIONS:  - Monitor WBC    Outcome: Progressing     Problem: SAFETY ADULT  Goal: Patient will remain free of falls  Description  INTERVENTIONS:  - Assess patient frequently for physical needs  -  Identify cognitive and physical deficits and behaviors that affect risk of falls    -  [place] fall precautions as indicated by assessment   - Educate patient/family on patient safety including physical limitations  - Instruct patient to call for assistance with activity based on assessment  - Modify environment to reduce risk of injury  - Consider OT/PT consult to assist with strengthening/mobility  Outcome: Progressing  Goal: Maintain or return to baseline ADL function  Description  INTERVENTIONS:  -  Assess patient's ability to carry out ADLs; assess patient's baseline for ADL function and identify physical deficits which impact ability to perform ADLs (bathing, care of mouth/teeth, toileting, grooming, dressing, etc )  - Assess/evaluate cause of self-care deficits   - Assess range of motion  - Assess patient's mobility; develop plan if impaired  - Assess patient's need for assistive devices and provide as appropriate  - Encourage maximum independence but intervene and supervise when necessary  - Involve family in performance of ADLs  - Assess for home care needs following discharge   - Consider OT consult to assist with ADL evaluation and planning for discharge  - Provide patient education as appropriate  Outcome: Progressing  Goal: Maintain or return mobility status to optimal level  Description  INTERVENTIONS:  - Assess patient's baseline mobility status (ambulation, transfers, stairs, etc )    - Identify cognitive and physical deficits and behaviors that affect mobility  - Identify mobility aids required to assist with transfers and/or ambulation (gait belt, sit-to-stand, lift, walker, cane, etc )  - [place] fall precautions as indicated by assessment  - Record patient progress and toleration of activity level on Mobility SBAR; progress patient to next Phase/Stage  - Instruct patient to call for assistance with activity based on assessment  - Consider rehabilitation consult to assist with strengthening/weightbearing, etc   Outcome: Progressing     Problem: DISCHARGE PLANNING  Goal: Discharge to home or other facility with appropriate resources  Description  INTERVENTIONS:  - Identify barriers to discharge w/patient and caregiver  - Arrange for needed discharge resources and transportation as appropriate  - Identify discharge learning needs (meds, wound care, etc )  - Arrange for interpretive services to assist at discharge as needed  - Refer to Case Management Department for coordinating discharge planning if the patient needs post-hospital services based on physician/advanced practitioner order or complex needs related to functional status, cognitive ability, or social support system  Outcome: Progressing     Problem: Knowledge Deficit  Goal: Patient/family/caregiver demonstrates understanding of disease process, treatment plan, medications, and discharge instructions  Description  Complete learning assessment and assess knowledge base  Interventions:  - Provide teaching at level of understanding  - Provide teaching via preferred learning methods  Outcome: Progressing     Problem: Nutrition/Hydration-ADULT  Goal: Nutrient/Hydration intake appropriate for improving, restoring or maintaining nutritional needs  Description  Monitor and assess patient's nutrition/hydration status for malnutrition  Collaborate with interdisciplinary team and initiate plan and interventions as ordered  Monitor patient's weight and dietary intake as ordered or per policy  Utilize nutrition screening tool and intervene as necessary  Determine patient's food preferences and provide high-protein, high-caloric foods as appropriate       INTERVENTIONS:  - Monitor oral intake, urinary output, labs, and treatment plans  - Assess nutrition and hydration status and recommend course of action  - Evaluate amount of meals eaten  - Assist patient with eating if necessary   - Allow adequate time for meals  - Recommend/ encourage appropriate diets, oral nutritional supplements, and vitamin/mineral supplements  - Order, calculate, and assess calorie counts as needed  - Recommend, monitor, and adjust tube feedings and TPN/PPN based on assessed needs  - Assess need for intravenous fluids  - Provide specific nutrition/hydration education as appropriate  - Include patient/family/caregiver in decisions related to nutrition  Outcome: Progressing     Problem: Prexisting or High Potential for Compromised Skin Integrity  Goal: Skin integrity is maintained or improved  Description  INTERVENTIONS:  - Identify patients at risk for skin breakdown  - Assess and monitor skin integrity  - Assess and monitor nutrition and hydration status  - Monitor labs   - Assess for incontinence   - Turn and reposition patient  - Assist with mobility/ambulation  - Relieve pressure over bony prominences  - Avoid friction and shearing  - Provide appropriate hygiene as needed including keeping skin clean and dry  - Evaluate need for skin moisturizer/barrier cream  - Collaborate with interdisciplinary team   - Patient/family teaching  - Consider wound care consult   Outcome: Progressing     Problem: Potential for Falls  Goal: Patient will remain free of falls  Description  INTERVENTIONS:  - Assess patient frequently for physical needs  -  Identify cognitive and physical deficits and behaviors that affect risk of falls    -  [place] fall precautions as indicated by assessment   - Educate patient/family on patient safety including physical limitations  - Instruct patient to call for assistance with activity based on assessment  - Modify environment to reduce risk of injury  - Consider OT/PT consult to assist with strengthening/mobility  Outcome: Progressing

## 2019-09-05 NOTE — ASSESSMENT & PLAN NOTE
48yo female s/p Ex lap, extended R colon resection with en bloc resection small bowel, repair of umbilical hernia on 9/14/47    Plan:  Fulls, poss advance to regular today  prevena VAC removed today on rounds  Incentive spirometry  Encourage ambulation  DVT prophylaxis  Analgesia PRN  Monitor and replete electrolytes

## 2019-09-05 NOTE — DISCHARGE INSTRUCTIONS
Please attend your scheduled follow up appointment with Dr Jenni Selby on 9/13 @ 8:15am in the [address] [facility] office    Activity:    Do not lift more than 10 pounds (a gallon of milk) for 1-2 weeks post-operatively                 Walking is encouraged  Normal daily activities including climbing steps are okay  Do not engage in strenuous activity for 4-6 weeks post-operatively    Return to Work:   Okay to return to work in one week    Diet:   You may return to your normal heart healthy diet  Wound Care: Your wound is closed with dissolvable stitches  It is okay to shower  Wash incision gently with soap and water and pat dry  Do not soak incisions in bath water or swim for two weeks  Do not apply any creams or ointments  Pain Medication:   Please take as directed  No driving while taking narcotic pain medications    Other:  If you have questions after discharge please call the office      If you have increased pain, fever >101 5, increased drainage, redness or a bad smell at your surgery site, please call us immediately or come directly to the Emergency Room

## 2019-09-06 ENCOUNTER — TRANSITIONAL CARE MANAGEMENT (OUTPATIENT)
Dept: FAMILY MEDICINE CLINIC | Facility: CLINIC | Age: 54
End: 2019-09-06

## 2019-09-11 ENCOUNTER — OFFICE VISIT (OUTPATIENT)
Dept: FAMILY MEDICINE CLINIC | Facility: CLINIC | Age: 54
End: 2019-09-11
Payer: COMMERCIAL

## 2019-09-11 VITALS
BODY MASS INDEX: 18.99 KG/M2 | WEIGHT: 114 LBS | TEMPERATURE: 99.9 F | HEIGHT: 65 IN | DIASTOLIC BLOOD PRESSURE: 62 MMHG | SYSTOLIC BLOOD PRESSURE: 96 MMHG | HEART RATE: 88 BPM

## 2019-09-11 DIAGNOSIS — C78.7 COLON CANCER METASTASIZED TO LIVER (HCC): Primary | ICD-10-CM

## 2019-09-11 DIAGNOSIS — E53.8 B12 DEFICIENCY: ICD-10-CM

## 2019-09-11 DIAGNOSIS — C18.9 COLON CANCER METASTASIZED TO LIVER (HCC): Primary | ICD-10-CM

## 2019-09-11 DIAGNOSIS — D53.1 MEGALOBLASTIC ANEMIA: ICD-10-CM

## 2019-09-11 DIAGNOSIS — M06.041 RHEUMATOID ARTHRITIS INVOLVING BOTH HANDS WITH NEGATIVE RHEUMATOID FACTOR (HCC): ICD-10-CM

## 2019-09-11 DIAGNOSIS — M06.042 RHEUMATOID ARTHRITIS INVOLVING BOTH HANDS WITH NEGATIVE RHEUMATOID FACTOR (HCC): ICD-10-CM

## 2019-09-11 PROCEDURE — 99495 TRANSJ CARE MGMT MOD F2F 14D: CPT | Performed by: FAMILY MEDICINE

## 2019-09-12 ENCOUNTER — OFFICE VISIT (OUTPATIENT)
Dept: HEMATOLOGY ONCOLOGY | Facility: CLINIC | Age: 54
End: 2019-09-12
Payer: COMMERCIAL

## 2019-09-12 ENCOUNTER — TRANSITIONAL CARE MANAGEMENT (OUTPATIENT)
Dept: FAMILY MEDICINE CLINIC | Facility: CLINIC | Age: 54
End: 2019-09-12

## 2019-09-12 VITALS
SYSTOLIC BLOOD PRESSURE: 90 MMHG | HEART RATE: 82 BPM | DIASTOLIC BLOOD PRESSURE: 62 MMHG | HEIGHT: 65 IN | OXYGEN SATURATION: 98 % | BODY MASS INDEX: 18.99 KG/M2 | RESPIRATION RATE: 18 BRPM | TEMPERATURE: 98.2 F | WEIGHT: 114 LBS

## 2019-09-12 DIAGNOSIS — C78.7 COLON CANCER METASTASIZED TO LIVER (HCC): Primary | ICD-10-CM

## 2019-09-12 DIAGNOSIS — C18.9 COLON CANCER METASTASIZED TO LIVER (HCC): Primary | ICD-10-CM

## 2019-09-12 PROBLEM — E53.8 B12 DEFICIENCY: Status: ACTIVE | Noted: 2019-09-12

## 2019-09-12 PROCEDURE — 99215 OFFICE O/P EST HI 40 MIN: CPT | Performed by: INTERNAL MEDICINE

## 2019-09-12 RX ORDER — SODIUM CHLORIDE 9 MG/ML
20 INJECTION, SOLUTION INTRAVENOUS ONCE
Status: CANCELLED | OUTPATIENT
Start: 2019-11-18

## 2019-09-12 RX ORDER — SODIUM CHLORIDE 9 MG/ML
20 INJECTION, SOLUTION INTRAVENOUS ONCE
Status: CANCELLED | OUTPATIENT
Start: 2019-10-28

## 2019-09-12 RX ORDER — SODIUM CHLORIDE 9 MG/ML
20 INJECTION, SOLUTION INTRAVENOUS ONCE
Status: CANCELLED | OUTPATIENT
Start: 2019-10-07

## 2019-09-12 NOTE — PROGRESS NOTES
Hematology / Oncology Outpatient Follow Up Note    Chuy Wilson 47 y o  female :1965 MIX:[id number]         Date:  2019    Assessment / Plan:  A 80-year-old female with metastatic colon cancer  She has extensive liver metastasis   Molecular testing could not be performed based on the liver biopsy specimen  She had 12 cycle of FOLFOX with bevacizumab with excellent tolerance, resulting in partial response  Since 2018, she has been on maintenance bevacizumab monotherapy  Her CEA has been steadily declining  However, recent CT scan showed continuous decrease of liver metastasis but slight enlargement of primary transverse colon mass  She has occasional lower GI bleeding which I assumed from primary colon mass  Therefore, we held bevacizumab then she underwent resection of transverse colon tumor for palliative reason  She has relatively slow recovery  Prior to the surgical resection, she went to [facility] where she was suggested to have maintenance therapy with 5 FU and bevacizumab  Since she has loose stool, I think it is appropriate to restart bevacizumab monotherapy and consider adding on 5 FU later on  I regular restarted bevacizumab in 2019  I ordered B Griffin, and lN-wanda and K-wanda mutation on transverse colon specimen  She is in agreement with my recommendation    I will see her again in late 2019 for follow-up         Subjective:      HPI:  A 24-year-old female who was found to have severe microcytic anemia in 2018   Therefore, she was advised to be hospitalized  Fabi Estevez was given transfusion  Romina Riedel was performed which did not show any major pathology   As outpatient basis, she underwent CT scan of abdomen pelvis which showed transverse colon mass as well as multiple liver metastatic disease   She subsequently underwent liver biopsy which showed metastatic adenocarcinoma, consistent with colorectal primary   She presents today to discuss treatment options   Since January 2018, she lost 25 pound  Jt Stubbs has mildly anorexic  [facility], she has no complaint of pain  She denied any respiratory symptoms    She has normal bowel movement    Prior to the hospitalization, she had slowly progressive fatigue as well as some exertional shortness of breath   She is currently on oral iron 3 times per day  Jt Stubbs has rheumatoid arthritis for which she is on weekly methotrexate and folic acid   She has no family history of colorectal cancer  Jt Stubbs is a lifetime never smoker   Her performance status is normal            Interval History:   A 47year-old female with metastatic colon cancer with extensive liver metastasis   She started systemic chemotherapy with FOLFOX-6 which she tolerated very well  We were notified by pathology Department that molecular testing cannot be performed on liver biopsy tissue  She was treated with 12 cycle of FOLFOX with bevacizumab with excellent tolerance  She had good partial response  Since April 2019, she has been on maintenance bevacizumab monotherapy  She is tolerating treatment very well  She presents today for follow-up  Her CEA continued to decline  Recent CT scan showed continued shrinkage of liver metastasis  However, primary tumor in transverse colon seems to be slightly larger  Since the beginning of bevacizumab monotherapy, she has occasional lower GI bleeding on the week of bevacizumab treatment  Therefore, she underwent palliative resection of transverse colon in August 29, 2019  Prior to this, we held bevacizumab for nearly 6 weeks  She has relatively slow recovery from surgery  She has no bleeding symptoms  Her wound is healing appropriately  She continued to have some loose stool  She has no nausea vomiting  After the surgery, she lost nearly 10 lb  Her pain is well controlled  She has no respiratory symptoms    Her performance status is 1/4 on the ECOG scale                                                                                 Objective:      Primary Diagnosis:     Metastatic colon cancer   Diagnosed in October 2018      Cancer Staging:  Cancer Staging  No matching staging information was found for the patient         Previous Hematologic/ Oncologic Treatment:       Bevacizumab with FOLFOX-6  X12 cycle  Completed in April 2019       Current Hematologic/ Oncologic Treatment:       Bevacizumab monotherapy since April 2019       Disease Status:      Partial response      Test Results:     Pathology:     Liver biopsy showed metastatic adenocarcinoma, consistent with colorectal primary  Molecular testing could not be performed per pathology department      Radiology:     CT scan of chest abdomen pelvis in July 2019 showed continued decrease of multiple liver metastasis  Slight enlargement of transverse colon mass      Laboratory:     See below      Physical Exam:        General Appearance:    Alert, oriented          Eyes:    PERRL   Ears:    Normal external ear canals, both ears   Nose:   Nares normal, septum midline   Throat:   Mucosa moist  Pharynx without injection  Neck:   Supple         Lungs:     Clear to auscultation bilaterally   Chest Wall:    No tenderness or deformity    Heart:    Regular rate and rhythm         Abdomen:     Soft, non-tender, bowel sounds +, no organomegaly               Extremities:   Extremities no cyanosis or edema         Skin:   no rash or icterus  Lymph nodes:   Cervical, supraclavicular, and axillary nodes normal   Neurologic:   CNII-XII intact, normal strength, sensation and reflexes     Throughout             Breast exam:  NA           ROS: Review of Systems   Constitutional:        Fatigue and anorexia  Some weight loss  All other systems reviewed and are negative  Imaging: No results found        Labs:   Lab Results   Component Value Date    WBC 6 03 09/04/2019    HGB 7 4 (L) 09/04/2019    HCT 25 1 (L) 09/04/2019    MCV 90 09/04/2019     09/04/2019     Lab Results   Component Value Date    K 3 9 09/04/2019     09/04/2019    CO2 28 09/04/2019    BUN 5 09/04/2019    CREATININE 0 44 (L) 09/04/2019    GLUF 105 (H) 07/15/2019    CALCIUM 8 8 09/04/2019    AST 29 07/15/2019    ALT 27 07/15/2019    ALKPHOS 300 (H) 07/15/2019    EGFR 115 09/04/2019         Lab Results   Component Value Date     (H) 09/24/2018         Lab Results   Component Value Date    CEA 7 1 (H) 07/15/2019           Lab Results   Component Value Date    IRON 11 (L) 09/13/2018    TIBC 302 09/13/2018    FERRITIN 15 09/13/2018         Lab Results   Component Value Date    FOLATE 11 4 09/13/2018         Current Medications: Reviewed  Allergies: Reviewed  PMH/FH/SH:  Reviewed      Vital Sign:    Body surface area is 1 56 meters squared      Wt Readings from Last 3 Encounters:   09/12/19 51 7 kg (114 lb)   09/11/19 51 7 kg (114 lb)   08/29/19 59 4 kg (131 lb)        Temp Readings from Last 3 Encounters:   09/12/19 98 2 °F (36 8 °C) (Tympanic Core)   09/11/19 99 9 °F (37 7 °C)   09/05/19 98 4 °F (36 9 °C)        BP Readings from Last 3 Encounters:   09/12/19 90/62   09/11/19 96/62   09/05/19 99/67         Pulse Readings from Last 3 Encounters:   09/12/19 82   09/11/19 88   09/05/19 75     @LASTSAO2(3)@

## 2019-09-12 NOTE — PROGRESS NOTES
Assessment/Plan:     51-year-old woman with:  Transitional care management visit for patient with Colon cancer with metastases to the liver status post recent right hemicolectomy, rheumatoid arthritis, megaloblastic anemia B12 deficiency  Discussed workup and treatment options at length with risks and benefits  Discussed wound care and close follow-up with her surgeon along with follow-up with Medical Oncology and Radiation Oncology  Discussed continue current medications and supportive care along with return parameters at length  Offered referral to palliative care at some point if patient feels that she is having breakthrough pain that is not being controlled and she declines at the time being but will call so she changes her mind  Follow-up in 3 months  No problem-specific Assessment & Plan notes found for this encounter  Diagnoses and all orders for this visit:    Colon cancer metastasized to liver ([address] )    Rheumatoid arthritis involving both hands with negative rheumatoid factor (HCC)    Megaloblastic anemia    B12 deficiency         Subjective:     Patient ID: Gregg Birmingham is a 47 y o  female  Patient is a 51-year-old woman who presents for transitional care management visit  She recently was admitted to the hospital for exploratory laparotomy based on bleeding and abdominal pain and distention  She underwent right-sided hemicolectomy due to a bulky tumor  Patient's surgery went well and she was able to have anastomosis performed  Patient has rheumatoid arthritis megaloblastic anemia and B12 deficiency  She admits otherwise being stable on her medications and admits that she is doing well tolerating oral liquids and food with minimal nausea at this time  Her pain is being well controlled with the Percocet that she was prescribed  No fevers chills  No vomiting  Tolerating p o  intake and no dizziness lightheadedness or near-syncope    No other complaints at this time      Review of Systems   Constitutional: Negative  HENT: Negative  Eyes: Negative  Respiratory: Negative  Cardiovascular: Negative  Gastrointestinal: Negative  Endocrine: Negative  Genitourinary: Negative  Musculoskeletal: Negative  Allergic/Immunologic: Negative  Neurological: Negative  Hematological: Negative  Psychiatric/Behavioral: Negative  All other systems reviewed and are negative  Objective:     Physical Exam   Constitutional: She is oriented to person, place, and time  She appears well-developed and well-nourished  HENT:   Head: Atraumatic  Right Ear: External ear normal    Left Ear: External ear normal    Eyes: Pupils are equal, round, and reactive to light  Conjunctivae and EOM are normal    Neck: Normal range of motion  Cardiovascular: Normal rate, regular rhythm and normal heart sounds  Pulmonary/Chest: Effort normal and breath sounds normal  No respiratory distress  Abdominal: Soft  She exhibits no distension  There is tenderness  There is no rebound and no guarding  Abdominal incision clean dry and intact  Staples in place  Musculoskeletal: Normal range of motion  Neurological: She is alert and oriented to person, place, and time  No cranial nerve deficit  Skin: Skin is warm and dry  Psychiatric: She has a normal mood and affect  Her behavior is normal  Judgment and thought content normal          Vitals:    09/11/19 1608   BP: 96/62   BP Location: Left arm   Pulse: 88   Temp: 99 9 °F (37 7 °C)   Weight: 51 7 kg (114 lb)   Height: 5' 5" (1 651 m)       Transitional Care Management Review:  Holly Mackay is a 47 y o  female here for TCM follow up       During the TCM phone call patient stated:    TCM Call (since 8/12/2019)     Date and time call was made  9/6/2019 10:46 AM    Patient was hospitialized at  [facility]    Date of Admission  08/23/19    Date of discharge  09/05/19    Diagnosis  COLON CA METS TO LIVER    Disposition  Home    Were the patients medications reviewed and updated  Yes    Current Symptoms  None      TCM Call (since 8/12/2019)     Post hospital issues  None    Should patient be enrolled in anticoag monitoring? No    Scheduled for follow up?   Yes    Did you obtain your prescribed medications  Yes    Do you need help managing your prescriptions or medications  No    Is transportation to your appointment needed  Yes    Specify why  BOYFRIEND WILL BRING HER TO HER APPT    I have advised the patient to call PCP with any new or worsening symptoms  SK,CMA    Living Arrangements  Spouse or Significiant other    Support System  Partner    The type of support provided  Emotional; Physical    Do you have social support  Yes, as much as I need    Are you recieving any outpatient services  No    Are you recieving home care services  No    Are you using any community resources  No    Current waiver services  No    Have you fallen in the last 12 months  No    Interperter language line needed  No    Counseling  Patient          Dima Mandujano MD

## 2019-09-13 ENCOUNTER — OFFICE VISIT (OUTPATIENT)
Dept: SURGICAL ONCOLOGY | Facility: CLINIC | Age: 54
End: 2019-09-13

## 2019-09-13 VITALS
HEIGHT: 65 IN | WEIGHT: 116 LBS | HEART RATE: 93 BPM | RESPIRATION RATE: 16 BRPM | SYSTOLIC BLOOD PRESSURE: 122 MMHG | DIASTOLIC BLOOD PRESSURE: 70 MMHG | TEMPERATURE: 98.5 F | BODY MASS INDEX: 19.33 KG/M2

## 2019-09-13 DIAGNOSIS — C18.9 COLON CANCER METASTASIZED TO LIVER (HCC): Primary | ICD-10-CM

## 2019-09-13 DIAGNOSIS — C78.7 COLON CANCER METASTASIZED TO LIVER (HCC): Primary | ICD-10-CM

## 2019-09-13 PROCEDURE — 99024 POSTOP FOLLOW-UP VISIT: CPT | Performed by: SURGERY

## 2019-09-13 NOTE — PROGRESS NOTES
Surgical Oncology Follow Up       [facility] SURGICAL ONCOLOGY [place] [id number]    Ainsley Coop  1965  [id number]  [facility] SURGICAL ONCOLOGY [facility]    Chief Complaint   Patient presents with    Post-op     Post-op right ko-colectomy  Assessment/Plan:    No problem-specific Assessment & Plan notes found for this encounter  Diagnoses and all orders for this visit:    Colon cancer metastasized to liver [facility])        Advance Care Planning/Advance Directives:  Discussed disease status, cancer treatment plans and/or cancer treatment goals with the patient  Colon cancer metastasized to liver ([facility] )    11/14/2018 Initial Diagnosis     Colon cancer metastasized to liver ([facility] [facility] )      4/19/2019 -  Chemotherapy     bevacizumab (AVASTIN) 900 mg in sodium chloride 0 9 % 100 mL IVPB, 15 mg/kg, Intravenous, Once, 4 of 9 cycles  Dose modification: 7 5 mg/kg (original dose 15 mg/kg, Cycle 2, Reason: Other (See Comments), Comment: regimen)  Administration: 450 mg (5/20/2019), 450 mg (6/10/2019), 450 mg (7/1/2019)      8/29/2019 Surgery     Right hemicolectomy:  - Residual adenocarcinoma   - Thirty (30) lymph nodes negative for carcinoma (0/30)         History of Present Illness:  Patient is a 59-year-old woman here for postop check status post right hemicolectomy, extended, with on bloc small bowel resection for large nearly obstructing tumor   -Interval History:  She is doing well after surgery  Her bowels are somewhat loose as expected  She has some pain at the site of her heparin injections in the right lower quadrant  Otherwise, she has no major unexpected complaints to report  Review of Systems:  Review of Systems   Constitutional: Negative  HENT: Negative  Eyes: Negative  Respiratory: Negative  Cardiovascular: Negative  Gastrointestinal: Negative  Endocrine: Negative  Genitourinary: Negative  Musculoskeletal: Negative  Skin: Negative  Allergic/Immunologic: Negative  Neurological: Negative  Hematological: Negative  Psychiatric/Behavioral: Negative  Patient Active Problem List   Diagnosis    Arthritis    Hay fever    Herpes zoster    Seasonal allergies    Anxiety    Hot flashes    Impaired fasting glucose    Fatigue    Rheumatoid arthritis involving both hands with negative rheumatoid factor (HCC)    Other hyperlipidemia    Megaloblastic anemia    Severe anemia    Liver masses    Colon cancer metastas[facility] to liver ([facility] [address] [facility]    B12 deficiency     Past Medical History:   Diagnosis Date    Anxious mood     Cancer ([facility] )     Colon cancer ([facility] )     History of chemotherapy     Rheumatoid arthritis ([facility] )      Past Surgical History:   Procedure Laterality Date    APPENDECTOMY      ESOPHAGOGASTRODUODENOSCOPY N/A 9/14/2018    Procedure: ESOPHAGOGASTRODUODENOSCOPY (EGD) with polypectomy;  Surgeon: Minerva Beebe MD;  Location: [facility];   Service: Gastroenterology    IR IMAGE GUIDED BIOPSY/ASPIRATION LIVER  10/25/2018    IR PORT PLACEMENT  11/7/2018    r chest    WY PART REMOVAL COLON W ANASTOMOSIS N/A 8/29/2019    Procedure: EXTENDED RIGHT COLON RESECTION;  Surgeon: Susy Blair MD;  [facility]: [facility];  Service: Surgical Oncology    TUBAL LIGATION      resolved 2007    WISDOM TOOTH EXTRACTION      resolved 1990     Family History   Problem Relation Age of Onset    Anxiety disorder Mother     Hypertension Father     Diabetes Father     Heart attack Maternal Grandmother         acute MI     Social History     Socioeconomic History    Marital status: Single     Spouse name: Not on file    Number of children: Not on file    Years of education: Not on file    Highest education level: Not on file   Occupational History    Occupation: logistics   Social Needs    Financial resource strain: Not on file   Davis-Don insecurity:     Worry: Not on file     Inability: Not on file    Transportation needs:     Medical: Not on file     Non-medical: Not on file   Tobacco Use    Smoking status: Former Smoker     Packs/day: 0 00     Years: 0 00     Pack years: 0 00     Last attempt to quit: 2000     Years since quittin 0    Smokeless tobacco: Never Used   Substance and Sexual Activity    Alcohol use: Not Currently     Alcohol/week: 0 0 standard drinks     Frequency: Never     Drinks per session: Patient refused     Binge frequency: Never    Drug use: No    Sexual activity: Yes     Partners: Male     Birth control/protection: None     Comment: patient reports tubal    Lifestyle    Physical activity:     Days per week: Not on file     Minutes per session: Not on file    Stress: Not on file   Relationships    Social connections:     Talks on phone: Not on file     Gets together: Not on file     Attends Sikh service: Not on file     Active member of club or organization: Not on file     Attends meetings of clubs or organizations: Not on file     Relationship status: Not on file    Intimate partner violence:     Fear of current or ex partner: Not on file     Emotionally abused: Not on file     Physically abused: Not on file     Forced sexual activity: Not on file   Other Topics Concern    Not on file   Social History Narrative    Not on file       Current Outpatient Medications:     cyanocobalamin (VITAMIN B-12) 1,000 mcg tablet, Take by mouth daily, Disp: , Rfl:     ferrous sulfate 325 (65 Fe) mg tablet, Take 1 tablet (325 mg total) by mouth 3 (three) times a day with meals, Disp: 90 tablet, Rfl: 0    folic acid (FOLVITE) 1 mg tablet, 1 mg, Disp: , Rfl:     methotrexate 2 5 mg tablet, 15 mg once a week , Disp: , Rfl:     Multiple Vitamin (MULTIVITAMIN) capsule, Take 1 capsule by mouth daily, Disp: , Rfl:     Omega-3 Fatty Acids (FISH OIL PO), Take by mouth daily , Disp: , Rfl:   No Known Allergies  Vitals: 09/13/19 0814   BP: 122/70   Pulse: 93   Resp: 16   Temp: 98 5 °F (36 9 °C)       Physical Exam   Abdominal:   Incision clean dry intact         Results:  Labs:  Case Report   Surgical Pathology Report                         Case: [id number]                                    Authorizing Provider: Jeaneth Parish MD        Collected:           08/29/2019 1719               Ordering Location:     [address]      Received:            08/29/2019 2228                                      Hospital Operating Room                                                       Pathologist:           Adi Ferris MD                                                                Specimens:   A) - Other, HERNIA SAC                                                                               B) - Large Intestine, NOS, RIGHT COLON AND EN BLOC SMALL BOWEL                             Final Diagnosis   A  Hernia sac (excision):  - Benign fibroconnective tissue  - No carcinoma identified      B   Right and transverse colon, terminal ileum (right hemicolectomy):  - Residual adenocarcinoma   - Thirty (30) lymph nodes negative for carcinoma (0/30)  - Associated abscess   - See staging synoptic below (ypT3N0)     RESULTS OF IMMUNOHISTOCHEMICAL ANALYSIS FOR MISMATCH REPAIR PROTEIN LOSS     INTERPRETATION: No loss of nuclear expression of MMR proteins: Low probability of MSI-H     Note: Background non-neoplastic tissue and/or internal control with intact nuclear expression       RESULTS:  Antibody          Clone               Description                           Results  MLH1               M1                   Mismatch repair protein       Intact nuclear expression  MSH2              [id number]       Mismatch repair protein       Intact nuclear expression  UCB5              61                     Mismatch repair protein       Intact nuclear expression  YZF3              [id number]           Mismatch repair protein       Intact nuclear expression     Comment:   A positive control for each antibody has been reviewed and accepted      [facility] Specimen ID: [id number]  Evaluator: Alexis Sharma MD     These tests were developed and their performance characteristics determined by [facility]  [facility]  may not be cleared or approved by the [place]  Food and Drug Administration   The FDA determined that such clearance or approval is not necessary   These tests are used for clinical purposes  [facility]  should not be regarded as investigational or for research   This laboratory has been approved by [place] [address], designated as a high-complexity laboratory and is qualified to perform these tests      Comments: Patients whose tumors demonstrate lack of expression of one or more DNA mismatch repair proteins might be at risk for Caraballo Syndrome  This cancer susceptibility syndrome greatly increases the risk of synchronous and/or metachronous cancers in the affected patients and their family members  Normal expression of all proteins does not completely rule out familial cancer predisposition      The [facility] Caraballo Syndrome Surveillance Program Task Forces recommends that all patients with lack of expression of one or more DNA mismatch repair proteins and those with concerning personal or family history should undergo thorough evaluation, counseling and possibly genetic testing            Comment:  - The patient's liver metastasis diagnosed prior to initiation of chemotherapy is noted  - Gross specimen reviewed by Dr Amaris Ramirez on 9/9/19 at 12:05 pm with AGUSTINA Mcginnis  - Intradepartmental consultation concurs with the diagnosis of adenocarcinoma  - Desmin is negative in a focus of interest     Electronically signed by Rene Connors MD on 9/9/2019 at 12:09 PM         Imaging  No results found  I reviewed the above laboratory and imaging data  Discussion/Summary:47year old woman, post extended right hemicolectomy, doing well  Plan on 6 month follow-up   She will start chemotherapy per Dr Teresa Snow in the next month  Pathology report discussed with patient, and a copy given to her for records  All questions answered

## 2019-09-13 NOTE — LETTER
September 13, 2019     Avtar South MD  [facility]    Patient: Phan Toth   YOB: 1965   Date of Visit: 9/13/2019       Dear Dr Dashawn Rojo: Thank you for referring Isaac Baltazar to me for evaluation  Below are my notes for this consultation  If you have questions, please do not hesitate to call me  I look forward to following your patient along with you  Sincerely,        Jazmin Dixon MD        CC: TERRY Johnston MD Editha Nao, MD  9/13/2019  8:42 AM  Sign at close encounter     Surgical Oncology Follow Up       [facility] [place] [address] [address]  1965  [id number]  [facility]  [address] [facility] [address]    Chief Complaint   Patient presents with    Post-op     Post-op right ko-colectomy  Assessment/Plan:    No problem-specific Assessment & Plan notes found for this encounter  Diagnoses and all orders for this visit:    Colon cancer metastasized to liver [facility])        Advance Care Planning/Advance Directives:  Discussed disease status, cancer treatment plans and/or cancer treatment goals with the patient          Colon cancer metastasized to liver ([facility] [address] )    11/14/2018 Initial Diagnosis     Colon cancer metastasized to liver ([facility] [address] )      4/19/2019 -  Chemotherapy     bevacizumab (AVASTIN) 900 mg in sodium chloride 0 9 % 100 mL IVPB, 15 mg/kg, Intravenous, Once, 4 of 9 cycles  Dose modification: 7 5 mg/kg (original dose 15 mg/kg, Cycle 2, Reason: Other (See Comments), Comment: regimen)  Administration: 450 mg (5/20/2019), 450 mg (6/10/2019), 450 mg (7/1/2019)      8/29/2019 Surgery     Right hemicolectomy:  - Residual adenocarcinoma   - Thirty (30) lymph nodes negative for carcinoma (0/30)         History of Present Illness:  Patient is a 71-year-old woman here for postop check status post right hemicolectomy, extended, with on bloc small bowel resection for large nearly obstructing tumor   -Interval History:  She is doing well after surgery  Her bowels are somewhat loose as expected  She has some pain at the site of her heparin injections in the right lower quadrant  Otherwise, she has no major unexpected complaints to report  Review of Systems:  Review of Systems   Constitutional: Negative  HENT: Negative  Eyes: Negative  Respiratory: Negative  Cardiovascular: Negative  Gastrointestinal: Negative  Endocrine: Negative  Genitourinary: Negative  Musculoskeletal: Negative  Skin: Negative  Allergic/Immunologic: Negative  Neurological: Negative  Hematological: Negative  Psychiatric/Behavioral: Negative  Patient Active Problem List   Diagnosis    Arthritis    Hay fever    Herpes zoster    Seasonal allergies    Anxiety    Hot flashes    Impaired fasting glucose    Fatigue    Rheumatoid arthritis involving both hands with negative rheumatoid factor (HCC)    Other hyperlipidemia    Megaloblastic anemia    Severe anemia    Liver masses    [facility]    B12 deficiency     Past Medical History:   Diagnosis Date    Anxious mood     [facility] [address] [facility]     [facility]     History of chemotherapy     Rheumatoid arthritis ([facility] )      Past Surgical History:   Procedure Laterality Date    APPENDECTOMY      ESOPHAGOGASTRODUODENOSCOPY N/A 9/14/2018    Procedure: ESOPHAGOGASTRODUODENOSCOPY (EGD) with polypectomy;  Surgeon: Velma Painter MD;  Location: [facility];   Service: Gastroenterology    IR IMAGE GUIDED BIOPSY/ASPIRATION LIVER  10/25/2018    IR PORT PLACEMENT  11/7/2018    r chest    MT PART REMOVAL COLON W ANASTOMOSIS N/A 8/29/2019    Procedure: EXTENDED RIGHT COLON RESECTION;  Surgeon: Philipp Sol MD;  [facility];  Service: Surgical Oncology    TUBAL LIGATION resolved     WISDOM TOOTH EXTRACTION      resolved      Family History   Problem Relation Age of Onset    Anxiety disorder Mother     Hypertension Father     Diabetes Father     Heart attack Maternal Grandmother         acute MI     Social History     Socioeconomic History    Marital status: Single     Spouse name: Not on file    Number of children: Not on file    Years of education: Not on file    Highest education level: Not on file   Occupational History    Occupation: logistics   Social Needs    Financial resource strain: Not on file    Food insecurity:     Worry: Not on file     Inability: Not on file    Transportation needs:     Medical: Not on file     Non-medical: Not on file   Tobacco Use    Smoking status: Former Smoker     Packs/day: 0 00     Years: 0 00     Pack years: 0 00     Last attempt to quit: 2000     Years since quittin 0    Smokeless tobacco: Never Used   Substance and Sexual Activity    Alcohol use: Not Currently     Alcohol/week: 0 0 standard drinks     Frequency: Never     Drinks per session: Patient refused     Binge frequency: Never    Drug use: No    Sexual activity: Yes     Partners: Male     Birth control/protection: None     Comment: patient reports tubal    Lifestyle    Physical activity:     Days per week: Not on file     Minutes per session: Not on file    Stress: Not on file   Relationships    Social connections:     Talks on phone: Not on file     Gets together: Not on file     Attends [employer] service: Not on file     Active member of club or organization: Not on file     Attends meetings of clubs or organizations: Not on file     Relationship status: Not on file    Intimate partner violence:     Fear of current or ex partner: Not on file     Emotionally abused: Not on file     Physically abused: Not on file     Forced sexual activity: Not on file   Other Topics Concern    Not on file   Social History Narrative    Not on file Current Outpatient Medications:     cyanocobalamin (VITAMIN B-12) 1,000 mcg tablet, Take by mouth daily, Disp: , Rfl:     ferrous sulfate 325 (65 Fe) mg tablet, Take 1 tablet (325 mg total) by mouth 3 (three) times a day with meals, Disp: 90 tablet, Rfl: 0    folic acid (FOLVITE) 1 mg tablet, 1 mg, Disp: , Rfl:     methotrexate 2 5 mg tablet, 15 mg once a week , Disp: , Rfl:     Multiple Vitamin (MULTIVITAMIN) capsule, Take 1 capsule by mouth daily, Disp: , Rfl:     Omega-3 Fatty Acids (FISH OIL PO), Take by mouth daily , Disp: , Rfl:   No Known Allergies  Vitals:    09/13/19 0814   BP: 122/70   Pulse: 93   Resp: 16   Temp: 98 5 °F (36 9 °C)       Physical Exam   Abdominal:   Incision clean dry intact         Results:  Labs:  Case Report   Surgical Pathology Report                         Case: [id number]                                    Authorizing Provider: Karthik Sims MD        Collected:           08/29/2019 6429               Ordering Location:     [facility]      Received:            08/29/2019 [facility]                                      Hospital Operating Room                                                       Pathologist:           Dom Turner MD                                                                Specimens:   A) - Other, HERNIA SAC                                                                               B) - Large Intestine, NOS, RIGHT COLON AND EN BLOC SMALL BOWEL                             Final Diagnosis   A  Hernia sac (excision):  - Benign fibroconnective tissue  - No carcinoma identified      B   Right and transverse colon, terminal ileum (right hemicolectomy):  - Residual adenocarcinoma   - Thirty (30) lymph nodes negative for carcinoma (0/30)  - Associated abscess   - See staging synoptic below (ypT3N0)     RESULTS OF IMMUNOHISTOCHEMICAL ANALYSIS FOR MISMATCH REPAIR PROTEIN LOSS     INTERPRETATION: No loss of nuclear expression of MMR proteins: Low probability of MSI-H     Note: Background non-neoplastic tissue and/or internal control with intact nuclear expression       RESULTS:  Antibody          Clone               Description                           Results  MLH1               M1                   Mismatch repair protein       Intact nuclear expression  MSH2              [id number]       Mismatch repair protein       Intact nuclear expression  APW4              57                     Mismatch repair protein       Intact nuclear expression  QXG5              [id number]           Mismatch repair protein       Intact nuclear expression     Comment:   A positive control for each antibody has been reviewed and accepted      [facility] Specimen ID: [id number]  Evaluator: Hazel Betancourt MD     These tests were developed and their performance characteristics determined by [facility]  [facility]lliott Reeve may not be cleared or approved by the [place]  Food and Drug Administration   The FDA determined that such clearance or approval is not necessary   These tests are used for clinical purposes  [facility] should not be regarded as investigational or for research   This laboratory has been approved by [facility], designated as a high-complexity laboratory and is qualified to perform these tests      Comments: Patients whose tumors demonstrate lack of expression of one or more DNA mismatch repair proteins might be at risk for Caraballo Syndrome  This cancer susceptibility syndrome greatly increases the risk of synchronous and/or metachronous cancers in the affected patients and their family members   Normal expression of all proteins does not completely rule out familial cancer predisposition      The [facility] Caraballo Syndrome Surveillance Program Task Forces recommends that all patients with lack of expression of one or more DNA mismatch repair proteins and those with concerning personal or family history should undergo thorough evaluation, counseling and possibly genetic testing            Comment:  - The patient's liver metastasis diagnosed prior to initiation of chemotherapy is noted  - Gross specimen reviewed by Dr Gladys Guadarrama on 9/9/19 at 12:05 pm with AGUSTINA Mcginnis  - Intradepartmental consultation concurs with the diagnosis of adenocarcinoma  - Desmin is negative in a focus of interest     Electronically signed by Iain Block MD on 9/9/2019 at 12:09 PM         Imaging  No results found  I reviewed the above laboratory and imaging data  Discussion/Summary:47year old woman, post extended right hemicolectomy, doing well  Plan on 6 month follow-up  She will start chemotherapy per Dr Shivam Kendrick in the next month  Pathology report discussed with patient, and a copy given to her for records  All questions answered

## 2019-09-19 LAB — SCAN RESULT: NORMAL

## 2019-10-04 ENCOUNTER — APPOINTMENT (OUTPATIENT)
Dept: LAB | Age: 54
End: 2019-10-04
Payer: COMMERCIAL

## 2019-10-04 DIAGNOSIS — C18.9 COLON CANCER METASTASIZED TO LIVER (HCC): ICD-10-CM

## 2019-10-04 DIAGNOSIS — C78.7 COLON CANCER METASTASIZED TO LIVER (HCC): ICD-10-CM

## 2019-10-04 LAB
ALBUMIN SERPL BCP-MCNC: 3.3 G/DL (ref 3.5–5)
ALP SERPL-CCNC: 476 U/L (ref 46–116)
ALT SERPL W P-5'-P-CCNC: 76 U/L (ref 12–78)
ANION GAP SERPL CALCULATED.3IONS-SCNC: 6 MMOL/L (ref 4–13)
AST SERPL W P-5'-P-CCNC: 60 U/L (ref 5–45)
BILIRUB SERPL-MCNC: 0.47 MG/DL (ref 0.2–1)
BUN SERPL-MCNC: 12 MG/DL (ref 5–25)
CALCIUM SERPL-MCNC: 9.5 MG/DL (ref 8.3–10.1)
CEA SERPL-MCNC: 29.6 NG/ML (ref 0–3)
CHLORIDE SERPL-SCNC: 105 MMOL/L (ref 100–108)
CO2 SERPL-SCNC: 27 MMOL/L (ref 21–32)
CREAT SERPL-MCNC: 0.58 MG/DL (ref 0.6–1.3)
ERYTHROCYTE [DISTWIDTH] IN BLOOD BY AUTOMATED COUNT: 16 % (ref 11.6–15.1)
GFR SERPL CREATININE-BSD FRML MDRD: 105 ML/MIN/1.73SQ M
GLUCOSE P FAST SERPL-MCNC: 89 MG/DL (ref 65–99)
HCT VFR BLD AUTO: 32.7 % (ref 34.8–46.1)
HGB BLD-MCNC: 9.4 G/DL (ref 11.5–15.4)
MCH RBC QN AUTO: 24.2 PG (ref 26.8–34.3)
MCHC RBC AUTO-ENTMCNC: 28.7 G/DL (ref 31.4–37.4)
MCV RBC AUTO: 84 FL (ref 82–98)
NRBC BLD AUTO-RTO: 0 /100 WBCS
PLATELET # BLD AUTO: 229 THOUSANDS/UL (ref 149–390)
PMV BLD AUTO: 9 FL (ref 8.9–12.7)
POTASSIUM SERPL-SCNC: 3.9 MMOL/L (ref 3.5–5.3)
PROT SERPL-MCNC: 7.6 G/DL (ref 6.4–8.2)
RBC # BLD AUTO: 3.89 MILLION/UL (ref 3.81–5.12)
SODIUM SERPL-SCNC: 138 MMOL/L (ref 136–145)
WBC # BLD AUTO: 5.96 THOUSAND/UL (ref 4.31–10.16)

## 2019-10-04 PROCEDURE — 82378 CARCINOEMBRYONIC ANTIGEN: CPT

## 2019-10-04 PROCEDURE — 36415 COLL VENOUS BLD VENIPUNCTURE: CPT

## 2019-10-04 PROCEDURE — 80053 COMPREHEN METABOLIC PANEL: CPT

## 2019-10-04 PROCEDURE — 85025 COMPLETE CBC W/AUTO DIFF WBC: CPT

## 2019-10-07 ENCOUNTER — HOSPITAL ENCOUNTER (OUTPATIENT)
Dept: INFUSION CENTER | Facility: HOSPITAL | Age: 54
Discharge: HOME | End: 2019-10-07
Payer: COMMERCIAL

## 2019-10-07 VITALS
HEART RATE: 74 BPM | DIASTOLIC BLOOD PRESSURE: 87 MMHG | BODY MASS INDEX: 20.86 KG/M2 | HEIGHT: 65 IN | SYSTOLIC BLOOD PRESSURE: 121 MMHG | RESPIRATION RATE: 16 BRPM | WEIGHT: 125.22 LBS | TEMPERATURE: 96.8 F

## 2019-10-07 DIAGNOSIS — C18.9 COLON CANCER METASTASIZED TO LIVER (HCC): Primary | ICD-10-CM

## 2019-10-07 DIAGNOSIS — C78.7 COLON CANCER METASTASIZED TO LIVER (HCC): Primary | ICD-10-CM

## 2019-10-07 PROCEDURE — 96413 CHEMO IV INFUSION 1 HR: CPT

## 2019-10-07 RX ORDER — SODIUM CHLORIDE 9 MG/ML
20 INJECTION, SOLUTION INTRAVENOUS ONCE
Status: COMPLETED | OUTPATIENT
Start: 2019-10-07 | End: 2019-10-07

## 2019-10-07 RX ADMIN — SODIUM CHLORIDE 20 ML/HR: 0.9 INJECTION, SOLUTION INTRAVENOUS at 09:26

## 2019-10-07 RX ADMIN — BEVACIZUMAB 450 MG: 400 INJECTION, SOLUTION INTRAVENOUS at 09:26

## 2019-10-07 NOTE — PROGRESS NOTES
Pt tolerated todays dose of avastin without issue  No complaints offered  Has been healing well post colectomy  No signs of bleeding noted by pt  Reviewed last md note and labs pre treatment  Port flsuhed and deaccessed per routine  Discharged ambulatory with

## 2019-10-24 ENCOUNTER — OFFICE VISIT (OUTPATIENT)
Dept: HEMATOLOGY ONCOLOGY | Facility: CLINIC | Age: 54
End: 2019-10-24
Payer: COMMERCIAL

## 2019-10-24 VITALS
OXYGEN SATURATION: 98 % | RESPIRATION RATE: 16 BRPM | HEIGHT: 65 IN | WEIGHT: 128.6 LBS | SYSTOLIC BLOOD PRESSURE: 130 MMHG | BODY MASS INDEX: 21.43 KG/M2 | HEART RATE: 82 BPM | DIASTOLIC BLOOD PRESSURE: 70 MMHG | TEMPERATURE: 98.4 F

## 2019-10-24 DIAGNOSIS — C18.9 COLON CANCER METASTASIZED TO LIVER (HCC): ICD-10-CM

## 2019-10-24 DIAGNOSIS — C78.7 COLON CANCER METASTASIZED TO LIVER (HCC): Primary | ICD-10-CM

## 2019-10-24 DIAGNOSIS — C18.9 COLON CANCER METASTASIZED TO LIVER (HCC): Primary | ICD-10-CM

## 2019-10-24 DIAGNOSIS — C78.7 COLON CANCER METASTASIZED TO LIVER (HCC): ICD-10-CM

## 2019-10-24 PROCEDURE — 99215 OFFICE O/P EST HI 40 MIN: CPT | Performed by: INTERNAL MEDICINE

## 2019-10-24 RX ORDER — DEXTROSE MONOHYDRATE 50 MG/ML
20 INJECTION, SOLUTION INTRAVENOUS ONCE
Status: CANCELLED | OUTPATIENT
Start: 2019-12-16

## 2019-10-24 RX ORDER — DEXTROSE MONOHYDRATE 50 MG/ML
20 INJECTION, SOLUTION INTRAVENOUS ONCE
Status: CANCELLED | OUTPATIENT
Start: 2019-12-02

## 2019-10-24 RX ORDER — SODIUM CHLORIDE 9 MG/ML
20 INJECTION, SOLUTION INTRAVENOUS ONCE AS NEEDED
Status: CANCELLED | OUTPATIENT
Start: 2019-11-04

## 2019-10-24 RX ORDER — SODIUM CHLORIDE 9 MG/ML
20 INJECTION, SOLUTION INTRAVENOUS ONCE AS NEEDED
Status: CANCELLED | OUTPATIENT
Start: 2019-12-02

## 2019-10-24 RX ORDER — SODIUM CHLORIDE 9 MG/ML
20 INJECTION, SOLUTION INTRAVENOUS ONCE AS NEEDED
Status: CANCELLED | OUTPATIENT
Start: 2019-11-18

## 2019-10-24 RX ORDER — SODIUM CHLORIDE 9 MG/ML
20 INJECTION, SOLUTION INTRAVENOUS ONCE AS NEEDED
Status: CANCELLED | OUTPATIENT
Start: 2019-12-16

## 2019-10-24 RX ORDER — DEXTROSE MONOHYDRATE 50 MG/ML
20 INJECTION, SOLUTION INTRAVENOUS ONCE
Status: CANCELLED | OUTPATIENT
Start: 2019-11-18

## 2019-10-24 RX ORDER — DEXTROSE MONOHYDRATE 50 MG/ML
20 INJECTION, SOLUTION INTRAVENOUS ONCE
Status: CANCELLED | OUTPATIENT
Start: 2019-11-04

## 2019-10-24 NOTE — PROGRESS NOTES
Hematology / Oncology Outpatient Follow Up Note    Shweta Jackson 47 y o  female :1965 [facility]:[id number]         Date:  10/24/2019    Assessment / Plan:  A 40-year-old female with metastatic colon cancer  She has extensive liver metastasis   Molecular testing could not be performed based on the liver biopsy specimen   She had 12 cycle of FOLFOX with bevacizumab with excellent tolerance, resulting in partial response   Since 2018, she has been on maintenance bevacizumab monotherapy  She underwent palliative resection of transverse colon due to the bleeding  She fully recovered from the surgery  Unfortunately, she had CEA progression  We discussed following treatment options  1  Bevacizumab with infusion of 5 FU  2  Bevacizumab with FOLFIRI  Pros and cons of above 2 options were thoroughly discussed  She wished to try bevacizumab with infusion 5 FU  She is going to start this regimen in 2019  I will see her again in beginning of 2019 for follow-up  She is in agreement with my recommendation  We also discussed K-wanda mutation  Therefore, she is not candidate for anti EGFR antibody         Subjective:      HPI:  A 40-year-old female who was found to have severe microcytic anemia in 2018   Therefore, she was advised to be hospitalized  Jayesh Sanchez was given transfusion  Veronda [facility] was performed which did not show any major pathology   As outpatient basis, she underwent CT scan of abdomen pelvis which showed transverse colon mass as well as multiple liver metastatic disease   She subsequently underwent liver biopsy which showed metastatic adenocarcinoma, consistent with colorectal primary   She presents today to discuss treatment options   Since 2018, she lost 25 pound  Jayesh Sanchez has mildly anorexic  [facility], she has no complaint of pain   She denied any respiratory symptoms    She has normal bowel movement    Prior to the hospitalization, she had slowly progressive fatigue as well as some exertional shortness of breath   She is currently on oral iron 3 times per day  Bret Simons has rheumatoid arthritis for which she is on weekly methotrexate and folic acid   She has no family history of colorectal cancer  Bret Simons is a lifetime never smoker   Her performance status is normal            Interval History:   A 47year-old female with metastatic colon cancer with extensive liver metastasis   She started systemic chemotherapy with FOLFOX-6 which she tolerated very well  We were notified by pathology Department that molecular testing cannot be performed on liver biopsy tissue   She was treated with 12 cycle of FOLFOX with bevacizumab with excellent tolerance   She had good partial response   Since April 2019, she has been on maintenance bevacizumab monotherapy   She is tolerating treatment very well   She presents today for follow-up  Maciel Kathleen CEA continued to decline   Recent CT scan showed continued shrinkage of liver metastasis   However, primary tumor in transverse colon seems to be slightly larger  Because of the bleeding from this lesion, she underwent palliative resection of transverse colon  She stayed on bevacizumab monotherapy  She recovered well from surgery by now  She feels well  She has significant weight gain  She has no respiratory symptoms  She has no abdominal pain  However, her recent tumor marker showed elevation to 29  Her performance status is normal                                                                                 Objective:      Primary Diagnosis:     Metastatic colon cancer  K-wanda mutation positive    BRAF wild type   Diagnosed in October 2018      Cancer Staging:  Cancer Staging  No matching staging information was found for the patient         Previous Hematologic/ Oncologic Treatment:       1  Bevacizumab with FOLFOX-6   X12 cycle   Completed in April 2019    2  Bevacizumab monotherapy from April 2019 through October 2019       Current Hematologic/ Oncologic Treatment:       Bevacizumab with infusion of 5 FU to be started in November 4, 2019       Disease Status:      Clinical progressive disease on bevacizumab      Test Results:     Pathology:     Liver biopsy showed metastatic adenocarcinoma, consistent with colorectal primary   Molecular testing could not be performed per pathology department      Radiology:     CT scan of chest abdomen pelvis in July 2019 showed continued decrease of multiple liver metastasis   Slight enlargement of transverse colon mass      Laboratory:     See below      Physical Exam:        General Appearance:    Alert, oriented          Eyes:    PERRL   Ears:    Normal external ear canals, both ears   Nose:   Nares normal, septum midline   Throat:   Mucosa moist  Pharynx without injection  Neck:   Supple         Lungs:     Clear to auscultation bilaterally   Chest Wall:    No tenderness or deformity    Heart:    Regular rate and rhythm         Abdomen:     Soft, non-tender, bowel sounds +, no organomegaly               Extremities:   Extremities no cyanosis or edema         Skin:   no rash or icterus  Lymph nodes:   Cervical, supraclavicular, and axillary nodes normal   Neurologic:   CNII-XII intact, normal strength, sensation and reflexes     Throughout             Breast exam:  NA           ROS: Review of Systems   All other systems reviewed and are negative  Imaging: No results found        Labs:   Lab Results   Component Value Date    WBC 5 96 10/04/2019    HGB 9 4 (L) 10/04/2019    HCT 32 7 (L) 10/04/2019    MCV 84 10/04/2019     10/04/2019     Lab Results   Component Value Date    K 3 9 10/04/2019     10/04/2019    CO2 27 10/04/2019    BUN 12 10/04/2019    CREATININE 0 58 (L) 10/04/2019    GLUF 89 10/04/2019    CALCIUM 9 5 10/04/2019    AST 60 (H) 10/04/2019    ALT 76 10/04/2019    ALKPHOS 476 (H) 10/04/2019    EGFR 105 10/04/2019         Lab Results   Component Value Date     (H) 09/24/2018         Lab Results   Component Value Date    CEA 29 6 (H) 10/04/2019         Lab Results   Component Value Date    IRON 11 (L) 09/13/2018    TIBC 302 09/13/2018    FERRITIN 15 09/13/2018         Lab Results   Component Value Date    FOLATE 11 4 09/13/2018         Current Medications: Reviewed  Allergies: Reviewed  PMH/FH/SH:  Reviewed      Vital Sign:    Body surface area is 1 64 meters squared      Wt Readings from Last 3 Encounters:   10/24/19 58 3 kg (128 lb 9 6 oz)   10/07/19 56 8 kg (125 lb 3 5 oz)   09/13/19 52 6 kg (116 lb)        Temp Readings from Last 3 Encounters:   10/24/19 98 4 °F (36 9 °C) (Tympanic Core)   10/07/19 (!) 96 8 °F (36 °C) (Temporal)   09/13/19 98 5 °F (36 9 °C)        BP Readings from Last 3 Encounters:   10/24/19 130/70   10/07/19 121/87   09/13/19 122/70         Pulse Readings from Last 3 Encounters:   10/24/19 82   10/07/19 74   09/13/19 93     @LASTSAO2(3)@

## 2019-10-28 ENCOUNTER — HOSPITAL ENCOUNTER (OUTPATIENT)
Dept: INFUSION CENTER | Facility: HOSPITAL | Age: 54
Discharge: HOME | End: 2019-10-28

## 2019-10-30 DIAGNOSIS — C18.9 COLON CANCER METASTASIZED TO LIVER (HCC): Primary | ICD-10-CM

## 2019-10-30 DIAGNOSIS — C78.7 COLON CANCER METASTASIZED TO LIVER (HCC): Primary | ICD-10-CM

## 2019-11-01 ENCOUNTER — APPOINTMENT (OUTPATIENT)
Dept: LAB | Age: 54
End: 2019-11-01
Payer: COMMERCIAL

## 2019-11-01 DIAGNOSIS — C18.9 COLON CANCER METASTASIZED TO LIVER (HCC): ICD-10-CM

## 2019-11-01 DIAGNOSIS — C78.7 COLON CANCER METASTASIZED TO LIVER (HCC): ICD-10-CM

## 2019-11-01 LAB
ALBUMIN SERPL BCP-MCNC: 3.9 G/DL (ref 3.5–5)
ALP SERPL-CCNC: 587 U/L (ref 46–116)
ALT SERPL W P-5'-P-CCNC: 116 U/L (ref 12–78)
ANION GAP SERPL CALCULATED.3IONS-SCNC: 8 MMOL/L (ref 4–13)
AST SERPL W P-5'-P-CCNC: 87 U/L (ref 5–45)
BASOPHILS # BLD AUTO: 0.04 THOUSANDS/ΜL (ref 0–0.1)
BASOPHILS NFR BLD AUTO: 1 % (ref 0–1)
BILIRUB SERPL-MCNC: 0.75 MG/DL (ref 0.2–1)
BUN SERPL-MCNC: 13 MG/DL (ref 5–25)
CALCIUM SERPL-MCNC: 9.6 MG/DL (ref 8.3–10.1)
CHLORIDE SERPL-SCNC: 105 MMOL/L (ref 100–108)
CO2 SERPL-SCNC: 25 MMOL/L (ref 21–32)
CREAT SERPL-MCNC: 0.74 MG/DL (ref 0.6–1.3)
EOSINOPHIL # BLD AUTO: 0.09 THOUSAND/ΜL (ref 0–0.61)
EOSINOPHIL NFR BLD AUTO: 1 % (ref 0–6)
ERYTHROCYTE [DISTWIDTH] IN BLOOD BY AUTOMATED COUNT: 18.3 % (ref 11.6–15.1)
GFR SERPL CREATININE-BSD FRML MDRD: 92 ML/MIN/1.73SQ M
GLUCOSE P FAST SERPL-MCNC: 93 MG/DL (ref 65–99)
HCT VFR BLD AUTO: 35.7 % (ref 34.8–46.1)
HGB BLD-MCNC: 10.3 G/DL (ref 11.5–15.4)
IMM GRANULOCYTES # BLD AUTO: 0.02 THOUSAND/UL (ref 0–0.2)
IMM GRANULOCYTES NFR BLD AUTO: 0 % (ref 0–2)
LYMPHOCYTES # BLD AUTO: 1.32 THOUSANDS/ΜL (ref 0.6–4.47)
LYMPHOCYTES NFR BLD AUTO: 20 % (ref 14–44)
MCH RBC QN AUTO: 23.5 PG (ref 26.8–34.3)
MCHC RBC AUTO-ENTMCNC: 28.9 G/DL (ref 31.4–37.4)
MCV RBC AUTO: 81 FL (ref 82–98)
MONOCYTES # BLD AUTO: 0.43 THOUSAND/ΜL (ref 0.17–1.22)
MONOCYTES NFR BLD AUTO: 7 % (ref 4–12)
NEUTROPHILS # BLD AUTO: 4.66 THOUSANDS/ΜL (ref 1.85–7.62)
NEUTS SEG NFR BLD AUTO: 71 % (ref 43–75)
NRBC BLD AUTO-RTO: 0 /100 WBCS
PLATELET # BLD AUTO: 209 THOUSANDS/UL (ref 149–390)
PMV BLD AUTO: 9.1 FL (ref 8.9–12.7)
POTASSIUM SERPL-SCNC: 4 MMOL/L (ref 3.5–5.3)
PROT SERPL-MCNC: 8.1 G/DL (ref 6.4–8.2)
RBC # BLD AUTO: 4.39 MILLION/UL (ref 3.81–5.12)
SODIUM SERPL-SCNC: 138 MMOL/L (ref 136–145)
WBC # BLD AUTO: 6.56 THOUSAND/UL (ref 4.31–10.16)

## 2019-11-01 PROCEDURE — 85025 COMPLETE CBC W/AUTO DIFF WBC: CPT

## 2019-11-01 PROCEDURE — 36415 COLL VENOUS BLD VENIPUNCTURE: CPT

## 2019-11-01 PROCEDURE — 80053 COMPREHEN METABOLIC PANEL: CPT

## 2019-11-04 ENCOUNTER — HOSPITAL ENCOUNTER (OUTPATIENT)
Dept: INFUSION CENTER | Facility: HOSPITAL | Age: 54
Discharge: HOME | End: 2019-11-04
Payer: COMMERCIAL

## 2019-11-04 VITALS
BODY MASS INDEX: 21.23 KG/M2 | SYSTOLIC BLOOD PRESSURE: 116 MMHG | WEIGHT: 127.43 LBS | HEIGHT: 65 IN | TEMPERATURE: 97.1 F | DIASTOLIC BLOOD PRESSURE: 76 MMHG | RESPIRATION RATE: 16 BRPM | HEART RATE: 85 BPM

## 2019-11-04 DIAGNOSIS — C78.7 COLON CANCER METASTASIZED TO LIVER (HCC): Primary | ICD-10-CM

## 2019-11-04 DIAGNOSIS — C18.9 COLON CANCER METASTASIZED TO LIVER (HCC): Primary | ICD-10-CM

## 2019-11-04 PROCEDURE — 96413 CHEMO IV INFUSION 1 HR: CPT

## 2019-11-04 PROCEDURE — 96367 TX/PROPH/DG ADDL SEQ IV INF: CPT

## 2019-11-04 PROCEDURE — 96366 THER/PROPH/DIAG IV INF ADDON: CPT

## 2019-11-04 RX ORDER — DEXTROSE MONOHYDRATE 50 MG/ML
20 INJECTION, SOLUTION INTRAVENOUS ONCE
Status: DISCONTINUED | OUTPATIENT
Start: 2019-11-04 | End: 2019-11-08 | Stop reason: HOSPADM

## 2019-11-04 RX ORDER — SODIUM CHLORIDE 9 MG/ML
20 INJECTION, SOLUTION INTRAVENOUS ONCE AS NEEDED
Status: DISCONTINUED | OUTPATIENT
Start: 2019-11-04 | End: 2019-11-08 | Stop reason: HOSPADM

## 2019-11-04 RX ADMIN — SODIUM CHLORIDE 20 ML/HR: 0.9 INJECTION, SOLUTION INTRAVENOUS at 09:11

## 2019-11-04 RX ADMIN — LEUCOVORIN CALCIUM 656 MG: 350 INJECTION, POWDER, LYOPHILIZED, FOR SOLUTION INTRAMUSCULAR; INTRAVENOUS at 10:36

## 2019-11-04 RX ADMIN — DEXAMETHASONE SODIUM PHOSPHATE: 10 INJECTION, SOLUTION INTRAMUSCULAR; INTRAVENOUS at 09:12

## 2019-11-04 RX ADMIN — BEVACIZUMAB 292.5 MG: 100 INJECTION, SOLUTION INTRAVENOUS at 09:48

## 2019-11-04 NOTE — PLAN OF CARE
Problem: Potential for Falls  Goal: Patient will remain free of falls  Description  INTERVENTIONS:  - Assess patient frequently for physical needs  -  Identify cognitive and physical deficits and behaviors that affect risk of falls    -  [place] fall precautions as indicated by assessment   - Educate patient/family on patient safety including physical limitations  - Instruct patient to call for assistance with activity based on assessment  - Modify environment to reduce risk of injury  - Consider OT/PT consult to assist with strengthening/mobility  Outcome: Progressing

## 2019-11-04 NOTE — PROGRESS NOTES
Pt here for Avastin/leucovorin/5FU today  Offers no complaints  Is asking if she can have a flu shot today, spoke with Krishan España RN and ok to have today  Pt aware  Patient tolerated treatment well today without complications    Aware to come back Wed for disconnect at 11:00am

## 2019-11-06 ENCOUNTER — HOSPITAL ENCOUNTER (OUTPATIENT)
Dept: INFUSION CENTER | Facility: HOSPITAL | Age: 54
Discharge: HOME | End: 2019-11-06

## 2019-11-06 DIAGNOSIS — C18.9 COLON CANCER METASTASIZED TO LIVER (HCC): Primary | ICD-10-CM

## 2019-11-06 DIAGNOSIS — C78.7 COLON CANCER METASTASIZED TO LIVER (HCC): Primary | ICD-10-CM

## 2019-11-06 NOTE — PROGRESS NOTES
Pt tolerated 5fu cadd pump well  No complaints offered  Reservoir volume is 0 0ml  Port flushed and deaccessed per routine  Discharged ambulatory with next appt booked

## 2019-11-12 ENCOUNTER — OFFICE VISIT (OUTPATIENT)
Dept: URGENT CARE | Age: 54
End: 2019-11-12
Payer: COMMERCIAL

## 2019-11-12 VITALS
DIASTOLIC BLOOD PRESSURE: 86 MMHG | BODY MASS INDEX: 22.53 KG/M2 | RESPIRATION RATE: 20 BRPM | HEART RATE: 79 BPM | OXYGEN SATURATION: 99 % | TEMPERATURE: 97.9 F | HEIGHT: 64 IN | SYSTOLIC BLOOD PRESSURE: 122 MMHG | WEIGHT: 132 LBS

## 2019-11-12 DIAGNOSIS — R60.0 LIP EDEMA: Primary | ICD-10-CM

## 2019-11-12 PROCEDURE — S9088 SERVICES PROVIDED IN URGENT: HCPCS | Performed by: PHYSICIAN ASSISTANT

## 2019-11-12 PROCEDURE — 99213 OFFICE O/P EST LOW 20 MIN: CPT | Performed by: PHYSICIAN ASSISTANT

## 2019-11-12 NOTE — PATIENT INSTRUCTIONS
The discussed pros and cons of treatment with patient  discussed that steroids could potentially throw the off patient's counts for chemotherapy  Patient drove herself here, therefore Benadryl not advised at this point due to sedative effects  Advised patient to report to ED for medication and observation    Edema   WHAT YOU NEED TO KNOW:   Edema is swelling throughout your body  Edema is usually a sign that you are retaining fluid  The swelling may be caused by heart failure or kidney, thyroid, or liver disease  It may also be caused by medicines such as antidepressants, blood pressure medicines, or hormones  Sudden swelling around the lips or face may be a sign of a severe allergic reaction  Swelling of an arm or leg may be caused by blockage of your veins  DISCHARGE INSTRUCTIONS:   Return to the emergency department if:   · You have shortness of breath at rest, especially when you lie down  · You cough up pink, foamy sputum  · You have chest pain  · Your heartbeat is fast or uneven  Contact your healthcare provider if:   · The swollen area feels cold and is pale or blue in color  · The swollen area feels warm, painful, and is red in color  · You have increased swelling or swelling in other parts of your body  · You have questions or concerns about your condition or care  Medicines:   · Medicines  help to get rid of extra body fluid  · Take your medicine as directed  Contact your healthcare provider if you think your medicine is not helping or if you have side effects  Tell him or her if you are allergic to any medicine  Keep a list of the medicines, vitamins, and herbs you take  Include the amounts, and when and why you take them  Bring the list or the pill bottles to follow-up visits  Carry your medicine list with you in case of an emergency  Follow up with your healthcare provider as directed:  Write down your questions so you remember to ask them during your visits     Manage edema:   · Elevate  your arms or legs as directed  Raise them above the level of your heart as often as you can  This will help decrease swelling and pain  Prop them on pillows or blankets to keep them elevated comfortably  · Wear pressure stockings as directed  The stockings are tight and put pressure on your legs  This helps to keep fluid from collecting in your legs or ankles  · Limit your salt intake  Salt causes your body to hold water  Ask about any other changes to your diet  · Stay active  Do not stand or sit for long periods of time  Ask your healthcare provider about the best exercise plan for you  · Keep your skin moist  using lotion, cream, or ointment  Ask your healthcare provider what to use and how often to use it  © 2017 [facility] Information is for End User's use only and may not be sold, redistributed or otherwise used for commercial purposes  All illustrations and images included in CareNotes® are the copyrighted property of A D A M , Inc  or Nito Arriaga  The above information is an  only  It is not intended as medical advice for individual conditions or treatments  Talk to your doctor, nurse or pharmacist before following any medical regimen to see if it is safe and effective for you

## 2019-11-12 NOTE — PROGRESS NOTES
[address]        NAME: Dipesh Madrid is a 47 y o  female  : 1965    MRN: [id number]  DATE: 2019  TIME: 7:49 PM    Assessment and Plan   Lip edema [R60 0]  1  Lip edema  Transfer to other facility         Patient Instructions     The discussed pros and cons of treatment with patient  discussed that steroids could potentially throw the off patient's counts for chemotherapy  Patient drove herself here, therefore Benadryl not advised at this point due to sedative effects  Advised patient to report to ED for medication and observation    Chief Complaint     Chief Complaint   Patient presents with    Lip Swelling     Pt reports she started OTW with cold symptoms and has been taking Robitussin Cough/Cold since yesterday  Today woke up with a sore on her upper lip which became larger  Denies itching, burning or tingling  Pt recently started chemotherapy  History of Present Illness       Patient is a 55-year-old female presenting office for upper lip swelling  Patient states that she knows lip swelling this morning is gotten progressively worse over time  Patient denies any new foods but states that she took Robitussin honey this morning followed by lip swelling  The patient denies any lip pain, shortness of breath chest tightness difficulty breathing difficulty swallowing  Patient denies any prior history swelling  Patient has a current medical history of liver cancer and has been receiving chemotherapy every other Monday  This the patient 2nd round of chemo  Patient states that her 1st treatment of this round was last Monday  Patient denies any treatment for lip swelling  Patient offers no other complaints at this time  Review of Systems   Review of Systems   Constitutional: Negative  HENT: Positive for facial swelling   Negative for congestion, dental problem, drooling, ear discharge, ear pain, hearing loss, mouth sores, nosebleeds, postnasal drip, rhinorrhea, sinus pressure, sinus pain, sneezing, sore throat, tinnitus, trouble swallowing and voice change  Eyes: Negative  Respiratory: Negative  Cardiovascular: Negative  Gastrointestinal: Negative  Endocrine: Negative  Genitourinary: Negative  Musculoskeletal: Negative  Skin: Negative  Allergic/Immunologic: Negative  Neurological: Negative  Hematological: Negative  Psychiatric/Behavioral: Negative  Current Medications       Current Outpatient Medications:     cyanocobalamin (VITAMIN B-12) 1,000 mcg tablet, Take by mouth daily, Disp: , Rfl:     ferrous sulfate 325 (65 Fe) mg tablet, Take 1 tablet (325 mg total) by mouth 3 (three) times a day with meals (Patient taking differently: Take 325 mg by mouth daily with breakfast ), Disp: 90 tablet, Rfl: 0    folic acid (FOLVITE) 1 mg tablet, 1 mg, Disp: , Rfl:     methotrexate 2 5 mg tablet, 15 mg once a week , Disp: , Rfl:     Multiple Vitamin (MULTIVITAMIN) capsule, Take 1 capsule by mouth daily, Disp: , Rfl:     Omega-3 Fatty Acids (FISH OIL PO), Take by mouth daily , Disp: , Rfl:     Current Allergies     Allergies as of 11/12/2019    (No Known Allergies)            The following portions of the patient's history were reviewed and updated as appropriate: allergies, current medications, past family history, past medical history, past social history, past surgical history and problem list      Past Medical History:   Diagnosis Date    Anxious mood     [facility]     [facility] ([facility] )     History of chemotherapy     Rheumatoid arthritis ([facility] )        Past Surgical History:   Procedure Laterality Date    APPENDECTOMY      ESOPHAGOGASTRODUODENOSCOPY N/A 9/14/2018    Procedure: ESOPHAGOGASTRODUODENOSCOPY (EGD) with polypectomy;  Surgeon: Rick Lyles MD;  Location: [facility];   Service: Gastroenterology    IR IMAGE GUIDED BIOPSY/ASPIRATION LIVER  10/25/2018    IR PORT PLACEMENT  11/7/2018    r chest    [place] PART REMOVAL COLON W ANASTOMOSIS N/A 8/29/2019    Procedure: EXTENDED RIGHT COLON RESECTION;  Surgeon: Deacon Johnson MD;  [facility];  Service: Surgical Oncology    TUBAL LIGATION      resolved 2007    WISDOM TOOTH EXTRACTION      resolved 1990       Family History   Problem Relation Age of Onset    Anxiety disorder Mother     Hypertension Father     Diabetes Father     Heart attack Maternal Grandmother         acute MI         Medications have been verified  Objective   /86   Pulse 79   Temp 97 9 °F (36 6 °C)   Resp 20   Ht 5' 4" (1 626 m)   Wt 59 9 kg (132 lb)   LMP 09/28/2018 (Exact Date)   SpO2 99%   BMI 22 66 kg/m²        Physical Exam     Physical Exam   Constitutional: She is oriented to person, place, and time  She appears well-developed and well-nourished  HENT:   Head: Normocephalic  Eyes: Pupils are equal, round, and reactive to light  Conjunctivae and EOM are normal    Neck: Normal range of motion  Cardiovascular: Normal rate, regular rhythm, normal heart sounds and intact distal pulses  Pulmonary/Chest: Effort normal and breath sounds normal    Neurological: She is alert and oriented to person, place, and time  Skin: Skin is warm  Capillary refill takes less than 2 seconds  Psychiatric: She has a normal mood and affect  Her behavior is normal  Judgment and thought content normal    Nursing note and vitals reviewed

## 2019-11-13 DIAGNOSIS — C78.7 COLON CANCER METASTASIZED TO LIVER (HCC): Primary | ICD-10-CM

## 2019-11-13 DIAGNOSIS — C18.9 COLON CANCER METASTASIZED TO LIVER (HCC): Primary | ICD-10-CM

## 2019-11-15 ENCOUNTER — APPOINTMENT (OUTPATIENT)
Dept: LAB | Age: 54
End: 2019-11-15
Payer: COMMERCIAL

## 2019-11-15 DIAGNOSIS — C78.7 COLON CANCER METASTASIZED TO LIVER (HCC): ICD-10-CM

## 2019-11-15 DIAGNOSIS — C18.9 COLON CANCER METASTASIZED TO LIVER (HCC): ICD-10-CM

## 2019-11-15 LAB
ALBUMIN SERPL BCP-MCNC: 3.7 G/DL (ref 3.5–5)
ALP SERPL-CCNC: 451 U/L (ref 46–116)
ALT SERPL W P-5'-P-CCNC: 76 U/L (ref 12–78)
ANION GAP SERPL CALCULATED.3IONS-SCNC: 10 MMOL/L (ref 4–13)
AST SERPL W P-5'-P-CCNC: 59 U/L (ref 5–45)
BASOPHILS # BLD AUTO: 0.04 THOUSANDS/ΜL (ref 0–0.1)
BASOPHILS NFR BLD AUTO: 1 % (ref 0–1)
BILIRUB SERPL-MCNC: 0.62 MG/DL (ref 0.2–1)
BUN SERPL-MCNC: 14 MG/DL (ref 5–25)
CALCIUM SERPL-MCNC: 9.2 MG/DL (ref 8.3–10.1)
CHLORIDE SERPL-SCNC: 108 MMOL/L (ref 100–108)
CO2 SERPL-SCNC: 25 MMOL/L (ref 21–32)
CREAT SERPL-MCNC: 0.72 MG/DL (ref 0.6–1.3)
EOSINOPHIL # BLD AUTO: 0.11 THOUSAND/ΜL (ref 0–0.61)
EOSINOPHIL NFR BLD AUTO: 2 % (ref 0–6)
ERYTHROCYTE [DISTWIDTH] IN BLOOD BY AUTOMATED COUNT: 20.4 % (ref 11.6–15.1)
GFR SERPL CREATININE-BSD FRML MDRD: 95 ML/MIN/1.73SQ M
GLUCOSE P FAST SERPL-MCNC: 99 MG/DL (ref 65–99)
HCT VFR BLD AUTO: 36.5 % (ref 34.8–46.1)
HGB BLD-MCNC: 10.7 G/DL (ref 11.5–15.4)
IMM GRANULOCYTES # BLD AUTO: 0.02 THOUSAND/UL (ref 0–0.2)
IMM GRANULOCYTES NFR BLD AUTO: 0 % (ref 0–2)
LYMPHOCYTES # BLD AUTO: 1.58 THOUSANDS/ΜL (ref 0.6–4.47)
LYMPHOCYTES NFR BLD AUTO: 25 % (ref 14–44)
MCH RBC QN AUTO: 23.9 PG (ref 26.8–34.3)
MCHC RBC AUTO-ENTMCNC: 29.3 G/DL (ref 31.4–37.4)
MCV RBC AUTO: 82 FL (ref 82–98)
MONOCYTES # BLD AUTO: 0.46 THOUSAND/ΜL (ref 0.17–1.22)
MONOCYTES NFR BLD AUTO: 7 % (ref 4–12)
NEUTROPHILS # BLD AUTO: 4.23 THOUSANDS/ΜL (ref 1.85–7.62)
NEUTS SEG NFR BLD AUTO: 65 % (ref 43–75)
NRBC BLD AUTO-RTO: 0 /100 WBCS
PLATELET # BLD AUTO: 206 THOUSANDS/UL (ref 149–390)
PMV BLD AUTO: 8.9 FL (ref 8.9–12.7)
POTASSIUM SERPL-SCNC: 4 MMOL/L (ref 3.5–5.3)
PROT SERPL-MCNC: 7.8 G/DL (ref 6.4–8.2)
RBC # BLD AUTO: 4.48 MILLION/UL (ref 3.81–5.12)
SODIUM SERPL-SCNC: 143 MMOL/L (ref 136–145)
WBC # BLD AUTO: 6.44 THOUSAND/UL (ref 4.31–10.16)

## 2019-11-15 PROCEDURE — 80053 COMPREHEN METABOLIC PANEL: CPT

## 2019-11-15 PROCEDURE — 85025 COMPLETE CBC W/AUTO DIFF WBC: CPT

## 2019-11-15 PROCEDURE — 36415 COLL VENOUS BLD VENIPUNCTURE: CPT

## 2019-11-18 ENCOUNTER — HOSPITAL ENCOUNTER (OUTPATIENT)
Dept: INFUSION CENTER | Facility: HOSPITAL | Age: 54
Discharge: HOME | End: 2019-11-18
Payer: COMMERCIAL

## 2019-11-18 VITALS
HEIGHT: 65 IN | WEIGHT: 127.87 LBS | SYSTOLIC BLOOD PRESSURE: 113 MMHG | DIASTOLIC BLOOD PRESSURE: 80 MMHG | HEART RATE: 88 BPM | RESPIRATION RATE: 16 BRPM | BODY MASS INDEX: 21.3 KG/M2 | TEMPERATURE: 98 F

## 2019-11-18 DIAGNOSIS — C78.7 COLON CANCER METASTASIZED TO LIVER (HCC): Primary | ICD-10-CM

## 2019-11-18 DIAGNOSIS — C18.9 COLON CANCER METASTASIZED TO LIVER (HCC): Primary | ICD-10-CM

## 2019-11-18 PROCEDURE — 96413 CHEMO IV INFUSION 1 HR: CPT

## 2019-11-18 PROCEDURE — 96367 TX/PROPH/DG ADDL SEQ IV INF: CPT

## 2019-11-18 PROCEDURE — G0498 CHEMO EXTEND IV INFUS W/PUMP: HCPCS

## 2019-11-18 RX ORDER — DEXTROSE MONOHYDRATE 50 MG/ML
20 INJECTION, SOLUTION INTRAVENOUS ONCE
Status: DISCONTINUED | OUTPATIENT
Start: 2019-11-18 | End: 2019-11-22 | Stop reason: HOSPADM

## 2019-11-18 RX ORDER — SODIUM CHLORIDE 9 MG/ML
20 INJECTION, SOLUTION INTRAVENOUS ONCE AS NEEDED
Status: DISCONTINUED | OUTPATIENT
Start: 2019-11-18 | End: 2019-11-22 | Stop reason: HOSPADM

## 2019-11-18 RX ADMIN — BEVACIZUMAB 292.5 MG: 100 INJECTION, SOLUTION INTRAVENOUS at 09:22

## 2019-11-18 RX ADMIN — SODIUM CHLORIDE 20 ML/HR: 0.9 INJECTION, SOLUTION INTRAVENOUS at 08:56

## 2019-11-18 RX ADMIN — LEUCOVORIN CALCIUM 656 MG: 350 INJECTION, POWDER, LYOPHILIZED, FOR SOLUTION INTRAMUSCULAR; INTRAVENOUS at 09:58

## 2019-11-18 RX ADMIN — DEXAMETHASONE SODIUM PHOSPHATE: 10 INJECTION, SOLUTION INTRAMUSCULAR; INTRAVENOUS at 08:56

## 2019-11-18 NOTE — PLAN OF CARE
Problem: Potential for Falls  Goal: Patient will remain free of falls  Description  INTERVENTIONS:  - Assess patient frequently for physical needs  -  Identify cognitive and physical deficits and behaviors that affect risk of falls    -  [place] fall precautions as indicated by assessment   - Educate patient/family on patient safety including physical limitations  - Instruct patient to call for assistance with activity based on assessment  - Modify environment to reduce risk of injury  - Consider OT/PT consult to assist with strengthening/mobility  Outcome: Progressing

## 2019-11-20 ENCOUNTER — HOSPITAL ENCOUNTER (OUTPATIENT)
Dept: INFUSION CENTER | Facility: HOSPITAL | Age: 54
Discharge: HOME | End: 2019-11-20

## 2019-11-20 DIAGNOSIS — C78.7 COLON CANCER METASTASIZED TO LIVER (HCC): Primary | ICD-10-CM

## 2019-11-20 DIAGNOSIS — C18.9 COLON CANCER METASTASIZED TO LIVER (HCC): Primary | ICD-10-CM

## 2019-11-20 NOTE — PROGRESS NOTES
Cadd pump disconnected per order, o ml res   Volume noted, good blood return prior to disconnect, pt offers no complaints today

## 2019-11-26 DIAGNOSIS — C78.7 COLON CANCER METASTASIZED TO LIVER (HCC): Primary | ICD-10-CM

## 2019-11-26 DIAGNOSIS — C18.9 COLON CANCER METASTASIZED TO LIVER (HCC): Primary | ICD-10-CM

## 2019-11-29 ENCOUNTER — HOSPITAL ENCOUNTER (OUTPATIENT)
Dept: CT IMAGING | Facility: HOSPITAL | Age: 54
Discharge: HOME | End: 2019-11-29
Attending: INTERNAL MEDICINE
Payer: COMMERCIAL

## 2019-11-29 ENCOUNTER — APPOINTMENT (OUTPATIENT)
Dept: LAB | Age: 54
End: 2019-11-29
Payer: COMMERCIAL

## 2019-11-29 DIAGNOSIS — C18.9 COLON CANCER METASTASIZED TO LIVER (HCC): ICD-10-CM

## 2019-11-29 DIAGNOSIS — C78.7 COLON CANCER METASTASIZED TO LIVER (HCC): ICD-10-CM

## 2019-11-29 LAB
ALBUMIN SERPL BCP-MCNC: 3.4 G/DL (ref 3.5–5)
ALP SERPL-CCNC: 370 U/L (ref 46–116)
ALT SERPL W P-5'-P-CCNC: 80 U/L (ref 12–78)
ANION GAP SERPL CALCULATED.3IONS-SCNC: 6 MMOL/L (ref 4–13)
AST SERPL W P-5'-P-CCNC: 68 U/L (ref 5–45)
BASOPHILS # BLD AUTO: 0.03 THOUSANDS/ΜL (ref 0–0.1)
BASOPHILS NFR BLD AUTO: 1 % (ref 0–1)
BILIRUB SERPL-MCNC: 0.79 MG/DL (ref 0.2–1)
BUN SERPL-MCNC: 13 MG/DL (ref 5–25)
CALCIUM SERPL-MCNC: 9.6 MG/DL (ref 8.3–10.1)
CHLORIDE SERPL-SCNC: 107 MMOL/L (ref 100–108)
CO2 SERPL-SCNC: 24 MMOL/L (ref 21–32)
CREAT SERPL-MCNC: 0.7 MG/DL (ref 0.6–1.3)
EOSINOPHIL # BLD AUTO: 0.07 THOUSAND/ΜL (ref 0–0.61)
EOSINOPHIL NFR BLD AUTO: 2 % (ref 0–6)
ERYTHROCYTE [DISTWIDTH] IN BLOOD BY AUTOMATED COUNT: 21.9 % (ref 11.6–15.1)
GFR SERPL CREATININE-BSD FRML MDRD: 99 ML/MIN/1.73SQ M
GLUCOSE P FAST SERPL-MCNC: 94 MG/DL (ref 65–99)
HCT VFR BLD AUTO: 37.7 % (ref 34.8–46.1)
HGB BLD-MCNC: 11.1 G/DL (ref 11.5–15.4)
IMM GRANULOCYTES # BLD AUTO: 0.01 THOUSAND/UL (ref 0–0.2)
IMM GRANULOCYTES NFR BLD AUTO: 0 % (ref 0–2)
LYMPHOCYTES # BLD AUTO: 1.55 THOUSANDS/ΜL (ref 0.6–4.47)
LYMPHOCYTES NFR BLD AUTO: 40 % (ref 14–44)
MCH RBC QN AUTO: 24.3 PG (ref 26.8–34.3)
MCHC RBC AUTO-ENTMCNC: 29.4 G/DL (ref 31.4–37.4)
MCV RBC AUTO: 83 FL (ref 82–98)
MONOCYTES # BLD AUTO: 0.34 THOUSAND/ΜL (ref 0.17–1.22)
MONOCYTES NFR BLD AUTO: 9 % (ref 4–12)
NEUTROPHILS # BLD AUTO: 1.89 THOUSANDS/ΜL (ref 1.85–7.62)
NEUTS SEG NFR BLD AUTO: 48 % (ref 43–75)
NRBC BLD AUTO-RTO: 0 /100 WBCS
PLATELET # BLD AUTO: 156 THOUSANDS/UL (ref 149–390)
PMV BLD AUTO: 9 FL (ref 8.9–12.7)
POTASSIUM SERPL-SCNC: 3.8 MMOL/L (ref 3.5–5.3)
PROT SERPL-MCNC: 7.7 G/DL (ref 6.4–8.2)
RBC # BLD AUTO: 4.56 MILLION/UL (ref 3.81–5.12)
SODIUM SERPL-SCNC: 137 MMOL/L (ref 136–145)
WBC # BLD AUTO: 3.89 THOUSAND/UL (ref 4.31–10.16)

## 2019-11-29 PROCEDURE — 85025 COMPLETE CBC W/AUTO DIFF WBC: CPT

## 2019-11-29 PROCEDURE — 71260 CT THORAX DX C+: CPT

## 2019-11-29 PROCEDURE — 36415 COLL VENOUS BLD VENIPUNCTURE: CPT

## 2019-11-29 PROCEDURE — 80053 COMPREHEN METABOLIC PANEL: CPT

## 2019-11-29 PROCEDURE — 74177 CT ABD & PELVIS W/CONTRAST: CPT

## 2019-11-29 RX ADMIN — IOHEXOL 100 ML: 350 INJECTION, SOLUTION INTRAVENOUS at 12:44

## 2019-12-02 ENCOUNTER — HOSPITAL ENCOUNTER (OUTPATIENT)
Dept: INFUSION CENTER | Facility: HOSPITAL | Age: 54
Discharge: HOME | End: 2019-12-02
Payer: COMMERCIAL

## 2019-12-02 VITALS
HEIGHT: 65 IN | WEIGHT: 130.07 LBS | TEMPERATURE: 97.9 F | HEART RATE: 91 BPM | BODY MASS INDEX: 21.67 KG/M2 | SYSTOLIC BLOOD PRESSURE: 131 MMHG | RESPIRATION RATE: 16 BRPM | DIASTOLIC BLOOD PRESSURE: 72 MMHG

## 2019-12-02 DIAGNOSIS — C18.9 COLON CANCER METASTASIZED TO LIVER (HCC): Primary | ICD-10-CM

## 2019-12-02 DIAGNOSIS — C78.7 COLON CANCER METASTASIZED TO LIVER (HCC): Primary | ICD-10-CM

## 2019-12-02 PROCEDURE — 96413 CHEMO IV INFUSION 1 HR: CPT

## 2019-12-02 PROCEDURE — G0498 CHEMO EXTEND IV INFUS W/PUMP: HCPCS

## 2019-12-02 PROCEDURE — 96367 TX/PROPH/DG ADDL SEQ IV INF: CPT

## 2019-12-02 RX ORDER — SODIUM CHLORIDE 9 MG/ML
20 INJECTION, SOLUTION INTRAVENOUS ONCE AS NEEDED
Status: DISCONTINUED | OUTPATIENT
Start: 2019-12-02 | End: 2019-12-06 | Stop reason: HOSPADM

## 2019-12-02 RX ADMIN — SODIUM CHLORIDE 20 ML/HR: 0.9 INJECTION, SOLUTION INTRAVENOUS at 08:53

## 2019-12-02 RX ADMIN — LEUCOVORIN CALCIUM 656 MG: 350 INJECTION, POWDER, LYOPHILIZED, FOR SOLUTION INTRAMUSCULAR; INTRAVENOUS at 10:05

## 2019-12-02 RX ADMIN — BEVACIZUMAB 292.5 MG: 100 INJECTION, SOLUTION INTRAVENOUS at 09:24

## 2019-12-02 RX ADMIN — DEXAMETHASONE SODIUM PHOSPHATE: 10 INJECTION, SOLUTION INTRAMUSCULAR; INTRAVENOUS at 08:55

## 2019-12-02 NOTE — PLAN OF CARE
Problem: Potential for Falls  Goal: Patient will remain free of falls  Description  INTERVENTIONS:  - Assess patient frequently for physical needs  -  Identify cognitive and physical deficits and behaviors that affect risk of falls    -  [place] fall precautions as indicated by assessment   - Educate patient/family on patient safety including physical limitations  - Instruct patient to call for assistance with activity based on assessment  - Modify environment to reduce risk of injury  - Consider OT/PT consult to assist with strengthening/mobility  Outcome: Progressing

## 2019-12-02 NOTE — PROGRESS NOTES
Pt here for avastin,leucovorin and 5FU CADD pump, tolerated well without complications    patient aware to return Wed at 9:11am

## 2019-12-04 ENCOUNTER — HOSPITAL ENCOUNTER (OUTPATIENT)
Dept: INFUSION CENTER | Facility: HOSPITAL | Age: 54
Discharge: HOME | End: 2019-12-04

## 2019-12-04 DIAGNOSIS — C18.9 COLON CANCER METASTASIZED TO LIVER (HCC): Primary | ICD-10-CM

## 2019-12-04 DIAGNOSIS — C78.7 COLON CANCER METASTASIZED TO LIVER (HCC): Primary | ICD-10-CM

## 2019-12-05 ENCOUNTER — OFFICE VISIT (OUTPATIENT)
Dept: HEMATOLOGY ONCOLOGY | Facility: CLINIC | Age: 54
End: 2019-12-05
Payer: COMMERCIAL

## 2019-12-05 VITALS
TEMPERATURE: 97.5 F | OXYGEN SATURATION: 98 % | WEIGHT: 130 LBS | HEART RATE: 84 BPM | BODY MASS INDEX: 21.66 KG/M2 | HEIGHT: 65 IN | SYSTOLIC BLOOD PRESSURE: 118 MMHG | DIASTOLIC BLOOD PRESSURE: 64 MMHG | RESPIRATION RATE: 18 BRPM

## 2019-12-05 DIAGNOSIS — C18.9 COLON CANCER METASTASIZED TO LIVER (HCC): Primary | ICD-10-CM

## 2019-12-05 DIAGNOSIS — C78.7 COLON CANCER METASTASIZED TO LIVER (HCC): Primary | ICD-10-CM

## 2019-12-05 PROCEDURE — 99214 OFFICE O/P EST MOD 30 MIN: CPT | Performed by: INTERNAL MEDICINE

## 2019-12-05 RX ORDER — SODIUM CHLORIDE 9 MG/ML
20 INJECTION, SOLUTION INTRAVENOUS ONCE AS NEEDED
Status: CANCELLED | OUTPATIENT
Start: 2019-12-31

## 2019-12-05 RX ORDER — DEXTROSE MONOHYDRATE 50 MG/ML
20 INJECTION, SOLUTION INTRAVENOUS ONCE
Status: CANCELLED | OUTPATIENT
Start: 2020-01-27

## 2019-12-05 RX ORDER — SODIUM CHLORIDE 9 MG/ML
20 INJECTION, SOLUTION INTRAVENOUS ONCE AS NEEDED
Status: CANCELLED | OUTPATIENT
Start: 2020-01-13

## 2019-12-05 RX ORDER — DEXTROSE MONOHYDRATE 50 MG/ML
20 INJECTION, SOLUTION INTRAVENOUS ONCE
Status: CANCELLED | OUTPATIENT
Start: 2020-01-13

## 2019-12-05 RX ORDER — SODIUM CHLORIDE 9 MG/ML
20 INJECTION, SOLUTION INTRAVENOUS ONCE AS NEEDED
Status: CANCELLED | OUTPATIENT
Start: 2020-01-27

## 2019-12-05 RX ORDER — DEXTROSE MONOHYDRATE 50 MG/ML
20 INJECTION, SOLUTION INTRAVENOUS ONCE
Status: CANCELLED | OUTPATIENT
Start: 2019-12-31

## 2019-12-05 NOTE — PROGRESS NOTES
Hematology / Oncology Outpatient Follow Up Note    Iona Rogel 47 y o  female :1965 IQV:[id number]         Date:  2019    Assessment / Plan:  A 43-year-old female with metastatic colon cancer  She has extensive liver metastasis   Molecular testing could not be performed based on the liver biopsy specimen   She had 12 cycle of FOLFOX with bevacizumab with excellent tolerance, resulting in partial response   Since 2018, she has been on maintenance bevacizumab monotherapy  She underwent palliative resection of transverse colon due to the bleeding  She fully recovered from the surgery  Unfortunately, she had CEA progression  Therefore, she is back on bevacizumab and infusion of 5 FU with no toxicity  Radiographically, she has stable disease  Clinically, she is doing well  I recommended her to continue with bevacizumab and infusional 5 FU every 2 weeks  I would obtain CEA in the next several weeks  I will see her again in late 2020 for follow-up  She is in agreement with my recommendations       Subjective:      HPI:  A 43-year-old female who was found to have severe microcytic anemia in 2018   Therefore, she was advised to be hospitalized  Raymundo Muhammad was given transfusion  P[place]on Collins was performed which did not show any major pathology   As outpatient basis, she underwent CT scan of abdomen pelvis which showed transverse colon mass as well as multiple liver metastatic disease   She subsequently underwent liver biopsy which showed metastatic adenocarcinoma, consistent with colorectal primary   She presents today to discuss treatment options   Since 2018, she lost 25 pound  Raymundo Muhammad has mildly anorexic  [facility], she has no complaint of pain   She denied any respiratory symptoms    She has normal bowel movement    Prior to the hospitalization, she had slowly progressive fatigue as well as some exertional shortness of breath   She is currently on oral iron 3 times per day  Raymundo Muhammad has rheumatoid arthritis for which she is on weekly methotrexate and folic acid   She has no family history of colorectal cancer  Anshulgovind Fall is a lifetime never smoker   Her performance status is normal            Interval History:   A 47year-old female with metastatic colon cancer with extensive liver metastasis   She started systemic chemotherapy with FOLFOX-6 which she tolerated very well  We were notified by pathology Department that molecular testing cannot be performed on liver biopsy tissue   She was treated with 12 cycle of FOLFOX with bevacizumab with excellent tolerance   She had good partial response   Since April 2019, she was on maintenance bevacizumab monotherapy  In summer of 2019, she underwent palliative resection of transverse colon, because of the chronic blood loss  Postoperatively, she has elevation of CEA for which she is back on infusional 5-FU as well as bevacizumab  She has been tolerating treatment very well  She has no nausea vomiting  She has no abdominal pain  Her blood pressure is normal   She is maintaining her weight  Her performance status is normal   Recent CT scan showed mixed response in the metastatic tumor in the liver                                                                                Objective:      Primary Diagnosis:     Metastatic colon cancer  K-[place] mutation positive    BRAF wild type   Diagnosed in October 2018      Cancer Staging:  Cancer Staging  No matching staging information was found for the patient         Previous Hematologic/ Oncologic Treatment:       1  Bevacizumab with FOLFOX-6   X12 cycle   Completed in April 2019    2  Bevacizumab monotherapy from April 2019 through October 2019       Current Hematologic/ Oncologic Treatment:       Bevacizumab with infusion of 5 FU since early November 2019       Disease Status:      Stable disease on current treatment      Test Results:     Pathology:     Liver biopsy showed metastatic adenocarcinoma, consistent with colorectal primary   Molecular testing could not be performed per pathology department      Radiology:     CT scan of chest abdomen pelvis in November , 2019 showed mixed response with liver metastasis  No new metastatic lesions      Laboratory:     See below      Physical Exam:        General Appearance:    Alert, oriented          Eyes:    PERRL   Ears:    Normal external ear canals, both ears   Nose:   Nares normal, septum midline   Throat:   Mucosa moist  Pharynx without injection  Neck:   Supple         Lungs:     Clear to auscultation bilaterally   Chest Wall:    No tenderness or deformity    Heart:    Regular rate and rhythm         Abdomen:     Soft, non-tender, bowel sounds +, no organomegaly               Extremities:   Extremities no cyanosis or edema         Skin:   no rash or icterus  Lymph nodes:   Cervical, supraclavicular, and axillary nodes normal   Neurologic:   CNII-XII intact, normal strength, sensation and reflexes     Throughout           ROS: Review of Systems   All other systems reviewed and are negative  Imaging: Ct Chest Abdomen Pelvis W Contrast    Result Date: 12/1/2019  Narrative: CT CHEST, ABDOMEN AND PELVIS WITH IV CONTRAST INDICATION:   C18 9: Malignant neoplasm of colon, unspecified C78 7: Secondary malignant neoplasm of liver and intrahepatic bile duct  COMPARISON:  CT chest abdomen pelvis 7/15/2019 TECHNIQUE: CT examination of the chest, abdomen and pelvis was performed  In addition to portal venous phase postcontrast scanning through the abdomen and pelvis, delayed phase postcontrast scanning was performed through the upper abdominal viscera  Axial, sagittal, and coronal 2D reformatted images were created from the source data and submitted for interpretation  Radiation dose length product (DLP) for this visit:  694 mGy-cm     This examination, like all CT scans performed in the [facility], was performed utilizing techniques to minimize radiation dose exposure, including the use of iterative reconstruction and automated exposure control  IV Contrast:  100 mL of iohexol (OMNIPAQUE)   350 Enteric Contrast: Enteric contrast was administered  FINDINGS: CHEST LUNGS:  4 mm groundglass nodular opacity image 28 series 3, unchanged  No suspicious nodule  6 mm subpleural discoid scar image 64 series 3, unchanged  No infiltrate  Central airways are clear  PLEURA:  New low-density within the right major fissure attributed to loculated pleural fluid  Prior pleural low-density along the medial right hemidiaphragm has resolved and adjacent to the right heart border has almost completely resolved with residual opacity measuring approximately 0 7 x 0 6 cm image 30 series 601 previously 2 2 x 1 3 cm  HEART/GREAT VESSELS:  Unremarkable for patient's age  Tip of portacatheter at the cavoatrial junction  MEDIASTINUM AND ANTHONY:  Unremarkable  CHEST WALL AND LOWER NECK:   Right chest wall marko catheter  ABDOMEN LIVER/BILIARY TREE:  Diffuse metastatic liver lesions  Representative lesions including previously described lesions will be measured on series 2: Image 42, posterior right lobe, 1 6 x 1 5 cm previously 1 8 x 1 6 cm  Image 52, posterior right lobe, 2 x 1 5 cm previously 1 5 x 1 1 cm  Image 53, lateral segment left lobe, 1 9 x 1 7 cm previously 2 2 x 2 cm  Image 57, lateral segment left lobe, 0 8 x 0 8 cm previously 1 1 x 1 1 cm  Image 58, posterior right lobe, 1 5 x 1 4 cm previously 1 2 x 1 1 cm  Image 67, posterior right lobe, 1 6 x 1 cm, new  No duct dilation  GALLBLADDER:  There are gallstone(s) within the gallbladder, without pericholecystic inflammatory changes  SPLEEN:  Stable mild splenomegaly at 13 9 cm  PANCREAS:  Unremarkable  ADRENAL GLANDS:  Unremarkable  KIDNEYS/URETERS: One or more sharply circumscribed subcentimeter renal hypodensities are noted   These lesions are too small to accurately characterize, but are statistically most likely to represent benign cortical renal cyst(s)  According to the guidelines published in the [facility] Paper of the ACR Incidental Findings Committee (Radiology 2010), no further workup of these lesions is recommended  Additional stable left renal simple cyst(s) is(are) present        STOMACH AND BOWEL:  Interval partial right hemicolectomy with ventral right periumbilical ileocolic anastomosis  No abnormal thickening to suggest recurrent or residual colonic mass  No bowel obstruction  APPENDIX:  Post appendectomy  ABDOMINOPELVIC CAVITY:  No ascites or free intraperitoneal air  No lymphadenopathy  VESSELS:  Stable prominent bilateral adnexal and left ovarian veins may be sequela of chronic pelvic vascular congestion syndrome  Tiny distal esophageal and gastrohepatic varices again noted  PELVIS REPRODUCTIVE ORGANS:  Unremarkable for patient's age  URINARY BLADDER:  Unremarkable  ABDOMINAL WALL/INGUINAL REGIONS:  Interval postsurgical changes ventral supraumbilical abdominal wall including repair of prior fat-containing hernia  New 2 1 cm ventral hypogastric abdominal wall diastases with small amount of protruding fat  No bowel  herniation  OSSEOUS STRUCTURES:  No acute fracture or destructive osseous lesion  Mild degenerative changes in the spine  Impression: CT chest: No evidence of metastatic disease  4 mm groundglass nodule or scar right upper lobe, in retrospect unchanged as far back as January 29, 2019  New small loculated pleural fluid in the right major fissure  This may also represent redistribution of prior pleural fluid loculations as described above  No significant interval change  CT abdomen and pelvis: Multiple hepatic metastatic lesions  A few of these lesions have enlarged and others have decreased in size  At least one new lesion in the right lobe  Enlargement of a few of the lesions potentially due to post therapy necrosis    The new lesion potentially result of interval confluence of smaller adjacent lesions with post therapy necrosis  Interval partial right hemicolectomy and resection of transverse colonic mass  No evidence of residual or recurrent colonic mass  Ileocolic anastomosis is intact  No bowel obstruction  New small ventral hypogastric abdominal wall diastases with small amount of protruding fat  No bowel herniation  No other significant interval change  The examination demonstrates a significant  finding and was documented as such in [facility] for liaison and referring practitioner notification  Workstation performed: [id number]         Labs:   Lab Results   Component Value Date    WBC 3 89 (L) 11/29/2019    HGB 11 1 (L) 11/29/2019    HCT 37 7 11/29/2019    MCV 83 11/29/2019     11/29/2019     Lab Results   Component Value Date    K 3 8 11/29/2019     11/29/2019    CO2 24 11/29/2019    BUN 13 11/29/2019    CREATININE 0 70 11/29/2019    GLUF 94 11/29/2019    CALCIUM 9 6 11/29/2019    AST 68 (H) 11/29/2019    ALT 80 (H) 11/29/2019    ALKPHOS 370 (H) 11/29/2019    EGFR 99 11/29/2019         Lab Results   Component Value Date     (H) 09/24/2018         Lab Results   Component Value Date    CEA 29 6 (H) 10/04/2019         Lab Results   Component Value Date    IRON 11 (L) 09/13/2018    TIBC 302 09/13/2018    FERRITIN 15 09/13/2018       No results found for: [id number]    Lab Results   Component Value Date    FOLATE 11 4 09/13/2018         Current Medications: Reviewed  Allergies: Reviewed  PMH/FH/SH:  Reviewed      Vital Sign:    Body surface area is 1 65 meters squared      Wt Readings from Last 3 Encounters:   12/05/19 59 kg (130 lb)   12/02/19 59 kg (130 lb 1 1 oz)   11/18/19 58 kg (127 lb 13 9 oz)        Temp Readings from Last 3 Encounters:   12/05/19 97 5 °F (36 4 °C) (Tympanic Core)   12/02/19 97 9 °F (36 6 °C) (Temporal)   11/18/19 98 °F (36 7 °C) (Temporal)        BP Readings from Last 3 Encounters:   12/05/19 118/64   12/02/19 131/72   11/18/19 113/80         Pulse Readings from Last 3 Encounters:   12/05/19 84   12/02/19 91   11/18/19 88     @LASTSAO2(3)@

## 2019-12-10 DIAGNOSIS — C78.7 COLON CANCER METASTASIZED TO LIVER (HCC): Primary | ICD-10-CM

## 2019-12-10 DIAGNOSIS — C18.9 COLON CANCER METASTASIZED TO LIVER (HCC): Primary | ICD-10-CM

## 2019-12-13 ENCOUNTER — APPOINTMENT (OUTPATIENT)
Dept: LAB | Age: 54
End: 2019-12-13
Payer: COMMERCIAL

## 2019-12-13 DIAGNOSIS — C78.7 COLON CANCER METASTASIZED TO LIVER (HCC): ICD-10-CM

## 2019-12-13 DIAGNOSIS — C18.9 COLON CANCER METASTASIZED TO LIVER (HCC): ICD-10-CM

## 2019-12-13 LAB
ALBUMIN SERPL BCP-MCNC: 3.7 G/DL (ref 3.5–5)
ALP SERPL-CCNC: 352 U/L (ref 46–116)
ALT SERPL W P-5'-P-CCNC: 89 U/L (ref 12–78)
ANION GAP SERPL CALCULATED.3IONS-SCNC: 4 MMOL/L (ref 4–13)
AST SERPL W P-5'-P-CCNC: 50 U/L (ref 5–45)
BASOPHILS # BLD AUTO: 0.04 THOUSANDS/ΜL (ref 0–0.1)
BASOPHILS NFR BLD AUTO: 1 % (ref 0–1)
BILIRUB SERPL-MCNC: 0.78 MG/DL (ref 0.2–1)
BUN SERPL-MCNC: 16 MG/DL (ref 5–25)
CALCIUM SERPL-MCNC: 9.5 MG/DL (ref 8.3–10.1)
CHLORIDE SERPL-SCNC: 105 MMOL/L (ref 100–108)
CO2 SERPL-SCNC: 27 MMOL/L (ref 21–32)
CREAT SERPL-MCNC: 0.77 MG/DL (ref 0.6–1.3)
EOSINOPHIL # BLD AUTO: 0.06 THOUSAND/ΜL (ref 0–0.61)
EOSINOPHIL NFR BLD AUTO: 1 % (ref 0–6)
ERYTHROCYTE [DISTWIDTH] IN BLOOD BY AUTOMATED COUNT: 22.2 % (ref 11.6–15.1)
GFR SERPL CREATININE-BSD FRML MDRD: 88 ML/MIN/1.73SQ M
GLUCOSE P FAST SERPL-MCNC: 96 MG/DL (ref 65–99)
HCT VFR BLD AUTO: 40.3 % (ref 34.8–46.1)
HGB BLD-MCNC: 12.2 G/DL (ref 11.5–15.4)
IMM GRANULOCYTES # BLD AUTO: 0.02 THOUSAND/UL (ref 0–0.2)
IMM GRANULOCYTES NFR BLD AUTO: 0 % (ref 0–2)
LYMPHOCYTES # BLD AUTO: 1.82 THOUSANDS/ΜL (ref 0.6–4.47)
LYMPHOCYTES NFR BLD AUTO: 33 % (ref 14–44)
MCH RBC QN AUTO: 25.5 PG (ref 26.8–34.3)
MCHC RBC AUTO-ENTMCNC: 30.3 G/DL (ref 31.4–37.4)
MCV RBC AUTO: 84 FL (ref 82–98)
MONOCYTES # BLD AUTO: 0.46 THOUSAND/ΜL (ref 0.17–1.22)
MONOCYTES NFR BLD AUTO: 8 % (ref 4–12)
NEUTROPHILS # BLD AUTO: 3.12 THOUSANDS/ΜL (ref 1.85–7.62)
NEUTS SEG NFR BLD AUTO: 57 % (ref 43–75)
NRBC BLD AUTO-RTO: 0 /100 WBCS
PLATELET # BLD AUTO: 165 THOUSANDS/UL (ref 149–390)
PMV BLD AUTO: 8.7 FL (ref 8.9–12.7)
POTASSIUM SERPL-SCNC: 4 MMOL/L (ref 3.5–5.3)
PROT SERPL-MCNC: 7.9 G/DL (ref 6.4–8.2)
RBC # BLD AUTO: 4.78 MILLION/UL (ref 3.81–5.12)
SODIUM SERPL-SCNC: 136 MMOL/L (ref 136–145)
WBC # BLD AUTO: 5.52 THOUSAND/UL (ref 4.31–10.16)

## 2019-12-13 PROCEDURE — 36415 COLL VENOUS BLD VENIPUNCTURE: CPT

## 2019-12-13 PROCEDURE — 85025 COMPLETE CBC W/AUTO DIFF WBC: CPT

## 2019-12-13 PROCEDURE — 80053 COMPREHEN METABOLIC PANEL: CPT

## 2019-12-16 ENCOUNTER — HOSPITAL ENCOUNTER (OUTPATIENT)
Dept: INFUSION CENTER | Facility: HOSPITAL | Age: 54
Discharge: HOME | End: 2019-12-16
Payer: COMMERCIAL

## 2019-12-16 VITALS
HEIGHT: 65 IN | BODY MASS INDEX: 21.74 KG/M2 | TEMPERATURE: 99.2 F | SYSTOLIC BLOOD PRESSURE: 125 MMHG | HEART RATE: 78 BPM | RESPIRATION RATE: 18 BRPM | DIASTOLIC BLOOD PRESSURE: 81 MMHG | WEIGHT: 130.51 LBS

## 2019-12-16 DIAGNOSIS — C78.7 COLON CANCER METASTASIZED TO LIVER (HCC): Primary | ICD-10-CM

## 2019-12-16 DIAGNOSIS — C18.9 COLON CANCER METASTASIZED TO LIVER (HCC): Primary | ICD-10-CM

## 2019-12-16 PROCEDURE — G0498 CHEMO EXTEND IV INFUS W/PUMP: HCPCS

## 2019-12-16 PROCEDURE — 96413 CHEMO IV INFUSION 1 HR: CPT

## 2019-12-16 PROCEDURE — 96367 TX/PROPH/DG ADDL SEQ IV INF: CPT

## 2019-12-16 RX ORDER — SODIUM CHLORIDE 9 MG/ML
20 INJECTION, SOLUTION INTRAVENOUS ONCE AS NEEDED
Status: DISCONTINUED | OUTPATIENT
Start: 2019-12-16 | End: 2019-12-20 | Stop reason: HOSPADM

## 2019-12-16 RX ORDER — DEXTROSE MONOHYDRATE 50 MG/ML
20 INJECTION, SOLUTION INTRAVENOUS ONCE
Status: DISCONTINUED | OUTPATIENT
Start: 2019-12-16 | End: 2019-12-20 | Stop reason: HOSPADM

## 2019-12-16 RX ADMIN — BEVACIZUMAB 292.5 MG: 100 INJECTION, SOLUTION INTRAVENOUS at 10:07

## 2019-12-16 RX ADMIN — LEUCOVORIN CALCIUM 656 MG: 350 INJECTION, POWDER, LYOPHILIZED, FOR SOLUTION INTRAMUSCULAR; INTRAVENOUS at 10:43

## 2019-12-16 RX ADMIN — ONDANSETRON HYDROCHLORIDE: 2 INJECTION, SOLUTION INTRAMUSCULAR; INTRAVENOUS at 09:32

## 2019-12-16 RX ADMIN — SODIUM CHLORIDE 20 ML/HR: 0.9 INJECTION, SOLUTION INTRAVENOUS at 09:33

## 2019-12-16 NOTE — PROGRESS NOTES
Pt tolerated leucovorin, avastin, and has been connected to 5fu cadd pump via port  All connections secured with tape  Pt is familiar with device  Discharged ambulatory deferring avs  Aware of Wednesday appt time

## 2019-12-18 ENCOUNTER — HOSPITAL ENCOUNTER (OUTPATIENT)
Dept: INFUSION CENTER | Facility: HOSPITAL | Age: 54
Discharge: HOME | End: 2019-12-18

## 2019-12-18 DIAGNOSIS — C18.9 COLON CANCER METASTASIZED TO LIVER (HCC): Primary | ICD-10-CM

## 2019-12-18 DIAGNOSIS — C78.7 COLON CANCER METASTASIZED TO LIVER (HCC): Primary | ICD-10-CM

## 2019-12-18 NOTE — PROGRESS NOTES
Pt admitted to infusion department today for cadd pump dc  [place] volume 0 0ml  No adverse symptoms   discharged ambulatory deferring avs

## 2019-12-24 DIAGNOSIS — C78.7 COLON CANCER METASTASIZED TO LIVER (HCC): Primary | ICD-10-CM

## 2019-12-24 DIAGNOSIS — C18.9 COLON CANCER METASTASIZED TO LIVER (HCC): Primary | ICD-10-CM

## 2019-12-27 ENCOUNTER — APPOINTMENT (OUTPATIENT)
Dept: LAB | Age: 54
End: 2019-12-27
Payer: COMMERCIAL

## 2019-12-27 DIAGNOSIS — C78.7 COLON CANCER METASTASIZED TO LIVER (HCC): ICD-10-CM

## 2019-12-27 DIAGNOSIS — C18.9 COLON CANCER METASTASIZED TO LIVER (HCC): ICD-10-CM

## 2019-12-27 LAB
ALBUMIN SERPL BCP-MCNC: 3.5 G/DL (ref 3.5–5)
ALP SERPL-CCNC: 366 U/L (ref 46–116)
ALT SERPL W P-5'-P-CCNC: 127 U/L (ref 12–78)
ANION GAP SERPL CALCULATED.3IONS-SCNC: 7 MMOL/L (ref 4–13)
AST SERPL W P-5'-P-CCNC: 88 U/L (ref 5–45)
BASOPHILS # BLD AUTO: 0.03 THOUSANDS/ΜL (ref 0–0.1)
BASOPHILS NFR BLD AUTO: 1 % (ref 0–1)
BILIRUB SERPL-MCNC: 0.72 MG/DL (ref 0.2–1)
BUN SERPL-MCNC: 17 MG/DL (ref 5–25)
CALCIUM SERPL-MCNC: 9.4 MG/DL (ref 8.3–10.1)
CEA SERPL-MCNC: 30.4 NG/ML (ref 0–3)
CHLORIDE SERPL-SCNC: 109 MMOL/L (ref 100–108)
CO2 SERPL-SCNC: 24 MMOL/L (ref 21–32)
CREAT SERPL-MCNC: 0.87 MG/DL (ref 0.6–1.3)
EOSINOPHIL # BLD AUTO: 0.06 THOUSAND/ΜL (ref 0–0.61)
EOSINOPHIL NFR BLD AUTO: 1 % (ref 0–6)
ERYTHROCYTE [DISTWIDTH] IN BLOOD BY AUTOMATED COUNT: 21.9 % (ref 11.6–15.1)
GFR SERPL CREATININE-BSD FRML MDRD: 76 ML/MIN/1.73SQ M
GLUCOSE P FAST SERPL-MCNC: 101 MG/DL (ref 65–99)
HCT VFR BLD AUTO: 38.3 % (ref 34.8–46.1)
HGB BLD-MCNC: 11.6 G/DL (ref 11.5–15.4)
IMM GRANULOCYTES # BLD AUTO: 0.02 THOUSAND/UL (ref 0–0.2)
IMM GRANULOCYTES NFR BLD AUTO: 0 % (ref 0–2)
LYMPHOCYTES # BLD AUTO: 1.53 THOUSANDS/ΜL (ref 0.6–4.47)
LYMPHOCYTES NFR BLD AUTO: 30 % (ref 14–44)
MCH RBC QN AUTO: 25.7 PG (ref 26.8–34.3)
MCHC RBC AUTO-ENTMCNC: 30.3 G/DL (ref 31.4–37.4)
MCV RBC AUTO: 85 FL (ref 82–98)
MONOCYTES # BLD AUTO: 0.44 THOUSAND/ΜL (ref 0.17–1.22)
MONOCYTES NFR BLD AUTO: 9 % (ref 4–12)
NEUTROPHILS # BLD AUTO: 2.95 THOUSANDS/ΜL (ref 1.85–7.62)
NEUTS SEG NFR BLD AUTO: 59 % (ref 43–75)
NRBC BLD AUTO-RTO: 0 /100 WBCS
PLATELET # BLD AUTO: 137 THOUSANDS/UL (ref 149–390)
PMV BLD AUTO: 8.8 FL (ref 8.9–12.7)
POTASSIUM SERPL-SCNC: 3.9 MMOL/L (ref 3.5–5.3)
PROT SERPL-MCNC: 7.7 G/DL (ref 6.4–8.2)
RBC # BLD AUTO: 4.52 MILLION/UL (ref 3.81–5.12)
SODIUM SERPL-SCNC: 140 MMOL/L (ref 136–145)
WBC # BLD AUTO: 5.03 THOUSAND/UL (ref 4.31–10.16)

## 2019-12-27 PROCEDURE — 85025 COMPLETE CBC W/AUTO DIFF WBC: CPT

## 2019-12-27 PROCEDURE — 36415 COLL VENOUS BLD VENIPUNCTURE: CPT

## 2019-12-27 PROCEDURE — 82378 CARCINOEMBRYONIC ANTIGEN: CPT

## 2019-12-27 PROCEDURE — 80053 COMPREHEN METABOLIC PANEL: CPT

## 2019-12-31 ENCOUNTER — HOSPITAL ENCOUNTER (OUTPATIENT)
Dept: INFUSION CENTER | Facility: HOSPITAL | Age: 54
Discharge: HOME | End: 2019-12-31
Payer: COMMERCIAL

## 2019-12-31 VITALS
WEIGHT: 130.07 LBS | TEMPERATURE: 96.5 F | DIASTOLIC BLOOD PRESSURE: 82 MMHG | RESPIRATION RATE: 16 BRPM | HEART RATE: 87 BPM | HEIGHT: 65 IN | SYSTOLIC BLOOD PRESSURE: 135 MMHG | BODY MASS INDEX: 21.67 KG/M2

## 2019-12-31 DIAGNOSIS — C18.9 COLON CANCER METASTASIZED TO LIVER (HCC): Primary | ICD-10-CM

## 2019-12-31 DIAGNOSIS — C78.7 COLON CANCER METASTASIZED TO LIVER (HCC): Primary | ICD-10-CM

## 2019-12-31 PROCEDURE — G0498 CHEMO EXTEND IV INFUS W/PUMP: HCPCS

## 2019-12-31 PROCEDURE — 96367 TX/PROPH/DG ADDL SEQ IV INF: CPT

## 2019-12-31 PROCEDURE — 96413 CHEMO IV INFUSION 1 HR: CPT

## 2019-12-31 RX ORDER — DEXTROSE MONOHYDRATE 50 MG/ML
20 INJECTION, SOLUTION INTRAVENOUS ONCE
Status: DISCONTINUED | OUTPATIENT
Start: 2019-12-31 | End: 2020-01-04 | Stop reason: HOSPADM

## 2019-12-31 RX ORDER — SODIUM CHLORIDE 9 MG/ML
20 INJECTION, SOLUTION INTRAVENOUS ONCE AS NEEDED
Status: DISCONTINUED | OUTPATIENT
Start: 2019-12-31 | End: 2020-01-04 | Stop reason: HOSPADM

## 2019-12-31 RX ADMIN — DEXAMETHASONE SODIUM PHOSPHATE: 10 INJECTION, SOLUTION INTRAMUSCULAR; INTRAVENOUS at 09:15

## 2019-12-31 RX ADMIN — LEUCOVORIN CALCIUM 656 MG: 350 INJECTION, POWDER, LYOPHILIZED, FOR SOLUTION INTRAMUSCULAR; INTRAVENOUS at 10:29

## 2019-12-31 RX ADMIN — BEVACIZUMAB 292.5 MG: 100 INJECTION, SOLUTION INTRAVENOUS at 09:44

## 2019-12-31 RX ADMIN — SODIUM CHLORIDE 20 ML/HR: 0.9 INJECTION, SOLUTION INTRAVENOUS at 09:11

## 2019-12-31 NOTE — PLAN OF CARE
Problem: Potential for Falls  Goal: Patient will remain free of falls  Description  INTERVENTIONS:  - Assess patient frequently for physical needs  -  Identify cognitive and physical deficits and behaviors that affect risk of falls    -  [place] fall precautions as indicated by assessment   - Educate patient/family on patient safety including physical limitations  - Instruct patient to call for assistance with activity based on assessment  - Modify environment to reduce risk of injury  - Consider OT/PT consult to assist with strengthening/mobility  Outcome: Progressing

## 2019-12-31 NOTE — PROGRESS NOTES
Pt here for avastin/leucovorin/5FU today  Offers no new complaints  Tolerated treatment well without complications  CADD pump connected and aware to return Thur at 0930 for disconnect

## 2020-01-01 ENCOUNTER — HOSPITAL ENCOUNTER (OUTPATIENT)
Dept: INFUSION CENTER | Facility: HOSPITAL | Age: 55
Discharge: HOME | End: 2020-12-14
Attending: INTERNAL MEDICINE
Payer: COMMERCIAL

## 2020-01-01 ENCOUNTER — OFFICE VISIT (OUTPATIENT)
Dept: HEMATOLOGY ONCOLOGY | Facility: CLINIC | Age: 55
End: 2020-01-01
Payer: COMMERCIAL

## 2020-01-01 ENCOUNTER — HOSPITAL ENCOUNTER (OUTPATIENT)
Dept: INFUSION CENTER | Facility: HOSPITAL | Age: 55
Discharge: HOME | End: 2020-12-02
Attending: INTERNAL MEDICINE

## 2020-01-01 ENCOUNTER — HOSPITAL ENCOUNTER (OUTPATIENT)
Dept: INFUSION CENTER | Facility: HOSPITAL | Age: 55
Discharge: HOME | End: 2020-12-30
Attending: INTERNAL MEDICINE

## 2020-01-01 ENCOUNTER — HOSPITAL ENCOUNTER (OUTPATIENT)
Dept: INFUSION CENTER | Facility: HOSPITAL | Age: 55
Discharge: HOME | End: 2020-11-02
Attending: INTERNAL MEDICINE
Payer: COMMERCIAL

## 2020-01-01 ENCOUNTER — HOSPITAL ENCOUNTER (OUTPATIENT)
Dept: INFUSION CENTER | Facility: HOSPITAL | Age: 55
Discharge: HOME | End: 2020-12-16
Attending: INTERNAL MEDICINE

## 2020-01-01 ENCOUNTER — LAB (OUTPATIENT)
Dept: LAB | Age: 55
End: 2020-01-01
Payer: COMMERCIAL

## 2020-01-01 ENCOUNTER — HOSPITAL ENCOUNTER (OUTPATIENT)
Dept: INFUSION CENTER | Facility: HOSPITAL | Age: 55
Discharge: HOME | End: 2020-11-04
Attending: INTERNAL MEDICINE

## 2020-01-01 ENCOUNTER — HOSPITAL ENCOUNTER (OUTPATIENT)
Dept: INFUSION CENTER | Facility: HOSPITAL | Age: 55
Discharge: HOME | End: 2020-11-30
Attending: INTERNAL MEDICINE
Payer: COMMERCIAL

## 2020-01-01 ENCOUNTER — HOSPITAL ENCOUNTER (OUTPATIENT)
Dept: INFUSION CENTER | Facility: HOSPITAL | Age: 55
Discharge: HOME | End: 2020-11-18
Attending: INTERNAL MEDICINE

## 2020-01-01 ENCOUNTER — HOSPITAL ENCOUNTER (OUTPATIENT)
Dept: INFUSION CENTER | Facility: HOSPITAL | Age: 55
Discharge: HOME | End: 2020-11-16
Attending: INTERNAL MEDICINE
Payer: COMMERCIAL

## 2020-01-01 ENCOUNTER — HOSPITAL ENCOUNTER (OUTPATIENT)
Dept: INFUSION CENTER | Facility: HOSPITAL | Age: 55
Discharge: HOME | End: 2020-12-28
Attending: INTERNAL MEDICINE
Payer: COMMERCIAL

## 2020-01-01 VITALS
SYSTOLIC BLOOD PRESSURE: 128 MMHG | HEIGHT: 64 IN | DIASTOLIC BLOOD PRESSURE: 83 MMHG | BODY MASS INDEX: 23.26 KG/M2 | TEMPERATURE: 97.8 F | RESPIRATION RATE: 18 BRPM | HEART RATE: 88 BPM | WEIGHT: 136.24 LBS

## 2020-01-01 VITALS
HEIGHT: 64 IN | RESPIRATION RATE: 18 BRPM | BODY MASS INDEX: 24.01 KG/M2 | HEART RATE: 79 BPM | DIASTOLIC BLOOD PRESSURE: 90 MMHG | WEIGHT: 140.65 LBS | SYSTOLIC BLOOD PRESSURE: 125 MMHG | TEMPERATURE: 97.6 F

## 2020-01-01 VITALS
SYSTOLIC BLOOD PRESSURE: 138 MMHG | OXYGEN SATURATION: 98 % | TEMPERATURE: 97.2 F | BODY MASS INDEX: 23.64 KG/M2 | WEIGHT: 138.45 LBS | RESPIRATION RATE: 18 BRPM | DIASTOLIC BLOOD PRESSURE: 88 MMHG | HEIGHT: 64 IN | HEART RATE: 85 BPM

## 2020-01-01 VITALS
RESPIRATION RATE: 18 BRPM | SYSTOLIC BLOOD PRESSURE: 130 MMHG | WEIGHT: 139.33 LBS | TEMPERATURE: 98 F | BODY MASS INDEX: 23.79 KG/M2 | HEART RATE: 79 BPM | DIASTOLIC BLOOD PRESSURE: 82 MMHG | HEIGHT: 64 IN

## 2020-01-01 VITALS
TEMPERATURE: 97.1 F | RESPIRATION RATE: 18 BRPM | DIASTOLIC BLOOD PRESSURE: 78 MMHG | BODY MASS INDEX: 23.64 KG/M2 | WEIGHT: 138.45 LBS | OXYGEN SATURATION: 99 % | SYSTOLIC BLOOD PRESSURE: 121 MMHG | HEART RATE: 92 BPM | HEIGHT: 64 IN

## 2020-01-01 VITALS
SYSTOLIC BLOOD PRESSURE: 124 MMHG | WEIGHT: 139 LBS | TEMPERATURE: 98.4 F | HEART RATE: 87 BPM | HEIGHT: 64 IN | RESPIRATION RATE: 18 BRPM | DIASTOLIC BLOOD PRESSURE: 64 MMHG | BODY MASS INDEX: 23.73 KG/M2 | OXYGEN SATURATION: 96 %

## 2020-01-01 DIAGNOSIS — C18.9 COLON CANCER METASTASIZED TO LIVER (HCC): Primary | ICD-10-CM

## 2020-01-01 DIAGNOSIS — C78.7 COLON CANCER METASTASIZED TO LIVER (HCC): Primary | ICD-10-CM

## 2020-01-01 DIAGNOSIS — C78.7 COLON CANCER METASTASIZED TO LIVER (HCC): ICD-10-CM

## 2020-01-01 DIAGNOSIS — C18.9 COLON CANCER METASTASIZED TO LIVER (HCC): ICD-10-CM

## 2020-01-01 LAB
ALBUMIN SERPL BCP-MCNC: 3.5 G/DL (ref 3.5–5)
ALBUMIN SERPL BCP-MCNC: 3.5 G/DL (ref 3.5–5)
ALBUMIN SERPL BCP-MCNC: 3.7 G/DL (ref 3–5.2)
ALP SERPL-CCNC: 197 U/L (ref 43–122)
ALP SERPL-CCNC: 235 U/L (ref 46–116)
ALP SERPL-CCNC: 292 U/L (ref 46–116)
ALT SERPL W P-5'-P-CCNC: 35 U/L (ref 9–52)
ALT SERPL W P-5'-P-CCNC: 44 U/L (ref 12–78)
ALT SERPL W P-5'-P-CCNC: 79 U/L (ref 12–78)
ANION GAP SERPL CALCULATED.3IONS-SCNC: 7 MMOL/L (ref 4–13)
ANION GAP SERPL CALCULATED.3IONS-SCNC: 7 MMOL/L (ref 4–13)
ANION GAP SERPL CALCULATED.3IONS-SCNC: 7 MMOL/L (ref 5–14)
AST SERPL W P-5'-P-CCNC: 34 U/L (ref 14–36)
AST SERPL W P-5'-P-CCNC: 36 U/L (ref 5–45)
AST SERPL W P-5'-P-CCNC: 65 U/L (ref 5–45)
BASOPHILS # BLD AUTO: 0.01 THOUSANDS/ΜL (ref 0–0.1)
BASOPHILS # BLD AUTO: 0.02 THOUSANDS/ΜL (ref 0–0.1)
BASOPHILS NFR BLD AUTO: 0 % (ref 0–1)
BASOPHILS NFR BLD AUTO: 0 % (ref 0–1)
BASOPHILS NFR BLD AUTO: 1 % (ref 0–1)
BASOPHILS NFR BLD AUTO: 1 % (ref 0–1)
BILIRUB SERPL-MCNC: 0.67 MG/DL (ref 0.2–1)
BILIRUB SERPL-MCNC: 0.7 MG/DL
BILIRUB SERPL-MCNC: 0.76 MG/DL (ref 0.2–1)
BUN SERPL-MCNC: 10 MG/DL (ref 5–25)
BUN SERPL-MCNC: 13 MG/DL (ref 5–25)
BUN SERPL-MCNC: 14 MG/DL (ref 5–25)
CALCIUM SERPL-MCNC: 8.8 MG/DL (ref 8.4–10.2)
CALCIUM SERPL-MCNC: 9.3 MG/DL (ref 8.3–10.1)
CALCIUM SERPL-MCNC: 9.4 MG/DL (ref 8.3–10.1)
CHLORIDE SERPL-SCNC: 102 MMOL/L (ref 97–108)
CHLORIDE SERPL-SCNC: 106 MMOL/L (ref 100–108)
CHLORIDE SERPL-SCNC: 109 MMOL/L (ref 100–108)
CO2 SERPL-SCNC: 26 MMOL/L (ref 21–32)
CO2 SERPL-SCNC: 28 MMOL/L (ref 21–32)
CO2 SERPL-SCNC: 29 MMOL/L (ref 22–30)
CREAT SERPL-MCNC: 0.69 MG/DL (ref 0.6–1.2)
CREAT SERPL-MCNC: 0.86 MG/DL (ref 0.6–1.3)
CREAT SERPL-MCNC: 0.86 MG/DL (ref 0.6–1.3)
EOSINOPHIL # BLD AUTO: 0.02 THOUSAND/ΜL (ref 0–0.61)
EOSINOPHIL # BLD AUTO: 0.03 THOUSAND/ΜL (ref 0–0.61)
EOSINOPHIL NFR BLD AUTO: 1 % (ref 0–6)
ERYTHROCYTE [DISTWIDTH] IN BLOOD BY AUTOMATED COUNT: 16.5 % (ref 11.6–15.1)
ERYTHROCYTE [DISTWIDTH] IN BLOOD BY AUTOMATED COUNT: 16.5 % (ref 11.6–15.1)
ERYTHROCYTE [DISTWIDTH] IN BLOOD BY AUTOMATED COUNT: 16.6 % (ref 11.6–15.1)
ERYTHROCYTE [DISTWIDTH] IN BLOOD BY AUTOMATED COUNT: 16.8 % (ref 11.6–15.1)
GFR SERPL CREATININE-BSD FRML MDRD: 76 ML/MIN/1.73SQ M
GFR SERPL CREATININE-BSD FRML MDRD: 76 ML/MIN/1.73SQ M
GFR SERPL CREATININE-BSD FRML MDRD: 98 ML/MIN/1.73SQ M
GLUCOSE P FAST SERPL-MCNC: 107 MG/DL (ref 65–99)
GLUCOSE P FAST SERPL-MCNC: 99 MG/DL (ref 65–99)
GLUCOSE SERPL-MCNC: 119 MG/DL (ref 70–99)
HCT VFR BLD AUTO: 36.3 % (ref 34.8–46.1)
HCT VFR BLD AUTO: 38.1 % (ref 34.8–46.1)
HCT VFR BLD AUTO: 38.5 % (ref 34.8–46.1)
HCT VFR BLD AUTO: 38.7 % (ref 34.8–46.1)
HGB BLD-MCNC: 11.6 G/DL (ref 11.5–15.4)
HGB BLD-MCNC: 11.8 G/DL (ref 11.5–15.4)
HGB BLD-MCNC: 11.9 G/DL (ref 11.5–15.4)
HGB BLD-MCNC: 11.9 G/DL (ref 11.5–15.4)
IMM GRANULOCYTES # BLD AUTO: 0 THOUSAND/UL (ref 0–0.2)
IMM GRANULOCYTES # BLD AUTO: 0.01 THOUSAND/UL (ref 0–0.2)
IMM GRANULOCYTES NFR BLD AUTO: 0 % (ref 0–2)
LYMPHOCYTES # BLD AUTO: 1.22 THOUSANDS/ΜL (ref 0.6–4.47)
LYMPHOCYTES # BLD AUTO: 1.35 THOUSANDS/ΜL (ref 0.6–4.47)
LYMPHOCYTES # BLD AUTO: 1.38 THOUSANDS/ΜL (ref 0.6–4.47)
LYMPHOCYTES # BLD AUTO: 1.65 THOUSANDS/ΜL (ref 0.6–4.47)
LYMPHOCYTES NFR BLD AUTO: 33 % (ref 14–44)
LYMPHOCYTES NFR BLD AUTO: 36 % (ref 14–44)
LYMPHOCYTES NFR BLD AUTO: 38 % (ref 14–44)
LYMPHOCYTES NFR BLD AUTO: 39 % (ref 14–44)
MCH RBC QN AUTO: 28.6 PG (ref 26.8–34.3)
MCH RBC QN AUTO: 28.9 PG (ref 26.8–34.3)
MCH RBC QN AUTO: 29.3 PG (ref 26.8–34.3)
MCH RBC QN AUTO: 29.6 PG (ref 26.8–34.3)
MCHC RBC AUTO-ENTMCNC: 30.6 G/DL (ref 31.4–37.4)
MCHC RBC AUTO-ENTMCNC: 30.7 G/DL (ref 31.4–37.4)
MCHC RBC AUTO-ENTMCNC: 31.2 G/DL (ref 31.4–37.4)
MCHC RBC AUTO-ENTMCNC: 32 G/DL (ref 31.4–37.4)
MCV RBC AUTO: 93 FL (ref 82–98)
MCV RBC AUTO: 93 FL (ref 82–98)
MCV RBC AUTO: 94 FL (ref 82–98)
MCV RBC AUTO: 94 FL (ref 82–98)
MONOCYTES # BLD AUTO: 0.25 THOUSAND/ΜL (ref 0.17–1.22)
MONOCYTES # BLD AUTO: 0.26 THOUSAND/ΜL (ref 0.17–1.22)
MONOCYTES # BLD AUTO: 0.34 THOUSAND/ΜL (ref 0.17–1.22)
MONOCYTES # BLD AUTO: 0.43 THOUSAND/ΜL (ref 0.17–1.22)
MONOCYTES NFR BLD AUTO: 10 % (ref 4–12)
MONOCYTES NFR BLD AUTO: 7 % (ref 4–12)
MONOCYTES NFR BLD AUTO: 7 % (ref 4–12)
MONOCYTES NFR BLD AUTO: 9 % (ref 4–12)
NEUTROPHILS # BLD AUTO: 1.8 THOUSANDS/ΜL (ref 1.85–7.62)
NEUTROPHILS # BLD AUTO: 1.83 THOUSANDS/ΜL (ref 1.85–7.62)
NEUTROPHILS # BLD AUTO: 2.17 THOUSANDS/ΜL (ref 1.85–7.62)
NEUTROPHILS # BLD AUTO: 2.49 THOUSANDS/ΜL (ref 1.85–7.62)
NEUTS SEG NFR BLD AUTO: 51 % (ref 43–75)
NEUTS SEG NFR BLD AUTO: 52 % (ref 43–75)
NEUTS SEG NFR BLD AUTO: 54 % (ref 43–75)
NEUTS SEG NFR BLD AUTO: 58 % (ref 43–75)
NRBC BLD AUTO-RTO: 0 /100 WBCS
PLATELET # BLD AUTO: 175 THOUSANDS/UL (ref 149–390)
PLATELET # BLD AUTO: 183 THOUSANDS/UL (ref 149–390)
PLATELET # BLD AUTO: 186 THOUSANDS/UL (ref 149–390)
PLATELET # BLD AUTO: 223 THOUSANDS/UL (ref 149–390)
PMV BLD AUTO: 10 FL (ref 8.9–12.7)
PMV BLD AUTO: 10.1 FL (ref 8.9–12.7)
PMV BLD AUTO: 9.6 FL (ref 8.9–12.7)
PMV BLD AUTO: 9.8 FL (ref 8.9–12.7)
POTASSIUM SERPL-SCNC: 3.1 MMOL/L (ref 3.6–5)
POTASSIUM SERPL-SCNC: 3.2 MMOL/L (ref 3.5–5.3)
POTASSIUM SERPL-SCNC: 3.2 MMOL/L (ref 3.5–5.3)
PROT SERPL-MCNC: 6.8 G/DL (ref 5.9–8.4)
PROT SERPL-MCNC: 7.2 G/DL (ref 6.4–8.2)
PROT SERPL-MCNC: 7.4 G/DL (ref 6.4–8.2)
RBC # BLD AUTO: 3.92 MILLION/UL (ref 3.81–5.12)
RBC # BLD AUTO: 4.06 MILLION/UL (ref 3.81–5.12)
RBC # BLD AUTO: 4.12 MILLION/UL (ref 3.81–5.12)
RBC # BLD AUTO: 4.13 MILLION/UL (ref 3.81–5.12)
SODIUM SERPL-SCNC: 138 MMOL/L (ref 137–147)
SODIUM SERPL-SCNC: 141 MMOL/L (ref 136–145)
SODIUM SERPL-SCNC: 142 MMOL/L (ref 136–145)
WBC # BLD AUTO: 3.45 THOUSAND/UL (ref 4.31–10.16)
WBC # BLD AUTO: 3.59 THOUSAND/UL (ref 4.31–10.16)
WBC # BLD AUTO: 3.7 THOUSAND/UL (ref 4.31–10.16)
WBC # BLD AUTO: 4.62 THOUSAND/UL (ref 4.31–10.16)

## 2020-01-01 PROCEDURE — 96413 CHEMO IV INFUSION 1 HR: CPT

## 2020-01-01 PROCEDURE — 96368 THER/DIAG CONCURRENT INF: CPT

## 2020-01-01 PROCEDURE — 96367 TX/PROPH/DG ADDL SEQ IV INF: CPT

## 2020-01-01 PROCEDURE — 96417 CHEMO IV INFUS EACH ADDL SEQ: CPT

## 2020-01-01 PROCEDURE — 85025 COMPLETE CBC W/AUTO DIFF WBC: CPT

## 2020-01-01 PROCEDURE — 96375 TX/PRO/DX INJ NEW DRUG ADDON: CPT

## 2020-01-01 PROCEDURE — 36415 COLL VENOUS BLD VENIPUNCTURE: CPT

## 2020-01-01 PROCEDURE — 80053 COMPREHEN METABOLIC PANEL: CPT | Performed by: INTERNAL MEDICINE

## 2020-01-01 PROCEDURE — G0498 CHEMO EXTEND IV INFUS W/PUMP: HCPCS

## 2020-01-01 PROCEDURE — 96415 CHEMO IV INFUSION ADDL HR: CPT

## 2020-01-01 PROCEDURE — 99214 OFFICE O/P EST MOD 30 MIN: CPT | Performed by: INTERNAL MEDICINE

## 2020-01-01 PROCEDURE — 80053 COMPREHEN METABOLIC PANEL: CPT

## 2020-01-01 RX ORDER — SODIUM CHLORIDE 9 MG/ML
20 INJECTION, SOLUTION INTRAVENOUS ONCE
Status: COMPLETED | OUTPATIENT
Start: 2020-01-01 | End: 2020-01-01

## 2020-01-01 RX ORDER — ATROPINE SULFATE 1 MG/ML
0.25 INJECTION, SOLUTION INTRAMUSCULAR; INTRAVENOUS; SUBCUTANEOUS ONCE AS NEEDED
Status: DISCONTINUED | OUTPATIENT
Start: 2020-01-01 | End: 2020-01-01 | Stop reason: HOSPADM

## 2020-01-01 RX ORDER — ATROPINE SULFATE 1 MG/ML
0.25 INJECTION, SOLUTION INTRAMUSCULAR; INTRAVENOUS; SUBCUTANEOUS ONCE
Status: CANCELLED | OUTPATIENT
Start: 2020-01-01

## 2020-01-01 RX ORDER — ATROPINE SULFATE 1 MG/ML
0.25 INJECTION, SOLUTION INTRAMUSCULAR; INTRAVENOUS; SUBCUTANEOUS ONCE
Status: COMPLETED | OUTPATIENT
Start: 2020-01-01 | End: 2020-01-01

## 2020-01-01 RX ORDER — SODIUM CHLORIDE 9 MG/ML
20 INJECTION, SOLUTION INTRAVENOUS ONCE
Status: CANCELLED | OUTPATIENT
Start: 2021-01-01

## 2020-01-01 RX ORDER — ATROPINE SULFATE 1 MG/ML
0.25 INJECTION, SOLUTION INTRAMUSCULAR; INTRAVENOUS; SUBCUTANEOUS ONCE
Status: CANCELLED | OUTPATIENT
Start: 2021-01-01

## 2020-01-01 RX ORDER — ATROPINE SULFATE 1 MG/ML
0.25 INJECTION, SOLUTION INTRAMUSCULAR; INTRAVENOUS; SUBCUTANEOUS ONCE AS NEEDED
Status: CANCELLED | OUTPATIENT
Start: 2021-01-01

## 2020-01-01 RX ORDER — SODIUM CHLORIDE 9 MG/ML
20 INJECTION, SOLUTION INTRAVENOUS ONCE
Status: CANCELLED | OUTPATIENT
Start: 2020-01-01

## 2020-01-01 RX ORDER — ATROPINE SULFATE 1 MG/ML
0.25 INJECTION, SOLUTION INTRAMUSCULAR; INTRAVENOUS; SUBCUTANEOUS ONCE AS NEEDED
Status: DISCONTINUED | OUTPATIENT
Start: 2020-01-01 | End: 2021-01-01 | Stop reason: HOSPADM

## 2020-01-01 RX ORDER — ATROPINE SULFATE 1 MG/ML
0.25 INJECTION, SOLUTION INTRAMUSCULAR; INTRAVENOUS; SUBCUTANEOUS ONCE AS NEEDED
Status: CANCELLED | OUTPATIENT
Start: 2020-01-01

## 2020-01-01 RX ADMIN — ATROPINE SULFATE 0.25 MG: 1 INJECTION, SOLUTION INTRAMUSCULAR; INTRAVENOUS; SUBCUTANEOUS at 11:38

## 2020-01-01 RX ADMIN — SODIUM CHLORIDE 300 MG: 0.9 INJECTION, SOLUTION INTRAVENOUS at 11:36

## 2020-01-01 RX ADMIN — BEVACIZUMAB 320 MG: 400 INJECTION, SOLUTION INTRAVENOUS at 10:53

## 2020-01-01 RX ADMIN — IRINOTECAN HYDROCHLORIDE 300 MG: 20 INJECTION INTRAVENOUS at 11:45

## 2020-01-01 RX ADMIN — ATROPINE SULFATE 0.25 MG: 1 INJECTION, SOLUTION INTRAMUSCULAR; INTRAVENOUS; SUBCUTANEOUS at 11:23

## 2020-01-01 RX ADMIN — LEUCOVORIN CALCIUM 684 MG: 350 INJECTION, POWDER, LYOPHILIZED, FOR SOLUTION INTRAMUSCULAR; INTRAVENOUS at 11:28

## 2020-01-01 RX ADMIN — BEVACIZUMAB 320 MG: 400 INJECTION, SOLUTION INTRAVENOUS at 10:43

## 2020-01-01 RX ADMIN — LEUCOVORIN CALCIUM 684 MG: 200 INJECTION, POWDER, LYOPHILIZED, FOR SOLUTION INTRAMUSCULAR; INTRAVENOUS at 11:48

## 2020-01-01 RX ADMIN — ATROPINE SULFATE 0.25 MG: 1 INJECTION, SOLUTION INTRAMUSCULAR; INTRAVENOUS; SUBCUTANEOUS at 11:28

## 2020-01-01 RX ADMIN — ONDANSETRON HYDROCHLORIDE: 2 INJECTION, SOLUTION INTRAMUSCULAR; INTRAVENOUS at 10:02

## 2020-01-01 RX ADMIN — ATROPINE SULFATE 0.25 MG: 1 INJECTION, SOLUTION INTRAMUSCULAR; INTRAVENOUS; SUBCUTANEOUS at 11:37

## 2020-01-01 RX ADMIN — SODIUM CHLORIDE 20 ML/HR: 0.9 INJECTION, SOLUTION INTRAVENOUS at 10:13

## 2020-01-01 RX ADMIN — ATROPINE SULFATE 0.25 MG: 1 INJECTION, SOLUTION INTRAMUSCULAR; INTRAVENOUS; SUBCUTANEOUS at 11:39

## 2020-01-01 RX ADMIN — SODIUM CHLORIDE 20 ML/HR: 0.9 INJECTION, SOLUTION INTRAVENOUS at 09:55

## 2020-01-01 RX ADMIN — ATROPINE SULFATE 0.25 MG: 1 INJECTION, SOLUTION INTRAMUSCULAR; INTRAVENOUS; SUBCUTANEOUS at 11:20

## 2020-01-01 RX ADMIN — ATROPINE SULFATE 0.25 MG: 1 INJECTION, SOLUTION INTRAMUSCULAR; INTRAVENOUS; SUBCUTANEOUS at 11:47

## 2020-01-01 RX ADMIN — SODIUM CHLORIDE 20 ML/HR: 0.9 INJECTION, SOLUTION INTRAVENOUS at 09:45

## 2020-01-01 RX ADMIN — ONDANSETRON HYDROCHLORIDE: 2 INJECTION, SOLUTION INTRAMUSCULAR; INTRAVENOUS at 10:10

## 2020-01-01 RX ADMIN — DEXAMETHASONE SODIUM PHOSPHATE: 10 INJECTION, SOLUTION INTRAMUSCULAR; INTRAVENOUS at 09:37

## 2020-01-01 RX ADMIN — LEUCOVORIN CALCIUM 684 MG: 350 INJECTION, POWDER, LYOPHILIZED, FOR SOLUTION INTRAMUSCULAR; INTRAVENOUS at 11:44

## 2020-01-01 RX ADMIN — ONDANSETRON HYDROCHLORIDE: 2 INJECTION, SOLUTION INTRAMUSCULAR; INTRAVENOUS at 10:04

## 2020-01-01 RX ADMIN — ATROPINE SULFATE 0.25 MG: 1 INJECTION, SOLUTION INTRAMUSCULAR; INTRAVENOUS; SUBCUTANEOUS at 11:43

## 2020-01-01 RX ADMIN — SODIUM CHLORIDE 20 ML/HR: 0.9 INJECTION, SOLUTION INTRAVENOUS at 09:35

## 2020-01-01 RX ADMIN — ATROPINE SULFATE 0.25 MG: 1 INJECTION, SOLUTION INTRAMUSCULAR; INTRAVENOUS; SUBCUTANEOUS at 12:03

## 2020-01-01 RX ADMIN — LEUCOVORIN CALCIUM 684 MG: 350 INJECTION, POWDER, LYOPHILIZED, FOR SOLUTION INTRAMUSCULAR; INTRAVENOUS at 11:36

## 2020-01-01 RX ADMIN — SODIUM CHLORIDE 300 MG: 0.9 INJECTION, SOLUTION INTRAVENOUS at 11:28

## 2020-01-01 RX ADMIN — SODIUM CHLORIDE 20 ML/HR: 0.9 INJECTION, SOLUTION INTRAVENOUS at 10:09

## 2020-01-01 RX ADMIN — SODIUM CHLORIDE 300 MG: 0.9 INJECTION, SOLUTION INTRAVENOUS at 11:44

## 2020-01-01 RX ADMIN — BEVACIZUMAB 320 MG: 100 INJECTION, SOLUTION INTRAVENOUS at 10:46

## 2020-01-01 RX ADMIN — ATROPINE SULFATE 0.25 MG: 1 INJECTION, SOLUTION INTRAMUSCULAR; INTRAVENOUS; SUBCUTANEOUS at 11:59

## 2020-01-01 RX ADMIN — BEVACIZUMAB 320 MG: 400 INJECTION, SOLUTION INTRAVENOUS at 10:37

## 2020-01-01 RX ADMIN — BEVACIZUMAB 320 MG: 400 INJECTION, SOLUTION INTRAVENOUS at 10:33

## 2020-01-01 RX ADMIN — ONDANSETRON HYDROCHLORIDE: 2 INJECTION, SOLUTION INTRAMUSCULAR; INTRAVENOUS at 10:13

## 2020-01-01 RX ADMIN — LEUCOVORIN CALCIUM 684 MG: 200 INJECTION, POWDER, LYOPHILIZED, FOR SOLUTION INTRAMUSCULAR; INTRAVENOUS at 11:36

## 2020-01-02 ENCOUNTER — HOSPITAL ENCOUNTER (OUTPATIENT)
Dept: INFUSION CENTER | Facility: HOSPITAL | Age: 55
Discharge: HOME | End: 2020-01-02

## 2020-01-02 DIAGNOSIS — C18.9 COLON CANCER METASTASIZED TO LIVER (HCC): Primary | ICD-10-CM

## 2020-01-02 DIAGNOSIS — C78.7 COLON CANCER METASTASIZED TO LIVER (HCC): Primary | ICD-10-CM

## 2020-01-02 NOTE — PLAN OF CARE
Problem: Potential for Falls  Goal: Patient will remain free of falls  Description  INTERVENTIONS:  - Assess patient frequently for physical needs  -  Identify cognitive and physical deficits and behaviors that affect risk of falls  -  [place] fall precautions as indicated by assessment   - Educate patient/family on patient safety including physical limitations  - Instruct patient to call for assistance with activity based on assessment  - Modify environment to reduce risk of injury  - Consider OT/PT consult to assist with strengthening/mobility  Outcome: Progressing     Problem: Knowledge Deficit  Goal: Patient/family/caregiver demonstrates understanding of disease process, treatment plan, medications, and discharge instructions  Description  Complete learning assessment and assess knowledge base    Interventions:  - Provide teaching at level of understanding  - Provide teaching via preferred learning methods  Outcome: Progressing

## 2020-01-02 NOTE — PROGRESS NOTES
Pt tolerated 46 hours worth of 5fu without adverse reaction noted  Port flushed and deacessed per routine   Discharged ambulatory deferring avs

## 2020-01-06 DIAGNOSIS — C18.9 COLON CANCER METASTASIZED TO LIVER (HCC): Primary | ICD-10-CM

## 2020-01-06 DIAGNOSIS — C78.7 COLON CANCER METASTASIZED TO LIVER (HCC): Primary | ICD-10-CM

## 2020-01-08 ENCOUNTER — OFFICE VISIT (OUTPATIENT)
Dept: FAMILY MEDICINE CLINIC | Facility: CLINIC | Age: 55
End: 2020-01-08
Payer: COMMERCIAL

## 2020-01-08 VITALS
BODY MASS INDEX: 22.06 KG/M2 | DIASTOLIC BLOOD PRESSURE: 76 MMHG | SYSTOLIC BLOOD PRESSURE: 106 MMHG | WEIGHT: 132.4 LBS | HEIGHT: 65 IN

## 2020-01-08 DIAGNOSIS — C78.7 COLON CANCER METASTASIZED TO LIVER (HCC): Primary | ICD-10-CM

## 2020-01-08 DIAGNOSIS — M06.041 RHEUMATOID ARTHRITIS INVOLVING BOTH HANDS WITH NEGATIVE RHEUMATOID FACTOR (HCC): ICD-10-CM

## 2020-01-08 DIAGNOSIS — M06.042 RHEUMATOID ARTHRITIS INVOLVING BOTH HANDS WITH NEGATIVE RHEUMATOID FACTOR (HCC): ICD-10-CM

## 2020-01-08 DIAGNOSIS — C18.9 COLON CANCER METASTASIZED TO LIVER (HCC): Primary | ICD-10-CM

## 2020-01-08 DIAGNOSIS — E53.8 B12 DEFICIENCY: ICD-10-CM

## 2020-01-08 DIAGNOSIS — G62.2 POLYNEUROPATHY DUE TO OTHER TOXIC AGENTS (HCC): ICD-10-CM

## 2020-01-08 PROCEDURE — 99214 OFFICE O/P EST MOD 30 MIN: CPT | Performed by: FAMILY MEDICINE

## 2020-01-09 PROBLEM — G62.2 POLYNEUROPATHY DUE TO OTHER TOXIC AGENTS (HCC): Status: ACTIVE | Noted: 2020-01-09

## 2020-01-09 NOTE — PROGRESS NOTES
Assessment/Plan:    71-year-old woman with: metastatic colon cancer to the liver currently on chemo, rheumatoid arthritis, B12 deficiency and peripheral neuropathy  Discussed supportive care return parameters  Encouraged close follow-up with Oncology  Continue current medications  Offered trial of a neuropathic medication such as gabapentin to help her neuropathy and she declines at this time being but advised to call back if she changes her mind  Discussed additional workup and treatment options patient plans to call back if she has other questions or concerns otherwise follow-up in 6 months  No problem-specific Assessment & Plan notes found for this encounter  Diagnoses and all orders for this visit:    Colon cancer metastasized to liver [facility])    Rheumatoid arthritis involving both hands with negative rheumatoid factor ([facility] [address] )    B12 deficiency    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 [facility])          Subjective:     Chief Complaint   Patient presents with    Well Check     annual well check  Pt may be due for routine labwork  pt due for penumo vaccine   Follow-up     Reg F/U IFG, anxiety, fatigue, hyperlipids, anemia  Patient ID: Dipesh Madrid is a 47 y o  female  Patient is a 71-year-old woman who presents for follow-up on metastatic colon cancer to the liver currently on chemo, rheumatoid arthritis, B12 deficiency and peripheral neuropathy  She admits being generally stable on her current regimen and continues to follow closely with Oncology  No fevers chills nausea vomiting  Tolerating p o  intake  No other complaints at this time reason neuropathy and she is trying topical switch provide slightly      The following portions of the patient's history were reviewed and updated as appropriate: allergies, current medications, past family history, past medical history, past social history, past surgical history and problem list     Review of Systems   Constitutional: Negative  HENT: Negative  Eyes: Negative  Respiratory: Negative  Cardiovascular: Negative  Gastrointestinal: Negative  Endocrine: Negative  Genitourinary: Negative  Musculoskeletal: Negative  Allergic/Immunologic: Negative  Neurological: Negative  Hematological: Negative  Psychiatric/Behavioral: Negative  All other systems reviewed and are negative  Objective:      /76 (BP Location: Right arm, Patient Position: Sitting, Cuff Size: Standard)   Ht 5' 5" (1 651 m)   Wt 60 1 kg (132 lb 6 4 oz)   LMP 09/28/2018 (Exact Date)   BMI 22 03 kg/m²          Physical Exam   Constitutional: She is oriented to person, place, and time  She appears well-developed and well-nourished  HENT:   Head: Atraumatic  Right Ear: External ear normal    Left Ear: External ear normal    Eyes: Pupils are equal, round, and reactive to light  Conjunctivae and EOM are normal    Neck: Normal range of motion  Cardiovascular: Normal rate, regular rhythm and normal heart sounds  Pulmonary/Chest: Effort normal and breath sounds normal  No respiratory distress  Abdominal: Soft  She exhibits no distension  There is no tenderness  There is no rebound and no guarding  Musculoskeletal: Normal range of motion  Neurological: She is alert and oriented to person, place, and time  No cranial nerve deficit  Skin: Skin is warm and dry  Psychiatric: She has a normal mood and affect   Her behavior is normal  Judgment and thought content normal

## 2020-01-10 ENCOUNTER — APPOINTMENT (OUTPATIENT)
Dept: LAB | Age: 55
End: 2020-01-10
Payer: COMMERCIAL

## 2020-01-10 DIAGNOSIS — C18.9 COLON CANCER METASTASIZED TO LIVER (HCC): ICD-10-CM

## 2020-01-10 DIAGNOSIS — C78.7 COLON CANCER METASTASIZED TO LIVER (HCC): ICD-10-CM

## 2020-01-10 LAB
ALBUMIN SERPL BCP-MCNC: 3.5 G/DL (ref 3.5–5)
ALP SERPL-CCNC: 384 U/L (ref 46–116)
ALT SERPL W P-5'-P-CCNC: 97 U/L (ref 12–78)
ANION GAP SERPL CALCULATED.3IONS-SCNC: 8 MMOL/L (ref 4–13)
AST SERPL W P-5'-P-CCNC: 66 U/L (ref 5–45)
BASOPHILS # BLD AUTO: 0.03 THOUSANDS/ΜL (ref 0–0.1)
BASOPHILS NFR BLD AUTO: 1 % (ref 0–1)
BILIRUB SERPL-MCNC: 0.6 MG/DL (ref 0.2–1)
BUN SERPL-MCNC: 16 MG/DL (ref 5–25)
CALCIUM SERPL-MCNC: 9.2 MG/DL (ref 8.3–10.1)
CHLORIDE SERPL-SCNC: 107 MMOL/L (ref 100–108)
CO2 SERPL-SCNC: 25 MMOL/L (ref 21–32)
CREAT SERPL-MCNC: 0.8 MG/DL (ref 0.6–1.3)
EOSINOPHIL # BLD AUTO: 0.05 THOUSAND/ΜL (ref 0–0.61)
EOSINOPHIL NFR BLD AUTO: 1 % (ref 0–6)
ERYTHROCYTE [DISTWIDTH] IN BLOOD BY AUTOMATED COUNT: 21.2 % (ref 11.6–15.1)
GFR SERPL CREATININE-BSD FRML MDRD: 84 ML/MIN/1.73SQ M
GLUCOSE P FAST SERPL-MCNC: 96 MG/DL (ref 65–99)
HCT VFR BLD AUTO: 37.8 % (ref 34.8–46.1)
HGB BLD-MCNC: 11.9 G/DL (ref 11.5–15.4)
IMM GRANULOCYTES # BLD AUTO: 0.02 THOUSAND/UL (ref 0–0.2)
IMM GRANULOCYTES NFR BLD AUTO: 0 % (ref 0–2)
LYMPHOCYTES # BLD AUTO: 1.66 THOUSANDS/ΜL (ref 0.6–4.47)
LYMPHOCYTES NFR BLD AUTO: 30 % (ref 14–44)
MCH RBC QN AUTO: 27.2 PG (ref 26.8–34.3)
MCHC RBC AUTO-ENTMCNC: 31.5 G/DL (ref 31.4–37.4)
MCV RBC AUTO: 87 FL (ref 82–98)
MONOCYTES # BLD AUTO: 0.42 THOUSAND/ΜL (ref 0.17–1.22)
MONOCYTES NFR BLD AUTO: 8 % (ref 4–12)
NEUTROPHILS # BLD AUTO: 3.3 THOUSANDS/ΜL (ref 1.85–7.62)
NEUTS SEG NFR BLD AUTO: 60 % (ref 43–75)
NRBC BLD AUTO-RTO: 0 /100 WBCS
PLATELET # BLD AUTO: 159 THOUSANDS/UL (ref 149–390)
PMV BLD AUTO: 8.8 FL (ref 8.9–12.7)
POTASSIUM SERPL-SCNC: 4 MMOL/L (ref 3.5–5.3)
PROT SERPL-MCNC: 7.7 G/DL (ref 6.4–8.2)
RBC # BLD AUTO: 4.37 MILLION/UL (ref 3.81–5.12)
SODIUM SERPL-SCNC: 140 MMOL/L (ref 136–145)
WBC # BLD AUTO: 5.48 THOUSAND/UL (ref 4.31–10.16)

## 2020-01-10 PROCEDURE — 80053 COMPREHEN METABOLIC PANEL: CPT

## 2020-01-10 PROCEDURE — 85025 COMPLETE CBC W/AUTO DIFF WBC: CPT

## 2020-01-10 PROCEDURE — 36415 COLL VENOUS BLD VENIPUNCTURE: CPT

## 2020-01-13 ENCOUNTER — HOSPITAL ENCOUNTER (OUTPATIENT)
Dept: INFUSION CENTER | Facility: HOSPITAL | Age: 55
Discharge: HOME | End: 2020-01-13
Payer: COMMERCIAL

## 2020-01-13 VITALS
BODY MASS INDEX: 21.67 KG/M2 | HEIGHT: 65 IN | TEMPERATURE: 98.7 F | WEIGHT: 130.07 LBS | DIASTOLIC BLOOD PRESSURE: 72 MMHG | SYSTOLIC BLOOD PRESSURE: 122 MMHG | HEART RATE: 81 BPM | RESPIRATION RATE: 16 BRPM

## 2020-01-13 DIAGNOSIS — C78.7 COLON CANCER METASTASIZED TO LIVER (HCC): Primary | ICD-10-CM

## 2020-01-13 DIAGNOSIS — C18.9 COLON CANCER METASTASIZED TO LIVER (HCC): Primary | ICD-10-CM

## 2020-01-13 PROCEDURE — G0498 CHEMO EXTEND IV INFUS W/PUMP: HCPCS

## 2020-01-13 PROCEDURE — 96413 CHEMO IV INFUSION 1 HR: CPT

## 2020-01-13 PROCEDURE — 96367 TX/PROPH/DG ADDL SEQ IV INF: CPT

## 2020-01-13 RX ORDER — SODIUM CHLORIDE 9 MG/ML
20 INJECTION, SOLUTION INTRAVENOUS ONCE AS NEEDED
Status: DISCONTINUED | OUTPATIENT
Start: 2020-01-13 | End: 2020-01-17 | Stop reason: HOSPADM

## 2020-01-13 RX ORDER — DEXTROSE MONOHYDRATE 50 MG/ML
20 INJECTION, SOLUTION INTRAVENOUS ONCE
Status: DISCONTINUED | OUTPATIENT
Start: 2020-01-13 | End: 2020-01-17 | Stop reason: HOSPADM

## 2020-01-13 RX ADMIN — BEVACIZUMAB 292.5 MG: 100 INJECTION, SOLUTION INTRAVENOUS at 09:49

## 2020-01-13 RX ADMIN — ONDANSETRON HYDROCHLORIDE: 2 INJECTION, SOLUTION INTRAMUSCULAR; INTRAVENOUS at 09:22

## 2020-01-13 RX ADMIN — SODIUM CHLORIDE 20 ML/HR: 0.9 INJECTION, SOLUTION INTRAVENOUS at 09:22

## 2020-01-13 RX ADMIN — LEUCOVORIN CALCIUM 656 MG: 350 INJECTION, POWDER, LYOPHILIZED, FOR SOLUTION INTRAMUSCULAR; INTRAVENOUS at 10:30

## 2020-01-13 NOTE — PLAN OF CARE
Problem: Potential for Falls  Goal: Patient will remain free of falls  Description  INTERVENTIONS:  - Assess patient frequently for physical needs  -  Identify cognitive and physical deficits and behaviors that affect risk of falls  -  [place] fall precautions as indicated by assessment   - Educate patient/family on patient safety including physical limitations  - Instruct patient to call for assistance with activity based on assessment  - Modify environment to reduce risk of injury  - Consider OT/PT consult to assist with strengthening/mobility  Outcome: Progressing     Problem: Knowledge Deficit  Goal: Patient/family/caregiver demonstrates understanding of disease process, treatment plan, medications, and discharge instructions  Description  Complete learning assessment and assess knowledge base    Interventions:  - Provide teaching at level of understanding  - Provide teaching via preferred learning methods  Outcome: Progressing

## 2020-01-13 NOTE — PROGRESS NOTES
Pt admitted to infusion department today for avastin, leucovorin, and 5fu cadd pump  Tolerated medications well  5fu cadd attached via port  All connections secured with tape  Pt is familiar with device   Discharged ambulatory deferring avs

## 2020-01-15 ENCOUNTER — HOSPITAL ENCOUNTER (OUTPATIENT)
Dept: INFUSION CENTER | Facility: HOSPITAL | Age: 55
Discharge: HOME | End: 2020-01-15

## 2020-01-15 DIAGNOSIS — C18.9 COLON CANCER METASTASIZED TO LIVER (HCC): Primary | ICD-10-CM

## 2020-01-15 DIAGNOSIS — C78.7 COLON CANCER METASTASIZED TO LIVER (HCC): Primary | ICD-10-CM

## 2020-01-15 NOTE — PROGRESS NOTES
Pt tolerated 5fu cadd medication well  No adverse reactions noted  Reservoir volume 0 0ml  Port flushed and deaccessed per routine   Discharged ambulatory deferring avs

## 2020-01-21 DIAGNOSIS — C18.9 COLON CANCER METASTASIZED TO LIVER (HCC): Primary | ICD-10-CM

## 2020-01-21 DIAGNOSIS — C78.7 COLON CANCER METASTASIZED TO LIVER (HCC): Primary | ICD-10-CM

## 2020-01-24 ENCOUNTER — OFFICE VISIT (OUTPATIENT)
Dept: HEMATOLOGY ONCOLOGY | Facility: CLINIC | Age: 55
End: 2020-01-24
Payer: COMMERCIAL

## 2020-01-24 ENCOUNTER — APPOINTMENT (OUTPATIENT)
Dept: LAB | Age: 55
End: 2020-01-24
Payer: COMMERCIAL

## 2020-01-24 VITALS
BODY MASS INDEX: 22.33 KG/M2 | DIASTOLIC BLOOD PRESSURE: 70 MMHG | WEIGHT: 134 LBS | TEMPERATURE: 97 F | HEIGHT: 65 IN | RESPIRATION RATE: 18 BRPM | SYSTOLIC BLOOD PRESSURE: 122 MMHG

## 2020-01-24 DIAGNOSIS — C18.9 COLON CANCER METASTASIZED TO LIVER (HCC): ICD-10-CM

## 2020-01-24 DIAGNOSIS — C18.9 COLON CANCER METASTASIZED TO LIVER (HCC): Primary | ICD-10-CM

## 2020-01-24 DIAGNOSIS — C78.7 COLON CANCER METASTASIZED TO LIVER (HCC): Primary | ICD-10-CM

## 2020-01-24 DIAGNOSIS — C78.7 COLON CANCER METASTASIZED TO LIVER (HCC): ICD-10-CM

## 2020-01-24 LAB
ALBUMIN SERPL BCP-MCNC: 3.9 G/DL (ref 3.5–5)
ALP SERPL-CCNC: 325 U/L (ref 46–116)
ALT SERPL W P-5'-P-CCNC: 151 U/L (ref 12–78)
ANION GAP SERPL CALCULATED.3IONS-SCNC: 5 MMOL/L (ref 4–13)
AST SERPL W P-5'-P-CCNC: 74 U/L (ref 5–45)
BASOPHILS # BLD AUTO: 0.04 THOUSANDS/ΜL (ref 0–0.1)
BASOPHILS NFR BLD AUTO: 1 % (ref 0–1)
BILIRUB SERPL-MCNC: 1.22 MG/DL (ref 0.2–1)
BUN SERPL-MCNC: 17 MG/DL (ref 5–25)
CALCIUM SERPL-MCNC: 9.8 MG/DL (ref 8.3–10.1)
CHLORIDE SERPL-SCNC: 106 MMOL/L (ref 100–108)
CO2 SERPL-SCNC: 28 MMOL/L (ref 21–32)
CREAT SERPL-MCNC: 0.84 MG/DL (ref 0.6–1.3)
EOSINOPHIL # BLD AUTO: 0.04 THOUSAND/ΜL (ref 0–0.61)
EOSINOPHIL NFR BLD AUTO: 1 % (ref 0–6)
ERYTHROCYTE [DISTWIDTH] IN BLOOD BY AUTOMATED COUNT: 21.8 % (ref 11.6–15.1)
GFR SERPL CREATININE-BSD FRML MDRD: 79 ML/MIN/1.73SQ M
GLUCOSE P FAST SERPL-MCNC: 97 MG/DL (ref 65–99)
HCT VFR BLD AUTO: 41.3 % (ref 34.8–46.1)
HGB BLD-MCNC: 12.7 G/DL (ref 11.5–15.4)
IMM GRANULOCYTES # BLD AUTO: 0.01 THOUSAND/UL (ref 0–0.2)
IMM GRANULOCYTES NFR BLD AUTO: 0 % (ref 0–2)
LYMPHOCYTES # BLD AUTO: 1.76 THOUSANDS/ΜL (ref 0.6–4.47)
LYMPHOCYTES NFR BLD AUTO: 34 % (ref 14–44)
MCH RBC QN AUTO: 27.1 PG (ref 26.8–34.3)
MCHC RBC AUTO-ENTMCNC: 30.8 G/DL (ref 31.4–37.4)
MCV RBC AUTO: 88 FL (ref 82–98)
MONOCYTES # BLD AUTO: 0.53 THOUSAND/ΜL (ref 0.17–1.22)
MONOCYTES NFR BLD AUTO: 10 % (ref 4–12)
NEUTROPHILS # BLD AUTO: 2.73 THOUSANDS/ΜL (ref 1.85–7.62)
NEUTS SEG NFR BLD AUTO: 54 % (ref 43–75)
NRBC BLD AUTO-RTO: 0 /100 WBCS
PLATELET # BLD AUTO: 165 THOUSANDS/UL (ref 149–390)
PMV BLD AUTO: 9.2 FL (ref 8.9–12.7)
POTASSIUM SERPL-SCNC: 4.2 MMOL/L (ref 3.5–5.3)
PROT SERPL-MCNC: 7.9 G/DL (ref 6.4–8.2)
RBC # BLD AUTO: 4.68 MILLION/UL (ref 3.81–5.12)
SODIUM SERPL-SCNC: 139 MMOL/L (ref 136–145)
WBC # BLD AUTO: 5.11 THOUSAND/UL (ref 4.31–10.16)

## 2020-01-24 PROCEDURE — 36415 COLL VENOUS BLD VENIPUNCTURE: CPT

## 2020-01-24 PROCEDURE — 99214 OFFICE O/P EST MOD 30 MIN: CPT | Performed by: INTERNAL MEDICINE

## 2020-01-24 PROCEDURE — 80053 COMPREHEN METABOLIC PANEL: CPT

## 2020-01-24 PROCEDURE — 85025 COMPLETE CBC W/AUTO DIFF WBC: CPT

## 2020-01-24 RX ORDER — DEXTROSE MONOHYDRATE 50 MG/ML
20 INJECTION, SOLUTION INTRAVENOUS ONCE
Status: CANCELLED | OUTPATIENT
Start: 2020-02-24

## 2020-01-24 RX ORDER — SODIUM CHLORIDE 9 MG/ML
20 INJECTION, SOLUTION INTRAVENOUS ONCE AS NEEDED
Status: CANCELLED | OUTPATIENT
Start: 2020-03-09

## 2020-01-24 RX ORDER — SODIUM CHLORIDE 9 MG/ML
20 INJECTION, SOLUTION INTRAVENOUS ONCE AS NEEDED
Status: CANCELLED | OUTPATIENT
Start: 2020-02-10

## 2020-01-24 RX ORDER — DEXTROSE MONOHYDRATE 50 MG/ML
20 INJECTION, SOLUTION INTRAVENOUS ONCE
Status: CANCELLED | OUTPATIENT
Start: 2020-03-09

## 2020-01-24 RX ORDER — DEXTROSE MONOHYDRATE 50 MG/ML
20 INJECTION, SOLUTION INTRAVENOUS ONCE
Status: CANCELLED | OUTPATIENT
Start: 2020-02-10

## 2020-01-24 RX ORDER — SODIUM CHLORIDE 9 MG/ML
20 INJECTION, SOLUTION INTRAVENOUS ONCE AS NEEDED
Status: CANCELLED | OUTPATIENT
Start: 2020-02-24

## 2020-01-24 NOTE — PROGRESS NOTES
Hematology / Oncology Outpatient Follow Up Note    Dipesh Madrid 47 y o  female :1965 CRD:[id number]         Date:  2020    Assessment / Plan:  A 59-year-old female with metastatic colon cancer  She has extensive liver metastasis  Her tumor has K-wanda mutation and is MSI stable  She had 12 cycle of FOLFOX with bevacizumab with excellent tolerance, resulting in partial response   Since 2018, she has been on maintenance bevacizumab monotherapy   She underwent palliative resection of transverse colon due to the bleeding   She fully recovered from the surgery   Unfortunately, she had CEA progression  Therefore, she is back on bevacizumab and infusion of 5 FU with no toxicity  Clinically as well as serologically, she has stable disease  She has no tumor related symptomatology  I recommended her to continue with bevacizumab and infusion of 5 FU  She is in agreement with my recommendation  I am going to set up CT scan of chest abdomen pelvis in late 2020 for disease evaluation  I will see her again in beginning of 2020 for follow-up  She is in agreement with my recommendation         Subjective:      HPI:  A 59-year-old female who was found to have severe microcytic anemia in 2018   Therefore, she was advised to be hospitalized  Nelson Champagne was given transfusion  Rosa Moment was performed which did not show any major pathology   As outpatient basis, she underwent CT scan of abdomen pelvis which showed transverse colon mass as well as multiple liver metastatic disease   She subsequently underwent liver biopsy which showed metastatic adenocarcinoma, consistent with colorectal primary   She presents today to discuss treatment options   Since 2018, she lost 25 pound  Nelson Champagne has mildly anorexic  [facility], she has no complaint of pain   She denied any respiratory symptoms    She has normal bowel movement    Prior to the hospitalization, she had slowly progressive fatigue as well as some exertional shortness of breath   She is currently on oral iron 3 times per day  Kristine Varela has rheumatoid arthritis for which she is on weekly methotrexate and folic acid   She has no family history of colorectal cancer  Kristine Varela is a lifetime never smoker   Her performance status is normal            Interval History:   A 47year-old female with metastatic colon cancer with extensive liver metastasis  Her tumor has K-wanda mutation and is MSI stable   She started systemic chemotherapy with FOLFOX-6 which she tolerated very well  We were notified by pathology Department that molecular testing cannot be performed on liver biopsy tissue   She was treated with 12 cycle of FOLFOX with bevacizumab with excellent tolerance   She had good partial response   Since April 2019, she was on maintenance bevacizumab monotherapy  In summer of 2019, she underwent palliative resection of transverse colon, because of the chronic blood loss  Postoperatively, she has elevation of CEA for which she is back on infusional 5-FU as well as bevacizumab  She has been tolerating treatment very well  She presents today for routine follow-up  She has stable CEA  She has no new complaint  Her weight is stable  She has no abdominal pain  She denied diarrhea  She has no respiratory symptoms  Her performance status is normal                                                                                Objective:      Primary Diagnosis:     Metastatic colon cancer   K-wanda mutation positive   BRAF wild type  MSI stable   Diagnosed in October 2018      Cancer Staging:  Cancer Staging  No matching staging information was found for the patient         Previous Hematologic/ Oncologic Treatment:       1  Bevacizumab with FOLFOX-6   X12 cycle   Completed in April 2019    2  Bevacizumab monotherapy from April 2019 through October 2019       Current Hematologic/ Oncologic Treatment:       Bevacizumab with infusion of 5 FU since early November 2019     Disease Status:      Stable disease on current treatment      Test Results:     Pathology:     Liver biopsy showed metastatic adenocarcinoma, consistent with colorectal primary   Molecular testing could not be performed per pathology department      Radiology:     CT scan of chest abdomen pelvis in November , 2019 showed mixed response with liver metastasis  No new metastatic lesions      Laboratory:     See below      Physical Exam:        General Appearance:    Alert, oriented          Eyes:    PERRL   Ears:    Normal external ear canals, both ears   Nose:   Nares normal, septum midline   Throat:   Mucosa moist  Pharynx without injection  Neck:   Supple         Lungs:     Clear to auscultation bilaterally   Chest Wall:    No tenderness or deformity    Heart:    Regular rate and rhythm         Abdomen:     Soft, non-tender, bowel sounds +, no organomegaly               Extremities:   Extremities no cyanosis or edema         Skin:   no rash or icterus  Lymph nodes:   Cervical, supraclavicular, and axillary nodes normal   Neurologic:   CNII-XII intact, normal strength, sensation and reflexes     Throughout           ROS: Review of Systems   All other systems reviewed and are negative  Imaging: No results found        Labs:   Lab Results   Component Value Date    WBC 5 48 01/10/2020    HGB 11 9 01/10/2020    HCT 37 8 01/10/2020    MCV 87 01/10/2020     01/10/2020     Lab Results   Component Value Date    K 4 0 01/10/2020     01/10/2020    CO2 25 01/10/2020    BUN 16 01/10/2020    CREATININE 0 80 01/10/2020    GLUF 96 01/10/2020    CALCIUM 9 2 01/10/2020    AST 66 (H) 01/10/2020    ALT 97 (H) 01/10/2020    ALKPHOS 384 (H) 01/10/2020    EGFR 84 01/10/2020         Lab Results   Component Value Date     (H) 09/24/2018         Lab Results   Component Value Date    CEA 30 4 (H) 12/27/2019         Lab Results   Component Value Date    IRON 11 (L) 09/13/2018    TIBC 302 09/13/2018 FERRITIN 15 09/13/2018         Lab Results   Component Value Date    FOLATE 11 4 09/13/2018         Current Medications: Reviewed  Allergies: Reviewed  PMH/FH/SH:  Reviewed      Vital Sign:    Body surface area is 1 67 meters squared      Wt Readings from Last 3 Encounters:   01/24/20 60 8 kg (134 lb)   01/13/20 59 kg (130 lb 1 1 oz)   01/08/20 60 1 kg (132 lb 6 4 oz)        Temp Readings from Last 3 Encounters:   01/24/20 (!) 97 °F (36 1 °C) (Tympanic Core)   01/13/20 98 7 °F (37 1 °C) (Temporal)   12/31/19 (!) 96 5 °F (35 8 °C) (Temporal)        BP Readings from Last 3 Encounters:   01/24/20 122/70   01/13/20 122/72   01/08/20 106/76         Pulse Readings from Last 3 Encounters:   01/13/20 81   12/31/19 87   12/16/19 78     @LASTSAO2(3)@

## 2020-01-27 ENCOUNTER — HOSPITAL ENCOUNTER (OUTPATIENT)
Dept: INFUSION CENTER | Facility: HOSPITAL | Age: 55
Discharge: HOME | End: 2020-01-27
Payer: COMMERCIAL

## 2020-01-27 VITALS
DIASTOLIC BLOOD PRESSURE: 90 MMHG | TEMPERATURE: 97.9 F | WEIGHT: 134.04 LBS | HEART RATE: 88 BPM | BODY MASS INDEX: 22.33 KG/M2 | RESPIRATION RATE: 16 BRPM | HEIGHT: 65 IN | SYSTOLIC BLOOD PRESSURE: 120 MMHG

## 2020-01-27 DIAGNOSIS — C78.7 COLON CANCER METASTASIZED TO LIVER (HCC): Primary | ICD-10-CM

## 2020-01-27 DIAGNOSIS — C18.9 COLON CANCER METASTASIZED TO LIVER (HCC): Primary | ICD-10-CM

## 2020-01-27 PROCEDURE — G0498 CHEMO EXTEND IV INFUS W/PUMP: HCPCS

## 2020-01-27 PROCEDURE — 96367 TX/PROPH/DG ADDL SEQ IV INF: CPT

## 2020-01-27 PROCEDURE — 96413 CHEMO IV INFUSION 1 HR: CPT

## 2020-01-27 RX ORDER — DEXTROSE MONOHYDRATE 50 MG/ML
20 INJECTION, SOLUTION INTRAVENOUS ONCE
Status: DISCONTINUED | OUTPATIENT
Start: 2020-01-27 | End: 2020-01-31 | Stop reason: HOSPADM

## 2020-01-27 RX ORDER — SODIUM CHLORIDE 9 MG/ML
20 INJECTION, SOLUTION INTRAVENOUS ONCE AS NEEDED
Status: DISCONTINUED | OUTPATIENT
Start: 2020-01-27 | End: 2020-01-31 | Stop reason: HOSPADM

## 2020-01-27 RX ADMIN — BEVACIZUMAB 292.5 MG: 100 INJECTION, SOLUTION INTRAVENOUS at 10:18

## 2020-01-27 RX ADMIN — ONDANSETRON HYDROCHLORIDE: 2 INJECTION, SOLUTION INTRAMUSCULAR; INTRAVENOUS at 09:44

## 2020-01-27 RX ADMIN — LEUCOVORIN CALCIUM 656 MG: 350 INJECTION, POWDER, LYOPHILIZED, FOR SOLUTION INTRAMUSCULAR; INTRAVENOUS at 11:06

## 2020-01-27 RX ADMIN — SODIUM CHLORIDE 20 ML/HR: 0.9 INJECTION, SOLUTION INTRAVENOUS at 09:44

## 2020-01-27 NOTE — PROGRESS NOTES
Here for avastin/leuco/5FU, tolerated well without complications    Patient aware to return Wed for pump disconnect at 10:15am

## 2020-01-29 ENCOUNTER — HOSPITAL ENCOUNTER (OUTPATIENT)
Dept: INFUSION CENTER | Facility: HOSPITAL | Age: 55
Discharge: HOME | End: 2020-01-29

## 2020-01-29 DIAGNOSIS — C78.7 COLON CANCER METASTASIZED TO LIVER (HCC): Primary | ICD-10-CM

## 2020-01-29 DIAGNOSIS — C18.9 COLON CANCER METASTASIZED TO LIVER (HCC): Primary | ICD-10-CM

## 2020-01-29 NOTE — PROGRESS NOTES
Pt tolerated CADD D/C with no adverse reactions  Res Vol 0ml  Left unit ambulatory with a steady gait

## 2020-02-04 DIAGNOSIS — C78.7 COLON CANCER METASTASIZED TO LIVER (HCC): Primary | ICD-10-CM

## 2020-02-04 DIAGNOSIS — C18.9 COLON CANCER METASTASIZED TO LIVER (HCC): Primary | ICD-10-CM

## 2020-02-07 ENCOUNTER — APPOINTMENT (OUTPATIENT)
Dept: LAB | Age: 55
End: 2020-02-07
Payer: COMMERCIAL

## 2020-02-07 DIAGNOSIS — C78.7 COLON CANCER METASTASIZED TO LIVER (HCC): ICD-10-CM

## 2020-02-07 DIAGNOSIS — C18.9 COLON CANCER METASTASIZED TO LIVER (HCC): ICD-10-CM

## 2020-02-07 LAB
ALBUMIN SERPL BCP-MCNC: 3.4 G/DL (ref 3.5–5)
ALP SERPL-CCNC: 332 U/L (ref 46–116)
ALT SERPL W P-5'-P-CCNC: 103 U/L (ref 12–78)
ANION GAP SERPL CALCULATED.3IONS-SCNC: 7 MMOL/L (ref 4–13)
AST SERPL W P-5'-P-CCNC: 65 U/L (ref 5–45)
BASOPHILS # BLD AUTO: 0.03 THOUSANDS/ΜL (ref 0–0.1)
BASOPHILS NFR BLD AUTO: 1 % (ref 0–1)
BILIRUB SERPL-MCNC: 0.99 MG/DL (ref 0.2–1)
BUN SERPL-MCNC: 16 MG/DL (ref 5–25)
CALCIUM SERPL-MCNC: 9.4 MG/DL (ref 8.3–10.1)
CHLORIDE SERPL-SCNC: 108 MMOL/L (ref 100–108)
CO2 SERPL-SCNC: 26 MMOL/L (ref 21–32)
CREAT SERPL-MCNC: 0.76 MG/DL (ref 0.6–1.3)
EOSINOPHIL # BLD AUTO: 0.05 THOUSAND/ΜL (ref 0–0.61)
EOSINOPHIL NFR BLD AUTO: 1 % (ref 0–6)
ERYTHROCYTE [DISTWIDTH] IN BLOOD BY AUTOMATED COUNT: 20.6 % (ref 11.6–15.1)
GFR SERPL CREATININE-BSD FRML MDRD: 89 ML/MIN/1.73SQ M
GLUCOSE P FAST SERPL-MCNC: 88 MG/DL (ref 65–99)
HCT VFR BLD AUTO: 36.8 % (ref 34.8–46.1)
HGB BLD-MCNC: 11.8 G/DL (ref 11.5–15.4)
IMM GRANULOCYTES # BLD AUTO: 0.02 THOUSAND/UL (ref 0–0.2)
IMM GRANULOCYTES NFR BLD AUTO: 0 % (ref 0–2)
LYMPHOCYTES # BLD AUTO: 1.52 THOUSANDS/ΜL (ref 0.6–4.47)
LYMPHOCYTES NFR BLD AUTO: 32 % (ref 14–44)
MCH RBC QN AUTO: 28.2 PG (ref 26.8–34.3)
MCHC RBC AUTO-ENTMCNC: 32.1 G/DL (ref 31.4–37.4)
MCV RBC AUTO: 88 FL (ref 82–98)
MONOCYTES # BLD AUTO: 0.45 THOUSAND/ΜL (ref 0.17–1.22)
MONOCYTES NFR BLD AUTO: 9 % (ref 4–12)
NEUTROPHILS # BLD AUTO: 2.71 THOUSANDS/ΜL (ref 1.85–7.62)
NEUTS SEG NFR BLD AUTO: 57 % (ref 43–75)
NRBC BLD AUTO-RTO: 0 /100 WBCS
PLATELET # BLD AUTO: 156 THOUSANDS/UL (ref 149–390)
PMV BLD AUTO: 9.1 FL (ref 8.9–12.7)
POTASSIUM SERPL-SCNC: 3.9 MMOL/L (ref 3.5–5.3)
PROT SERPL-MCNC: 7.4 G/DL (ref 6.4–8.2)
RBC # BLD AUTO: 4.18 MILLION/UL (ref 3.81–5.12)
SODIUM SERPL-SCNC: 141 MMOL/L (ref 136–145)
WBC # BLD AUTO: 4.78 THOUSAND/UL (ref 4.31–10.16)

## 2020-02-07 PROCEDURE — 80053 COMPREHEN METABOLIC PANEL: CPT

## 2020-02-07 PROCEDURE — 85025 COMPLETE CBC W/AUTO DIFF WBC: CPT

## 2020-02-07 PROCEDURE — 36415 COLL VENOUS BLD VENIPUNCTURE: CPT

## 2020-02-10 ENCOUNTER — HOSPITAL ENCOUNTER (OUTPATIENT)
Dept: INFUSION CENTER | Facility: HOSPITAL | Age: 55
Discharge: HOME | End: 2020-02-10
Payer: COMMERCIAL

## 2020-02-10 VITALS
DIASTOLIC BLOOD PRESSURE: 84 MMHG | SYSTOLIC BLOOD PRESSURE: 129 MMHG | TEMPERATURE: 98.4 F | BODY MASS INDEX: 22.63 KG/M2 | WEIGHT: 135.8 LBS | OXYGEN SATURATION: 98 % | RESPIRATION RATE: 16 BRPM | HEART RATE: 78 BPM | HEIGHT: 65 IN

## 2020-02-10 DIAGNOSIS — C18.9 COLON CANCER METASTASIZED TO LIVER (HCC): Primary | ICD-10-CM

## 2020-02-10 DIAGNOSIS — C78.7 COLON CANCER METASTASIZED TO LIVER (HCC): Primary | ICD-10-CM

## 2020-02-10 PROCEDURE — G0498 CHEMO EXTEND IV INFUS W/PUMP: HCPCS

## 2020-02-10 PROCEDURE — 96366 THER/PROPH/DIAG IV INF ADDON: CPT

## 2020-02-10 PROCEDURE — 96367 TX/PROPH/DG ADDL SEQ IV INF: CPT

## 2020-02-10 PROCEDURE — 96413 CHEMO IV INFUSION 1 HR: CPT

## 2020-02-10 RX ORDER — SODIUM CHLORIDE 9 MG/ML
20 INJECTION, SOLUTION INTRAVENOUS ONCE AS NEEDED
Status: DISCONTINUED | OUTPATIENT
Start: 2020-02-10 | End: 2020-02-14 | Stop reason: HOSPADM

## 2020-02-10 RX ORDER — DEXTROSE MONOHYDRATE 50 MG/ML
20 INJECTION, SOLUTION INTRAVENOUS ONCE
Status: DISCONTINUED | OUTPATIENT
Start: 2020-02-10 | End: 2020-02-14 | Stop reason: HOSPADM

## 2020-02-10 RX ADMIN — ONDANSETRON HYDROCHLORIDE: 2 INJECTION, SOLUTION INTRAMUSCULAR; INTRAVENOUS at 09:42

## 2020-02-10 RX ADMIN — LEUCOVORIN CALCIUM 656 MG: 350 INJECTION, POWDER, LYOPHILIZED, FOR SOLUTION INTRAMUSCULAR; INTRAVENOUS at 11:29

## 2020-02-10 RX ADMIN — BEVACIZUMAB 292.5 MG: 100 INJECTION, SOLUTION INTRAVENOUS at 10:32

## 2020-02-10 RX ADMIN — SODIUM CHLORIDE 20 ML/HR: 0.9 INJECTION, SOLUTION INTRAVENOUS at 09:45

## 2020-02-10 NOTE — PROGRESS NOTES
Rec'd pt in good spirits and without complaints  Port accessed per protocol , Premeds and Chemo infused without adverse reaction  Pt appeared comfortable throughout treatment today  CADD pump applied per policy  Pt then d/c'd to home with steady gait

## 2020-02-12 ENCOUNTER — HOSPITAL ENCOUNTER (OUTPATIENT)
Dept: INFUSION CENTER | Facility: HOSPITAL | Age: 55
Discharge: HOME | End: 2020-02-12

## 2020-02-12 DIAGNOSIS — C18.9 COLON CANCER METASTASIZED TO LIVER (HCC): Primary | ICD-10-CM

## 2020-02-12 DIAGNOSIS — C78.7 COLON CANCER METASTASIZED TO LIVER (HCC): Primary | ICD-10-CM

## 2020-02-12 NOTE — PLAN OF CARE
Problem: Potential for Falls  Goal: Patient will remain free of falls  Description  INTERVENTIONS:  - Assess patient frequently for physical needs  -  Identify cognitive and physical deficits and behaviors that affect risk of falls  -  [place] fall precautions as indicated by assessment   - Educate patient/family on patient safety including physical limitations  - Instruct patient to call for assistance with activity based on assessment  - Modify environment to reduce risk of injury  - Consider OT/PT consult to assist with strengthening/mobility  Outcome: Progressing     Problem: Knowledge Deficit  Goal: Patient/family/caregiver demonstrates understanding of disease process, treatment plan, medications, and discharge instructions  Description  Complete learning assessment and assess knowledge base    Interventions:  - Provide teaching at level of understanding  - Provide teaching via preferred learning methods  Outcome: Progressing

## 2020-02-12 NOTE — PROGRESS NOTES
Pt arrives on unit for CADD D/C  Res Volume 0ml  Offers no c/o, left unit ambulatory with a steady gait

## 2020-02-17 ENCOUNTER — TRANSCRIBE ORDERS (OUTPATIENT)
Dept: ADMINISTRATIVE | Facility: HOSPITAL | Age: 55
End: 2020-02-17

## 2020-02-17 ENCOUNTER — APPOINTMENT (OUTPATIENT)
Dept: LAB | Age: 55
End: 2020-02-17
Payer: COMMERCIAL

## 2020-02-17 DIAGNOSIS — M15.0 PRIMARY GENERALIZED HYPERTROPHIC OSTEOARTHROSIS: ICD-10-CM

## 2020-02-17 DIAGNOSIS — M06.89 OTHER SPECIFIED RHEUMATOID ARTHRITIS, MULTIPLE SITES (HCC): ICD-10-CM

## 2020-02-17 DIAGNOSIS — Z79.899 ENCOUNTER FOR LONG-TERM (CURRENT) USE OF OTHER MEDICATIONS: Primary | ICD-10-CM

## 2020-02-17 DIAGNOSIS — Z79.899 ENCOUNTER FOR LONG-TERM (CURRENT) USE OF OTHER MEDICATIONS: ICD-10-CM

## 2020-02-17 LAB
ALBUMIN SERPL BCP-MCNC: 3.3 G/DL (ref 3.5–5)
ALP SERPL-CCNC: 312 U/L (ref 46–116)
ALT SERPL W P-5'-P-CCNC: 135 U/L (ref 12–78)
ANION GAP SERPL CALCULATED.3IONS-SCNC: 6 MMOL/L (ref 4–13)
AST SERPL W P-5'-P-CCNC: 77 U/L (ref 5–45)
BASOPHILS # BLD AUTO: 0.03 THOUSANDS/ΜL (ref 0–0.1)
BASOPHILS NFR BLD AUTO: 1 % (ref 0–1)
BILIRUB SERPL-MCNC: 0.75 MG/DL (ref 0.2–1)
BUN SERPL-MCNC: 12 MG/DL (ref 5–25)
CALCIUM SERPL-MCNC: 8.9 MG/DL (ref 8.3–10.1)
CHLORIDE SERPL-SCNC: 108 MMOL/L (ref 100–108)
CO2 SERPL-SCNC: 26 MMOL/L (ref 21–32)
CREAT SERPL-MCNC: 0.83 MG/DL (ref 0.6–1.3)
CRP SERPL QL: <3 MG/L
EOSINOPHIL # BLD AUTO: 0.05 THOUSAND/ΜL (ref 0–0.61)
EOSINOPHIL NFR BLD AUTO: 1 % (ref 0–6)
ERYTHROCYTE [DISTWIDTH] IN BLOOD BY AUTOMATED COUNT: 19.9 % (ref 11.6–15.1)
ERYTHROCYTE [SEDIMENTATION RATE] IN BLOOD: 42 MM/HOUR (ref 0–20)
GFR SERPL CREATININE-BSD FRML MDRD: 80 ML/MIN/1.73SQ M
GLUCOSE P FAST SERPL-MCNC: 96 MG/DL (ref 65–99)
HCT VFR BLD AUTO: 37.1 % (ref 34.8–46.1)
HGB BLD-MCNC: 11.6 G/DL (ref 11.5–15.4)
IMM GRANULOCYTES # BLD AUTO: 0.01 THOUSAND/UL (ref 0–0.2)
IMM GRANULOCYTES NFR BLD AUTO: 0 % (ref 0–2)
LYMPHOCYTES # BLD AUTO: 1.21 THOUSANDS/ΜL (ref 0.6–4.47)
LYMPHOCYTES NFR BLD AUTO: 34 % (ref 14–44)
MCH RBC QN AUTO: 27.8 PG (ref 26.8–34.3)
MCHC RBC AUTO-ENTMCNC: 31.3 G/DL (ref 31.4–37.4)
MCV RBC AUTO: 89 FL (ref 82–98)
MONOCYTES # BLD AUTO: 0.31 THOUSAND/ΜL (ref 0.17–1.22)
MONOCYTES NFR BLD AUTO: 9 % (ref 4–12)
NEUTROPHILS # BLD AUTO: 1.97 THOUSANDS/ΜL (ref 1.85–7.62)
NEUTS SEG NFR BLD AUTO: 55 % (ref 43–75)
NRBC BLD AUTO-RTO: 0 /100 WBCS
PLATELET # BLD AUTO: 141 THOUSANDS/UL (ref 149–390)
PMV BLD AUTO: 9.5 FL (ref 8.9–12.7)
POTASSIUM SERPL-SCNC: 4 MMOL/L (ref 3.5–5.3)
PROT SERPL-MCNC: 7.3 G/DL (ref 6.4–8.2)
RBC # BLD AUTO: 4.18 MILLION/UL (ref 3.81–5.12)
SODIUM SERPL-SCNC: 140 MMOL/L (ref 136–145)
WBC # BLD AUTO: 3.58 THOUSAND/UL (ref 4.31–10.16)

## 2020-02-17 PROCEDURE — 86140 C-REACTIVE PROTEIN: CPT

## 2020-02-17 PROCEDURE — 36415 COLL VENOUS BLD VENIPUNCTURE: CPT

## 2020-02-17 PROCEDURE — 85652 RBC SED RATE AUTOMATED: CPT

## 2020-02-17 PROCEDURE — 80053 COMPREHEN METABOLIC PANEL: CPT

## 2020-02-17 PROCEDURE — 85025 COMPLETE CBC W/AUTO DIFF WBC: CPT

## 2020-02-18 DIAGNOSIS — C18.9 COLON CANCER METASTASIZED TO LIVER (HCC): Primary | ICD-10-CM

## 2020-02-18 DIAGNOSIS — C78.7 COLON CANCER METASTASIZED TO LIVER (HCC): Primary | ICD-10-CM

## 2020-02-24 ENCOUNTER — HOSPITAL ENCOUNTER (OUTPATIENT)
Dept: INFUSION CENTER | Facility: HOSPITAL | Age: 55
Discharge: HOME | End: 2020-02-24
Payer: COMMERCIAL

## 2020-02-24 ENCOUNTER — TELEPHONE (OUTPATIENT)
Dept: OBGYN CLINIC | Facility: CLINIC | Age: 55
End: 2020-02-24

## 2020-02-24 VITALS
SYSTOLIC BLOOD PRESSURE: 124 MMHG | WEIGHT: 136.69 LBS | DIASTOLIC BLOOD PRESSURE: 80 MMHG | RESPIRATION RATE: 16 BRPM | BODY MASS INDEX: 22.77 KG/M2 | HEIGHT: 65 IN | TEMPERATURE: 97.7 F | HEART RATE: 88 BPM

## 2020-02-24 DIAGNOSIS — C18.9 COLON CANCER METASTASIZED TO LIVER (HCC): Primary | ICD-10-CM

## 2020-02-24 DIAGNOSIS — C78.7 COLON CANCER METASTASIZED TO LIVER (HCC): Primary | ICD-10-CM

## 2020-02-24 PROCEDURE — 96367 TX/PROPH/DG ADDL SEQ IV INF: CPT

## 2020-02-24 PROCEDURE — G0498 CHEMO EXTEND IV INFUS W/PUMP: HCPCS

## 2020-02-24 PROCEDURE — 96413 CHEMO IV INFUSION 1 HR: CPT

## 2020-02-24 RX ORDER — SODIUM CHLORIDE 9 MG/ML
20 INJECTION, SOLUTION INTRAVENOUS ONCE AS NEEDED
Status: DISCONTINUED | OUTPATIENT
Start: 2020-02-24 | End: 2020-02-28 | Stop reason: HOSPADM

## 2020-02-24 RX ORDER — DEXTROSE MONOHYDRATE 50 MG/ML
20 INJECTION, SOLUTION INTRAVENOUS ONCE
Status: DISCONTINUED | OUTPATIENT
Start: 2020-02-24 | End: 2020-02-28 | Stop reason: HOSPADM

## 2020-02-24 RX ADMIN — SODIUM CHLORIDE 20 ML/HR: 0.9 INJECTION, SOLUTION INTRAVENOUS at 09:35

## 2020-02-24 RX ADMIN — LEUCOVORIN CALCIUM 656 MG: 350 INJECTION, POWDER, LYOPHILIZED, FOR SOLUTION INTRAMUSCULAR; INTRAVENOUS at 11:30

## 2020-02-24 RX ADMIN — BEVACIZUMAB 292.5 MG: 100 INJECTION, SOLUTION INTRAVENOUS at 10:41

## 2020-02-24 RX ADMIN — ONDANSETRON HYDROCHLORIDE: 2 INJECTION, SOLUTION INTRAMUSCULAR; INTRAVENOUS at 09:46

## 2020-02-24 NOTE — PROGRESS NOTES
Pt tolerated chemo infusion without difficulty  CADD pump connected and infusing as ordered  Aware of appt on Wed 2/26 for pump d/c    Left ambulatory deferring AVS

## 2020-02-26 ENCOUNTER — HOSPITAL ENCOUNTER (OUTPATIENT)
Dept: INFUSION CENTER | Facility: HOSPITAL | Age: 55
Discharge: HOME | End: 2020-02-26

## 2020-02-26 DIAGNOSIS — C78.7 COLON CANCER METASTASIZED TO LIVER (HCC): Primary | ICD-10-CM

## 2020-02-26 DIAGNOSIS — C18.9 COLON CANCER METASTASIZED TO LIVER (HCC): Primary | ICD-10-CM

## 2020-02-26 NOTE — PROGRESS NOTES
Pt here for CADD pump d/c  Tolerated without difficulty  Port flushed and deaccessed per protocol  Brisk blood return noted  Aware of next follow up with Dr Arron Mendoza    Left ambulatory deferring AVS

## 2020-02-29 ENCOUNTER — HOSPITAL ENCOUNTER (OUTPATIENT)
Dept: CT IMAGING | Facility: HOSPITAL | Age: 55
Discharge: HOME | End: 2020-02-29
Attending: INTERNAL MEDICINE
Payer: COMMERCIAL

## 2020-02-29 DIAGNOSIS — C18.9 COLON CANCER METASTASIZED TO LIVER (HCC): ICD-10-CM

## 2020-02-29 DIAGNOSIS — C78.7 COLON CANCER METASTASIZED TO LIVER (HCC): ICD-10-CM

## 2020-02-29 PROCEDURE — 71260 CT THORAX DX C+: CPT

## 2020-02-29 PROCEDURE — 74177 CT ABD & PELVIS W/CONTRAST: CPT

## 2020-02-29 RX ADMIN — IOHEXOL 100 ML: 350 INJECTION, SOLUTION INTRAVENOUS at 09:05

## 2020-03-03 DIAGNOSIS — C78.7 COLON CANCER METASTASIZED TO LIVER (HCC): Primary | ICD-10-CM

## 2020-03-03 DIAGNOSIS — C18.9 COLON CANCER METASTASIZED TO LIVER (HCC): Primary | ICD-10-CM

## 2020-03-05 ENCOUNTER — APPOINTMENT (OUTPATIENT)
Dept: LAB | Age: 55
End: 2020-03-05
Payer: COMMERCIAL

## 2020-03-05 DIAGNOSIS — C78.7 COLON CANCER METASTASIZED TO LIVER (HCC): ICD-10-CM

## 2020-03-05 DIAGNOSIS — C18.9 COLON CANCER METASTASIZED TO LIVER (HCC): ICD-10-CM

## 2020-03-05 LAB
ALBUMIN SERPL BCP-MCNC: 3.4 G/DL (ref 3.5–5)
ALP SERPL-CCNC: 332 U/L (ref 46–116)
ALT SERPL W P-5'-P-CCNC: 82 U/L (ref 12–78)
ANION GAP SERPL CALCULATED.3IONS-SCNC: 7 MMOL/L (ref 4–13)
AST SERPL W P-5'-P-CCNC: 56 U/L (ref 5–45)
BASOPHILS # BLD AUTO: 0.03 THOUSANDS/ΜL (ref 0–0.1)
BASOPHILS NFR BLD AUTO: 1 % (ref 0–1)
BILIRUB SERPL-MCNC: 0.86 MG/DL (ref 0.2–1)
BUN SERPL-MCNC: 17 MG/DL (ref 5–25)
CALCIUM SERPL-MCNC: 9.2 MG/DL (ref 8.3–10.1)
CHLORIDE SERPL-SCNC: 107 MMOL/L (ref 100–108)
CO2 SERPL-SCNC: 27 MMOL/L (ref 21–32)
CREAT SERPL-MCNC: 0.79 MG/DL (ref 0.6–1.3)
EOSINOPHIL # BLD AUTO: 0.1 THOUSAND/ΜL (ref 0–0.61)
EOSINOPHIL NFR BLD AUTO: 2 % (ref 0–6)
ERYTHROCYTE [DISTWIDTH] IN BLOOD BY AUTOMATED COUNT: 20.5 % (ref 11.6–15.1)
GFR SERPL CREATININE-BSD FRML MDRD: 85 ML/MIN/1.73SQ M
GLUCOSE P FAST SERPL-MCNC: 94 MG/DL (ref 65–99)
HCT VFR BLD AUTO: 39.1 % (ref 34.8–46.1)
HGB BLD-MCNC: 12.1 G/DL (ref 11.5–15.4)
IMM GRANULOCYTES # BLD AUTO: 0.02 THOUSAND/UL (ref 0–0.2)
IMM GRANULOCYTES NFR BLD AUTO: 0 % (ref 0–2)
LYMPHOCYTES # BLD AUTO: 0.94 THOUSANDS/ΜL (ref 0.6–4.47)
LYMPHOCYTES NFR BLD AUTO: 21 % (ref 14–44)
MCH RBC QN AUTO: 28.1 PG (ref 26.8–34.3)
MCHC RBC AUTO-ENTMCNC: 30.9 G/DL (ref 31.4–37.4)
MCV RBC AUTO: 91 FL (ref 82–98)
MONOCYTES # BLD AUTO: 0.47 THOUSAND/ΜL (ref 0.17–1.22)
MONOCYTES NFR BLD AUTO: 10 % (ref 4–12)
NEUTROPHILS # BLD AUTO: 3.01 THOUSANDS/ΜL (ref 1.85–7.62)
NEUTS SEG NFR BLD AUTO: 66 % (ref 43–75)
NRBC BLD AUTO-RTO: 0 /100 WBCS
PLATELET # BLD AUTO: 159 THOUSANDS/UL (ref 149–390)
PMV BLD AUTO: 10.3 FL (ref 8.9–12.7)
POTASSIUM SERPL-SCNC: 4.1 MMOL/L (ref 3.5–5.3)
PROT SERPL-MCNC: 7.4 G/DL (ref 6.4–8.2)
RBC # BLD AUTO: 4.3 MILLION/UL (ref 3.81–5.12)
SODIUM SERPL-SCNC: 141 MMOL/L (ref 136–145)
WBC # BLD AUTO: 4.57 THOUSAND/UL (ref 4.31–10.16)

## 2020-03-05 PROCEDURE — 36415 COLL VENOUS BLD VENIPUNCTURE: CPT

## 2020-03-05 PROCEDURE — 80053 COMPREHEN METABOLIC PANEL: CPT

## 2020-03-05 PROCEDURE — 85025 COMPLETE CBC W/AUTO DIFF WBC: CPT

## 2020-03-06 ENCOUNTER — OFFICE VISIT (OUTPATIENT)
Dept: HEMATOLOGY ONCOLOGY | Facility: CLINIC | Age: 55
End: 2020-03-06
Payer: COMMERCIAL

## 2020-03-06 VITALS
WEIGHT: 137 LBS | SYSTOLIC BLOOD PRESSURE: 122 MMHG | TEMPERATURE: 97.8 F | RESPIRATION RATE: 18 BRPM | BODY MASS INDEX: 22.82 KG/M2 | HEIGHT: 65 IN | OXYGEN SATURATION: 96 % | DIASTOLIC BLOOD PRESSURE: 78 MMHG | HEART RATE: 87 BPM

## 2020-03-06 DIAGNOSIS — C18.9 COLON CANCER METASTASIZED TO LIVER (HCC): Primary | ICD-10-CM

## 2020-03-06 DIAGNOSIS — C78.7 COLON CANCER METASTASIZED TO LIVER (HCC): Primary | ICD-10-CM

## 2020-03-06 PROCEDURE — 99214 OFFICE O/P EST MOD 30 MIN: CPT | Performed by: INTERNAL MEDICINE

## 2020-03-06 PROCEDURE — 3008F BODY MASS INDEX DOCD: CPT | Performed by: INTERNAL MEDICINE

## 2020-03-06 PROCEDURE — 1036F TOBACCO NON-USER: CPT | Performed by: INTERNAL MEDICINE

## 2020-03-06 RX ORDER — SODIUM CHLORIDE 9 MG/ML
20 INJECTION, SOLUTION INTRAVENOUS ONCE AS NEEDED
Status: CANCELLED | OUTPATIENT
Start: 2020-03-23

## 2020-03-06 RX ORDER — DEXTROSE MONOHYDRATE 50 MG/ML
20 INJECTION, SOLUTION INTRAVENOUS ONCE
Status: CANCELLED | OUTPATIENT
Start: 2020-05-18

## 2020-03-06 RX ORDER — DEXTROSE MONOHYDRATE 50 MG/ML
20 INJECTION, SOLUTION INTRAVENOUS ONCE
Status: CANCELLED | OUTPATIENT
Start: 2020-04-20

## 2020-03-06 RX ORDER — DEXTROSE MONOHYDRATE 50 MG/ML
20 INJECTION, SOLUTION INTRAVENOUS ONCE
Status: CANCELLED | OUTPATIENT
Start: 2020-05-04

## 2020-03-06 RX ORDER — SODIUM CHLORIDE 9 MG/ML
20 INJECTION, SOLUTION INTRAVENOUS ONCE AS NEEDED
Status: CANCELLED | OUTPATIENT
Start: 2020-06-15

## 2020-03-06 RX ORDER — DEXTROSE MONOHYDRATE 50 MG/ML
20 INJECTION, SOLUTION INTRAVENOUS ONCE
Status: CANCELLED | OUTPATIENT
Start: 2020-06-01

## 2020-03-06 RX ORDER — DEXTROSE MONOHYDRATE 50 MG/ML
20 INJECTION, SOLUTION INTRAVENOUS ONCE
Status: CANCELLED | OUTPATIENT
Start: 2020-06-15

## 2020-03-06 RX ORDER — SODIUM CHLORIDE 9 MG/ML
20 INJECTION, SOLUTION INTRAVENOUS ONCE AS NEEDED
Status: CANCELLED | OUTPATIENT
Start: 2020-05-04

## 2020-03-06 RX ORDER — DEXTROSE MONOHYDRATE 50 MG/ML
20 INJECTION, SOLUTION INTRAVENOUS ONCE
Status: CANCELLED | OUTPATIENT
Start: 2020-04-06

## 2020-03-06 RX ORDER — SODIUM CHLORIDE 9 MG/ML
20 INJECTION, SOLUTION INTRAVENOUS ONCE AS NEEDED
Status: CANCELLED | OUTPATIENT
Start: 2020-04-20

## 2020-03-06 RX ORDER — SODIUM CHLORIDE 9 MG/ML
20 INJECTION, SOLUTION INTRAVENOUS ONCE AS NEEDED
Status: CANCELLED | OUTPATIENT
Start: 2020-06-01

## 2020-03-06 RX ORDER — SODIUM CHLORIDE 9 MG/ML
20 INJECTION, SOLUTION INTRAVENOUS ONCE AS NEEDED
Status: CANCELLED | OUTPATIENT
Start: 2020-04-06

## 2020-03-06 RX ORDER — SODIUM CHLORIDE 9 MG/ML
20 INJECTION, SOLUTION INTRAVENOUS ONCE AS NEEDED
Status: CANCELLED | OUTPATIENT
Start: 2020-05-18

## 2020-03-06 RX ORDER — DEXTROSE MONOHYDRATE 50 MG/ML
20 INJECTION, SOLUTION INTRAVENOUS ONCE
Status: CANCELLED | OUTPATIENT
Start: 2020-03-23

## 2020-03-06 NOTE — PROGRESS NOTES
Hematology / Oncology Outpatient Follow Up Note    Triny Matta 47 y o  female :1965 TVI:[id number]         Date:  3/6/2020    Assessment / Plan:  A 27-year-old female with metastatic colon cancer  She has extensive liver metastasis  Her tumor has K-wanda mutation and is MSI stable  She had 12 cycle of FOLFOX with bevacizumab with excellent tolerance, resulting in partial response   Since 2018, she has been on maintenance bevacizumab monotherapy   She underwent palliative resection of transverse colon due to the bleeding   She fully recovered from the surgery   Unfortunately, she had CEA progression   Therefore, she is back on bevacizumab and infusion of 5 FU with no toxicity  Clinically, she has no tumor related symptomatology  She has no evidence of progression, radiographically  I recommended her to continue with bevacizumab and infusional 5 FU every 2 weeks  She is in agreement with my recommendation  I will see her again in 3 months with another CT scan of chest abdomen pelvis as well as CEA         Subjective:      HPI:  A 27-year-old female who was found to have severe microcytic anemia in 2018   Therefore, she was advised to be hospitalized  Codi Gallardo was given transfusion  Ankur Fend was performed which did not show any major pathology   As outpatient basis, she underwent CT scan of abdomen pelvis which showed transverse colon mass as well as multiple liver metastatic disease   She subsequently underwent liver biopsy which showed metastatic adenocarcinoma, consistent with colorectal primary   She presents today to discuss treatment options   Since 2018, she lost 25 pound  Codi Gallardo has mildly anorexic  [facility], she has no complaint of pain   She denied any respiratory symptoms    She has normal bowel movement    Prior to the hospitalization, she had slowly progressive fatigue as well as some exertional shortness of breath   She is currently on oral iron 3 times per day  Codi Gallardo has rheumatoid arthritis for which she is on weekly methotrexate and folic acid   She has no family history of colorectal cancer  Kristine Varela is a lifetime never smoker   Her performance status is normal            Interval History:   A 47year-old female with metastatic colon cancer with extensive liver metastasis  Her tumor has K-wanda mutation and is MSI stable   She started systemic chemotherapy with FOLFOX-6 which she tolerated very well  We were notified by pathology Department that molecular testing cannot be performed on liver biopsy tissue   She was treated with 12 cycle of FOLFOX with bevacizumab with excellent tolerance   She had good partial response   Since April 2019, she was on maintenance bevacizumab monotherapy   In summer of 2019, she underwent palliative resection of transverse colon, because of the chronic blood loss   Postoperatively, she has elevation of CEA for which she is back on infusional 5-FU as well as bevacizumab   She has been tolerating treatment very well  She presents today for routine follow-up  Her recent CT scan shows stable liver metastasis  There was no new metastatic disease  She continued to do well with minimal symptomatology  Her blood pressure is normal   She denied any pain  He has normal bowel movement  Her weight is stable  She has no respiratory symptoms  Her performance status is normal                                                                                Objective:      Primary Diagnosis:     Metastatic colon cancer   K-wanda mutation positive   BRAF wild type   MSI stable   Diagnosed in October 2018      Cancer Staging:  Cancer Staging  No matching staging information was found for the patient         Previous Hematologic/ Oncologic Treatment:       1  Bevacizumab with FOLFOX-6   X12 cycle   Completed in April 2019    2  Bevacizumab monotherapy from April 2019 through October 2019       Current Hematologic/ Oncologic Treatment:       Bevacizumab with infusion of 5 FU since early November 2019       Disease Status:      Stable disease on current treatment      Test Results:     Pathology:     Liver biopsy showed metastatic adenocarcinoma, consistent with colorectal primary   Molecular testing could not be performed per pathology department      Radiology:     CT scan of chest abdomen pelvis in March 2020 showed stable liver metastasis  No new metastatic disease      Laboratory:     See below      Physical Exam:        General Appearance:    Alert, oriented          Eyes:    PERRL   Ears:    Normal external ear canals, both ears   Nose:   Nares normal, septum midline   Throat:   Mucosa moist  Pharynx without injection  Neck:   Supple         Lungs:     Clear to auscultation bilaterally   Chest Wall:    No tenderness or deformity    Heart:    Regular rate and rhythm         Abdomen:     Soft, non-tender, bowel sounds +, no organomegaly               Extremities:   Extremities no cyanosis or edema         Skin:   no rash or icterus  Lymph nodes:   Cervical, supraclavicular, and axillary nodes normal   Neurologic:   CNII-XII intact, normal strength, sensation and reflexes     Throughout           ROS: Review of Systems   All other systems reviewed and are negative  Imaging: Ct Chest Abdomen Pelvis W Contrast    Result Date: 3/5/2020  Narrative: CT CHEST, ABDOMEN AND PELVIS WITH IV CONTRAST INDICATION:   C18 9: Malignant neoplasm of colon, unspecified C78 7: Secondary malignant neoplasm of liver and intrahepatic bile duct  Dr Briseyda Jones note from 1/24/2020 was reviewed  Patient has history of metastatic colon cancer with extensive liver metastases  Patient had been on maintenance bevacizumab but had CEA progression, therefore infusion 5 FU was added  Patient also has had palliative transverse colon resection due to bleeding  COMPARISON:  Chest, abdomen, and pelvic CT from 11/29/2019  Older CT from 1/29/2019 was also reviewed   TECHNIQUE: CT examination of the chest, abdomen and pelvis was performed  Axial, sagittal, and coronal 2D reformatted images were created from the source data and submitted for interpretation  Radiation dose length product (DLP) for this visit:  520 mGy-cm   This examination, like all CT scans performed in the [facility], was performed utilizing techniques to minimize radiation dose exposure, including the use of iterative reconstruction and automated exposure control  IV Contrast:  100 mL of iohexol (OMNIPAQUE) Enteric Contrast: Enteric contrast was administered  FINDINGS: CHEST LUNGS:  On series 2 image 17, again seen is a 3 mm groundglass right upper lobe pulmonary nodule  This has been stable dating back to 1/29/2019  There are no new pulmonary nodules  There is no tracheal or endobronchial lesion  PLEURA:  Unchanged small amount of loculated pleural fluid in the inferior aspect of the right major fissure (series 2 image 33 ) HEART/GREAT VESSELS:  Unremarkable for patient's age  There is a Port-A-Cath in place  MEDIASTINUM AND ANTHONY:  Unremarkable  CHEST WALL AND LOWER NECK:   Unremarkable  ABDOMEN LIVER/BILIARY TREE:  Again seen are numerous liver lesions consistent with metastatic disease  Several of the larger and more discrete lesions will be measured and described on series 2: Image 45, right lobe segment 8, 1 8 x 1 6 cm, unchanged  Image 46, segment 2/3, 2 9 x 1 5 cm, unchanged  Image 53, segment 7, 2 1 x 1 7 cm, unchanged  Image 55, left lobe segment 2/3, 1 7 x 1 8 cm, unchanged  Image 64, segment 4, 1 5 x 1 4 cm, unchanged  There are no new lesions  GALLBLADDER:  There are several small calcified gallstones within the gallbladder, without pericholecystic inflammatory changes  SPLEEN:  Unremarkable  PANCREAS:  Unremarkable  ADRENAL GLANDS:  Unremarkable  KIDNEYS/URETERS:  Several small simple cysts in the left kidney upper pole  Kidneys and ureters are otherwise unremarkable   STOMACH AND BOWEL:  Status post right hemicolectomy  Residual bowel loops are unremarkable  APPENDIX:  No findings to suggest appendicitis  ABDOMINOPELVIC CAVITY:  No ascites or free intraperitoneal air  No lymphadenopathy  VESSELS:  Unremarkable for patient's age  PELVIS REPRODUCTIVE ORGANS:  Unremarkable for patient's age  URINARY BLADDER:  Unremarkable  ABDOMINAL WALL/INGUINAL REGIONS:  There is midline abdominal incision scar  OSSEOUS STRUCTURES:  No acute fracture or destructive osseous lesion  Impression: In the chest, there is a stable 3 mm right upper lobe groundglass nodule since 1/29/2019, likely benign  No new pulmonary nodules  Unchanged small amount of loculated pleural fluid in the inferior aspect of the right major fissure (series 2 image 33 ) In the abdomen and pelvis, again seen are numerous liver metastases, unchanged compared to 11/29/2019  There are no new foci of metastatic disease  Workstation performed: [id number]         Labs:   Lab Results   Component Value Date    WBC 4 57 03/05/2020    HGB 12 1 03/05/2020    HCT 39 1 03/05/2020    MCV 91 03/05/2020     03/05/2020     Lab Results   Component Value Date    K 4 1 03/05/2020     03/05/2020    CO2 27 03/05/2020    BUN 17 03/05/2020    CREATININE 0 79 03/05/2020    GLUF 94 03/05/2020    CALCIUM 9 2 03/05/2020    AST 56 (H) 03/05/2020    ALT 82 (H) 03/05/2020    ALKPHOS 332 (H) 03/05/2020    EGFR 85 03/05/2020         Lab Results   Component Value Date     (H) 09/24/2018         Lab Results   Component Value Date    CEA 30 4 (H) 12/27/2019         Lab Results   Component Value Date    IRON 11 (L) 09/13/2018    TIBC 302 09/13/2018    FERRITIN 15 09/13/2018       No results found for: [id number]    Lab Results   Component Value Date    FOLATE 11 4 09/13/2018         Current Medications: Reviewed  Allergies: Reviewed  PMH/FH/SH:  Reviewed      Vital Sign:    Body surface area is 1 68 meters squared      Wt Readings from Last 3 Encounters:   03/06/20 62 1 kg (137 lb)   02/24/20 62 kg (136 lb 11 oz)   02/10/20 61 6 kg (135 lb 12 9 oz)        Temp Readings from Last 3 Encounters:   03/06/20 97 8 °F (36 6 °C) (Tympanic Core)   02/24/20 97 7 °F (36 5 °C) (Temporal)   02/10/20 98 4 °F (36 9 °C) (Tympanic)        BP Readings from Last 3 Encounters:   03/06/20 122/78   02/24/20 124/80   02/10/20 129/84         Pulse Readings from Last 3 Encounters:   03/06/20 87   02/24/20 88   02/10/20 78     @LASTSAO2(3)@

## 2020-03-09 ENCOUNTER — HOSPITAL ENCOUNTER (OUTPATIENT)
Dept: INFUSION CENTER | Facility: HOSPITAL | Age: 55
Discharge: HOME | End: 2020-03-09
Payer: COMMERCIAL

## 2020-03-09 VITALS
WEIGHT: 138.45 LBS | RESPIRATION RATE: 16 BRPM | BODY MASS INDEX: 23.07 KG/M2 | HEIGHT: 65 IN | DIASTOLIC BLOOD PRESSURE: 77 MMHG | TEMPERATURE: 98.3 F | SYSTOLIC BLOOD PRESSURE: 125 MMHG | HEART RATE: 88 BPM

## 2020-03-09 DIAGNOSIS — C18.9 COLON CANCER METASTASIZED TO LIVER (HCC): Primary | ICD-10-CM

## 2020-03-09 DIAGNOSIS — C78.7 COLON CANCER METASTASIZED TO LIVER (HCC): Primary | ICD-10-CM

## 2020-03-09 PROCEDURE — G0498 CHEMO EXTEND IV INFUS W/PUMP: HCPCS

## 2020-03-09 PROCEDURE — 96367 TX/PROPH/DG ADDL SEQ IV INF: CPT

## 2020-03-09 PROCEDURE — 96413 CHEMO IV INFUSION 1 HR: CPT

## 2020-03-09 RX ORDER — SODIUM CHLORIDE 9 MG/ML
20 INJECTION, SOLUTION INTRAVENOUS ONCE AS NEEDED
Status: DISCONTINUED | OUTPATIENT
Start: 2020-03-09 | End: 2020-03-13 | Stop reason: HOSPADM

## 2020-03-09 RX ORDER — DEXTROSE MONOHYDRATE 50 MG/ML
20 INJECTION, SOLUTION INTRAVENOUS ONCE
Status: DISCONTINUED | OUTPATIENT
Start: 2020-03-09 | End: 2020-03-13 | Stop reason: HOSPADM

## 2020-03-09 RX ADMIN — LEUCOVORIN CALCIUM 656 MG: 350 INJECTION, POWDER, LYOPHILIZED, FOR SOLUTION INTRAMUSCULAR; INTRAVENOUS at 12:36

## 2020-03-09 RX ADMIN — SODIUM CHLORIDE 20 ML/HR: 9 INJECTION, SOLUTION INTRAVENOUS at 09:25

## 2020-03-09 RX ADMIN — DEXAMETHASONE SODIUM PHOSPHATE: 10 INJECTION, SOLUTION INTRAMUSCULAR; INTRAVENOUS at 09:52

## 2020-03-09 RX ADMIN — BEVACIZUMAB 292.5 MG: 100 INJECTION, SOLUTION INTRAVENOUS at 11:18

## 2020-03-09 NOTE — PLAN OF CARE
Problem: Knowledge Deficit  Goal: Patient/family/caregiver demonstrates understanding of disease process, treatment plan, medications, and discharge instructions  Description  Complete learning assessment and assess knowledge base    Interventions:  - Provide teaching at level of understanding  - Provide teaching via preferred learning methods  3/9/2020 1409 by Christiana Kerr RN  Outcome: Progressing  3/9/2020 1409 by Christiana Kerr RN  Outcome: Progressing

## 2020-03-09 NOTE — PROGRESS NOTES
Pt tolerated avastin and  leucovorin   5fu CADD pump applied for the next 46 hours  Pt is familiar with device  All connections secured with tape   Discharged ambulatory deferring avs

## 2020-03-11 ENCOUNTER — TELEPHONE (OUTPATIENT)
Dept: GASTROENTEROLOGY | Facility: AMBULARY SURGERY CENTER | Age: 55
End: 2020-03-11

## 2020-03-11 ENCOUNTER — HOSPITAL ENCOUNTER (OUTPATIENT)
Dept: INFUSION CENTER | Facility: HOSPITAL | Age: 55
Discharge: HOME | End: 2020-03-11

## 2020-03-11 ENCOUNTER — DOCUMENTATION (OUTPATIENT)
Dept: HEMATOLOGY ONCOLOGY | Facility: CLINIC | Age: 55
End: 2020-03-11

## 2020-03-11 DIAGNOSIS — C78.7 COLON CANCER METASTASIZED TO LIVER (HCC): Primary | ICD-10-CM

## 2020-03-11 DIAGNOSIS — K92.1 BLOOD IN STOOL: ICD-10-CM

## 2020-03-11 DIAGNOSIS — C18.9 COLON CANCER METASTASIZED TO LIVER (HCC): Primary | ICD-10-CM

## 2020-03-11 NOTE — PROGRESS NOTES
Patient is NOT to receive Avastin until she sees a GI MD  She has blood in her stool at this time  Referral placed, STAT for GI doctor  Will make infusion center aware of above

## 2020-03-11 NOTE — PROGRESS NOTES
Time out from [facility] to hold avastin until pt sees dr Nogueira Manlius due to bloody stools  order removed from beacon plan confirmed

## 2020-03-12 ENCOUNTER — TELEPHONE (OUTPATIENT)
Dept: SURGICAL ONCOLOGY | Facility: CLINIC | Age: 55
End: 2020-03-12

## 2020-03-12 ENCOUNTER — TELEPHONE (OUTPATIENT)
Dept: GASTROENTEROLOGY | Facility: CLINIC | Age: 55
End: 2020-03-12

## 2020-03-12 ENCOUNTER — TRANSCRIBE ORDERS (OUTPATIENT)
Dept: GASTROENTEROLOGY | Facility: CLINIC | Age: 55
End: 2020-03-12

## 2020-03-12 ENCOUNTER — CONSULT (OUTPATIENT)
Dept: GASTROENTEROLOGY | Facility: CLINIC | Age: 55
End: 2020-03-12
Payer: COMMERCIAL

## 2020-03-12 VITALS
WEIGHT: 138 LBS | HEART RATE: 81 BPM | DIASTOLIC BLOOD PRESSURE: 89 MMHG | HEIGHT: 65 IN | SYSTOLIC BLOOD PRESSURE: 126 MMHG | BODY MASS INDEX: 22.99 KG/M2 | TEMPERATURE: 97.7 F

## 2020-03-12 DIAGNOSIS — K92.1 BLOOD IN STOOL: ICD-10-CM

## 2020-03-12 DIAGNOSIS — C18.9 COLON CANCER METASTASIZED TO LIVER (HCC): ICD-10-CM

## 2020-03-12 DIAGNOSIS — C78.7 COLON CANCER METASTASIZED TO LIVER (HCC): ICD-10-CM

## 2020-03-12 PROCEDURE — 99244 OFF/OP CNSLTJ NEW/EST MOD 40: CPT | Performed by: INTERNAL MEDICINE

## 2020-03-12 NOTE — TELEPHONE ENCOUNTER
Dr Bhavana Crystal and Dr Neeraj Byrd both agreed Charlie Leija does not need to be seen by Dr Bhavana Crystal tomorrow  Dr Bhavana Crystal will see the pt back after her CT scan at the end of May  Notified patient and rescheduled appt to 6/5 at 9am  Pt verbally understands

## 2020-03-12 NOTE — TELEPHONE ENCOUNTER
Colonoscopy scheduled 04/30/20 with Dr Juan C Lanier in [place]  Verbal instructions given by Maria T during OV  Given again over the phone and mailed paperwork

## 2020-03-12 NOTE — PROGRESS NOTES
[facility] - Outpatient Consultation  Hortensia Garza 47 y o  female MRN: [id number]  Encounter: [id number]          ASSESSMENT AND PLAN:  Ms Tone Dominguez was seen today for history of Stage IV colon cancer with new onset of hematochezia  Diagnoses and all orders for this visit:    Colon cancer metastasized to liver [facility]): She was diagnosed via CT in 2018 and given that she presented with liver mets and already had Stage IV disease she was not referred to GI at the time of her diagnosis  She is currently on a chemo regimen  Blood in stool: She reports blood mixed in with her stool for the past 3 days  I will performed colonoscopy to assess for malignancy as the cause vs other benign lesion  She is already s/p palliative colon resection  Procedure was discussed  Risks include but are not limited to bleeding, perforation, missed lesion  She is agreeable to the procedure  Bowel prep instructions given  She is very functional and is still working at [employer] currently  cedure were discussed  Risks include but are not limited to bleeding, perforation, missed lesion  She is agreeable to the procedure  Bowel prep instructions given  Follow-up after colonoscopy      ______________________________________________________________________    HPI:  Hortensia Garza is a 47 y o  female with RA, megoblastic anemia, HLD referred by Dr Juvencio Ramey office for evaluation and management of blood in the stool secondary to colon cancer with metastasis to the liver  Her colon (primary) and liver cancer were detected by CT scan for anemia in December 2018  She had resection of the transverse colon with Dr Acacia Miller in August 2019  She has not had prior colonoscopy  She follows with Dr Stu Fuller for cancer treatment  She had treatment Monday 3/9/2020 and noticed bright red blood mixed in with stool 3/10, 3/11 and today, slightly less each day   She denies diarrhea, weight loss, lightheadedness of dizziness  She denies FHx of colon cancer  She is a former smoker and does not drink alcohol  REVIEW OF SYSTEMS:    CONSTITUTIONAL: Denies any fever, chills, rigors, and weight loss  HEENT: No earache or tinnitus  Denies hearing loss or visual disturbances  CARDIOVASCULAR: No chest pain or palpitations  RESPIRATORY: Denies any cough, hemoptysis, shortness of breath or dyspnea on exertion  GASTROINTESTINAL: As noted in the History of Present Illness  GENITOURINARY: No problems with urination  Denies any hematuria or dysuria  NEUROLOGIC: No dizziness or vertigo, denies headaches  MUSCULOSKELETAL: Denies any muscle or joint pain  SKIN: Denies skin rashes or itching  ENDOCRINE: Denies excessive thirst  Denies intolerance to heat or cold  PSYCHOSOCIAL: Denies depression or anxiety  Denies any recent memory loss  Historical Information   Past Medical History:   Diagnosis Date    Anxious mood     Cancer [facility] )     [facility] )     History of chemotherapy     Rheumatoid arthritis ([place] [address] )      Past Surgical History:   Procedure Laterality Date    APPENDECTOMY      ESOPHAGOGASTRODUODENOSCOPY N/A 9/14/2018    Procedure: ESOPHAGOGASTRODUODENOSCOPY (EGD) with polypectomy;  Surgeon: Noe Hubbard MD;  Location: [facility];   Service: Gastroenterology    IR IMAGE GUIDED [address] [place] [address]  10/25/2018    IR PORT PLACEMENT  11/7/2018    r chest    SC PART REMOVAL COLON W ANASTOMOSIS N/A 8/29/2019    Procedure: EXTENDED RIGHT COLON RESECTION;  Surgeon: Ronni Sandoval MD;  Location:  MAIN OR;  Service: Surgical Oncology    TUBAL LIGATION      resolved 2007    WISDOM TOOTH EXTRACTION      resolved 1990     Social History   Social History     Substance and Sexual Activity   Alcohol Use Not Currently    Alcohol/week: 0 0 standard drinks    Frequency: Never    Drinks per session: Patient refused    Binge frequency: Never     Social History     Substance and Sexual Activity   Drug Use No     Social History     Tobacco Use   Smoking Status Former Smoker    Packs/day: 0 00    Years: 0 00    Pack years: 0 00    Last attempt to quit: 2000    Years since quittin 5   Smokeless Tobacco Never Used     Family History   Problem Relation Age of Onset    Anxiety disorder Mother     Hypertension Father     Diabetes Father     Heart attack Maternal Grandmother         acute MI       Meds/Allergies       Current Outpatient Medications:     cyanocobalamin (VITAMIN B-12) 1,000 mcg tablet    folic acid (FOLVITE) 1 mg tablet    methotrexate 2 5 mg tablet    Multiple Vitamin (MULTIVITAMIN) capsule    Omega-3 Fatty Acids (FISH OIL PO)    ferrous sulfate 325 (65 Fe) mg tablet    Na Sulfate-K Sulfate-Mg Sulf (Suprep Bowel Prep Kit) 17 5-3 13-1 6 GM/177ML SOLN  No current facility-administered medications for this visit  Facility-Administered Medications Ordered in Other Visits:     dextrose 5 % infusion, 20 mL/hr, Intravenous, Once    sodium chloride 0 9 % infusion, 20 mL/hr, Intravenous, Once PRN, 20 mL/hr at 20 0925    No Known Allergies        Objective     Blood pressure 126/89, pulse 81, temperature 97 7 °F (36 5 °C), temperature source Tympanic, height 5' 5" (1 651 m), weight 62 6 kg (138 lb), last menstrual period 2018, not currently breastfeeding  Body mass index is 22 96 kg/m²  PHYSICAL EXAM:      General Appearance:   Alert, cooperative, no distress   HEENT:   Normocephalic, atraumatic, anicteric      Neck:  Supple, symmetrical, trachea midline   Lungs:   Clear to auscultation bilaterally; no rales, rhonchi or wheezing; respirations unlabored    Heart[de-identified]   Regular rate and rhythm; no murmur, rub, or gallop     Abdomen:   Soft, non-tender, non-distended; normal bowel sounds; no masses, no organomegaly    Genitalia:   Deferred    Rectal:   Deferred    Extremities:  No cyanosis, clubbing or edema    Pulses:  2+ and symmetric    Skin:  No jaundice, rashes, or lesions    Lymph nodes:  No palpable cervical lymphadenopathy        Lab Results:   No visits with results within 1 Day(s) from this visit  Latest known visit with results is:   Appointment on 03/05/2020   Component Date Value    WBC 03/05/2020 4 57     RBC 03/05/2020 4 30     Hemoglobin 03/05/2020 12 1     Hematocrit 03/05/2020 39 1     MCV 03/05/2020 91     MCH 03/05/2020 28 1     MCHC 03/05/2020 30 9*    RDW 03/05/2020 20 5*    MPV 03/05/2020 10 3     Platelets 04/89/8053 159     nRBC 03/05/2020 0     Neutrophils Relative 03/05/2020 66     Immat GRANS % 03/05/2020 0     Lymphocytes Relative 03/05/2020 21     Monocytes Relative 03/05/2020 10     Eosinophils Relative 03/05/2020 2     Basophils Relative 03/05/2020 1     Neutrophils Absolute 03/05/2020 3 01     Immature Grans Absolute 03/05/2020 0 02     Lymphocytes Absolute 03/05/2020 0 94     Monocytes Absolute 03/05/2020 0 47     Eosinophils Absolute 03/05/2020 0 10     Basophils Absolute 03/05/2020 0 03     Sodium 03/05/2020 141     Potassium 03/05/2020 4 1     Chloride 03/05/2020 107     CO2 03/05/2020 27     ANION GAP 03/05/2020 7     BUN 03/05/2020 17     Creatinine 03/05/2020 0 79     Glucose, Fasting 03/05/2020 94     Calcium 03/05/2020 9 2     AST 03/05/2020 56*    ALT 03/05/2020 82*    Alkaline Phosphatase 03/05/2020 332*    Total Protein 03/05/2020 7 4     Albumin 03/05/2020 3 4*    Total Bilirubin 03/05/2020 0 86     eGFR 03/05/2020 85          Radiology Results:   Ct Chest Abdomen Pelvis W Contrast    Result Date: 3/5/2020  Narrative: CT CHEST, ABDOMEN AND PELVIS WITH IV CONTRAST INDICATION:   C18 9: Malignant neoplasm of colon, unspecified C78 7: Secondary malignant neoplasm of liver and intrahepatic bile duct  Dr Nikita Lawrence note from 1/24/2020 was reviewed  Patient has history of metastatic colon cancer with extensive liver metastases    Patient had been on maintenance bevacizumab but had CEA progression, therefore infusion 5 FU was added  Patient also has had palliative transverse colon resection due to bleeding  COMPARISON:  Chest, abdomen, and pelvic CT from 11/29/2019  Older CT from 1/29/2019 was also reviewed  TECHNIQUE: CT examination of the chest, abdomen and pelvis was performed  Axial, sagittal, and coronal 2D reformatted images were created from the source data and submitted for interpretation  Radiation dose length product (DLP) for this visit:  520 mGy-cm   This examination, like all CT scans performed in the [facility], was performed utilizing techniques to minimize radiation dose exposure, including the use of iterative reconstruction and automated exposure control  IV Contrast:  100 mL of iohexol (OMNIPAQUE) Enteric Contrast: Enteric contrast was administered  FINDINGS: CHEST LUNGS:  On series 2 image 17, again seen is a 3 mm groundglass right upper lobe pulmonary nodule  This has been stable dating back to 1/29/2019  There are no new pulmonary nodules  There is no tracheal or endobronchial lesion  PLEURA:  Unchanged small amount of loculated pleural fluid in the inferior aspect of the right major fissure (series 2 image 33 ) HEART/GREAT VESSELS:  Unremarkable for patient's age  There is a Port-A-Cath in place  MEDIASTINUM AND ANTHONY:  Unremarkable  CHEST WALL AND LOWER NECK:   Unremarkable  ABDOMEN LIVER/BILIARY TREE:  Again seen are numerous liver lesions consistent with metastatic disease  Several of the larger and more discrete lesions will be measured and described on series 2: Image 45, right lobe segment 8, 1 8 x 1 6 cm, unchanged  Image 46, segment 2/3, 2 9 x 1 5 cm, unchanged  Image 53, segment 7, 2 1 x 1 7 cm, unchanged  Image 55, left lobe segment 2/3, 1 7 x 1 8 cm, unchanged  Image 64, segment 4, 1 5 x 1 4 cm, unchanged  There are no new lesions   GALLBLADDER:  There are several small calcified gallstones within the gallbladder, without pericholecystic inflammatory changes  SPLEEN:  Unremarkable  PANCREAS:  Unremarkable  ADRENAL GLANDS:  Unremarkable  KIDNEYS/URETERS:  Several small simple cysts in the left kidney upper pole  Kidneys and ureters are otherwise unremarkable  STOMACH AND BOWEL:  Status post right hemicolectomy  Residual bowel loops are unremarkable  APPENDIX:  No findings to suggest appendicitis  ABDOMINOPELVIC CAVITY:  No ascites or free intraperitoneal air  No lymphadenopathy  VESSELS:  Unremarkable for patient's age  PELVIS REPRODUCTIVE ORGANS:  Unremarkable for patient's age  URINARY BLADDER:  Unremarkable  ABDOMINAL WALL/INGUINAL REGIONS:  There is midline abdominal incision scar  OSSEOUS STRUCTURES:  No acute fracture or destructive osseous lesion  Impression: In the chest, there is a stable 3 mm right upper lobe groundglass nodule since 1/29/2019, likely benign  No new pulmonary nodules  Unchanged small amount of loculated pleural fluid in the inferior aspect of the right major fissure (series 2 image 33 ) In the abdomen and pelvis, again seen are numerous liver metastases, unchanged compared to 11/29/2019  There are no new foci of metastatic disease  Workstation performed: [id number]     Attestation:   By signing my name below, Teresita Michelle, attest that this documentation has been prepared under the direction and in the presence of ERICKA Taylor  Electronically Signed: Carla Poole  3/12/2020       I, Tatyana Armando, personally performed the services described in this documentation  All medical record entries made by the addieibemil were at my direction and in my presence  I have reviewed the chart and discharge instructions and agree that the record reflects my personal performance and is accurate and complete   ERICKA Taylor  3/12/2020

## 2020-03-16 DIAGNOSIS — C78.7 COLON CANCER METASTASIZED TO LIVER (HCC): Primary | ICD-10-CM

## 2020-03-16 DIAGNOSIS — C18.9 COLON CANCER METASTASIZED TO LIVER (HCC): Primary | ICD-10-CM

## 2020-03-20 ENCOUNTER — APPOINTMENT (OUTPATIENT)
Dept: LAB | Age: 55
End: 2020-03-20
Payer: COMMERCIAL

## 2020-03-20 DIAGNOSIS — C18.9 COLON CANCER METASTASIZED TO LIVER (HCC): ICD-10-CM

## 2020-03-20 DIAGNOSIS — C78.7 COLON CANCER METASTASIZED TO LIVER (HCC): ICD-10-CM

## 2020-03-20 LAB
ALBUMIN SERPL BCP-MCNC: 3.6 G/DL (ref 3.5–5)
ALP SERPL-CCNC: 307 U/L (ref 46–116)
ALT SERPL W P-5'-P-CCNC: 77 U/L (ref 12–78)
ANION GAP SERPL CALCULATED.3IONS-SCNC: 4 MMOL/L (ref 4–13)
AST SERPL W P-5'-P-CCNC: 57 U/L (ref 5–45)
BASOPHILS # BLD AUTO: 0.04 THOUSANDS/ΜL (ref 0–0.1)
BASOPHILS NFR BLD AUTO: 1 % (ref 0–1)
BILIRUB SERPL-MCNC: 0.95 MG/DL (ref 0.2–1)
BUN SERPL-MCNC: 19 MG/DL (ref 5–25)
CALCIUM SERPL-MCNC: 9.3 MG/DL (ref 8.3–10.1)
CHLORIDE SERPL-SCNC: 105 MMOL/L (ref 100–108)
CO2 SERPL-SCNC: 27 MMOL/L (ref 21–32)
CREAT SERPL-MCNC: 0.8 MG/DL (ref 0.6–1.3)
EOSINOPHIL # BLD AUTO: 0.1 THOUSAND/ΜL (ref 0–0.61)
EOSINOPHIL NFR BLD AUTO: 2 % (ref 0–6)
ERYTHROCYTE [DISTWIDTH] IN BLOOD BY AUTOMATED COUNT: 20.2 % (ref 11.6–15.1)
GFR SERPL CREATININE-BSD FRML MDRD: 84 ML/MIN/1.73SQ M
GLUCOSE P FAST SERPL-MCNC: 84 MG/DL (ref 65–99)
HCT VFR BLD AUTO: 40.1 % (ref 34.8–46.1)
HGB BLD-MCNC: 12.6 G/DL (ref 11.5–15.4)
IMM GRANULOCYTES # BLD AUTO: 0.01 THOUSAND/UL (ref 0–0.2)
IMM GRANULOCYTES NFR BLD AUTO: 0 % (ref 0–2)
LYMPHOCYTES # BLD AUTO: 1.91 THOUSANDS/ΜL (ref 0.6–4.47)
LYMPHOCYTES NFR BLD AUTO: 36 % (ref 14–44)
MCH RBC QN AUTO: 28.3 PG (ref 26.8–34.3)
MCHC RBC AUTO-ENTMCNC: 31.4 G/DL (ref 31.4–37.4)
MCV RBC AUTO: 90 FL (ref 82–98)
MONOCYTES # BLD AUTO: 0.6 THOUSAND/ΜL (ref 0.17–1.22)
MONOCYTES NFR BLD AUTO: 11 % (ref 4–12)
NEUTROPHILS # BLD AUTO: 2.65 THOUSANDS/ΜL (ref 1.85–7.62)
NEUTS SEG NFR BLD AUTO: 50 % (ref 43–75)
NRBC BLD AUTO-RTO: 0 /100 WBCS
PLATELET # BLD AUTO: 188 THOUSANDS/UL (ref 149–390)
PMV BLD AUTO: 9.9 FL (ref 8.9–12.7)
POTASSIUM SERPL-SCNC: 4 MMOL/L (ref 3.5–5.3)
PROT SERPL-MCNC: 7.8 G/DL (ref 6.4–8.2)
RBC # BLD AUTO: 4.46 MILLION/UL (ref 3.81–5.12)
SODIUM SERPL-SCNC: 136 MMOL/L (ref 136–145)
WBC # BLD AUTO: 5.31 THOUSAND/UL (ref 4.31–10.16)

## 2020-03-20 PROCEDURE — 36415 COLL VENOUS BLD VENIPUNCTURE: CPT

## 2020-03-20 PROCEDURE — 85025 COMPLETE CBC W/AUTO DIFF WBC: CPT

## 2020-03-20 PROCEDURE — 80053 COMPREHEN METABOLIC PANEL: CPT

## 2020-03-23 ENCOUNTER — HOSPITAL ENCOUNTER (OUTPATIENT)
Dept: INFUSION CENTER | Facility: HOSPITAL | Age: 55
Discharge: HOME | End: 2020-03-23
Payer: COMMERCIAL

## 2020-03-23 VITALS
TEMPERATURE: 98.1 F | RESPIRATION RATE: 18 BRPM | BODY MASS INDEX: 22.77 KG/M2 | HEIGHT: 65 IN | HEART RATE: 76 BPM | SYSTOLIC BLOOD PRESSURE: 132 MMHG | WEIGHT: 136.69 LBS | DIASTOLIC BLOOD PRESSURE: 74 MMHG

## 2020-03-23 DIAGNOSIS — C18.9 COLON CANCER METASTASIZED TO LIVER (HCC): Primary | ICD-10-CM

## 2020-03-23 DIAGNOSIS — C78.7 COLON CANCER METASTASIZED TO LIVER (HCC): Primary | ICD-10-CM

## 2020-03-23 PROCEDURE — 96367 TX/PROPH/DG ADDL SEQ IV INF: CPT

## 2020-03-23 PROCEDURE — G0498 CHEMO EXTEND IV INFUS W/PUMP: HCPCS

## 2020-03-23 PROCEDURE — 96365 THER/PROPH/DIAG IV INF INIT: CPT

## 2020-03-23 RX ORDER — DEXTROSE MONOHYDRATE 50 MG/ML
20 INJECTION, SOLUTION INTRAVENOUS ONCE
Status: DISCONTINUED | OUTPATIENT
Start: 2020-03-23 | End: 2020-03-27 | Stop reason: HOSPADM

## 2020-03-23 RX ORDER — SODIUM CHLORIDE 9 MG/ML
20 INJECTION, SOLUTION INTRAVENOUS ONCE AS NEEDED
Status: DISCONTINUED | OUTPATIENT
Start: 2020-03-23 | End: 2020-03-27 | Stop reason: HOSPADM

## 2020-03-23 RX ADMIN — ONDANSETRON HYDROCHLORIDE: 2 INJECTION, SOLUTION INTRAMUSCULAR; INTRAVENOUS at 09:46

## 2020-03-23 RX ADMIN — LEUCOVORIN CALCIUM 656 MG: 350 INJECTION, POWDER, LYOPHILIZED, FOR SOLUTION INTRAMUSCULAR; INTRAVENOUS at 10:29

## 2020-03-23 RX ADMIN — SODIUM CHLORIDE 20 ML/HR: 0.9 INJECTION, SOLUTION INTRAVENOUS at 09:46

## 2020-03-25 ENCOUNTER — HOSPITAL ENCOUNTER (OUTPATIENT)
Dept: INFUSION CENTER | Facility: HOSPITAL | Age: 55
Discharge: HOME | End: 2020-03-25

## 2020-03-25 DIAGNOSIS — C18.9 COLON CANCER METASTASIZED TO LIVER (HCC): Primary | ICD-10-CM

## 2020-03-25 DIAGNOSIS — C78.7 COLON CANCER METASTASIZED TO LIVER (HCC): Primary | ICD-10-CM

## 2020-04-01 DIAGNOSIS — C78.7 COLON CANCER METASTASIZED TO LIVER (HCC): Primary | ICD-10-CM

## 2020-04-01 DIAGNOSIS — C18.9 COLON CANCER METASTASIZED TO LIVER (HCC): Primary | ICD-10-CM

## 2020-04-03 ENCOUNTER — APPOINTMENT (OUTPATIENT)
Dept: LAB | Facility: HOSPITAL | Age: 55
End: 2020-04-03
Attending: INTERNAL MEDICINE
Payer: COMMERCIAL

## 2020-04-03 DIAGNOSIS — C78.7 COLON CANCER METASTASIZED TO LIVER (HCC): ICD-10-CM

## 2020-04-03 DIAGNOSIS — C18.9 COLON CANCER METASTASIZED TO LIVER (HCC): ICD-10-CM

## 2020-04-03 LAB
ALBUMIN SERPL BCP-MCNC: 3.4 G/DL (ref 3.5–5)
ALP SERPL-CCNC: 300 U/L (ref 46–116)
ALT SERPL W P-5'-P-CCNC: 67 U/L (ref 12–78)
ANION GAP SERPL CALCULATED.3IONS-SCNC: 7 MMOL/L (ref 4–13)
AST SERPL W P-5'-P-CCNC: 51 U/L (ref 5–45)
BASOPHILS # BLD AUTO: 0.03 THOUSANDS/ΜL (ref 0–0.1)
BASOPHILS NFR BLD AUTO: 1 % (ref 0–1)
BILIRUB SERPL-MCNC: 0.82 MG/DL (ref 0.2–1)
BUN SERPL-MCNC: 10 MG/DL (ref 5–25)
CALCIUM SERPL-MCNC: 9.3 MG/DL (ref 8.3–10.1)
CHLORIDE SERPL-SCNC: 104 MMOL/L (ref 100–108)
CO2 SERPL-SCNC: 29 MMOL/L (ref 21–32)
CREAT SERPL-MCNC: 0.78 MG/DL (ref 0.6–1.3)
EOSINOPHIL # BLD AUTO: 0.07 THOUSAND/ΜL (ref 0–0.61)
EOSINOPHIL NFR BLD AUTO: 1 % (ref 0–6)
ERYTHROCYTE [DISTWIDTH] IN BLOOD BY AUTOMATED COUNT: 19.4 % (ref 11.6–15.1)
GFR SERPL CREATININE-BSD FRML MDRD: 86 ML/MIN/1.73SQ M
GLUCOSE P FAST SERPL-MCNC: 87 MG/DL (ref 65–99)
HCT VFR BLD AUTO: 41.4 % (ref 34.8–46.1)
HGB BLD-MCNC: 12.7 G/DL (ref 11.5–15.4)
IMM GRANULOCYTES # BLD AUTO: 0.02 THOUSAND/UL (ref 0–0.2)
IMM GRANULOCYTES NFR BLD AUTO: 0 % (ref 0–2)
LYMPHOCYTES # BLD AUTO: 1.49 THOUSANDS/ΜL (ref 0.6–4.47)
LYMPHOCYTES NFR BLD AUTO: 26 % (ref 14–44)
MCH RBC QN AUTO: 28.2 PG (ref 26.8–34.3)
MCHC RBC AUTO-ENTMCNC: 30.7 G/DL (ref 31.4–37.4)
MCV RBC AUTO: 92 FL (ref 82–98)
MONOCYTES # BLD AUTO: 0.49 THOUSAND/ΜL (ref 0.17–1.22)
MONOCYTES NFR BLD AUTO: 9 % (ref 4–12)
NEUTROPHILS # BLD AUTO: 3.54 THOUSANDS/ΜL (ref 1.85–7.62)
NEUTS SEG NFR BLD AUTO: 63 % (ref 43–75)
NRBC BLD AUTO-RTO: 0 /100 WBCS
PLATELET # BLD AUTO: 153 THOUSANDS/UL (ref 149–390)
PMV BLD AUTO: 9.3 FL (ref 8.9–12.7)
POTASSIUM SERPL-SCNC: 4 MMOL/L (ref 3.5–5.3)
PROT SERPL-MCNC: 7.9 G/DL (ref 6.4–8.2)
RBC # BLD AUTO: 4.5 MILLION/UL (ref 3.81–5.12)
SODIUM SERPL-SCNC: 140 MMOL/L (ref 136–145)
WBC # BLD AUTO: 5.64 THOUSAND/UL (ref 4.31–10.16)

## 2020-04-03 PROCEDURE — 36415 COLL VENOUS BLD VENIPUNCTURE: CPT

## 2020-04-03 PROCEDURE — 80053 COMPREHEN METABOLIC PANEL: CPT

## 2020-04-03 PROCEDURE — 85025 COMPLETE CBC W/AUTO DIFF WBC: CPT

## 2020-04-06 ENCOUNTER — HOSPITAL ENCOUNTER (OUTPATIENT)
Dept: INFUSION CENTER | Facility: HOSPITAL | Age: 55
Discharge: HOME | End: 2020-04-06
Payer: COMMERCIAL

## 2020-04-06 VITALS
HEART RATE: 74 BPM | SYSTOLIC BLOOD PRESSURE: 116 MMHG | DIASTOLIC BLOOD PRESSURE: 81 MMHG | RESPIRATION RATE: 16 BRPM | WEIGHT: 138.01 LBS | TEMPERATURE: 97.2 F | HEIGHT: 65 IN | BODY MASS INDEX: 22.99 KG/M2

## 2020-04-06 DIAGNOSIS — C18.9 COLON CANCER METASTASIZED TO LIVER (HCC): Primary | ICD-10-CM

## 2020-04-06 DIAGNOSIS — C78.7 COLON CANCER METASTASIZED TO LIVER (HCC): Primary | ICD-10-CM

## 2020-04-06 PROCEDURE — G0498 CHEMO EXTEND IV INFUS W/PUMP: HCPCS

## 2020-04-06 PROCEDURE — 96365 THER/PROPH/DIAG IV INF INIT: CPT

## 2020-04-06 PROCEDURE — 96367 TX/PROPH/DG ADDL SEQ IV INF: CPT

## 2020-04-06 RX ORDER — SODIUM CHLORIDE 9 MG/ML
20 INJECTION, SOLUTION INTRAVENOUS ONCE AS NEEDED
Status: DISCONTINUED | OUTPATIENT
Start: 2020-04-06 | End: 2020-04-10 | Stop reason: HOSPADM

## 2020-04-06 RX ORDER — DEXTROSE MONOHYDRATE 50 MG/ML
20 INJECTION, SOLUTION INTRAVENOUS ONCE
Status: DISCONTINUED | OUTPATIENT
Start: 2020-04-06 | End: 2020-04-10 | Stop reason: HOSPADM

## 2020-04-06 RX ADMIN — DEXAMETHASONE SODIUM PHOSPHATE: 10 INJECTION, SOLUTION INTRAMUSCULAR; INTRAVENOUS at 09:37

## 2020-04-06 RX ADMIN — SODIUM CHLORIDE 20 ML/HR: 0.9 INJECTION, SOLUTION INTRAVENOUS at 09:25

## 2020-04-06 RX ADMIN — LEUCOVORIN CALCIUM 656 MG: 350 INJECTION, POWDER, LYOPHILIZED, FOR SOLUTION INTRAMUSCULAR; INTRAVENOUS at 10:27

## 2020-04-08 ENCOUNTER — HOSPITAL ENCOUNTER (OUTPATIENT)
Dept: INFUSION CENTER | Facility: HOSPITAL | Age: 55
Discharge: HOME | End: 2020-04-08

## 2020-04-08 DIAGNOSIS — C78.7 COLON CANCER METASTASIZED TO LIVER (HCC): Primary | ICD-10-CM

## 2020-04-08 DIAGNOSIS — C18.9 COLON CANCER METASTASIZED TO LIVER (HCC): Primary | ICD-10-CM

## 2020-04-14 DIAGNOSIS — C78.7 COLON CANCER METASTASIZED TO LIVER (HCC): Primary | ICD-10-CM

## 2020-04-14 DIAGNOSIS — C18.9 COLON CANCER METASTASIZED TO LIVER (HCC): Primary | ICD-10-CM

## 2020-04-17 ENCOUNTER — APPOINTMENT (OUTPATIENT)
Dept: LAB | Facility: HOSPITAL | Age: 55
End: 2020-04-17
Payer: COMMERCIAL

## 2020-04-17 DIAGNOSIS — C78.7 COLON CANCER METASTASIZED TO LIVER (HCC): ICD-10-CM

## 2020-04-17 DIAGNOSIS — C18.9 COLON CANCER METASTASIZED TO LIVER (HCC): ICD-10-CM

## 2020-04-17 LAB
ALBUMIN SERPL BCP-MCNC: 3.9 G/DL (ref 3–5.2)
ALP SERPL-CCNC: 284 U/L (ref 43–122)
ALT SERPL W P-5'-P-CCNC: 105 U/L (ref 9–52)
ANION GAP SERPL CALCULATED.3IONS-SCNC: 5 MMOL/L (ref 5–14)
AST SERPL W P-5'-P-CCNC: 69 U/L (ref 14–36)
BASOPHILS # BLD AUTO: 0 THOUSANDS/ΜL (ref 0–0.1)
BASOPHILS NFR BLD AUTO: 1 % (ref 0–1)
BILIRUB SERPL-MCNC: 1 MG/DL
BUN SERPL-MCNC: 11 MG/DL (ref 5–25)
CALCIUM SERPL-MCNC: 9.3 MG/DL (ref 8.4–10.2)
CHLORIDE SERPL-SCNC: 103 MMOL/L (ref 97–108)
CO2 SERPL-SCNC: 29 MMOL/L (ref 22–30)
CREAT SERPL-MCNC: 0.82 MG/DL (ref 0.6–1.2)
EOSINOPHIL # BLD AUTO: 0.1 THOUSAND/ΜL (ref 0–0.4)
EOSINOPHIL NFR BLD AUTO: 1 % (ref 0–6)
ERYTHROCYTE [DISTWIDTH] IN BLOOD BY AUTOMATED COUNT: 22.2 %
GFR SERPL CREATININE-BSD FRML MDRD: 81 ML/MIN/1.73SQ M
GLUCOSE SERPL-MCNC: 97 MG/DL (ref 70–99)
HCT VFR BLD AUTO: 38.1 % (ref 36–46)
HGB BLD-MCNC: 12.5 G/DL (ref 12–16)
LYMPHOCYTES # BLD AUTO: 1.4 THOUSANDS/ΜL (ref 0.5–4)
LYMPHOCYTES NFR BLD AUTO: 29 % (ref 25–45)
MCH RBC QN AUTO: 28.2 PG (ref 26–34)
MCHC RBC AUTO-ENTMCNC: 32.7 G/DL (ref 31–36)
MCV RBC AUTO: 86 FL (ref 80–100)
MONOCYTES # BLD AUTO: 0.4 THOUSAND/ΜL (ref 0.2–0.9)
MONOCYTES NFR BLD AUTO: 9 % (ref 1–10)
NEUTROPHILS # BLD AUTO: 2.8 THOUSANDS/ΜL (ref 1.8–7.8)
NEUTS SEG NFR BLD AUTO: 60 % (ref 45–65)
PLATELET # BLD AUTO: 174 THOUSANDS/UL (ref 150–450)
PMV BLD AUTO: 7.2 FL (ref 8.9–12.7)
POTASSIUM SERPL-SCNC: 4.1 MMOL/L (ref 3.6–5)
PROT SERPL-MCNC: 7.4 G/DL (ref 5.9–8.4)
RBC # BLD AUTO: 4.41 MILLION/UL (ref 4–5.2)
SODIUM SERPL-SCNC: 137 MMOL/L (ref 137–147)
WBC # BLD AUTO: 4.7 THOUSAND/UL (ref 4.5–11)

## 2020-04-17 PROCEDURE — 80053 COMPREHEN METABOLIC PANEL: CPT

## 2020-04-17 PROCEDURE — 85025 COMPLETE CBC W/AUTO DIFF WBC: CPT

## 2020-04-17 PROCEDURE — 36415 COLL VENOUS BLD VENIPUNCTURE: CPT

## 2020-04-20 ENCOUNTER — HOSPITAL ENCOUNTER (OUTPATIENT)
Dept: INFUSION CENTER | Facility: HOSPITAL | Age: 55
Discharge: HOME | End: 2020-04-20
Payer: COMMERCIAL

## 2020-04-20 VITALS
HEIGHT: 65 IN | TEMPERATURE: 97.6 F | HEART RATE: 80 BPM | BODY MASS INDEX: 22.85 KG/M2 | RESPIRATION RATE: 16 BRPM | WEIGHT: 137.13 LBS | SYSTOLIC BLOOD PRESSURE: 135 MMHG | DIASTOLIC BLOOD PRESSURE: 75 MMHG

## 2020-04-20 DIAGNOSIS — C18.9 COLON CANCER METASTASIZED TO LIVER (HCC): Primary | ICD-10-CM

## 2020-04-20 DIAGNOSIS — C78.7 COLON CANCER METASTASIZED TO LIVER (HCC): Primary | ICD-10-CM

## 2020-04-20 PROCEDURE — 96365 THER/PROPH/DIAG IV INF INIT: CPT

## 2020-04-20 PROCEDURE — 96367 TX/PROPH/DG ADDL SEQ IV INF: CPT

## 2020-04-20 PROCEDURE — G0498 CHEMO EXTEND IV INFUS W/PUMP: HCPCS

## 2020-04-20 RX ORDER — SODIUM CHLORIDE 9 MG/ML
20 INJECTION, SOLUTION INTRAVENOUS ONCE AS NEEDED
Status: DISCONTINUED | OUTPATIENT
Start: 2020-04-20 | End: 2020-04-24 | Stop reason: HOSPADM

## 2020-04-20 RX ORDER — DEXTROSE MONOHYDRATE 50 MG/ML
20 INJECTION, SOLUTION INTRAVENOUS ONCE
Status: DISCONTINUED | OUTPATIENT
Start: 2020-04-20 | End: 2020-04-24 | Stop reason: HOSPADM

## 2020-04-20 RX ADMIN — SODIUM CHLORIDE 20 ML/HR: 0.9 INJECTION, SOLUTION INTRAVENOUS at 09:40

## 2020-04-20 RX ADMIN — DEXAMETHASONE SODIUM PHOSPHATE: 10 INJECTION, SOLUTION INTRAMUSCULAR; INTRAVENOUS at 09:53

## 2020-04-20 RX ADMIN — LEUCOVORIN CALCIUM 656 MG: 350 INJECTION, POWDER, LYOPHILIZED, FOR SOLUTION INTRAMUSCULAR; INTRAVENOUS at 10:28

## 2020-04-22 ENCOUNTER — HOSPITAL ENCOUNTER (OUTPATIENT)
Dept: INFUSION CENTER | Facility: HOSPITAL | Age: 55
Discharge: HOME | End: 2020-04-22

## 2020-04-22 DIAGNOSIS — C78.7 COLON CANCER METASTASIZED TO LIVER (HCC): Primary | ICD-10-CM

## 2020-04-22 DIAGNOSIS — C18.9 COLON CANCER METASTASIZED TO LIVER (HCC): Primary | ICD-10-CM

## 2020-04-23 ENCOUNTER — TELEPHONE (OUTPATIENT)
Dept: HEMATOLOGY ONCOLOGY | Facility: CLINIC | Age: 55
End: 2020-04-23

## 2020-04-27 ENCOUNTER — TELEPHONE (OUTPATIENT)
Dept: HEMATOLOGY ONCOLOGY | Facility: CLINIC | Age: 55
End: 2020-04-27

## 2020-04-28 DIAGNOSIS — C18.9 COLON CANCER METASTASIZED TO LIVER (HCC): Primary | ICD-10-CM

## 2020-04-28 DIAGNOSIS — C78.7 COLON CANCER METASTASIZED TO LIVER (HCC): Primary | ICD-10-CM

## 2020-05-01 ENCOUNTER — APPOINTMENT (OUTPATIENT)
Dept: LAB | Age: 55
End: 2020-05-01
Payer: COMMERCIAL

## 2020-05-01 DIAGNOSIS — C78.7 COLON CANCER METASTASIZED TO LIVER (HCC): ICD-10-CM

## 2020-05-01 DIAGNOSIS — C18.9 COLON CANCER METASTASIZED TO LIVER (HCC): ICD-10-CM

## 2020-05-01 LAB
ALBUMIN SERPL BCP-MCNC: 3.4 G/DL (ref 3.5–5)
ALP SERPL-CCNC: 305 U/L (ref 46–116)
ALT SERPL W P-5'-P-CCNC: 43 U/L (ref 12–78)
ANION GAP SERPL CALCULATED.3IONS-SCNC: 7 MMOL/L (ref 4–13)
AST SERPL W P-5'-P-CCNC: 37 U/L (ref 5–45)
BASOPHILS # BLD AUTO: 0.03 THOUSANDS/ΜL (ref 0–0.1)
BASOPHILS NFR BLD AUTO: 1 % (ref 0–1)
BILIRUB SERPL-MCNC: 0.77 MG/DL (ref 0.2–1)
BUN SERPL-MCNC: 14 MG/DL (ref 5–25)
CALCIUM SERPL-MCNC: 9.5 MG/DL (ref 8.3–10.1)
CHLORIDE SERPL-SCNC: 105 MMOL/L (ref 100–108)
CO2 SERPL-SCNC: 27 MMOL/L (ref 21–32)
CREAT SERPL-MCNC: 0.8 MG/DL (ref 0.6–1.3)
EOSINOPHIL # BLD AUTO: 0.09 THOUSAND/ΜL (ref 0–0.61)
EOSINOPHIL NFR BLD AUTO: 2 % (ref 0–6)
ERYTHROCYTE [DISTWIDTH] IN BLOOD BY AUTOMATED COUNT: 18.6 % (ref 11.6–15.1)
GFR SERPL CREATININE-BSD FRML MDRD: 84 ML/MIN/1.73SQ M
GLUCOSE P FAST SERPL-MCNC: 107 MG/DL (ref 65–99)
HCT VFR BLD AUTO: 41.1 % (ref 34.8–46.1)
HGB BLD-MCNC: 12.8 G/DL (ref 11.5–15.4)
IMM GRANULOCYTES # BLD AUTO: 0.01 THOUSAND/UL (ref 0–0.2)
IMM GRANULOCYTES NFR BLD AUTO: 0 % (ref 0–2)
LYMPHOCYTES # BLD AUTO: 1.77 THOUSANDS/ΜL (ref 0.6–4.47)
LYMPHOCYTES NFR BLD AUTO: 33 % (ref 14–44)
MCH RBC QN AUTO: 27.8 PG (ref 26.8–34.3)
MCHC RBC AUTO-ENTMCNC: 31.1 G/DL (ref 31.4–37.4)
MCV RBC AUTO: 89 FL (ref 82–98)
MONOCYTES # BLD AUTO: 0.41 THOUSAND/ΜL (ref 0.17–1.22)
MONOCYTES NFR BLD AUTO: 8 % (ref 4–12)
NEUTROPHILS # BLD AUTO: 3.03 THOUSANDS/ΜL (ref 1.85–7.62)
NEUTS SEG NFR BLD AUTO: 56 % (ref 43–75)
NRBC BLD AUTO-RTO: 0 /100 WBCS
PLATELET # BLD AUTO: 185 THOUSANDS/UL (ref 149–390)
PMV BLD AUTO: 9.4 FL (ref 8.9–12.7)
POTASSIUM SERPL-SCNC: 3.8 MMOL/L (ref 3.5–5.3)
PROT SERPL-MCNC: 7.6 G/DL (ref 6.4–8.2)
RBC # BLD AUTO: 4.61 MILLION/UL (ref 3.81–5.12)
SODIUM SERPL-SCNC: 139 MMOL/L (ref 136–145)
WBC # BLD AUTO: 5.34 THOUSAND/UL (ref 4.31–10.16)

## 2020-05-01 PROCEDURE — 85025 COMPLETE CBC W/AUTO DIFF WBC: CPT

## 2020-05-01 PROCEDURE — 36415 COLL VENOUS BLD VENIPUNCTURE: CPT

## 2020-05-01 PROCEDURE — 80053 COMPREHEN METABOLIC PANEL: CPT

## 2020-05-04 ENCOUNTER — HOSPITAL ENCOUNTER (OUTPATIENT)
Dept: INFUSION CENTER | Facility: HOSPITAL | Age: 55
Discharge: HOME | End: 2020-05-04
Payer: COMMERCIAL

## 2020-05-04 VITALS
WEIGHT: 135.36 LBS | SYSTOLIC BLOOD PRESSURE: 119 MMHG | TEMPERATURE: 98.1 F | BODY MASS INDEX: 22.55 KG/M2 | DIASTOLIC BLOOD PRESSURE: 64 MMHG | RESPIRATION RATE: 18 BRPM | HEIGHT: 65 IN | HEART RATE: 68 BPM

## 2020-05-04 DIAGNOSIS — C18.9 COLON CANCER METASTASIZED TO LIVER (HCC): Primary | ICD-10-CM

## 2020-05-04 DIAGNOSIS — C78.7 COLON CANCER METASTASIZED TO LIVER (HCC): Primary | ICD-10-CM

## 2020-05-04 PROCEDURE — G0498 CHEMO EXTEND IV INFUS W/PUMP: HCPCS

## 2020-05-04 PROCEDURE — 96413 CHEMO IV INFUSION 1 HR: CPT

## 2020-05-04 PROCEDURE — 96367 TX/PROPH/DG ADDL SEQ IV INF: CPT

## 2020-05-04 RX ORDER — DEXTROSE MONOHYDRATE 50 MG/ML
20 INJECTION, SOLUTION INTRAVENOUS ONCE
Status: DISCONTINUED | OUTPATIENT
Start: 2020-05-04 | End: 2020-05-08 | Stop reason: HOSPADM

## 2020-05-04 RX ORDER — SODIUM CHLORIDE 9 MG/ML
20 INJECTION, SOLUTION INTRAVENOUS ONCE AS NEEDED
Status: DISCONTINUED | OUTPATIENT
Start: 2020-05-04 | End: 2020-05-08 | Stop reason: HOSPADM

## 2020-05-04 RX ADMIN — ONDANSETRON HYDROCHLORIDE: 2 INJECTION, SOLUTION INTRAMUSCULAR; INTRAVENOUS at 09:30

## 2020-05-04 RX ADMIN — SODIUM CHLORIDE 20 ML/HR: 9 INJECTION, SOLUTION INTRAVENOUS at 09:30

## 2020-05-04 RX ADMIN — LEUCOVORIN CALCIUM 656 MG: 350 INJECTION, POWDER, LYOPHILIZED, FOR SOLUTION INTRAMUSCULAR; INTRAVENOUS at 10:54

## 2020-05-04 RX ADMIN — BEVACIZUMAB 292.5 MG: 100 INJECTION, SOLUTION INTRAVENOUS at 10:11

## 2020-05-04 RX ADMIN — SODIUM CHLORIDE 20 ML/HR: 9 INJECTION, SOLUTION INTRAVENOUS at 09:29

## 2020-05-06 ENCOUNTER — HOSPITAL ENCOUNTER (OUTPATIENT)
Dept: INFUSION CENTER | Facility: HOSPITAL | Age: 55
Discharge: HOME | End: 2020-05-06

## 2020-05-06 DIAGNOSIS — C18.9 COLON CANCER METASTASIZED TO LIVER (HCC): Primary | ICD-10-CM

## 2020-05-06 DIAGNOSIS — C78.7 COLON CANCER METASTASIZED TO LIVER (HCC): Primary | ICD-10-CM

## 2020-05-06 RX ORDER — ALBUTEROL SULFATE 90 UG/1
2 AEROSOL, METERED RESPIRATORY (INHALATION) EVERY 4 HOURS PRN
Status: DISCONTINUED | OUTPATIENT
Start: 2020-05-06 | End: 2020-05-06

## 2020-05-12 DIAGNOSIS — C78.7 COLON CANCER METASTASIZED TO LIVER (HCC): Primary | ICD-10-CM

## 2020-05-12 DIAGNOSIS — C18.9 COLON CANCER METASTASIZED TO LIVER (HCC): Primary | ICD-10-CM

## 2020-05-15 ENCOUNTER — APPOINTMENT (OUTPATIENT)
Dept: LAB | Age: 55
End: 2020-05-15
Payer: COMMERCIAL

## 2020-05-15 DIAGNOSIS — C18.9 COLON CANCER METASTASIZED TO LIVER (HCC): ICD-10-CM

## 2020-05-15 DIAGNOSIS — C78.7 COLON CANCER METASTASIZED TO LIVER (HCC): ICD-10-CM

## 2020-05-15 LAB
ALBUMIN SERPL BCP-MCNC: 3.4 G/DL (ref 3.5–5)
ALP SERPL-CCNC: 389 U/L (ref 46–116)
ALT SERPL W P-5'-P-CCNC: 92 U/L (ref 12–78)
ANION GAP SERPL CALCULATED.3IONS-SCNC: 5 MMOL/L (ref 4–13)
AST SERPL W P-5'-P-CCNC: 95 U/L (ref 5–45)
BASOPHILS # BLD AUTO: 0.02 THOUSANDS/ΜL (ref 0–0.1)
BASOPHILS NFR BLD AUTO: 0 % (ref 0–1)
BILIRUB SERPL-MCNC: 0.73 MG/DL (ref 0.2–1)
BUN SERPL-MCNC: 17 MG/DL (ref 5–25)
CALCIUM SERPL-MCNC: 9.7 MG/DL (ref 8.3–10.1)
CHLORIDE SERPL-SCNC: 106 MMOL/L (ref 100–108)
CO2 SERPL-SCNC: 27 MMOL/L (ref 21–32)
CREAT SERPL-MCNC: 0.87 MG/DL (ref 0.6–1.3)
EOSINOPHIL # BLD AUTO: 0.07 THOUSAND/ΜL (ref 0–0.61)
EOSINOPHIL NFR BLD AUTO: 1 % (ref 0–6)
ERYTHROCYTE [DISTWIDTH] IN BLOOD BY AUTOMATED COUNT: 18.6 % (ref 11.6–15.1)
GFR SERPL CREATININE-BSD FRML MDRD: 76 ML/MIN/1.73SQ M
GLUCOSE P FAST SERPL-MCNC: 89 MG/DL (ref 65–99)
HCT VFR BLD AUTO: 41.2 % (ref 34.8–46.1)
HGB BLD-MCNC: 12.9 G/DL (ref 11.5–15.4)
IMM GRANULOCYTES # BLD AUTO: 0.01 THOUSAND/UL (ref 0–0.2)
IMM GRANULOCYTES NFR BLD AUTO: 0 % (ref 0–2)
LYMPHOCYTES # BLD AUTO: 1.38 THOUSANDS/ΜL (ref 0.6–4.47)
LYMPHOCYTES NFR BLD AUTO: 28 % (ref 14–44)
MCH RBC QN AUTO: 28.2 PG (ref 26.8–34.3)
MCHC RBC AUTO-ENTMCNC: 31.3 G/DL (ref 31.4–37.4)
MCV RBC AUTO: 90 FL (ref 82–98)
MONOCYTES # BLD AUTO: 0.44 THOUSAND/ΜL (ref 0.17–1.22)
MONOCYTES NFR BLD AUTO: 9 % (ref 4–12)
NEUTROPHILS # BLD AUTO: 2.99 THOUSANDS/ΜL (ref 1.85–7.62)
NEUTS SEG NFR BLD AUTO: 62 % (ref 43–75)
NRBC BLD AUTO-RTO: 0 /100 WBCS
PLATELET # BLD AUTO: 162 THOUSANDS/UL (ref 149–390)
PMV BLD AUTO: 9.6 FL (ref 8.9–12.7)
POTASSIUM SERPL-SCNC: 4.5 MMOL/L (ref 3.5–5.3)
PROT SERPL-MCNC: 7.8 G/DL (ref 6.4–8.2)
RBC # BLD AUTO: 4.57 MILLION/UL (ref 3.81–5.12)
SODIUM SERPL-SCNC: 138 MMOL/L (ref 136–145)
WBC # BLD AUTO: 4.91 THOUSAND/UL (ref 4.31–10.16)

## 2020-05-15 PROCEDURE — 36415 COLL VENOUS BLD VENIPUNCTURE: CPT

## 2020-05-15 PROCEDURE — 80053 COMPREHEN METABOLIC PANEL: CPT

## 2020-05-15 PROCEDURE — 85025 COMPLETE CBC W/AUTO DIFF WBC: CPT

## 2020-05-18 ENCOUNTER — HOSPITAL ENCOUNTER (OUTPATIENT)
Dept: INFUSION CENTER | Facility: HOSPITAL | Age: 55
Discharge: HOME | End: 2020-05-18
Payer: COMMERCIAL

## 2020-05-18 VITALS
SYSTOLIC BLOOD PRESSURE: 131 MMHG | TEMPERATURE: 98 F | DIASTOLIC BLOOD PRESSURE: 88 MMHG | WEIGHT: 137.13 LBS | BODY MASS INDEX: 22.85 KG/M2 | HEIGHT: 65 IN | RESPIRATION RATE: 16 BRPM | HEART RATE: 81 BPM

## 2020-05-18 DIAGNOSIS — C78.7 COLON CANCER METASTASIZED TO LIVER (HCC): Primary | ICD-10-CM

## 2020-05-18 DIAGNOSIS — C18.9 COLON CANCER METASTASIZED TO LIVER (HCC): Primary | ICD-10-CM

## 2020-05-18 LAB — CEA SERPL-MCNC: 174 NG/ML (ref 0–3)

## 2020-05-18 PROCEDURE — 96413 CHEMO IV INFUSION 1 HR: CPT

## 2020-05-18 PROCEDURE — 82378 CARCINOEMBRYONIC ANTIGEN: CPT | Performed by: INTERNAL MEDICINE

## 2020-05-18 PROCEDURE — G0498 CHEMO EXTEND IV INFUS W/PUMP: HCPCS

## 2020-05-18 PROCEDURE — 96367 TX/PROPH/DG ADDL SEQ IV INF: CPT

## 2020-05-18 RX ORDER — SODIUM CHLORIDE 9 MG/ML
20 INJECTION, SOLUTION INTRAVENOUS ONCE AS NEEDED
Status: DISCONTINUED | OUTPATIENT
Start: 2020-05-18 | End: 2020-05-22 | Stop reason: HOSPADM

## 2020-05-18 RX ORDER — DEXTROSE MONOHYDRATE 50 MG/ML
20 INJECTION, SOLUTION INTRAVENOUS ONCE
Status: DISCONTINUED | OUTPATIENT
Start: 2020-05-18 | End: 2020-05-22 | Stop reason: HOSPADM

## 2020-05-18 RX ADMIN — SODIUM CHLORIDE 20 ML/HR: 0.9 INJECTION, SOLUTION INTRAVENOUS at 09:35

## 2020-05-18 RX ADMIN — LEUCOVORIN CALCIUM 656 MG: 350 INJECTION, POWDER, LYOPHILIZED, FOR SOLUTION INTRAMUSCULAR; INTRAVENOUS at 11:10

## 2020-05-18 RX ADMIN — DEXAMETHASONE SODIUM PHOSPHATE: 10 INJECTION, SOLUTION INTRAMUSCULAR; INTRAVENOUS at 09:40

## 2020-05-18 RX ADMIN — BEVACIZUMAB 292.5 MG: 100 INJECTION, SOLUTION INTRAVENOUS at 10:23

## 2020-05-20 ENCOUNTER — HOSPITAL ENCOUNTER (OUTPATIENT)
Dept: INFUSION CENTER | Facility: HOSPITAL | Age: 55
Discharge: HOME | End: 2020-05-20

## 2020-05-20 DIAGNOSIS — C78.7 COLON CANCER METASTASIZED TO LIVER (HCC): Primary | ICD-10-CM

## 2020-05-20 DIAGNOSIS — C18.9 COLON CANCER METASTASIZED TO LIVER (HCC): Primary | ICD-10-CM

## 2020-05-26 DIAGNOSIS — C78.7 COLON CANCER METASTASIZED TO LIVER (HCC): Primary | ICD-10-CM

## 2020-05-26 DIAGNOSIS — C18.9 COLON CANCER METASTASIZED TO LIVER (HCC): Primary | ICD-10-CM

## 2020-05-27 ENCOUNTER — HOSPITAL ENCOUNTER (OUTPATIENT)
Dept: CT IMAGING | Facility: HOSPITAL | Age: 55
Discharge: HOME | End: 2020-05-27
Attending: INTERNAL MEDICINE
Payer: COMMERCIAL

## 2020-05-27 DIAGNOSIS — C78.7 COLON CANCER METASTASIZED TO LIVER (HCC): ICD-10-CM

## 2020-05-27 DIAGNOSIS — C18.9 COLON CANCER METASTASIZED TO LIVER (HCC): ICD-10-CM

## 2020-05-27 PROCEDURE — 71260 CT THORAX DX C+: CPT

## 2020-05-27 PROCEDURE — 74177 CT ABD & PELVIS W/CONTRAST: CPT

## 2020-05-27 RX ADMIN — IOHEXOL 100 ML: 350 INJECTION, SOLUTION INTRAVENOUS at 09:41

## 2020-05-29 ENCOUNTER — APPOINTMENT (OUTPATIENT)
Dept: LAB | Age: 55
End: 2020-05-29
Payer: COMMERCIAL

## 2020-05-29 DIAGNOSIS — C18.9 COLON CANCER METASTASIZED TO LIVER (HCC): ICD-10-CM

## 2020-05-29 DIAGNOSIS — C78.7 COLON CANCER METASTASIZED TO LIVER (HCC): ICD-10-CM

## 2020-05-29 LAB
ALBUMIN SERPL BCP-MCNC: 3.2 G/DL (ref 3.5–5)
ALP SERPL-CCNC: 327 U/L (ref 46–116)
ALT SERPL W P-5'-P-CCNC: 93 U/L (ref 12–78)
ANION GAP SERPL CALCULATED.3IONS-SCNC: 7 MMOL/L (ref 4–13)
AST SERPL W P-5'-P-CCNC: 59 U/L (ref 5–45)
BASOPHILS # BLD AUTO: 0.01 THOUSANDS/ΜL (ref 0–0.1)
BASOPHILS NFR BLD AUTO: 0 % (ref 0–1)
BILIRUB SERPL-MCNC: 0.83 MG/DL (ref 0.2–1)
BUN SERPL-MCNC: 13 MG/DL (ref 5–25)
CALCIUM SERPL-MCNC: 9.3 MG/DL (ref 8.3–10.1)
CHLORIDE SERPL-SCNC: 106 MMOL/L (ref 100–108)
CO2 SERPL-SCNC: 25 MMOL/L (ref 21–32)
CREAT SERPL-MCNC: 0.79 MG/DL (ref 0.6–1.3)
EOSINOPHIL # BLD AUTO: 0.07 THOUSAND/ΜL (ref 0–0.61)
EOSINOPHIL NFR BLD AUTO: 2 % (ref 0–6)
ERYTHROCYTE [DISTWIDTH] IN BLOOD BY AUTOMATED COUNT: 19.3 % (ref 11.6–15.1)
GFR SERPL CREATININE-BSD FRML MDRD: 85 ML/MIN/1.73SQ M
GLUCOSE P FAST SERPL-MCNC: 97 MG/DL (ref 65–99)
HCT VFR BLD AUTO: 38 % (ref 34.8–46.1)
HGB BLD-MCNC: 12 G/DL (ref 11.5–15.4)
IMM GRANULOCYTES # BLD AUTO: 0.01 THOUSAND/UL (ref 0–0.2)
IMM GRANULOCYTES NFR BLD AUTO: 0 % (ref 0–2)
LYMPHOCYTES # BLD AUTO: 1.34 THOUSANDS/ΜL (ref 0.6–4.47)
LYMPHOCYTES NFR BLD AUTO: 33 % (ref 14–44)
MCH RBC QN AUTO: 28 PG (ref 26.8–34.3)
MCHC RBC AUTO-ENTMCNC: 31.6 G/DL (ref 31.4–37.4)
MCV RBC AUTO: 89 FL (ref 82–98)
MONOCYTES # BLD AUTO: 0.44 THOUSAND/ΜL (ref 0.17–1.22)
MONOCYTES NFR BLD AUTO: 11 % (ref 4–12)
NEUTROPHILS # BLD AUTO: 2.25 THOUSANDS/ΜL (ref 1.85–7.62)
NEUTS SEG NFR BLD AUTO: 54 % (ref 43–75)
NRBC BLD AUTO-RTO: 0 /100 WBCS
PLATELET # BLD AUTO: 168 THOUSANDS/UL (ref 149–390)
PMV BLD AUTO: 8.7 FL (ref 8.9–12.7)
POTASSIUM SERPL-SCNC: 3.4 MMOL/L (ref 3.5–5.3)
PROT SERPL-MCNC: 7.6 G/DL (ref 6.4–8.2)
RBC # BLD AUTO: 4.29 MILLION/UL (ref 3.81–5.12)
SODIUM SERPL-SCNC: 138 MMOL/L (ref 136–145)
WBC # BLD AUTO: 4.12 THOUSAND/UL (ref 4.31–10.16)

## 2020-05-29 PROCEDURE — 80053 COMPREHEN METABOLIC PANEL: CPT

## 2020-05-29 PROCEDURE — 85025 COMPLETE CBC W/AUTO DIFF WBC: CPT

## 2020-05-29 PROCEDURE — 36415 COLL VENOUS BLD VENIPUNCTURE: CPT

## 2020-06-01 ENCOUNTER — HOSPITAL ENCOUNTER (OUTPATIENT)
Dept: INFUSION CENTER | Facility: HOSPITAL | Age: 55
Discharge: HOME | End: 2020-06-01
Payer: COMMERCIAL

## 2020-06-01 VITALS
DIASTOLIC BLOOD PRESSURE: 87 MMHG | BODY MASS INDEX: 23.43 KG/M2 | WEIGHT: 140.65 LBS | TEMPERATURE: 98 F | RESPIRATION RATE: 16 BRPM | HEART RATE: 77 BPM | HEIGHT: 65 IN | SYSTOLIC BLOOD PRESSURE: 145 MMHG

## 2020-06-01 DIAGNOSIS — C18.9 COLON CANCER METASTASIZED TO LIVER (HCC): Primary | ICD-10-CM

## 2020-06-01 DIAGNOSIS — C78.7 COLON CANCER METASTASIZED TO LIVER (HCC): Primary | ICD-10-CM

## 2020-06-01 PROCEDURE — 96367 TX/PROPH/DG ADDL SEQ IV INF: CPT

## 2020-06-01 PROCEDURE — G0498 CHEMO EXTEND IV INFUS W/PUMP: HCPCS

## 2020-06-01 PROCEDURE — 96413 CHEMO IV INFUSION 1 HR: CPT

## 2020-06-01 RX ORDER — DEXTROSE MONOHYDRATE 50 MG/ML
20 INJECTION, SOLUTION INTRAVENOUS ONCE
Status: DISCONTINUED | OUTPATIENT
Start: 2020-06-01 | End: 2020-06-05 | Stop reason: HOSPADM

## 2020-06-01 RX ORDER — SODIUM CHLORIDE 9 MG/ML
20 INJECTION, SOLUTION INTRAVENOUS ONCE AS NEEDED
Status: DISCONTINUED | OUTPATIENT
Start: 2020-06-01 | End: 2020-06-05 | Stop reason: HOSPADM

## 2020-06-01 RX ADMIN — LEUCOVORIN CALCIUM 656 MG: 350 INJECTION, POWDER, LYOPHILIZED, FOR SOLUTION INTRAMUSCULAR; INTRAVENOUS at 10:56

## 2020-06-01 RX ADMIN — SODIUM CHLORIDE 20 ML/HR: 9 INJECTION, SOLUTION INTRAVENOUS at 09:26

## 2020-06-01 RX ADMIN — DEXAMETHASONE SODIUM PHOSPHATE: 10 INJECTION, SOLUTION INTRAMUSCULAR; INTRAVENOUS at 09:36

## 2020-06-01 RX ADMIN — BEVACIZUMAB 292.5 MG: 100 INJECTION, SOLUTION INTRAVENOUS at 10:17

## 2020-06-03 ENCOUNTER — TELEPHONE (OUTPATIENT)
Dept: SURGICAL ONCOLOGY | Facility: CLINIC | Age: 55
End: 2020-06-03

## 2020-06-03 ENCOUNTER — HOSPITAL ENCOUNTER (OUTPATIENT)
Dept: INFUSION CENTER | Facility: HOSPITAL | Age: 55
Discharge: HOME | End: 2020-06-03

## 2020-06-03 DIAGNOSIS — C18.9 COLON CANCER METASTASIZED TO LIVER (HCC): Primary | ICD-10-CM

## 2020-06-03 DIAGNOSIS — C78.7 COLON CANCER METASTASIZED TO LIVER (HCC): Primary | ICD-10-CM

## 2020-06-04 ENCOUNTER — OFFICE VISIT (OUTPATIENT)
Dept: HEMATOLOGY ONCOLOGY | Facility: CLINIC | Age: 55
End: 2020-06-04
Payer: COMMERCIAL

## 2020-06-04 VITALS
OXYGEN SATURATION: 98 % | DIASTOLIC BLOOD PRESSURE: 76 MMHG | SYSTOLIC BLOOD PRESSURE: 116 MMHG | BODY MASS INDEX: 23.49 KG/M2 | HEIGHT: 65 IN | WEIGHT: 141 LBS | TEMPERATURE: 97.9 F | HEART RATE: 78 BPM | RESPIRATION RATE: 18 BRPM

## 2020-06-04 DIAGNOSIS — C78.7 COLON CANCER METASTASIZED TO LIVER (HCC): Primary | ICD-10-CM

## 2020-06-04 DIAGNOSIS — C18.9 COLON CANCER METASTASIZED TO LIVER (HCC): Primary | ICD-10-CM

## 2020-06-04 PROCEDURE — 1036F TOBACCO NON-USER: CPT | Performed by: INTERNAL MEDICINE

## 2020-06-04 PROCEDURE — 99215 OFFICE O/P EST HI 40 MIN: CPT | Performed by: INTERNAL MEDICINE

## 2020-06-04 PROCEDURE — 3008F BODY MASS INDEX DOCD: CPT | Performed by: INTERNAL MEDICINE

## 2020-06-04 RX ORDER — SODIUM CHLORIDE 9 MG/ML
20 INJECTION, SOLUTION INTRAVENOUS ONCE
Status: CANCELLED | OUTPATIENT
Start: 2020-07-13

## 2020-06-04 RX ORDER — SODIUM CHLORIDE 9 MG/ML
20 INJECTION, SOLUTION INTRAVENOUS ONCE
Status: CANCELLED | OUTPATIENT
Start: 2020-06-29

## 2020-06-04 RX ORDER — ATROPINE SULFATE 1 MG/ML
0.25 INJECTION, SOLUTION INTRAMUSCULAR; INTRAVENOUS; SUBCUTANEOUS ONCE
Status: CANCELLED | OUTPATIENT
Start: 2020-06-15

## 2020-06-04 RX ORDER — ATROPINE SULFATE 1 MG/ML
0.25 INJECTION, SOLUTION INTRAMUSCULAR; INTRAVENOUS; SUBCUTANEOUS ONCE AS NEEDED
Status: CANCELLED | OUTPATIENT
Start: 2020-07-13

## 2020-06-04 RX ORDER — ATROPINE SULFATE 1 MG/ML
0.25 INJECTION, SOLUTION INTRAMUSCULAR; INTRAVENOUS; SUBCUTANEOUS ONCE AS NEEDED
Status: CANCELLED | OUTPATIENT
Start: 2020-06-15

## 2020-06-04 RX ORDER — ATROPINE SULFATE 1 MG/ML
0.25 INJECTION, SOLUTION INTRAMUSCULAR; INTRAVENOUS; SUBCUTANEOUS ONCE
Status: CANCELLED | OUTPATIENT
Start: 2020-07-13

## 2020-06-04 RX ORDER — ATROPINE SULFATE 1 MG/ML
0.25 INJECTION, SOLUTION INTRAMUSCULAR; INTRAVENOUS; SUBCUTANEOUS ONCE AS NEEDED
Status: CANCELLED | OUTPATIENT
Start: 2020-06-29

## 2020-06-04 RX ORDER — ATROPINE SULFATE 1 MG/ML
0.25 INJECTION, SOLUTION INTRAMUSCULAR; INTRAVENOUS; SUBCUTANEOUS ONCE
Status: CANCELLED | OUTPATIENT
Start: 2020-06-29

## 2020-06-04 RX ORDER — SODIUM CHLORIDE 9 MG/ML
20 INJECTION, SOLUTION INTRAVENOUS ONCE
Status: CANCELLED | OUTPATIENT
Start: 2020-06-15

## 2020-06-05 ENCOUNTER — OFFICE VISIT (OUTPATIENT)
Dept: SURGICAL ONCOLOGY | Facility: CLINIC | Age: 55
End: 2020-06-05
Payer: COMMERCIAL

## 2020-06-05 VITALS
TEMPERATURE: 98.7 F | WEIGHT: 140 LBS | BODY MASS INDEX: 23.32 KG/M2 | RESPIRATION RATE: 16 BRPM | HEIGHT: 65 IN | DIASTOLIC BLOOD PRESSURE: 80 MMHG | HEART RATE: 82 BPM | SYSTOLIC BLOOD PRESSURE: 110 MMHG

## 2020-06-05 DIAGNOSIS — C78.7 COLON CANCER METASTASIZED TO LIVER (HCC): Primary | ICD-10-CM

## 2020-06-05 DIAGNOSIS — C18.9 COLON CANCER METASTASIZED TO LIVER (HCC): Primary | ICD-10-CM

## 2020-06-05 PROCEDURE — 99214 OFFICE O/P EST MOD 30 MIN: CPT | Performed by: SURGERY

## 2020-06-05 PROCEDURE — 1036F TOBACCO NON-USER: CPT | Performed by: SURGERY

## 2020-06-05 PROCEDURE — 3008F BODY MASS INDEX DOCD: CPT | Performed by: SURGERY

## 2020-06-09 DIAGNOSIS — C78.7 COLON CANCER METASTASIZED TO LIVER (HCC): Primary | ICD-10-CM

## 2020-06-09 DIAGNOSIS — C18.9 COLON CANCER METASTASIZED TO LIVER (HCC): Primary | ICD-10-CM

## 2020-06-12 ENCOUNTER — APPOINTMENT (OUTPATIENT)
Dept: LAB | Age: 55
End: 2020-06-12
Payer: COMMERCIAL

## 2020-06-12 ENCOUNTER — TRANSCRIBE ORDERS (OUTPATIENT)
Dept: ADMINISTRATIVE | Facility: HOSPITAL | Age: 55
End: 2020-06-12

## 2020-06-12 DIAGNOSIS — Z79.899 ENCOUNTER FOR LONG-TERM (CURRENT) USE OF OTHER MEDICATIONS: ICD-10-CM

## 2020-06-12 DIAGNOSIS — M06.89 OTHER SPECIFIED RHEUMATOID ARTHRITIS, MULTIPLE SITES (HCC): ICD-10-CM

## 2020-06-12 DIAGNOSIS — C18.9 COLON CANCER METASTASIZED TO LIVER (HCC): ICD-10-CM

## 2020-06-12 DIAGNOSIS — C78.7 COLON CANCER METASTASIZED TO LIVER (HCC): ICD-10-CM

## 2020-06-12 DIAGNOSIS — Z79.899 ENCOUNTER FOR LONG-TERM (CURRENT) USE OF OTHER MEDICATIONS: Primary | ICD-10-CM

## 2020-06-12 LAB
ALBUMIN SERPL BCP-MCNC: 3.4 G/DL (ref 3.5–5)
ALP SERPL-CCNC: 407 U/L (ref 46–116)
ALT SERPL W P-5'-P-CCNC: 108 U/L (ref 12–78)
ANION GAP SERPL CALCULATED.3IONS-SCNC: 9 MMOL/L (ref 4–13)
AST SERPL W P-5'-P-CCNC: 81 U/L (ref 5–45)
BASOPHILS # BLD AUTO: 0.03 THOUSANDS/ΜL (ref 0–0.1)
BASOPHILS NFR BLD AUTO: 1 % (ref 0–1)
BILIRUB SERPL-MCNC: 0.9 MG/DL (ref 0.2–1)
BUN SERPL-MCNC: 13 MG/DL (ref 5–25)
CALCIUM SERPL-MCNC: 8.8 MG/DL (ref 8.3–10.1)
CHLORIDE SERPL-SCNC: 107 MMOL/L (ref 100–108)
CO2 SERPL-SCNC: 22 MMOL/L (ref 21–32)
CREAT SERPL-MCNC: 0.81 MG/DL (ref 0.6–1.3)
CRP SERPL QL: 7.8 MG/L
EOSINOPHIL # BLD AUTO: 0.07 THOUSAND/ΜL (ref 0–0.61)
EOSINOPHIL NFR BLD AUTO: 2 % (ref 0–6)
ERYTHROCYTE [DISTWIDTH] IN BLOOD BY AUTOMATED COUNT: 19.5 % (ref 11.6–15.1)
ERYTHROCYTE [SEDIMENTATION RATE] IN BLOOD: 71 MM/HOUR (ref 0–20)
GFR SERPL CREATININE-BSD FRML MDRD: 83 ML/MIN/1.73SQ M
GLUCOSE P FAST SERPL-MCNC: 101 MG/DL (ref 65–99)
HCT VFR BLD AUTO: 40.1 % (ref 34.8–46.1)
HGB BLD-MCNC: 12.6 G/DL (ref 11.5–15.4)
IMM GRANULOCYTES # BLD AUTO: 0.01 THOUSAND/UL (ref 0–0.2)
IMM GRANULOCYTES NFR BLD AUTO: 0 % (ref 0–2)
LYMPHOCYTES # BLD AUTO: 1.15 THOUSANDS/ΜL (ref 0.6–4.47)
LYMPHOCYTES NFR BLD AUTO: 30 % (ref 14–44)
MCH RBC QN AUTO: 28.4 PG (ref 26.8–34.3)
MCHC RBC AUTO-ENTMCNC: 31.4 G/DL (ref 31.4–37.4)
MCV RBC AUTO: 90 FL (ref 82–98)
MONOCYTES # BLD AUTO: 0.34 THOUSAND/ΜL (ref 0.17–1.22)
MONOCYTES NFR BLD AUTO: 9 % (ref 4–12)
NEUTROPHILS # BLD AUTO: 2.25 THOUSANDS/ΜL (ref 1.85–7.62)
NEUTS SEG NFR BLD AUTO: 58 % (ref 43–75)
NRBC BLD AUTO-RTO: 0 /100 WBCS
PLATELET # BLD AUTO: 173 THOUSANDS/UL (ref 149–390)
PMV BLD AUTO: 9.4 FL (ref 8.9–12.7)
POTASSIUM SERPL-SCNC: 4.1 MMOL/L (ref 3.5–5.3)
PROT SERPL-MCNC: 7.6 G/DL (ref 6.4–8.2)
RBC # BLD AUTO: 4.44 MILLION/UL (ref 3.81–5.12)
SODIUM SERPL-SCNC: 138 MMOL/L (ref 136–145)
WBC # BLD AUTO: 3.85 THOUSAND/UL (ref 4.31–10.16)

## 2020-06-12 PROCEDURE — 85025 COMPLETE CBC W/AUTO DIFF WBC: CPT

## 2020-06-12 PROCEDURE — 85652 RBC SED RATE AUTOMATED: CPT

## 2020-06-12 PROCEDURE — 36415 COLL VENOUS BLD VENIPUNCTURE: CPT

## 2020-06-12 PROCEDURE — 80053 COMPREHEN METABOLIC PANEL: CPT

## 2020-06-12 PROCEDURE — 86140 C-REACTIVE PROTEIN: CPT

## 2020-06-15 ENCOUNTER — HOSPITAL ENCOUNTER (OUTPATIENT)
Dept: INFUSION CENTER | Facility: HOSPITAL | Age: 55
End: 2020-06-15

## 2020-06-15 ENCOUNTER — HOSPITAL ENCOUNTER (OUTPATIENT)
Dept: INFUSION CENTER | Facility: HOSPITAL | Age: 55
Discharge: HOME | End: 2020-06-15
Payer: COMMERCIAL

## 2020-06-15 VITALS
HEART RATE: 81 BPM | WEIGHT: 139.33 LBS | RESPIRATION RATE: 18 BRPM | DIASTOLIC BLOOD PRESSURE: 90 MMHG | TEMPERATURE: 98.4 F | SYSTOLIC BLOOD PRESSURE: 134 MMHG | BODY MASS INDEX: 23.21 KG/M2 | HEIGHT: 65 IN

## 2020-06-15 DIAGNOSIS — C78.7 COLON CANCER METASTASIZED TO LIVER (HCC): Primary | ICD-10-CM

## 2020-06-15 DIAGNOSIS — C18.9 COLON CANCER METASTASIZED TO LIVER (HCC): Primary | ICD-10-CM

## 2020-06-15 PROCEDURE — 96413 CHEMO IV INFUSION 1 HR: CPT

## 2020-06-15 PROCEDURE — G0498 CHEMO EXTEND IV INFUS W/PUMP: HCPCS

## 2020-06-15 PROCEDURE — 96375 TX/PRO/DX INJ NEW DRUG ADDON: CPT

## 2020-06-15 PROCEDURE — 96368 THER/DIAG CONCURRENT INF: CPT

## 2020-06-15 PROCEDURE — 96415 CHEMO IV INFUSION ADDL HR: CPT

## 2020-06-15 PROCEDURE — 96367 TX/PROPH/DG ADDL SEQ IV INF: CPT

## 2020-06-15 RX ORDER — ATROPINE SULFATE 1 MG/ML
0.25 INJECTION, SOLUTION INTRAMUSCULAR; INTRAVENOUS; SUBCUTANEOUS ONCE
Status: COMPLETED | OUTPATIENT
Start: 2020-06-15 | End: 2020-06-15

## 2020-06-15 RX ORDER — SODIUM CHLORIDE 9 MG/ML
20 INJECTION, SOLUTION INTRAVENOUS ONCE
Status: COMPLETED | OUTPATIENT
Start: 2020-06-15 | End: 2020-06-15

## 2020-06-15 RX ORDER — ATROPINE SULFATE 1 MG/ML
0.25 INJECTION, SOLUTION INTRAMUSCULAR; INTRAVENOUS; SUBCUTANEOUS ONCE AS NEEDED
Status: DISCONTINUED | OUTPATIENT
Start: 2020-06-15 | End: 2020-06-19 | Stop reason: HOSPADM

## 2020-06-15 RX ADMIN — SODIUM CHLORIDE 20 ML/HR: 0.9 INJECTION, SOLUTION INTRAVENOUS at 09:35

## 2020-06-15 RX ADMIN — DEXAMETHASONE SODIUM PHOSPHATE: 10 INJECTION, SOLUTION INTRAMUSCULAR; INTRAVENOUS at 10:02

## 2020-06-15 RX ADMIN — ATROPINE SULFATE 0.25 MG: 1 INJECTION, SOLUTION INTRAMUSCULAR; INTRAVENOUS; SUBCUTANEOUS at 10:47

## 2020-06-15 RX ADMIN — SODIUM CHLORIDE 300 MG: 0.9 INJECTION, SOLUTION INTRAVENOUS at 10:58

## 2020-06-15 RX ADMIN — LEUCOVORIN CALCIUM 684 MG: 350 INJECTION, POWDER, LYOPHILIZED, FOR SOLUTION INTRAMUSCULAR; INTRAVENOUS at 10:58

## 2020-06-17 ENCOUNTER — HOSPITAL ENCOUNTER (OUTPATIENT)
Dept: INFUSION CENTER | Facility: HOSPITAL | Age: 55
Discharge: HOME | End: 2020-06-17

## 2020-06-17 DIAGNOSIS — C18.9 COLON CANCER METASTASIZED TO LIVER (HCC): Primary | ICD-10-CM

## 2020-06-17 DIAGNOSIS — C78.7 COLON CANCER METASTASIZED TO LIVER (HCC): Primary | ICD-10-CM

## 2020-06-22 DIAGNOSIS — C18.9 COLON CANCER METASTASIZED TO LIVER (HCC): Primary | ICD-10-CM

## 2020-06-22 DIAGNOSIS — C78.7 COLON CANCER METASTASIZED TO LIVER (HCC): Primary | ICD-10-CM

## 2020-06-25 ENCOUNTER — TELEPHONE (OUTPATIENT)
Dept: SURGICAL ONCOLOGY | Facility: CLINIC | Age: 55
End: 2020-06-25

## 2020-06-25 ENCOUNTER — DOCUMENTATION (OUTPATIENT)
Dept: HEMATOLOGY ONCOLOGY | Facility: CLINIC | Age: 55
End: 2020-06-25

## 2020-06-25 DIAGNOSIS — C78.7 COLON CANCER METASTASIZED TO LIVER (HCC): Primary | ICD-10-CM

## 2020-06-25 DIAGNOSIS — C18.9 COLON CANCER METASTASIZED TO LIVER (HCC): Primary | ICD-10-CM

## 2020-06-26 ENCOUNTER — APPOINTMENT (OUTPATIENT)
Dept: LAB | Age: 55
End: 2020-06-26
Payer: COMMERCIAL

## 2020-06-26 DIAGNOSIS — C78.7 COLON CANCER METASTASIZED TO LIVER (HCC): ICD-10-CM

## 2020-06-26 DIAGNOSIS — C18.9 COLON CANCER METASTASIZED TO LIVER (HCC): ICD-10-CM

## 2020-06-26 LAB
ALBUMIN SERPL BCP-MCNC: 3.4 G/DL (ref 3.5–5)
ALP SERPL-CCNC: 362 U/L (ref 46–116)
ALT SERPL W P-5'-P-CCNC: 68 U/L (ref 12–78)
ANION GAP SERPL CALCULATED.3IONS-SCNC: 9 MMOL/L (ref 4–13)
AST SERPL W P-5'-P-CCNC: 48 U/L (ref 5–45)
BASOPHILS # BLD AUTO: 0.02 THOUSANDS/ΜL (ref 0–0.1)
BASOPHILS NFR BLD AUTO: 1 % (ref 0–1)
BILIRUB SERPL-MCNC: 0.78 MG/DL (ref 0.2–1)
BUN SERPL-MCNC: 11 MG/DL (ref 5–25)
CALCIUM SERPL-MCNC: 8.8 MG/DL (ref 8.3–10.1)
CHLORIDE SERPL-SCNC: 106 MMOL/L (ref 100–108)
CO2 SERPL-SCNC: 24 MMOL/L (ref 21–32)
CREAT SERPL-MCNC: 0.77 MG/DL (ref 0.6–1.3)
EOSINOPHIL # BLD AUTO: 0.03 THOUSAND/ΜL (ref 0–0.61)
EOSINOPHIL NFR BLD AUTO: 1 % (ref 0–6)
ERYTHROCYTE [DISTWIDTH] IN BLOOD BY AUTOMATED COUNT: 18.3 % (ref 11.6–15.1)
GFR SERPL CREATININE-BSD FRML MDRD: 88 ML/MIN/1.73SQ M
GLUCOSE P FAST SERPL-MCNC: 94 MG/DL (ref 65–99)
HCT VFR BLD AUTO: 38.9 % (ref 34.8–46.1)
HGB BLD-MCNC: 12 G/DL (ref 11.5–15.4)
IMM GRANULOCYTES # BLD AUTO: 0.01 THOUSAND/UL (ref 0–0.2)
IMM GRANULOCYTES NFR BLD AUTO: 0 % (ref 0–2)
LYMPHOCYTES # BLD AUTO: 1.4 THOUSANDS/ΜL (ref 0.6–4.47)
LYMPHOCYTES NFR BLD AUTO: 43 % (ref 14–44)
MCH RBC QN AUTO: 28.3 PG (ref 26.8–34.3)
MCHC RBC AUTO-ENTMCNC: 30.8 G/DL (ref 31.4–37.4)
MCV RBC AUTO: 92 FL (ref 82–98)
MONOCYTES # BLD AUTO: 0.29 THOUSAND/ΜL (ref 0.17–1.22)
MONOCYTES NFR BLD AUTO: 9 % (ref 4–12)
NEUTROPHILS # BLD AUTO: 1.49 THOUSANDS/ΜL (ref 1.85–7.62)
NEUTS SEG NFR BLD AUTO: 46 % (ref 43–75)
NRBC BLD AUTO-RTO: 0 /100 WBCS
PLATELET # BLD AUTO: 198 THOUSANDS/UL (ref 149–390)
PMV BLD AUTO: 9.2 FL (ref 8.9–12.7)
POTASSIUM SERPL-SCNC: 3.4 MMOL/L (ref 3.5–5.3)
PROT SERPL-MCNC: 7.6 G/DL (ref 6.4–8.2)
RBC # BLD AUTO: 4.24 MILLION/UL (ref 3.81–5.12)
SODIUM SERPL-SCNC: 139 MMOL/L (ref 136–145)
WBC # BLD AUTO: 3.24 THOUSAND/UL (ref 4.31–10.16)

## 2020-06-26 PROCEDURE — 80053 COMPREHEN METABOLIC PANEL: CPT

## 2020-06-26 PROCEDURE — 36415 COLL VENOUS BLD VENIPUNCTURE: CPT

## 2020-06-26 PROCEDURE — 85025 COMPLETE CBC W/AUTO DIFF WBC: CPT

## 2020-06-29 ENCOUNTER — HOSPITAL ENCOUNTER (OUTPATIENT)
Dept: INFUSION CENTER | Facility: HOSPITAL | Age: 55
Discharge: HOME | End: 2020-06-29
Payer: COMMERCIAL

## 2020-06-29 VITALS
HEART RATE: 81 BPM | TEMPERATURE: 98 F | HEIGHT: 65 IN | DIASTOLIC BLOOD PRESSURE: 83 MMHG | WEIGHT: 142.42 LBS | SYSTOLIC BLOOD PRESSURE: 133 MMHG | BODY MASS INDEX: 23.73 KG/M2 | OXYGEN SATURATION: 98 % | RESPIRATION RATE: 16 BRPM

## 2020-06-29 DIAGNOSIS — C78.7 COLON CANCER METASTASIZED TO LIVER (HCC): Primary | ICD-10-CM

## 2020-06-29 DIAGNOSIS — C18.9 COLON CANCER METASTASIZED TO LIVER (HCC): Primary | ICD-10-CM

## 2020-06-29 PROCEDURE — 96367 TX/PROPH/DG ADDL SEQ IV INF: CPT

## 2020-06-29 PROCEDURE — G0498 CHEMO EXTEND IV INFUS W/PUMP: HCPCS

## 2020-06-29 PROCEDURE — 96368 THER/DIAG CONCURRENT INF: CPT

## 2020-06-29 PROCEDURE — 96375 TX/PRO/DX INJ NEW DRUG ADDON: CPT

## 2020-06-29 PROCEDURE — 96413 CHEMO IV INFUSION 1 HR: CPT

## 2020-06-29 RX ORDER — ATROPINE SULFATE 1 MG/ML
0.25 INJECTION, SOLUTION INTRAMUSCULAR; INTRAVENOUS; SUBCUTANEOUS ONCE
Status: COMPLETED | OUTPATIENT
Start: 2020-06-29 | End: 2020-06-29

## 2020-06-29 RX ORDER — SODIUM CHLORIDE 9 MG/ML
20 INJECTION, SOLUTION INTRAVENOUS ONCE
Status: COMPLETED | OUTPATIENT
Start: 2020-06-29 | End: 2020-06-29

## 2020-06-29 RX ORDER — ATROPINE SULFATE 1 MG/ML
0.25 INJECTION, SOLUTION INTRAMUSCULAR; INTRAVENOUS; SUBCUTANEOUS ONCE AS NEEDED
Status: DISCONTINUED | OUTPATIENT
Start: 2020-06-29 | End: 2020-07-03 | Stop reason: HOSPADM

## 2020-06-29 RX ADMIN — ATROPINE SULFATE 0.25 MG: 1 INJECTION, SOLUTION INTRAMUSCULAR; INTRAVENOUS; SUBCUTANEOUS at 10:02

## 2020-06-29 RX ADMIN — DEXAMETHASONE SODIUM PHOSPHATE: 10 INJECTION, SOLUTION INTRAMUSCULAR; INTRAVENOUS at 09:33

## 2020-06-29 RX ADMIN — LEUCOVORIN CALCIUM 684 MG: 350 INJECTION, POWDER, LYOPHILIZED, FOR SOLUTION INTRAMUSCULAR; INTRAVENOUS at 10:26

## 2020-06-29 RX ADMIN — SODIUM CHLORIDE 300 MG: 0.9 INJECTION, SOLUTION INTRAVENOUS at 10:26

## 2020-06-29 RX ADMIN — SODIUM CHLORIDE 20 ML/HR: 0.9 INJECTION, SOLUTION INTRAVENOUS at 09:30

## 2020-06-29 NOTE — PLAN OF CARE
Problem: Potential for Falls  Goal: Patient will remain free of falls  Description  INTERVENTIONS:  - Assess patient frequently for physical needs  -  Identify cognitive and physical deficits and behaviors that affect risk of falls    -  [place] fall precautions as indicated by assessment   - Educate patient/family on patient safety including physical limitations  - Instruct patient to call for assistance with activity based on assessment  - Modify environment to reduce risk of injury  - Consider OT/PT consult to assist with strengthening/mobility  Outcome: Progressing

## 2020-06-29 NOTE — PROGRESS NOTES
Pt here for irinotecan/leucovorin and 5FU CADD pump, tolerated well without complications   Pt aware to return Wed at [phone] for pump disconnect

## 2020-07-01 ENCOUNTER — HOSPITAL ENCOUNTER (OUTPATIENT)
Dept: INFUSION CENTER | Facility: HOSPITAL | Age: 55
Discharge: HOME | End: 2020-07-01

## 2020-07-01 DIAGNOSIS — C18.9 COLON CANCER METASTASIZED TO LIVER (HCC): ICD-10-CM

## 2020-07-01 DIAGNOSIS — C78.7 COLON CANCER METASTASIZED TO LIVER (HCC): ICD-10-CM

## 2020-07-01 NOTE — PROGRESS NOTES
Pt tolerated CADD pump d/c without difficulty  Port flushed and deaccessed per protocol  Aware of next appt    Left ambulatory declining AVS

## 2020-07-06 ENCOUNTER — RADIATION ONCOLOGY CONSULT (OUTPATIENT)
Dept: RADIATION ONCOLOGY | Facility: CLINIC | Age: 55
End: 2020-07-06
Attending: RADIOLOGY
Payer: COMMERCIAL

## 2020-07-06 VITALS
SYSTOLIC BLOOD PRESSURE: 127 MMHG | RESPIRATION RATE: 18 BRPM | DIASTOLIC BLOOD PRESSURE: 87 MMHG | TEMPERATURE: 97.2 F | HEIGHT: 65 IN | WEIGHT: 140.43 LBS | OXYGEN SATURATION: 99 % | HEART RATE: 82 BPM | BODY MASS INDEX: 23.4 KG/M2

## 2020-07-06 DIAGNOSIS — C78.7 SECONDARY MALIGNANT NEOPLASM OF LIVER (HCC): Primary | ICD-10-CM

## 2020-07-06 DIAGNOSIS — C18.9 COLON CANCER METASTASIZED TO LIVER (HCC): ICD-10-CM

## 2020-07-06 DIAGNOSIS — C78.7 COLON CANCER METASTASIZED TO LIVER (HCC): Primary | ICD-10-CM

## 2020-07-06 DIAGNOSIS — C78.7 COLON CANCER METASTASIZED TO LIVER (HCC): ICD-10-CM

## 2020-07-06 DIAGNOSIS — C18.9 COLON CANCER METASTASIZED TO LIVER (HCC): Primary | ICD-10-CM

## 2020-07-06 PROCEDURE — 99211 OFF/OP EST MAY X REQ PHY/QHP: CPT | Performed by: RADIOLOGY

## 2020-07-06 NOTE — PROGRESS NOTES
Consultation - Radiation Oncology      [facility]:[id number] : 1965  Encounter: [id number]  Patient Information: [place]  Chief Complaint   Patient presents with    Consult     radiation oncology     Cancer Staging  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  History of Present Illness   Rodolfo Collado is a 47y o  year old female who presents with metastatic colon cancer  In 2018 she was found to have severe microcytic anemia  CT abdomen and pelvis showed a transverse colon mass as well as metastatic disease in the liver  Liver biopsy done 10/25/18 revealed metastatic adenocarcinoma consistent with colorectal primary      She had 12 cycles of FOLFOX with bevacizumab with excellent tolerance which completed 2019, resulting in partial response  In 2019 she was started on maintenance bevacizumab monotherapy  She underwent palliative resection of transverse colon due to the bleeding 19  Unfortunately, she had CEA progression and was put back on bevacizumab and 5 FU in 19 CEA 30 4     3/12/20 Dr Thanh Hudson- she reports blood in stool for the past 3 days  Will perform colonoscopy to assess for malignancy vs other benign lesions  She is already s/p palliative resection  (colonoscopy scheduled for  got rescheduled)      20  0     20 CT C/A/P- Diffuse hepatic metastatic disease with with both stable nodules and enlarging nodules, but no new nodules    Stable 4 mm right upper lobe groundglass nodule    Stable loculated pleural effusion within the right major fissure  Cholelithiasis      20 Dr Eliana Guzman- Her recent CT scan shows some progression of liver metastasis   She has no new metastatic disease   Her CEA is markedly elevated  A month ago we held bevacizumab due to GI bleeding  She is going to have a colonoscopy in late July  Her performance status is excellent  Therefore, continued treatment is reasonable   I recommend her to start FOLFIRI consistent of 5 FU, leucovorin and irinotecan  After she has colonoscopy I may add back bevacizumab  She will start treatment 6/15/20      6/15/20 started FOLFIRI consistent of 5 FU, leucovorin and irinotecan    6/5/20 Dr Juan Antonio Grey- she had disease progression based on CT scan therefore her chemo has been modified  F/u in 4 months  Will present at tumor board to discuss other treatment options regarding liver, including possible SIRS therapy     6/25/20 patient was presented at the Rectal/GI Multidisciplinary Conference  - Continue with chemotherapy as currently planned  - Set up Radiation Oncology consult to discuss SIR-Spheres     Patient seen for consultation today with her sister  She denies any abdominal or pelvic pains nor cramps  She has some neuropathy in the hands and feet secondary to her chemotherapy  She also has a history of rheumatoid arthritis for over 20 years with some right hand contractures  Her rheumatoid arthritis has been stable on methotrexate weekly that she has continued to during her chemotherapy for colon carcinoma  She reports normal bowel movements without any blood in her bowel movements since she had her primary transverse colon lesion resected in August 2019  She has no dysuria nor any hematuria  She has gained about 20 lb since her surgery in August of 2019  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  She has been working with home since the coronavirus outbreak  She lives at home with her boyfriend as well as a 15year-old son  Her boyfriend works daily outside performing construction for [employer]  Patient's mother is alive and well at the age of 76 and has had a negative colonoscopy  Her father has a history of diabetes and is 76years old and also had a negative colonoscopy  Her parents have no history of any cancers  Her sister is 43 and alive and well      7/13/20 Infusion FOLFIRI   7/17/20   Fabien Lezama  7/23/20 Colonoscopy  10/9/20 Dr Leo Necessary           Lab Results   Component Value Date     SODIUM 139 06/26/2020     K 3 4 (L) 06/26/2020      06/26/2020     CO2 24 06/26/2020     AGAP 9 06/26/2020     BUN 11 06/26/2020     CREATININE 0 77 06/26/2020     GLUC 97 04/17/2020     GLUF 94 06/26/2020     CALCIUM 8 8 06/26/2020     AST 48 (H) 06/26/2020     ALT 68 06/26/2020     ALKPHOS 362 (H) 06/26/2020     TP 7 6 06/26/2020     TBILI 0 78 06/26/2020     EGFR 88 06/26/2020       Historical Information      Col[facility]as[facility] liver ([facility] [address] )    10/25/2018 Initial Diagnosis     [facility]      10/25/2018 Biopsy     Final Diagnosis   A   Liver mass (core needle biopsy):  - Metastatic adenocarcinoma with extensive necrosis            11/12/2018 - 4/2019 Chemotherapy     Bevacizumab with FOLFOX-6   X12 cycle      4/19/2019 - 10/27/2019 Chemotherapy     bevacizumab (AVASTIN) 900 mg in sodium chloride 0 9 % 100 mL IVPB, 15 mg/kg, Intravenous, Once, 5 of 9 cycles  Dose modification: 7 5 mg/kg (original dose 15 mg/kg, Cycle 2, Reason: Other (See Comments), Comment: regimen)  Administration: 450 mg (5/20/2019), 450 mg (6/10/2019), 450 mg (7/1/2019), 450 mg (10/7/2019)      8/29/2019 Surgery     Right hemicolectomy:  - Residual adenocarcinoma   - Thirty (30) lymph nodes negative for carcinoma (0/30)      11/4/2019 - 6/14/2020 Chemotherapy     bevacizumab (AVASTIN) 292 5 mg in sodium chloride 0 9 % 100 mL IVPB, 5 mg/kg = 292 5 mg (100 % of original dose 5 mg/kg), Intravenous, Once, 13 of 15 cycles  Dose modification: 5 mg/kg (original dose 5 mg/kg, Cycle 1)  Administration: 292 5 mg (11/4/2019), 292 5 mg (11/18/2019), 292 5 mg (12/2/2019), 292 5 mg (12/16/2019), 292 5 mg (12/31/2019), 292 5 mg (1/13/2020), 292 5 mg (1/27/2020), 292 5 mg (2/10/2020), 292 5 mg (2/24/2020), 292 5 mg (3/9/2020), 292 5 mg (5/4/2020), 292 5 mg (5/18/2020), 292 5 mg (6/1/2020)  leucovorin 656 mg in dextrose 5 % 250 mL IVPB, 400 mg/m2 = 656 mg, Intravenous, Once, 16 of 18 cycles  Administration: 656 mg (11/4/2019), 656 mg (11/18/2019), 656 mg (12/2/2019), 656 mg (12/16/2019), 656 mg (12/31/2019), 656 mg (4/6/2020), 656 mg (1/13/2020), 656 mg (1/27/2020), 656 mg (2/10/2020), 656 mg (2/24/2020), 656 mg (3/9/2020), 656 mg (3/23/2020), 656 mg (4/20/2020), 656 mg (5/4/2020), 656 mg (5/18/2020), 656 mg (6/1/2020)      6/15/2020 -  Chemotherapy     bevacizumab (AVASTIN) 320 mg in sodium chloride 0 9 % 100 mL IVPB, 5 mg/kg, Intravenous, Once, 0 of 3 cycles  irinotecan (CAMPTOSAR) 300 mg in sodium chloride 0 9 % 500 mL chemo infusion, 308 mg, Intravenous, Once, 2 of 6 cycles  Administration: 300 mg (6/15/2020), 300 mg (6/29/2020)  leucovorin 684 mg in sodium chloride 0 9 % 250 mL IVPB, 400 mg/m2 = 684 mg, Intravenous, Once, 2 of 6 cycles  Administration: 684 mg (6/15/2020), 684 mg (6/29/2020)         Past Medical History:   Diagnosis Date    Anxious mood     [facility] [facility]     [facility] cancer [facility] [address] [facility]     History of chemotherapy     Rheumatoid arthritis ([facility] [address] )      Past Surgical History:   Procedure Laterality Date    APPENDECTOMY      ESOPHAGOGASTRODUODENOSCOPY N/A 9/14/2018    Procedure: ESOPHAGOGASTRODUODENOSCOPY (EGD) with polypectomy;  Surgeon: Pastor Dutton MD;  Location: [facility];   Service: Gastroenterology    IR IMAGE GUIDED BIOPSY/ASPIRATION LIVER  10/25/2018    IR PORT PLACEMENT  11/7/2018    r chest    NH PART REMOVAL COLON W ANASTOMOSIS N/A 8/29/2019    Procedure: EXTENDED RIGHT COLON RESECTION;  Surgeon: Alfredo Bingham MD;  [facility];  Service: Surgical Oncology    TUBAL LIGATION      resolved 2007    WISDOM TOOTH EXTRACTION      resolved 1990       Family History   Problem Relation Age of Onset    Anxiety disorder Mother     Hypertension Father     Diabetes Father     Heart attack Maternal Grandmother         acute MI    Breast cancer Cousin        Social History   Social History Substance and Sexual Activity   Alcohol Use Not Currently    Alcohol/week: 0 0 standard drinks    Frequency: Never    Drinks per session: Patient refused    Binge frequency: Never     Social History     Substance and Sexual Activity   Drug Use No     Social History     Tobacco Use   Smoking Status Former Smoker    Packs/day: 0 00    Years: 0 00    Pack years: 0 00    Last attempt to quit: 2000    Years since quittin 8   Smokeless Tobacco Never Used         Meds/Allergies     Current Outpatient Medications:     cyanocobalamin (VITAMIN B-12) 1,000 mcg tablet, Take by mouth daily, Disp: , Rfl:     folic acid (FOLVITE) 1 mg tablet, Take 1 mg by mouth daily , Disp: , Rfl:     methotrexate 2 5 mg tablet, 15 mg once a week , Disp: , Rfl:     Multiple Vitamin (MULTIVITAMIN) capsule, Take 1 capsule by mouth daily, Disp: , Rfl:     Omega-3 Fatty Acids (FISH OIL PO), Take by mouth daily , Disp: , Rfl:     Na Sulfate-K Sulfate-Mg Sulf (Suprep Bowel Prep Kit) 17 5-3 13-1 6 GM/177ML SOLN, Take 1 Bottle by mouth once for 1 dose (Patient not taking: Reported on 2020), Disp: 1 Bottle, Rfl: 0  No Known Allergies    Review of Systems  Constitutional: Negative  HENT: Negative  Eyes: Negative  Respiratory: Negative  Cardiovascular: Negative  Gastrointestinal: Negative  Endocrine: Negative  Genitourinary: Negative  Musculoskeletal: Negative  Skin: Negative  Allergic/Immunologic: Negative  Neurological: Positive for numbness (neuropathy in hands and feet)  Hematological: Negative      Psychiatric/Behavioral: Negative        OBJECTIVE:   /87   Pulse 82   Temp (!) 97 2 °F (36 2 °C)   Resp 18   Ht 5' 5" (1 651 m)   Wt 63 7 kg (140 lb 6 9 oz)   LMP 2018 (Exact Date)   SpO2 99%   BMI 23 37 kg/m²   Pain Assessment:  0  Performance Status: ECOG/Zubrod/WHO: 1 - Symptomatic but completely ambulatory    Physical Exam   Constitutional: She is oriented to person, place, and time  She appears well-developed and well-nourished  No distress  HENT:   Head: Normocephalic and atraumatic  Mouth/Throat: No oropharyngeal exudate  Eyes: Pupils are equal, round, and reactive to light  Conjunctivae and EOM are normal  No scleral icterus  Neck: Normal range of motion  Neck supple  No tracheal deviation present  No thyromegaly present  Cardiovascular: Normal rate, regular rhythm and normal heart sounds  Pulmonary/Chest: Effort normal and breath sounds normal  No respiratory distress  She has no wheezes  She has no rales  She exhibits no tenderness  Abdominal: Soft  Bowel sounds are normal  She exhibits no distension and no mass  There is no tenderness  There is no rebound and no guarding  No hernia  Musculoskeletal: Normal range of motion  She exhibits no edema or tenderness  Lymphadenopathy:     She has no cervical adenopathy  Neurological: She is alert and oriented to person, place, and time  No cranial nerve deficit  Coordination normal    Skin: Skin is warm and dry  No rash noted  She is not diaphoretic  No erythema  No pallor  Psychiatric: She has a normal mood and affect  Her behavior is normal  Judgment and thought content normal    Nursing note and vitals reviewed  RESULTS  Lab Results    Chemistry        Component Value Date/Time    K 3 4 (L) 06/26/2020 0819     06/26/2020 0819    CO2 24 06/26/2020 0819    BUN 11 06/26/2020 0819    CREATININE 0 77 06/26/2020 0819        Component Value Date/Time    CALCIUM 8 8 06/26/2020 0819    ALKPHOS 362 (H) 06/26/2020 0819    AST 48 (H) 06/26/2020 0819    ALT 68 06/26/2020 0819            Lab Results   Component Value Date    WBC 3 24 (L) 06/26/2020    HGB 12 0 06/26/2020    HCT 38 9 06/26/2020    MCV 92 06/26/2020     06/26/2020       Imaging Studies: See Above      Pathology: See Above      ASSESSMENT  1  Secondary malignant neoplasm of liver (HCC)  IR SIR sphere mapping    IR SIR sphere implant   2  Colon cancer metastasized to liver [facility])  Ambulatory referral to Radiation Oncology    IR SIR sphere mapping    IR SIR sphere implant       Cancer Staging  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  PLAN/DISCUSSION  Orders Placed This Encounter   Procedures    IR SIR sphere mapping    IR SIR sphere implant          Greg Puentes is a 47y o  year old female with a 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 at time of her diagnosis in the fall of 2018  Liver biopsy on 10/25/2018 confirmed metastatic adenocarcinoma consistent with colorectal primary  She has been receiving chemotherapy with Dr Vidal Bay as outlined above  She had a good partial response and then have palliative resection of her transverse colon primary secondary to bleeding in August of 2019  She has had no further bleeding since that time  She had elevation of her CEA and was started back on Avastin and 5 FU in November of 2019  CEA level was 30 4 December 27, 2019 and now this brennen up to 174 0 on May 18, 2020  Restaging CT scans of the chest, abdomen, and pelvis on May 27, 2020 revealed diffuse hepatic metastasis with some nodules that are stable and some nodules that are enlarging  There were no new nodules  There was a stable 4 mm right upper lobe ground-glass nodule  There was no evidence of any disease outside of the liver  She started on FOLFIRI on Mary 15, 2020  She has not had Avastin since May 1, 2020  She is a good candidate for Sir sphere radiation therapy to treat her multiple bilateral liver metastasis  We discussed the rationale for Sir spheres including the acute side effects and the potential chronic complications with her  We discussed mapping procedure 1st to be followed by treatment approximately 1 week later  We answered all of her questions and she consents to treatment  She will need to remain off Avastin until after completion of the Sir sphere treatment    She is also scheduled for colonoscopy on July 23, 2020  She will continue with her FOLFIRI infusion  She will be scheduled for Sir spheres most likely in August pending insurance authorization  Donnie Valadez MD  7/6/2020,2:04 PM      Portions of the record may have been created with voice recognition software  Occasional wrong word or "sound a like" substitutions may have occurred due to the inherent limitations of voice recognition software  Read the chart carefully and recognize, using context, where substitutions have occurred

## 2020-07-06 NOTE — PROGRESS NOTES
Fang Child 1965 is a 47 y o  female     Patient is a 55-year-old female with a h/o metastatic colon cancer  In September 2018 she was found to have severe microcytic anemia  CT abdomen and pelvis showed a transverse colon mass as well as metastatic disease in the liver  Liver biopsy done 10/25/18 revealed metastatic adenocarcinoma consistent with colorectal primary  She had 12 cycles of FOLFOX with bevacizumab with excellent tolerance which completed 4/2019, resulting in partial response  In 4/2019 she was started on maintenance bevacizumab monotherapy  She underwent palliative resection of transverse colon due to the bleeding 8/29/19  Unfortunately, she had CEA progression and was put back on bevacizumab and 5 FU in November 2019 12/27/19 CEA 30 4    3/12/20 Dr Spenser Hawthorne- she reports blood in stool for the past 3 days  Will perform colonoscopy to assess for malignancy vs other benign lesions  She is already s/p palliative resection  (colonoscopy scheduled for 4/30 got rescheduled)     5/18/20  0      5/27/20 CT C/A/P- Diffuse hepatic metastatic disease with with both stable nodules and enlarging nodules, but no new nodules      Stable 4 mm right upper lobe groundglass nodule      Stable loculated pleural effusion within the right major fissure      Cholelithiasis  6/4/20 Dr Ananth Fleming- Her recent CT scan shows some progression of liver metastasis  She has no new metastatic disease  Her CEA is markedly elevated  A month ago we held bevacizumab due to GI bleeding  She is going to have a colonoscopy in late July  Her performance status is excellent  Therefore, continued treatment is reasonable  I recommend her to start FOLFIRI consistent of 5 FU, leucovorin and irinotecan  After she has colonoscopy I may add back bevacizumab  She will start treatment 6/15/20     6/5/20 Dr Kirsten Landin- she had disease progression based on CT scan therefore her chemo has been modified  F/u in 4 months   Will present at tumor board to discuss other treatment options regarding liver, including possible SIRS therapy    6/25/20 patient was presented at the Rectal/GI Multidisciplinary Conference  - Continue with chemotherapy as currently planned  - Set up Radiation Oncology consult to discuss SIR-Spheres    7/13/20 infusion  7/17/20 Dr Claude Junes  7/23/20 colonoscopy  10/9/20 Dr Jose Alejandro Calzada      Lab Results   Component Value Date    SODIUM 139 06/26/2020    K 3 4 (L) 06/26/2020     06/26/2020    CO2 24 06/26/2020    AGAP 9 06/26/2020    BUN 11 06/26/2020    CREATININE 0 77 06/26/2020    GLUC 97 04/17/2020    GLUF 94 06/26/2020    CALCIUM 8 8 06/26/2020    AST 48 (H) 06/26/2020    ALT 68 06/26/2020    ALKPHOS 362 (H) 06/26/2020    TP 7 6 06/26/2020    TBILI 0 78 06/26/2020    EGFR 88 06/26/2020          Colon cancer metastasized to liver ([facility] [address] )    10/25/2018 Initial Diagnosis     Colon cancer metastasized to liver ([facility] [address] )      10/25/2018 Biopsy     Final Diagnosis   A   Liver mass (core needle biopsy):  - Metastatic adenocarcinoma with extensive necrosis            11/12/2018 - 4/2019 Chemotherapy     Bevacizumab with FOLFOX-6   X12 cycle      4/19/2019 - 10/27/2019 Chemotherapy     bevacizumab (AVASTIN) 900 mg in sodium chloride 0 9 % 100 mL IVPB, 15 mg/kg, Intravenous, Once, 5 of 9 cycles  Dose modification: 7 5 mg/kg (original dose 15 mg/kg, Cycle 2, Reason: Other (See Comments), Comment: regimen)  Administration: 450 mg (5/20/2019), 450 mg (6/10/2019), 450 mg (7/1/2019), 450 mg (10/7/2019)      8/29/2019 Surgery     Right hemicolectomy:  - Residual adenocarcinoma   - Thirty (30) lymph nodes negative for carcinoma (0/30)      11/4/2019 - 6/14/2020 Chemotherapy     bevacizumab (AVASTIN) 292 5 mg in sodium chloride 0 9 % 100 mL IVPB, 5 mg/kg = 292 5 mg (100 % of original dose 5 mg/kg), Intravenous, Once, 13 of 15 cycles  Dose modification: 5 mg/kg (original dose 5 mg/kg, Cycle 1)  Administration: 292 5 mg (11/4/2019), 292 5 mg (11/18/2019), 292 5 mg (12/2/2019), 292 5 mg (12/16/2019), 292 5 mg (12/31/2019), 292 5 mg (1/13/2020), 292 5 mg (1/27/2020), 292 5 mg (2/10/2020), 292 5 mg (2/24/2020), 292 5 mg (3/9/2020), 292 5 mg (5/4/2020), 292 5 mg (5/18/2020), 292 5 mg (6/1/2020)  leucovorin 656 mg in dextrose 5 % 250 mL IVPB, 400 mg/m2 = 656 mg, Intravenous, Once, 16 of 18 cycles  Administration: 656 mg (11/4/2019), 656 mg (11/18/2019), 656 mg (12/2/2019), 656 mg (12/16/2019), 656 mg (12/31/2019), 656 mg (4/6/2020), 656 mg (1/13/2020), 656 mg (1/27/2020), 656 mg (2/10/2020), 656 mg (2/24/2020), 656 mg (3/9/2020), 656 mg (3/23/2020), 656 mg (4/20/2020), 656 mg (5/4/2020), 656 mg (5/18/2020), 656 mg (6/1/2020)      6/15/2020 -  Chemotherapy     bevacizumab (AVASTIN) 320 mg in sodium chloride 0 9 % 100 mL IVPB, 5 mg/kg, Intravenous, Once, 0 of 3 cycles  irinotecan (CAMPTOSAR) 300 mg in sodium chloride 0 9 % 500 mL chemo infusion, 308 mg, Intravenous, Once, 2 of 6 cycles  Administration: 300 mg (6/15/2020), 300 mg (6/29/2020)  leucovorin 684 mg in sodium chloride 0 9 % 250 mL IVPB, 400 mg/m2 = 684 mg, Intravenous, Once, 2 of 6 cycles  Administration: 684 mg (6/15/2020), 684 mg (6/29/2020)           Clinical Trial: no        Health Maintenance   Topic Date Due    Pneumococcal Vaccine: Pediatrics (0 to 5 Years) and At-Risk Patients (6 to 59 Years) (1 of 3 - PCV13) 08/16/1971    Annual Physical  04/11/2019    Depression Screening PHQ  11/12/2020    Cervical Cancer Screening  04/11/2021    MAMMOGRAM  04/30/2021    BMI: Adult  06/29/2021    Pneumococcal Vaccine: 65+ Years (1 of 2 - PCV13) 08/16/2030    HIB Vaccine  Aged Out    Hepatitis B Vaccine  Aged Out    IPV Vaccine  Aged Out    Hepatitis A Vaccine  Aged Out    Meningococcal ACWY Vaccine  Aged Out    HPV Vaccine  Aged Out    HIV Screening  Discontinued    Hepatitis C Screening  Discontinued    Influenza Vaccine  Discontinued    DTaP,Tdap,and Td Vaccines  Discontinued Past Medical History:   Diagnosis Date    Anxious mood     [facility] [address] [facility]     Colon cancer [facility] [facility]     History of chemotherapy     Rheumatoid arthritis ([facility] [address] [facility]        Past Surgical History:   Procedure Laterality Date    APPENDECTOMY      ESOPHAGOGASTRODUODENOSCOPY N/A 2018    Procedure: ESOPHAGOGASTRODUODENOSCOPY (EGD) with polypectomy;  Surgeon: Yinka Castrejon MD;  Location: [facility];   Service: Gastroenterology    IR IMAGE GUIDED [id number] [facility] LIVER  10/25/2018    IR PORT PLACEMENT  2018    r chest    [place] PART REMOVAL COLON W ANASTOMOSIS N/A 2019    Procedure: EXTENDED RIGHT COLON RESECTION;  Surgeon: Kenji Sher MD;  Location:  MAIN OR;  Service: Surgical Oncology    TUBAL LIGATION      resolved     WISDOM TOOTH EXTRACTION      resolved        Family History   Problem Relation Age of Onset    Anxiety disorder Mother     Hypertension Father     Diabetes Father     Heart attack Maternal Grandmother         acute MI    Breast cancer Cousin        Social History     Tobacco Use    Smoking status: Former Smoker     Packs/day: 0 00     Years: 0 00     Pack years: 0 00     Last attempt to quit: 2000     Years since quittin 8    Smokeless tobacco: Never Used   Substance Use Topics    Alcohol use: Not Currently     Alcohol/week: 0 0 standard drinks     Frequency: Never     Drinks per session: Patient refused     Binge frequency: Never    Drug use: No          Current Outpatient Medications:     cyanocobalamin (VITAMIN B-12) 1,000 mcg tablet, Take by mouth daily, Disp: , Rfl:     folic acid (FOLVITE) 1 mg tablet, Take 1 mg by mouth daily , Disp: , Rfl:     methotrexate 2 5 mg tablet, 15 mg once a week , Disp: , Rfl:     Multiple Vitamin (MULTIVITAMIN) capsule, Take 1 capsule by mouth daily, Disp: , Rfl:     Omega-3 Fatty Acids (FISH OIL PO), Take by mouth daily , Disp: , Rfl:     Na Sulfate-K Sulfate-Mg Sulf (Suprep Bowel Prep Kit) 17 5-3 13-1 6 GM/177ML SOLN, Take 1 Bottle by mouth once for 1 dose (Patient not taking: Reported on 6/5/2020), Disp: 1 Bottle, Rfl: 0    No Known Allergies     Review of Systems:  Review of Systems   Constitutional: Negative  HENT: Negative  Eyes: Negative  Respiratory: Negative  Cardiovascular: Negative  Gastrointestinal: Negative  Endocrine: Negative  Genitourinary: Negative  Musculoskeletal: Negative  Skin: Negative  Allergic/Immunologic: Negative  Neurological: Positive for numbness (neuropathy in hands and feet)  Hematological: Negative  Psychiatric/Behavioral: Negative  Vitals:    07/06/20 1233   BP: 127/87   Pulse: 82   Resp: 18   Temp: (!) 97 2 °F (36 2 °C)   SpO2: 99%   Weight: 63 7 kg (140 lb 6 9 oz)   Height: 5' 5" (1 651 m)       Pain Score: 0-No pain    Imaging:No images are attached to the encounter       Teaching SIRSphere's handout given

## 2020-07-07 DIAGNOSIS — C18.9 COLON CANCER METASTASIZED TO LIVER (HCC): Primary | ICD-10-CM

## 2020-07-07 DIAGNOSIS — C78.7 COLON CANCER METASTASIZED TO LIVER (HCC): Primary | ICD-10-CM

## 2020-07-08 ENCOUNTER — OFFICE VISIT (OUTPATIENT)
Dept: FAMILY MEDICINE CLINIC | Facility: CLINIC | Age: 55
End: 2020-07-08
Payer: COMMERCIAL

## 2020-07-08 VITALS
BODY MASS INDEX: 23.66 KG/M2 | WEIGHT: 142 LBS | HEIGHT: 65 IN | DIASTOLIC BLOOD PRESSURE: 70 MMHG | SYSTOLIC BLOOD PRESSURE: 110 MMHG | TEMPERATURE: 97.4 F

## 2020-07-08 DIAGNOSIS — C18.9 COLON CANCER METASTASIZED TO LIVER (HCC): Primary | ICD-10-CM

## 2020-07-08 DIAGNOSIS — C78.7 COLON CANCER METASTASIZED TO LIVER (HCC): Primary | ICD-10-CM

## 2020-07-08 DIAGNOSIS — M06.041 RHEUMATOID ARTHRITIS INVOLVING BOTH HANDS WITH NEGATIVE RHEUMATOID FACTOR (HCC): ICD-10-CM

## 2020-07-08 DIAGNOSIS — G62.2 POLYNEUROPATHY DUE TO OTHER TOXIC AGENTS (HCC): ICD-10-CM

## 2020-07-08 DIAGNOSIS — M06.042 RHEUMATOID ARTHRITIS INVOLVING BOTH HANDS WITH NEGATIVE RHEUMATOID FACTOR (HCC): ICD-10-CM

## 2020-07-08 DIAGNOSIS — D64.9 SEVERE ANEMIA: ICD-10-CM

## 2020-07-08 PROCEDURE — 1036F TOBACCO NON-USER: CPT | Performed by: FAMILY MEDICINE

## 2020-07-08 PROCEDURE — 99214 OFFICE O/P EST MOD 30 MIN: CPT | Performed by: FAMILY MEDICINE

## 2020-07-08 PROCEDURE — 3008F BODY MASS INDEX DOCD: CPT | Performed by: FAMILY MEDICINE

## 2020-07-09 NOTE — PROGRESS NOTES
Assessment/Plan:    15-year-old woman with: metastatic colon cancer, anemia, polyneuropathy and rheumatoid arthritis  Discussed workup and treatment options with risks and benefits  Will continue current medications and encouraged close follow-up with Oncology and her various specialists  Discussed supportive care return parameters and follow-up in 6 months  Patient declines trial of other medications to help with neuropathy at this time but advised to call back if she changes her mind  No problem-specific Assessment & Plan notes found for this encounter  Diagnoses and all orders for this visit:    Colon cancer metastasized to liver [facility])    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 ([facility] [address] )    Rheumatoid arthritis involving both hands with negative rheumatoid factor (HCC)    Severe anemia          Subjective:     Chief Complaint   Patient presents with    Well Check     Patient is here for a yearly annual well check     Follow-up     6 month follow up  Patient had labs done on 6/26 to review  Patient ID: Ellis Stevens is a 47 y o  female  Patient is a 15-year-old woman who presents for follow-up on metastatic colon cancer, anemia, polyneuropathy and rheumatoid arthritis she admits being generally stable on medications and continues to follow with oncology, Radiation Oncology and Surgical Oncology  She continues on chemotherapy and radiation  No fevers chills nausea vomiting  Tolerating p o  Intake no other complaints at this time      The following portions of the patient's history were reviewed and updated as appropriate: allergies, current medications, past family history, past medical history, past social history, past surgical history and problem list     Review of Systems   Constitutional: Negative  HENT: Negative  Eyes: Negative  Respiratory: Negative  Cardiovascular: Negative  Gastrointestinal: Negative  Endocrine: Negative  Genitourinary: Negative  Musculoskeletal: Negative  Allergic/Immunologic: Negative  Neurological: Negative  Hematological: Negative  Psychiatric/Behavioral: Negative  All other systems reviewed and are negative  Objective:      /70 (BP Location: Left arm, Patient Position: Sitting, Cuff Size: Standard)   Temp (!) 97 4 °F (36 3 °C) (Tympanic)   Ht 5' 5" (1 651 m)   Wt 64 4 kg (142 lb)   LMP 09/28/2018 (Exact Date)   BMI 23 63 kg/m²          Physical Exam   Constitutional: She is oriented to person, place, and time  She appears well-developed and well-nourished  HENT:   Head: Atraumatic  Right Ear: External ear normal    Left Ear: External ear normal    Eyes: Pupils are equal, round, and reactive to light  Conjunctivae and EOM are normal    Neck: Normal range of motion  Cardiovascular: Normal rate, regular rhythm and normal heart sounds  Pulmonary/Chest: Effort normal and breath sounds normal  No respiratory distress  Abdominal: Soft  She exhibits no distension  There is no tenderness  There is no rebound and no guarding  Musculoskeletal: Normal range of motion  Neurological: She is alert and oriented to person, place, and time  No cranial nerve deficit  Skin: Skin is warm and dry  Psychiatric: She has a normal mood and affect   Her behavior is normal  Judgment and thought content normal

## 2020-07-10 ENCOUNTER — APPOINTMENT (OUTPATIENT)
Dept: LAB | Age: 55
End: 2020-07-10
Payer: COMMERCIAL

## 2020-07-10 DIAGNOSIS — C18.9 COLON CANCER METASTASIZED TO LIVER (HCC): ICD-10-CM

## 2020-07-10 DIAGNOSIS — C78.7 COLON CANCER METASTASIZED TO LIVER (HCC): ICD-10-CM

## 2020-07-10 LAB
ALBUMIN SERPL BCP-MCNC: 3.5 G/DL (ref 3.5–5)
ALP SERPL-CCNC: 307 U/L (ref 46–116)
ALT SERPL W P-5'-P-CCNC: 77 U/L (ref 12–78)
ANION GAP SERPL CALCULATED.3IONS-SCNC: 4 MMOL/L (ref 4–13)
AST SERPL W P-5'-P-CCNC: 52 U/L (ref 5–45)
BASOPHILS # BLD AUTO: 0.01 THOUSANDS/ΜL (ref 0–0.1)
BASOPHILS NFR BLD AUTO: 0 % (ref 0–1)
BILIRUB SERPL-MCNC: 0.87 MG/DL (ref 0.2–1)
BUN SERPL-MCNC: 18 MG/DL (ref 5–25)
CALCIUM SERPL-MCNC: 9.8 MG/DL (ref 8.3–10.1)
CHLORIDE SERPL-SCNC: 109 MMOL/L (ref 100–108)
CO2 SERPL-SCNC: 26 MMOL/L (ref 21–32)
CREAT SERPL-MCNC: 0.85 MG/DL (ref 0.6–1.3)
EOSINOPHIL # BLD AUTO: 0.02 THOUSAND/ΜL (ref 0–0.61)
EOSINOPHIL NFR BLD AUTO: 1 % (ref 0–6)
ERYTHROCYTE [DISTWIDTH] IN BLOOD BY AUTOMATED COUNT: 18 % (ref 11.6–15.1)
GFR SERPL CREATININE-BSD FRML MDRD: 78 ML/MIN/1.73SQ M
GLUCOSE P FAST SERPL-MCNC: 90 MG/DL (ref 65–99)
HCT VFR BLD AUTO: 40.7 % (ref 34.8–46.1)
HGB BLD-MCNC: 12.3 G/DL (ref 11.5–15.4)
IMM GRANULOCYTES # BLD AUTO: 0.01 THOUSAND/UL (ref 0–0.2)
IMM GRANULOCYTES NFR BLD AUTO: 0 % (ref 0–2)
LYMPHOCYTES # BLD AUTO: 1.48 THOUSANDS/ΜL (ref 0.6–4.47)
LYMPHOCYTES NFR BLD AUTO: 44 % (ref 14–44)
MCH RBC QN AUTO: 27.9 PG (ref 26.8–34.3)
MCHC RBC AUTO-ENTMCNC: 30.2 G/DL (ref 31.4–37.4)
MCV RBC AUTO: 92 FL (ref 82–98)
MONOCYTES # BLD AUTO: 0.26 THOUSAND/ΜL (ref 0.17–1.22)
MONOCYTES NFR BLD AUTO: 8 % (ref 4–12)
NEUTROPHILS # BLD AUTO: 1.56 THOUSANDS/ΜL (ref 1.85–7.62)
NEUTS SEG NFR BLD AUTO: 47 % (ref 43–75)
NRBC BLD AUTO-RTO: 0 /100 WBCS
PLATELET # BLD AUTO: 177 THOUSANDS/UL (ref 149–390)
PMV BLD AUTO: 9.3 FL (ref 8.9–12.7)
POTASSIUM SERPL-SCNC: 4.1 MMOL/L (ref 3.5–5.3)
PROT SERPL-MCNC: 7.7 G/DL (ref 6.4–8.2)
RBC # BLD AUTO: 4.41 MILLION/UL (ref 3.81–5.12)
SODIUM SERPL-SCNC: 139 MMOL/L (ref 136–145)
WBC # BLD AUTO: 3.34 THOUSAND/UL (ref 4.31–10.16)

## 2020-07-10 PROCEDURE — 36415 COLL VENOUS BLD VENIPUNCTURE: CPT

## 2020-07-10 PROCEDURE — 85025 COMPLETE CBC W/AUTO DIFF WBC: CPT

## 2020-07-10 PROCEDURE — 80053 COMPREHEN METABOLIC PANEL: CPT

## 2020-07-13 ENCOUNTER — HOSPITAL ENCOUNTER (OUTPATIENT)
Dept: INFUSION CENTER | Facility: HOSPITAL | Age: 55
Discharge: HOME | End: 2020-07-13
Payer: COMMERCIAL

## 2020-07-13 ENCOUNTER — TELEPHONE (OUTPATIENT)
Dept: GASTROENTEROLOGY | Facility: CLINIC | Age: 55
End: 2020-07-13

## 2020-07-13 ENCOUNTER — PREP FOR PROCEDURE (OUTPATIENT)
Dept: GASTROENTEROLOGY | Facility: CLINIC | Age: 55
End: 2020-07-13

## 2020-07-13 VITALS
RESPIRATION RATE: 16 BRPM | DIASTOLIC BLOOD PRESSURE: 69 MMHG | TEMPERATURE: 98.4 F | HEIGHT: 65 IN | SYSTOLIC BLOOD PRESSURE: 115 MMHG | BODY MASS INDEX: 23.51 KG/M2 | WEIGHT: 141.09 LBS | HEART RATE: 62 BPM

## 2020-07-13 DIAGNOSIS — C78.7 COLON CANCER METASTASIZED TO LIVER (HCC): Primary | ICD-10-CM

## 2020-07-13 DIAGNOSIS — Z20.822 ENCOUNTER FOR LABORATORY TESTING FOR COVID-19 VIRUS: ICD-10-CM

## 2020-07-13 DIAGNOSIS — Z20.822 ENCOUNTER FOR LABORATORY TESTING FOR COVID-19 VIRUS: Primary | ICD-10-CM

## 2020-07-13 DIAGNOSIS — C18.9 COLON CANCER METASTASIZED TO LIVER (HCC): Primary | ICD-10-CM

## 2020-07-13 PROCEDURE — 96375 TX/PRO/DX INJ NEW DRUG ADDON: CPT

## 2020-07-13 PROCEDURE — G0498 CHEMO EXTEND IV INFUS W/PUMP: HCPCS

## 2020-07-13 PROCEDURE — U0003 INFECTIOUS AGENT DETECTION BY NUCLEIC ACID (DNA OR RNA); SEVERE ACUTE RESPIRATORY SYNDROME CORONAVIRUS 2 (SARS-COV-2) (CORONAVIRUS DISEASE [COVID-19]), AMPLIFIED PROBE TECHNIQUE, MAKING USE OF HIGH THROUGHPUT TECHNOLOGIES AS DESCRIBED BY CMS-2020-01-R: HCPCS

## 2020-07-13 PROCEDURE — 96413 CHEMO IV INFUSION 1 HR: CPT

## 2020-07-13 PROCEDURE — 96415 CHEMO IV INFUSION ADDL HR: CPT

## 2020-07-13 PROCEDURE — 96368 THER/DIAG CONCURRENT INF: CPT

## 2020-07-13 PROCEDURE — 96367 TX/PROPH/DG ADDL SEQ IV INF: CPT

## 2020-07-13 RX ORDER — ATROPINE SULFATE 1 MG/ML
0.25 INJECTION, SOLUTION INTRAMUSCULAR; INTRAVENOUS; SUBCUTANEOUS ONCE
Status: COMPLETED | OUTPATIENT
Start: 2020-07-13 | End: 2020-07-13

## 2020-07-13 RX ORDER — SODIUM CHLORIDE 9 MG/ML
20 INJECTION, SOLUTION INTRAVENOUS ONCE
Status: COMPLETED | OUTPATIENT
Start: 2020-07-13 | End: 2020-07-13

## 2020-07-13 RX ORDER — ATROPINE SULFATE 1 MG/ML
0.25 INJECTION, SOLUTION INTRAMUSCULAR; INTRAVENOUS; SUBCUTANEOUS ONCE AS NEEDED
Status: DISCONTINUED | OUTPATIENT
Start: 2020-07-13 | End: 2020-07-17 | Stop reason: HOSPADM

## 2020-07-13 RX ADMIN — SODIUM CHLORIDE 20 ML/HR: 0.9 INJECTION, SOLUTION INTRAVENOUS at 09:34

## 2020-07-13 RX ADMIN — DEXAMETHASONE SODIUM PHOSPHATE: 10 INJECTION, SOLUTION INTRAMUSCULAR; INTRAVENOUS at 10:01

## 2020-07-13 RX ADMIN — LEUCOVORIN CALCIUM 684 MG: 350 INJECTION, POWDER, LYOPHILIZED, FOR SOLUTION INTRAMUSCULAR; INTRAVENOUS at 10:40

## 2020-07-13 RX ADMIN — SODIUM CHLORIDE 300 MG: 0.9 INJECTION, SOLUTION INTRAVENOUS at 10:40

## 2020-07-13 RX ADMIN — ATROPINE SULFATE 0.25 MG: 1 INJECTION, SOLUTION INTRAMUSCULAR; INTRAVENOUS; SUBCUTANEOUS at 10:34

## 2020-07-13 NOTE — PROGRESS NOTES
Pt tolerated todays leucovorin, irinotecan well  Adrucil cadd pump attached via port  All connections secured with tape  Pt is familiar with cadd pump  appt rescheduled for Wednesday at 1030 for pump dc   Pt defers avs

## 2020-07-14 LAB
INPATIENT: NORMAL
SARS-COV-2 RNA SPEC QL NAA+PROBE: NOT DETECTED

## 2020-07-15 ENCOUNTER — HOSPITAL ENCOUNTER (OUTPATIENT)
Dept: INFUSION CENTER | Facility: HOSPITAL | Age: 55
Discharge: HOME | End: 2020-07-15

## 2020-07-15 DIAGNOSIS — C18.9 COLON CANCER METASTASIZED TO LIVER (HCC): Primary | ICD-10-CM

## 2020-07-15 DIAGNOSIS — C78.7 COLON CANCER METASTASIZED TO LIVER (HCC): Primary | ICD-10-CM

## 2020-07-15 NOTE — PROGRESS NOTES
Pt arrived to department today for Cadd pump dc  [place] volume 0 0ml  Port flushed and deaccessed  Good blood return noted

## 2020-07-17 ENCOUNTER — OFFICE VISIT (OUTPATIENT)
Dept: HEMATOLOGY ONCOLOGY | Facility: CLINIC | Age: 55
End: 2020-07-17
Payer: COMMERCIAL

## 2020-07-17 VITALS
BODY MASS INDEX: 23.51 KG/M2 | WEIGHT: 141.09 LBS | SYSTOLIC BLOOD PRESSURE: 124 MMHG | HEIGHT: 65 IN | RESPIRATION RATE: 18 BRPM | OXYGEN SATURATION: 99 % | TEMPERATURE: 98 F | HEART RATE: 80 BPM | DIASTOLIC BLOOD PRESSURE: 72 MMHG

## 2020-07-17 DIAGNOSIS — C18.9 COLON CANCER METASTASIZED TO LIVER (HCC): Primary | ICD-10-CM

## 2020-07-17 DIAGNOSIS — C78.7 COLON CANCER METASTASIZED TO LIVER (HCC): Primary | ICD-10-CM

## 2020-07-17 PROCEDURE — 1036F TOBACCO NON-USER: CPT | Performed by: INTERNAL MEDICINE

## 2020-07-17 PROCEDURE — 3008F BODY MASS INDEX DOCD: CPT | Performed by: INTERNAL MEDICINE

## 2020-07-17 PROCEDURE — 99215 OFFICE O/P EST HI 40 MIN: CPT | Performed by: INTERNAL MEDICINE

## 2020-07-17 RX ORDER — SODIUM CHLORIDE 9 MG/ML
20 INJECTION, SOLUTION INTRAVENOUS ONCE
Status: CANCELLED | OUTPATIENT
Start: 2020-07-27

## 2020-07-17 RX ORDER — SODIUM CHLORIDE 9 MG/ML
20 INJECTION, SOLUTION INTRAVENOUS ONCE
Status: CANCELLED | OUTPATIENT
Start: 2020-08-10

## 2020-07-17 RX ORDER — ATROPINE SULFATE 1 MG/ML
0.25 INJECTION, SOLUTION INTRAMUSCULAR; INTRAVENOUS; SUBCUTANEOUS ONCE AS NEEDED
Status: CANCELLED | OUTPATIENT
Start: 2020-08-10

## 2020-07-17 RX ORDER — ATROPINE SULFATE 1 MG/ML
0.25 INJECTION, SOLUTION INTRAMUSCULAR; INTRAVENOUS; SUBCUTANEOUS ONCE AS NEEDED
Status: CANCELLED | OUTPATIENT
Start: 2020-07-27

## 2020-07-17 RX ORDER — ATROPINE SULFATE 1 MG/ML
0.25 INJECTION, SOLUTION INTRAMUSCULAR; INTRAVENOUS; SUBCUTANEOUS ONCE
Status: CANCELLED | OUTPATIENT
Start: 2020-07-27

## 2020-07-17 RX ORDER — ATROPINE SULFATE 1 MG/ML
0.25 INJECTION, SOLUTION INTRAMUSCULAR; INTRAVENOUS; SUBCUTANEOUS ONCE
Status: CANCELLED | OUTPATIENT
Start: 2020-08-10

## 2020-07-17 NOTE — PROGRESS NOTES
Hematology / Oncology Outpatient Follow Up Note    Evertt Sandhoff 47 y o  female :1965 [facility]:[id number]         Date:  2020    Assessment / Plan:  A 72-year-old female with metastatic colon cancer  She has extensive liver metastasis   Her tumor has K-wanda mutation and is MSI stable   She had 12 cycle of FOLFOX with bevacizumab with excellent tolerance, resulting in partial response   Since 2018, she has been on maintenance bevacizumab monotherapy   She underwent palliative resection of transverse colon due to the bleeding   She fully recovered from the surgery  Subsequently, she was on maintenance bevacizumab  He had disease progression in May 2020  Therefore, she is currently on FOLFIRI with minimal toxicity  He has no tumor related symptomatology  She was presented Nimo forde has of treatment options  She presents today to discuss Sir sphere with me  We had extensive discussion regarding the cidofovir  Sir sphere is a liver directed therapy  Previous randomized trial comparing systemic chemotherapy with or without Sir sphere did not show any survival benefit  Response rate is slightly better with Sir sphere arm  Since she has no tumor related symptomatology, palliative benefit is not expected  With her current condition as well as no survival benefit with Sir sphere, indication for this procedure is highly questionable  She feel comfortable continuing with systemic chemotherapy at this moment  I will see her again in a month for routine follow-up    All the patient questions were answered to her satisfaction           Subjective:      HPI:  A 51-year-old female who was found to have severe microcytic anemia in 2018   Therefore, she was advised to be hospitalized  Luis Eda Navigerry was given transfusion  Kyle Dk was performed which did not show any major pathology   As outpatient basis, she underwent CT scan of abdomen pelvis which showed transverse colon mass as well as multiple liver metastatic disease   She subsequently underwent liver biopsy which showed metastatic adenocarcinoma, consistent with colorectal primary   She presents today to discuss treatment options   Since January 2018, she lost 25 pound  [facility] has mildly anorexic  [facility], she has no complaint of pain  She denied any respiratory symptoms    She has normal bowel movement    Prior to the hospitalization, she had slowly progressive fatigue as well as some exertional shortness of breath   She is currently on oral iron 3 times per day  [facility] has rheumatoid arthritis for which she is on weekly methotrexate and folic acid   She has no family history of colorectal cancer  [facility] is a lifetime never smoker   Her performance status is normal            Interval History:   A 47year-old female with metastatic colon cancer with extensive liver metastasis   Her tumor has K-wanda mutation and is MSI stable   She started systemic chemotherapy with FOLFOX-6 which she tolerated very well  We were notified by pathology Department that molecular testing cannot be performed on liver biopsy tissue   She was treated with 12 cycle of FOLFOX with bevacizumab with excellent tolerance   She had good partial response   Since April 2019, she was on maintenance bevacizumab monotherapy  However, she was found to have progressive disease in May 2020  Therefore, she is currently on FOLFIRI  We are holding bevacizumab knowing that she is going to have colonoscopy which is scheduled next week  She was presented the option of Sir sphere by GI tumor Board  She discussed with radiation oncologist   She presents today to discuss healed sphere with me  She continues to feel well  She has no symptom with liver metastasis  Her weight is stable  Her diarrhea is minimal   She has been afebrile    Her performance status is normal                                                                                Objective:      Primary Diagnosis:     Metastatic colon cancer   K-wanda mutation positive   BRAF wild type  MSI stable   Diagnosed in October 2018      Cancer Staging:  Cancer Staging  No matching staging information was found for the patient         Previous Hematologic/ Oncologic Treatment:       1  Bevacizumab with FOLFOX-6   X12 cycle   Completed in April 2019    2  Bevacizumab monotherapy from April 2019 through October 2019    3  Bevacizumab with infusion of 5 FU from November 2019 through May 2020       Current Hematologic/ Oncologic Treatment:       Bevacizumab with FOLFIRI  4th cycle to be given in July 27, 2020       Disease Status:      Not evaluated on current treatment      Test Results:     Pathology:     Liver biopsy showed metastatic adenocarcinoma, consistent with colorectal primary   Molecular testing could not be performed per pathology department      Radiology:     CT scan of chest abdomen pelvis in May 2020 shows some progression of liver metastasis  No new metastatic disease       Laboratory:     See below  CEA in May 2020 was 170       Physical Exam:        General Appearance:    Alert, oriented          Eyes:    PERRL   Ears:    Normal external ear canals, both ears   Nose:   Nares normal, septum midline   Throat:   Mucosa moist  Pharynx without injection  Neck:   Supple         Lungs:     Clear to auscultation bilaterally   Chest Wall:    No tenderness or deformity    Heart:    Regular rate and rhythm         Abdomen:     Soft, non-tender, bowel sounds +, no organomegaly               Extremities:   Extremities no cyanosis or edema         Skin:   no rash or icterus  Lymph nodes:   Cervical, supraclavicular, and axillary nodes normal   Neurologic:   CNII-XII intact, normal strength, sensation and reflexes     Throughout           ROS: Review of Systems   All other systems reviewed and are negative  Imaging: No results found        Labs:   Lab Results   Component Value Date    WBC 3 34 (L) 07/10/2020    HGB 12 3 07/10/2020    HCT 40 7 07/10/2020 MCV 92 07/10/2020     07/10/2020     Lab Results   Component Value Date    K 4 1 07/10/2020     (H) 07/10/2020    CO2 26 07/10/2020    BUN 18 07/10/2020    CREATININE 0 85 07/10/2020    GLUF 90 07/10/2020    CALCIUM 9 8 07/10/2020    AST 52 (H) 07/10/2020    ALT 77 07/10/2020    ALKPHOS 307 (H) 07/10/2020    EGFR 78 07/10/2020         Lab Results   Component Value Date     (H) 09/24/2018         Lab Results   Component Value Date     0 (H) 05/18/2020         Lab Results   Component Value Date    IRON 11 (L) 09/13/2018    TIBC 302 09/13/2018    FERRITIN 15 09/13/2018         Lab Results   Component Value Date    FOLATE 11 4 09/13/2018         Current Medications: Reviewed  Allergies: Reviewed  PMH/FH/SH:  Reviewed      Vital Sign:    Body surface area is 1 71 meters squared      Wt Readings from Last 3 Encounters:   07/17/20 64 kg (141 lb 1 5 oz)   07/13/20 64 kg (141 lb 1 5 oz)   07/08/20 64 4 kg (142 lb)        Temp Readings from Last 3 Encounters:   07/17/20 98 °F (36 7 °C) (Tympanic Core)   07/13/20 98 4 °F (36 9 °C) (Temporal)   07/08/20 (!) 97 4 °F (36 3 °C) (Tympanic)        BP Readings from Last 3 Encounters:   07/17/20 124/72   07/13/20 115/69   07/08/20 110/70         Pulse Readings from Last 3 Encounters:   07/17/20 80   07/13/20 62   07/06/20 82     @LASTSAO2(3)@

## 2020-07-17 NOTE — LETTER
2020     [facility], [address]    Patient: Marly Granger   YOB: 1965   Date of Visit: 2020       Dear Dr Ethel Piedra: Thank you for referring Pauly Gore to me for evaluation  Below are my notes for this consultation  If you have questions, please do not hesitate to call me  I look forward to following your patient along with you  Sincerely,        Shyla Marion MD        CC: MD Shyla Stewart MD  2020  9:46 AM  Sign at close encounter  Hematology / Oncology Outpatient Follow Up Note    Marly Granger 47 y o  female :1965 [facility]:[id number]         Date:  2020    Assessment / Plan:  A 80-year-old female with metastatic colon cancer  She has extensive liver metastasis   Her tumor has K-wanda mutation and is MSI stable   She had 12 cycle of FOLFOX with bevacizumab with excellent tolerance, resulting in partial response   Since 2018, she has been on maintenance bevacizumab monotherapy   She underwent palliative resection of transverse colon due to the bleeding   She fully recovered from the surgery  Subsequently, she was on maintenance bevacizumab  He had disease progression in May 2020  Therefore, she is currently on FOLFIRI with minimal toxicity  He has no tumor related symptomatology  She was presented Hanh forde has of treatment options  She presents today to discuss Sir sphere with me  We had extensive discussion regarding the cidofovir  Sir sphere is a liver directed therapy  Previous randomized trial comparing systemic chemotherapy with or without Sir sphere did not show any survival benefit  Response rate is slightly better with Sir sphere arm  Since she has no tumor related symptomatology, palliative benefit is not expected  With her current condition as well as no survival benefit with Sir sphere, indication for this procedure is highly questionable    She feel comfortable continuing with systemic chemotherapy at this moment  I will see her again in a month for routine follow-up  All the patient questions were answered to her satisfaction           Subjective:      HPI:  A 24-year-old female who was found to have severe microcytic anemia in September 2018   Therefore, she was advised to be hospitalized  Dominick Page was given transfusion  Selene Ida was performed which did not show any major pathology   As outpatient basis, she underwent CT scan of abdomen pelvis which showed transverse colon mass as well as multiple liver metastatic disease   She subsequently underwent liver biopsy which showed metastatic adenocarcinoma, consistent with colorectal primary   She presents today to discuss treatment options   Since January 2018, she lost 25 pound  Dominick Page has mildly anorexic  [facility], she has no complaint of pain  She denied any respiratory symptoms    She has normal bowel movement    Prior to the hospitalization, she had slowly progressive fatigue as well as some exertional shortness of breath   She is currently on oral iron 3 times per day  Dominick Page has rheumatoid arthritis for which she is on weekly methotrexate and folic acid   She has no family history of colorectal cancer  Dominick Page is a lifetime never smoker   Her performance status is normal            Interval History:   A 47year-old female with metastatic colon cancer with extensive liver metastasis   Her tumor has K-wanda mutation and is MSI stable   She started systemic chemotherapy with FOLFOX-6 which she tolerated very well  We were notified by pathology Department that molecular testing cannot be performed on liver biopsy tissue   She was treated with 12 cycle of FOLFOX with bevacizumab with excellent tolerance   She had good partial response   Since April 2019, she was on maintenance bevacizumab monotherapy  However, she was found to have progressive disease in May 2020  Therefore, she is currently on FOLFIRI    We are holding bevacizumab knowing that she is going to have colonoscopy which is scheduled next week  She was presented the option of Sir maurisio by GI tumor Board  She discussed with radiation oncologist   She presents today to discuss martha forde with me  She continues to feel well  She has no symptom with liver metastasis  Her weight is stable  Her diarrhea is minimal   She has been afebrile  Her performance status is normal                                                                                Objective:      Primary Diagnosis:     Metastatic colon cancer   K-wanda mutation positive   BRAF wild type  MSI stable   Diagnosed in October 2018      Cancer Staging:  Cancer Staging  No matching staging information was found for the patient         Previous Hematologic/ Oncologic Treatment:       1  Bevacizumab with FOLFOX-6   X12 cycle   Completed in April 2019    2  Bevacizumab monotherapy from April 2019 through October 2019    3  Bevacizumab with infusion of 5 FU from November 2019 through May 2020       Current Hematologic/ Oncologic Treatment:       Bevacizumab with FOLFIRI   4th cycle to be given in July 27, 2020       Disease Status:      Not evaluated on current treatment      Test Results:     Pathology:     Liver biopsy showed metastatic adenocarcinoma, consistent with colorectal primary   Molecular testing could not be performed per pathology department      Radiology:     CT scan of chest abdomen pelvis in May 2020 shows some progression of liver metastasis  No new metastatic disease       Laboratory:     See below  CEA in May 2020 was 170       Physical Exam:        General Appearance:    Alert, oriented          Eyes:    PERRL   Ears:    Normal external ear canals, both ears   Nose:   Nares normal, septum midline   Throat:   Mucosa moist  Pharynx without injection      Neck:   Supple         Lungs:     Clear to auscultation bilaterally   Chest Wall:    No tenderness or deformity    Heart:    Regular rate and rhythm       Abdomen:     Soft, non-tender, bowel sounds +, no organomegaly               Extremities:   Extremities no cyanosis or edema         Skin:   no rash or icterus  Lymph nodes:   Cervical, supraclavicular, and axillary nodes normal   Neurologic:   CNII-XII intact, normal strength, sensation and reflexes     Throughout           ROS: Review of Systems   All other systems reviewed and are negative  Imaging: No results found  Labs:   Lab Results   Component Value Date    WBC 3 34 (L) 07/10/2020    HGB 12 3 07/10/2020    HCT 40 7 07/10/2020    MCV 92 07/10/2020     07/10/2020     Lab Results   Component Value Date    K 4 1 07/10/2020     (H) 07/10/2020    CO2 26 07/10/2020    BUN 18 07/10/2020    CREATININE 0 85 07/10/2020    GLUF 90 07/10/2020    CALCIUM 9 8 07/10/2020    AST 52 (H) 07/10/2020    ALT 77 07/10/2020    ALKPHOS 307 (H) 07/10/2020    EGFR 78 07/10/2020         Lab Results   Component Value Date     (H) 09/24/2018         Lab Results   Component Value Date     0 (H) 05/18/2020         Lab Results   Component Value Date    IRON 11 (L) 09/13/2018    TIBC 302 09/13/2018    FERRITIN 15 09/13/2018         Lab Results   Component Value Date    FOLATE 11 4 09/13/2018         Current Medications: Reviewed  Allergies: Reviewed  PMH/FH/SH:  Reviewed      Vital Sign:    Body surface area is 1 71 meters squared      Wt Readings from Last 3 Encounters:   07/17/20 64 kg (141 lb 1 5 oz)   07/13/20 64 kg (141 lb 1 5 oz)   07/08/20 64 4 kg (142 lb)        Temp Readings from Last 3 Encounters:   07/17/20 98 °F (36 7 °C) (Tympanic Core)   07/13/20 98 4 °F (36 9 °C) (Temporal)   07/08/20 (!) 97 4 °F (36 3 °C) (Tympanic)        BP Readings from Last 3 Encounters:   07/17/20 124/72   07/13/20 115/69   07/08/20 110/70         Pulse Readings from Last 3 Encounters:   07/17/20 80   07/13/20 62   07/06/20 82     @LASTSAO2(3)@

## 2020-07-20 ENCOUNTER — ANNUAL EXAM (OUTPATIENT)
Dept: OBGYN CLINIC | Facility: CLINIC | Age: 55
End: 2020-07-20
Payer: COMMERCIAL

## 2020-07-20 VITALS
WEIGHT: 140.6 LBS | HEIGHT: 65 IN | SYSTOLIC BLOOD PRESSURE: 120 MMHG | DIASTOLIC BLOOD PRESSURE: 82 MMHG | TEMPERATURE: 98.5 F | BODY MASS INDEX: 23.43 KG/M2

## 2020-07-20 DIAGNOSIS — Z12.39 BREAST CANCER SCREENING: ICD-10-CM

## 2020-07-20 DIAGNOSIS — Z01.419 WOMEN'S ANNUAL ROUTINE GYNECOLOGICAL EXAMINATION: Primary | ICD-10-CM

## 2020-07-20 PROCEDURE — 99396 PREV VISIT EST AGE 40-64: CPT | Performed by: OBSTETRICS & GYNECOLOGY

## 2020-07-20 PROCEDURE — 87624 HPV HI-RISK TYP POOLED RSLT: CPT | Performed by: OBSTETRICS & GYNECOLOGY

## 2020-07-20 PROCEDURE — G0145 SCR C/V CYTO,THINLAYER,RESCR: HCPCS | Performed by: OBSTETRICS & GYNECOLOGY

## 2020-07-20 NOTE — PROGRESS NOTES
Assessment/Plan:    The patient was informed of a stable menopausal gyn examination  A Pap smear was performed  She is doing well with almost 2 year history of colon disease with metastatic to the liver  I was impressed with her added too dense  [place] Also impressed for positive added 2  She has good great care  She is happy with her weight  There is no problem depression or anxiety  No longer sexually active  She should return my office in 1 year  Subjective:      Patient ID: Ye Dang is a 47 y o  female  HPI    This is a 27-year-old white female, she is a  1 para 1 1 vaginal delivery approximately 13 years ago  She is now in menopause  She was last seen in this office approximately 2 years ago  Since that time she has been diagnosed with metastatic colon cancer  She has been on on chemotherapy  She has also has now metastatic disease to her liver  She underwent a colon resection in 2019  She is scheduled for colonoscopy later this week  She denies any major  GI complaint  She has not been sexually active for over a year  Her  is very supportive  Her mammogram is due  She is happy with her weight loss  She has a history of arthritis  She denies any problem with depression or anxiety  She has a dentist on a regular basis  There are no new major family illnesses report at this time  The following portions of the patient's history were reviewed and updated as appropriate: allergies, current medications, past family history, past medical history, past social history, past surgical history and problem list     Review of Systems   All other systems reviewed and are negative  Objective:      /82   Temp 98 5 °F (36 9 °C)   Ht 5' 5" (1 651 m)   Wt 63 8 kg (140 lb 9 6 oz)   LMP 2018 (Exact Date)   BMI 23 40 kg/m²          Physical Exam   Constitutional: She is oriented to person, place, and time   She appears well-developed and well-nourished  HENT:   Head: Normocephalic  Eyes: EOM are normal    Neck: Normal range of motion  Neck supple  No JVD present  No tracheal deviation present  No thyromegaly present  Cardiovascular: Normal rate, regular rhythm, normal heart sounds and intact distal pulses  Exam reveals no gallop and no friction rub  No murmur heard  Pulmonary/Chest: Effort normal and breath sounds normal  No stridor  No respiratory distress  She has no wheezes  She has no rales  She exhibits no tenderness  Right breast exhibits no inverted nipple, no mass, no nipple discharge, no skin change and no tenderness  Left breast exhibits no inverted nipple, no mass, no nipple discharge, no skin change and no tenderness  No breast swelling, tenderness, discharge or bleeding  Breasts are symmetrical    Abdominal: Soft  Bowel sounds are normal  She exhibits no distension and no mass  There is no hepatosplenomegaly  There is no tenderness  There is no rebound and no guarding  No hernia  Hernia confirmed negative in the right inguinal area and confirmed negative in the left inguinal area  Genitourinary: Rectum normal, vagina normal and uterus normal  No labial fusion  There is no rash, tenderness, lesion or injury on the right labia  There is no rash, tenderness, lesion or injury on the left labia  Uterus is not deviated, not enlarged, not fixed and not tender  Cervix exhibits no motion tenderness, no discharge and no friability  Right adnexum displays no mass, no tenderness and no fullness  Left adnexum displays no mass, no tenderness and no fullness  No erythema, tenderness or bleeding in the vagina  No foreign body in the vagina  No signs of injury around the vagina  No vaginal discharge found  Genitourinary Comments: The external genitalia within normal limits  The vagina is clean consistent with menopause  There is no evidence of atrophic vaginitis the present time    The uterus is small mobile nontender a Pap smear was performed  The adnexa clear bilaterally  There is no prolapse  Lymphadenopathy: No inguinal adenopathy noted on the right or left side  Neurological: She is alert and oriented to person, place, and time  Skin: Skin is warm and dry  Psychiatric: She has a normal mood and affect   Her behavior is normal

## 2020-07-20 NOTE — PATIENT INSTRUCTIONS
The patient was informed of a stable menopausal gyn examination  A Pap smear was performed  She is dealing well with her severe GI condition  She is having a very well  There is no obvious evidence of depression or anxiety  She will continue to follow with her cancer doctors return my office in 1 year

## 2020-07-21 DIAGNOSIS — C78.7 COLON CANCER METASTASIZED TO LIVER (HCC): Primary | ICD-10-CM

## 2020-07-21 DIAGNOSIS — C18.9 COLON CANCER METASTASIZED TO LIVER (HCC): Primary | ICD-10-CM

## 2020-07-22 ENCOUNTER — ANESTHESIA EVENT (OUTPATIENT)
Dept: GASTROENTEROLOGY | Facility: HOSPITAL | Age: 55
End: 2020-07-22

## 2020-07-22 RX ORDER — SODIUM CHLORIDE, SODIUM LACTATE, POTASSIUM CHLORIDE, CALCIUM CHLORIDE 600; 310; 30; 20 MG/100ML; MG/100ML; MG/100ML; MG/100ML
100 INJECTION, SOLUTION INTRAVENOUS CONTINUOUS
Status: CANCELLED | OUTPATIENT
Start: 2020-07-22

## 2020-07-23 ENCOUNTER — HOSPITAL ENCOUNTER (OUTPATIENT)
Dept: GASTROENTEROLOGY | Facility: HOSPITAL | Age: 55
Setting detail: OUTPATIENT SURGERY
Discharge: HOME | End: 2020-07-23
Attending: INTERNAL MEDICINE | Admitting: INTERNAL MEDICINE
Payer: COMMERCIAL

## 2020-07-23 ENCOUNTER — ANESTHESIA (OUTPATIENT)
Dept: GASTROENTEROLOGY | Facility: HOSPITAL | Age: 55
End: 2020-07-23

## 2020-07-23 VITALS
WEIGHT: 141 LBS | BODY MASS INDEX: 23.49 KG/M2 | OXYGEN SATURATION: 100 % | TEMPERATURE: 97.4 F | HEART RATE: 71 BPM | DIASTOLIC BLOOD PRESSURE: 51 MMHG | SYSTOLIC BLOOD PRESSURE: 91 MMHG | RESPIRATION RATE: 18 BRPM | HEIGHT: 65 IN

## 2020-07-23 DIAGNOSIS — K92.1 BLOOD IN STOOL: ICD-10-CM

## 2020-07-23 DIAGNOSIS — C18.9 COLON CANCER METASTASIZED TO LIVER (HCC): ICD-10-CM

## 2020-07-23 DIAGNOSIS — C78.7 COLON CANCER METASTASIZED TO LIVER (HCC): ICD-10-CM

## 2020-07-23 LAB
HPV HR 12 DNA CVX QL NAA+PROBE: NEGATIVE
HPV16 DNA CVX QL NAA+PROBE: NEGATIVE
HPV18 DNA CVX QL NAA+PROBE: NEGATIVE

## 2020-07-23 PROCEDURE — 45330 DIAGNOSTIC SIGMOIDOSCOPY: CPT | Performed by: INTERNAL MEDICINE

## 2020-07-23 RX ORDER — PROPOFOL 10 MG/ML
INJECTION, EMULSION INTRAVENOUS AS NEEDED
Status: DISCONTINUED | OUTPATIENT
Start: 2020-07-23 | End: 2020-07-23 | Stop reason: SURG

## 2020-07-23 RX ORDER — SODIUM CHLORIDE 9 MG/ML
INJECTION, SOLUTION INTRAVENOUS CONTINUOUS PRN
Status: DISCONTINUED | OUTPATIENT
Start: 2020-07-23 | End: 2020-07-23 | Stop reason: SURG

## 2020-07-23 RX ORDER — PROPOFOL 10 MG/ML
INJECTION, EMULSION INTRAVENOUS CONTINUOUS PRN
Status: DISCONTINUED | OUTPATIENT
Start: 2020-07-23 | End: 2020-07-23 | Stop reason: SURG

## 2020-07-23 RX ORDER — LIDOCAINE HYDROCHLORIDE 10 MG/ML
INJECTION, SOLUTION EPIDURAL; INFILTRATION; INTRACAUDAL; PERINEURAL AS NEEDED
Status: DISCONTINUED | OUTPATIENT
Start: 2020-07-23 | End: 2020-07-23 | Stop reason: SURG

## 2020-07-23 RX ADMIN — LIDOCAINE HYDROCHLORIDE 7 ML: 10 INJECTION, SOLUTION EPIDURAL; INFILTRATION; INTRACAUDAL; PERINEURAL at 12:33

## 2020-07-23 RX ADMIN — PROPOFOL 70 MG: 10 INJECTION, EMULSION INTRAVENOUS at 12:33

## 2020-07-23 RX ADMIN — PROPOFOL 120 MCG/KG/MIN: 10 INJECTION, EMULSION INTRAVENOUS at 12:31

## 2020-07-23 RX ADMIN — SODIUM CHLORIDE: 0.9 INJECTION, SOLUTION INTRAVENOUS at 12:28

## 2020-07-23 NOTE — H&P
History and Physical - [facility]  Michael Whitehead 47 y o  female MRN: [id number]                  HPI: Michael Whitehead is a 47y o  year old female who presents for with hx of stage IV colon cancer, s/p palliative resection of transverse colon in 2019 presents for colonoscopy due to hematochezia  REVIEW OF SYSTEMS: Per the HPI, and otherwise unremarkable  Historical Information   Past Medical History:   Diagnosis Date    Anxious mood     Cancer [facility] [address] [facility]     [facility] cancer ([facility] )     History of chemotherapy     Rheumatoid arthritis ([facility] )      Past Surgical History:   Procedure Laterality Date    APPENDECTOMY      ESOPHAGOGASTRODUODENOSCOPY N/A 2018    Procedure: ESOPHAGOGASTRODUODENOSCOPY (EGD) with polypectomy;  Surgeon: Bertha Myles MD;  Location: [facility];   Service: Gastroenterology    IR IMAGE GUIDED [address] [place] [address] LIVER  10/25/2018    IR PORT PLACEMENT  2018    r chest    UT PART REMOVAL COLON W ANASTOMOSIS N/A 2019    Procedure: EXTENDED RIGHT COLON RESECTION;  Surgeon: Greg Sharma MD;  Location:  MAIN OR;  Service: Surgical Oncology    TUBAL LIGATION      resolved     WISDOM TOOTH EXTRACTION      resolved      Social History   Social History     Substance and Sexual Activity   Alcohol Use Not Currently    Alcohol/week: 0 0 standard drinks    Frequency: Never    Drinks per session: Patient refused    Binge frequency: Never     Social History     Substance and Sexual Activity   Drug Use No     Social History     Tobacco Use   Smoking Status Former Smoker    Packs/day: 0 00    Years: 0 00    Pack years: 0 00    Last attempt to quit: 2000    Years since quittin 9   Smokeless Tobacco Never Used     Family History   Problem Relation Age of Onset    Anxiety disorder Mother     Hypertension Father     Diabetes Father     Heart attack Maternal Grandmother         acute MI    Breast cancer Cousin        Meds/Allergies       (Not in a hospital admission)    No Known Allergies    Objective     Blood pressure 130/78, pulse 80, temperature (!) 97 4 °F (36 3 °C), temperature source Tympanic, resp  rate 16, height 5' 5" (1 651 m), weight 64 kg (141 lb), last menstrual period 09/28/2018, SpO2 99 %, not currently breastfeeding  PHYSICAL EXAM    Gen: NAD  CV: RRR  CHEST: Clear  ABD: soft, NT/ND  EXT: no edema      ASSESSMENT/PLAN:  Mague Garcia is a 47y o  year old female who presents for with hx of stage IV colon cancer, s/p palliative resection of transverse colon in 8/2019 presents for colonoscopy due to hematochezia  The patient is stable and optimized for the procedure, we reviewed risk and benefits  Risk include but not limited to infection, bleeding, perforation and missing a lesion

## 2020-07-23 NOTE — ANESTHESIA POSTPROCEDURE EVALUATION
Post-Op Assessment Note    CV Status:  Stable  Pain Score: 0    Pain management: adequate     Mental Status:  Alert   Hydration Status:  Stable   PONV Controlled:  None   Airway Patency:  Patent   Post Op Vitals Reviewed: Yes      Staff: Anesthesiologist, CRNA           BP 91/51 (07/23/20 1251)    Temp     Pulse 71 (07/23/20 1251)   Resp 18 (07/23/20 1251)    SpO2 100 % (07/23/20 1251)

## 2020-07-23 NOTE — ANESTHESIA PREPROCEDURE EVALUATION
Review of Systems/Medical History  Patient summary reviewed  Chart reviewed  No history of anesthetic complications     Cardiovascular  Hyperlipidemia,    Pulmonary  Smoker ex-smoker  ,        GI/Hepatic    Liver disease , GI malignancy,        Negative  ROS        Endo/Other  Negative endo/other ROS      GYN  Negative gynecology ROS          Hematology  Negative hematology ROS      Musculoskeletal  Rheumatoid arthritis ,   Arthritis     Neurology  Negative neurology ROS      Psychology   Anxiety,              Physical Exam    Airway    Mallampati score: II  TM Distance: >3 FB  Neck ROM: full     Dental   No notable dental hx     Cardiovascular      Pulmonary      Other Findings        Anesthesia Plan  ASA Score- 3     Anesthesia Type- IV sedation with anesthesia with ASA Monitors  Additional Monitors:   Airway Plan:     Comment: Per patient, appropriately NPO, denies active CP/SOB/wheezing/symptoms related to heartburn/nausea/vomiting  Plan Factors-  Patient did not smoke on day of surgery  Induction- intravenous  Postoperative Plan-     Informed Consent- Anesthetic plan and risks discussed with patient  I personally reviewed this patient with the CRNA  Discussed and agreed on the Anesthesia Plan with the CRNA  Thomas Shelton

## 2020-07-24 DIAGNOSIS — C78.7 COLON CANCER METASTASIZED TO LIVER (HCC): Primary | ICD-10-CM

## 2020-07-24 DIAGNOSIS — C18.9 COLON CANCER METASTASIZED TO LIVER (HCC): Primary | ICD-10-CM

## 2020-07-25 ENCOUNTER — APPOINTMENT (OUTPATIENT)
Dept: LAB | Facility: HOSPITAL | Age: 55
End: 2020-07-25
Attending: INTERNAL MEDICINE
Payer: COMMERCIAL

## 2020-07-25 DIAGNOSIS — C78.7 COLON CANCER METASTASIZED TO LIVER (HCC): ICD-10-CM

## 2020-07-25 DIAGNOSIS — C18.9 COLON CANCER METASTASIZED TO LIVER (HCC): ICD-10-CM

## 2020-07-25 LAB
ALBUMIN SERPL BCP-MCNC: 3.8 G/DL (ref 3.5–5)
ALP SERPL-CCNC: 305 U/L (ref 46–116)
ALT SERPL W P-5'-P-CCNC: 80 U/L (ref 12–78)
ANION GAP SERPL CALCULATED.3IONS-SCNC: 9 MMOL/L (ref 4–13)
AST SERPL W P-5'-P-CCNC: 56 U/L (ref 5–45)
BASOPHILS # BLD AUTO: 0.02 THOUSANDS/ΜL (ref 0–0.1)
BASOPHILS NFR BLD AUTO: 1 % (ref 0–1)
BILIRUB SERPL-MCNC: 0.62 MG/DL (ref 0.2–1)
BUN SERPL-MCNC: 17 MG/DL (ref 5–25)
CALCIUM SERPL-MCNC: 9.1 MG/DL (ref 8.3–10.1)
CEA SERPL-MCNC: 188.3 NG/ML (ref 0–3)
CHLORIDE SERPL-SCNC: 103 MMOL/L (ref 100–108)
CO2 SERPL-SCNC: 28 MMOL/L (ref 21–32)
CREAT SERPL-MCNC: 1.01 MG/DL (ref 0.6–1.3)
EOSINOPHIL # BLD AUTO: 0.04 THOUSAND/ΜL (ref 0–0.61)
EOSINOPHIL NFR BLD AUTO: 1 % (ref 0–6)
ERYTHROCYTE [DISTWIDTH] IN BLOOD BY AUTOMATED COUNT: 18 % (ref 11.6–15.1)
GFR SERPL CREATININE-BSD FRML MDRD: 63 ML/MIN/1.73SQ M
GLUCOSE P FAST SERPL-MCNC: 106 MG/DL (ref 65–99)
HCT VFR BLD AUTO: 39.6 % (ref 34.8–46.1)
HGB BLD-MCNC: 12.1 G/DL (ref 11.5–15.4)
IMM GRANULOCYTES # BLD AUTO: 0 THOUSAND/UL (ref 0–0.2)
IMM GRANULOCYTES NFR BLD AUTO: 0 % (ref 0–2)
LYMPHOCYTES # BLD AUTO: 1.37 THOUSANDS/ΜL (ref 0.6–4.47)
LYMPHOCYTES NFR BLD AUTO: 39 % (ref 14–44)
MCH RBC QN AUTO: 28.3 PG (ref 26.8–34.3)
MCHC RBC AUTO-ENTMCNC: 30.6 G/DL (ref 31.4–37.4)
MCV RBC AUTO: 93 FL (ref 82–98)
MONOCYTES # BLD AUTO: 0.35 THOUSAND/ΜL (ref 0.17–1.22)
MONOCYTES NFR BLD AUTO: 10 % (ref 4–12)
NEUTROPHILS # BLD AUTO: 1.77 THOUSANDS/ΜL (ref 1.85–7.62)
NEUTS SEG NFR BLD AUTO: 49 % (ref 43–75)
NRBC BLD AUTO-RTO: 0 /100 WBCS
PLATELET # BLD AUTO: 146 THOUSANDS/UL (ref 149–390)
PMV BLD AUTO: 9 FL (ref 8.9–12.7)
POTASSIUM SERPL-SCNC: 3.6 MMOL/L (ref 3.5–5.3)
PROT SERPL-MCNC: 7.8 G/DL (ref 6.4–8.2)
RBC # BLD AUTO: 4.27 MILLION/UL (ref 3.81–5.12)
SODIUM SERPL-SCNC: 140 MMOL/L (ref 136–145)
WBC # BLD AUTO: 3.55 THOUSAND/UL (ref 4.31–10.16)

## 2020-07-25 PROCEDURE — 36415 COLL VENOUS BLD VENIPUNCTURE: CPT

## 2020-07-25 PROCEDURE — 80053 COMPREHEN METABOLIC PANEL: CPT

## 2020-07-25 PROCEDURE — 82378 CARCINOEMBRYONIC ANTIGEN: CPT

## 2020-07-25 PROCEDURE — 85025 COMPLETE CBC W/AUTO DIFF WBC: CPT

## 2020-07-27 ENCOUNTER — HOSPITAL ENCOUNTER (OUTPATIENT)
Dept: INFUSION CENTER | Facility: HOSPITAL | Age: 55
Discharge: HOME | End: 2020-07-27
Payer: COMMERCIAL

## 2020-07-27 VITALS
DIASTOLIC BLOOD PRESSURE: 68 MMHG | SYSTOLIC BLOOD PRESSURE: 109 MMHG | RESPIRATION RATE: 16 BRPM | OXYGEN SATURATION: 98 % | HEART RATE: 83 BPM | HEIGHT: 65 IN | BODY MASS INDEX: 23.65 KG/M2 | WEIGHT: 141.98 LBS

## 2020-07-27 DIAGNOSIS — C18.9 COLON CANCER METASTASIZED TO LIVER (HCC): Primary | ICD-10-CM

## 2020-07-27 DIAGNOSIS — C78.7 COLON CANCER METASTASIZED TO LIVER (HCC): Primary | ICD-10-CM

## 2020-07-27 LAB
LAB AP GYN PRIMARY INTERPRETATION: NORMAL
Lab: NORMAL

## 2020-07-27 PROCEDURE — 96368 THER/DIAG CONCURRENT INF: CPT

## 2020-07-27 PROCEDURE — 96375 TX/PRO/DX INJ NEW DRUG ADDON: CPT

## 2020-07-27 PROCEDURE — 96413 CHEMO IV INFUSION 1 HR: CPT

## 2020-07-27 PROCEDURE — G0498 CHEMO EXTEND IV INFUS W/PUMP: HCPCS

## 2020-07-27 PROCEDURE — 96367 TX/PROPH/DG ADDL SEQ IV INF: CPT

## 2020-07-27 PROCEDURE — 96417 CHEMO IV INFUS EACH ADDL SEQ: CPT

## 2020-07-27 PROCEDURE — 96415 CHEMO IV INFUSION ADDL HR: CPT

## 2020-07-27 RX ORDER — ATROPINE SULFATE 1 MG/ML
0.25 INJECTION, SOLUTION INTRAMUSCULAR; INTRAVENOUS; SUBCUTANEOUS ONCE
Status: COMPLETED | OUTPATIENT
Start: 2020-07-27 | End: 2020-07-27

## 2020-07-27 RX ORDER — ATROPINE SULFATE 1 MG/ML
0.25 INJECTION, SOLUTION INTRAMUSCULAR; INTRAVENOUS; SUBCUTANEOUS ONCE AS NEEDED
Status: DISCONTINUED | OUTPATIENT
Start: 2020-07-27 | End: 2020-07-31 | Stop reason: HOSPADM

## 2020-07-27 RX ORDER — SODIUM CHLORIDE 9 MG/ML
20 INJECTION, SOLUTION INTRAVENOUS ONCE
Status: COMPLETED | OUTPATIENT
Start: 2020-07-27 | End: 2020-07-27

## 2020-07-27 RX ADMIN — SODIUM CHLORIDE 20 ML/HR: 0.9 INJECTION, SOLUTION INTRAVENOUS at 10:05

## 2020-07-27 RX ADMIN — LEUCOVORIN CALCIUM 684 MG: 350 INJECTION, POWDER, LYOPHILIZED, FOR SOLUTION INTRAMUSCULAR; INTRAVENOUS at 11:46

## 2020-07-27 RX ADMIN — ATROPINE SULFATE 0.25 MG: 1 INJECTION, SOLUTION INTRAMUSCULAR; INTRAVENOUS; SUBCUTANEOUS at 10:39

## 2020-07-27 RX ADMIN — ONDANSETRON HYDROCHLORIDE: 2 INJECTION, SOLUTION INTRAMUSCULAR; INTRAVENOUS at 10:11

## 2020-07-27 RX ADMIN — SODIUM CHLORIDE 300 MG: 0.9 INJECTION, SOLUTION INTRAVENOUS at 11:46

## 2020-07-27 RX ADMIN — BEVACIZUMAB 320 MG: 400 INJECTION, SOLUTION INTRAVENOUS at 10:53

## 2020-07-27 NOTE — PROGRESS NOTES
Pt here for Folfiri and avastin, tolerated well without complications, confirmed appt back Wed 7-29 at 11:30 for pump disconnect

## 2020-07-29 ENCOUNTER — HOSPITAL ENCOUNTER (OUTPATIENT)
Dept: INFUSION CENTER | Facility: HOSPITAL | Age: 55
Discharge: HOME | End: 2020-07-29

## 2020-07-29 DIAGNOSIS — C78.7 COLON CANCER METASTASIZED TO LIVER (HCC): Primary | ICD-10-CM

## 2020-07-29 DIAGNOSIS — C18.9 COLON CANCER METASTASIZED TO LIVER (HCC): Primary | ICD-10-CM

## 2020-07-29 NOTE — PROGRESS NOTES
Pt tolerated CADD pump d/c without difficulty  Port flushed and deaccessed per protocol  Aware of next tx appt    Left ambulatory declining AVS

## 2020-08-04 DIAGNOSIS — C18.9 COLON CANCER METASTASIZED TO LIVER (HCC): Primary | ICD-10-CM

## 2020-08-04 DIAGNOSIS — C78.7 COLON CANCER METASTASIZED TO LIVER (HCC): Primary | ICD-10-CM

## 2020-08-07 ENCOUNTER — APPOINTMENT (OUTPATIENT)
Dept: LAB | Age: 55
End: 2020-08-07
Payer: COMMERCIAL

## 2020-08-07 DIAGNOSIS — C18.9 COLON CANCER METASTASIZED TO LIVER (HCC): ICD-10-CM

## 2020-08-07 DIAGNOSIS — C78.7 COLON CANCER METASTASIZED TO LIVER (HCC): ICD-10-CM

## 2020-08-07 LAB
ALBUMIN SERPL BCP-MCNC: 3.3 G/DL (ref 3.5–5)
ALP SERPL-CCNC: 260 U/L (ref 46–116)
ALT SERPL W P-5'-P-CCNC: 65 U/L (ref 12–78)
ANION GAP SERPL CALCULATED.3IONS-SCNC: 9 MMOL/L (ref 4–13)
AST SERPL W P-5'-P-CCNC: 54 U/L (ref 5–45)
BASOPHILS # BLD AUTO: 0.03 THOUSANDS/ΜL (ref 0–0.1)
BASOPHILS NFR BLD AUTO: 1 % (ref 0–1)
BILIRUB SERPL-MCNC: 0.7 MG/DL (ref 0.2–1)
BUN SERPL-MCNC: 11 MG/DL (ref 5–25)
CALCIUM SERPL-MCNC: 9 MG/DL (ref 8.3–10.1)
CHLORIDE SERPL-SCNC: 109 MMOL/L (ref 100–108)
CO2 SERPL-SCNC: 24 MMOL/L (ref 21–32)
CREAT SERPL-MCNC: 0.76 MG/DL (ref 0.6–1.3)
EOSINOPHIL # BLD AUTO: 0.02 THOUSAND/ΜL (ref 0–0.61)
EOSINOPHIL NFR BLD AUTO: 1 % (ref 0–6)
ERYTHROCYTE [DISTWIDTH] IN BLOOD BY AUTOMATED COUNT: 17.4 % (ref 11.6–15.1)
GFR SERPL CREATININE-BSD FRML MDRD: 89 ML/MIN/1.73SQ M
GLUCOSE P FAST SERPL-MCNC: 90 MG/DL (ref 65–99)
HCT VFR BLD AUTO: 36.2 % (ref 34.8–46.1)
HGB BLD-MCNC: 11.8 G/DL (ref 11.5–15.4)
IMM GRANULOCYTES # BLD AUTO: 0.01 THOUSAND/UL (ref 0–0.2)
IMM GRANULOCYTES NFR BLD AUTO: 0 % (ref 0–2)
LYMPHOCYTES # BLD AUTO: 1.41 THOUSANDS/ΜL (ref 0.6–4.47)
LYMPHOCYTES NFR BLD AUTO: 42 % (ref 14–44)
MCH RBC QN AUTO: 29.5 PG (ref 26.8–34.3)
MCHC RBC AUTO-ENTMCNC: 32.6 G/DL (ref 31.4–37.4)
MCV RBC AUTO: 91 FL (ref 82–98)
MONOCYTES # BLD AUTO: 0.28 THOUSAND/ΜL (ref 0.17–1.22)
MONOCYTES NFR BLD AUTO: 8 % (ref 4–12)
NEUTROPHILS # BLD AUTO: 1.64 THOUSANDS/ΜL (ref 1.85–7.62)
NEUTS SEG NFR BLD AUTO: 48 % (ref 43–75)
NRBC BLD AUTO-RTO: 0 /100 WBCS
PLATELET # BLD AUTO: 168 THOUSANDS/UL (ref 149–390)
PMV BLD AUTO: 9.3 FL (ref 8.9–12.7)
POTASSIUM SERPL-SCNC: 3.3 MMOL/L (ref 3.5–5.3)
PROT SERPL-MCNC: 7.3 G/DL (ref 6.4–8.2)
RBC # BLD AUTO: 4 MILLION/UL (ref 3.81–5.12)
SODIUM SERPL-SCNC: 142 MMOL/L (ref 136–145)
WBC # BLD AUTO: 3.39 THOUSAND/UL (ref 4.31–10.16)

## 2020-08-07 PROCEDURE — 85025 COMPLETE CBC W/AUTO DIFF WBC: CPT

## 2020-08-07 PROCEDURE — 80053 COMPREHEN METABOLIC PANEL: CPT

## 2020-08-07 PROCEDURE — 36415 COLL VENOUS BLD VENIPUNCTURE: CPT

## 2020-08-10 ENCOUNTER — HOSPITAL ENCOUNTER (OUTPATIENT)
Dept: INFUSION CENTER | Facility: HOSPITAL | Age: 55
Discharge: HOME | End: 2020-08-10
Payer: COMMERCIAL

## 2020-08-10 VITALS
RESPIRATION RATE: 16 BRPM | WEIGHT: 140.21 LBS | HEART RATE: 72 BPM | HEIGHT: 65 IN | DIASTOLIC BLOOD PRESSURE: 72 MMHG | TEMPERATURE: 97.9 F | BODY MASS INDEX: 23.36 KG/M2 | SYSTOLIC BLOOD PRESSURE: 138 MMHG | OXYGEN SATURATION: 98 %

## 2020-08-10 DIAGNOSIS — C78.7 COLON CANCER METASTASIZED TO LIVER (HCC): Primary | ICD-10-CM

## 2020-08-10 DIAGNOSIS — C18.9 COLON CANCER METASTASIZED TO LIVER (HCC): Primary | ICD-10-CM

## 2020-08-10 PROCEDURE — G0498 CHEMO EXTEND IV INFUS W/PUMP: HCPCS

## 2020-08-10 PROCEDURE — 96417 CHEMO IV INFUS EACH ADDL SEQ: CPT

## 2020-08-10 PROCEDURE — 96367 TX/PROPH/DG ADDL SEQ IV INF: CPT

## 2020-08-10 PROCEDURE — 96368 THER/DIAG CONCURRENT INF: CPT

## 2020-08-10 PROCEDURE — 96415 CHEMO IV INFUSION ADDL HR: CPT

## 2020-08-10 PROCEDURE — 96413 CHEMO IV INFUSION 1 HR: CPT

## 2020-08-10 PROCEDURE — 96375 TX/PRO/DX INJ NEW DRUG ADDON: CPT

## 2020-08-10 RX ORDER — ATROPINE SULFATE 1 MG/ML
0.25 INJECTION, SOLUTION INTRAMUSCULAR; INTRAVENOUS; SUBCUTANEOUS ONCE AS NEEDED
Status: DISCONTINUED | OUTPATIENT
Start: 2020-08-10 | End: 2020-08-14 | Stop reason: HOSPADM

## 2020-08-10 RX ORDER — ATROPINE SULFATE 1 MG/ML
0.25 INJECTION, SOLUTION INTRAMUSCULAR; INTRAVENOUS; SUBCUTANEOUS ONCE
Status: COMPLETED | OUTPATIENT
Start: 2020-08-10 | End: 2020-08-10

## 2020-08-10 RX ORDER — SODIUM CHLORIDE 9 MG/ML
20 INJECTION, SOLUTION INTRAVENOUS ONCE
Status: COMPLETED | OUTPATIENT
Start: 2020-08-10 | End: 2020-08-10

## 2020-08-10 RX ADMIN — SODIUM CHLORIDE 20 ML/HR: 0.9 INJECTION, SOLUTION INTRAVENOUS at 09:25

## 2020-08-10 RX ADMIN — BEVACIZUMAB 320 MG: 400 INJECTION, SOLUTION INTRAVENOUS at 10:25

## 2020-08-10 RX ADMIN — ATROPINE SULFATE 0.25 MG: 1 INJECTION, SOLUTION INTRAMUSCULAR; INTRAVENOUS; SUBCUTANEOUS at 11:05

## 2020-08-10 RX ADMIN — LEUCOVORIN CALCIUM 684 MG: 350 INJECTION, POWDER, LYOPHILIZED, FOR SOLUTION INTRAMUSCULAR; INTRAVENOUS at 11:13

## 2020-08-10 RX ADMIN — SODIUM CHLORIDE 300 MG: 0.9 INJECTION, SOLUTION INTRAVENOUS at 11:13

## 2020-08-10 RX ADMIN — ONDANSETRON HYDROCHLORIDE: 2 INJECTION, SOLUTION INTRAMUSCULAR; INTRAVENOUS at 09:34

## 2020-08-10 NOTE — PLAN OF CARE
Problem: Potential for Falls  Goal: Patient will remain free of falls  Description: INTERVENTIONS:  - Assess patient frequently for physical needs  -  Identify cognitive and physical deficits and behaviors that affect risk of falls    -  [place] fall precautions as indicated by assessment   - Educate patient/family on patient safety including physical limitations  - Instruct patient to call for assistance with activity based on assessment  - Modify environment to reduce risk of injury  - Consider OT/PT consult to assist with strengthening/mobility  Outcome: Progressing

## 2020-08-10 NOTE — PROGRESS NOTES
Pt here for avastin/irinotecan/cadd pump, tolerated well without complications    Confirmed appt back for CADD disconnect on Wed 8-12-20 at 11:00am

## 2020-08-12 ENCOUNTER — HOSPITAL ENCOUNTER (OUTPATIENT)
Dept: INFUSION CENTER | Facility: HOSPITAL | Age: 55
Discharge: HOME | End: 2020-08-12

## 2020-08-12 DIAGNOSIS — C18.9 COLON CANCER METASTASIZED TO LIVER (HCC): Primary | ICD-10-CM

## 2020-08-12 DIAGNOSIS — C78.7 COLON CANCER METASTASIZED TO LIVER (HCC): Primary | ICD-10-CM

## 2020-08-12 NOTE — PROGRESS NOTES
Pt toelrated  5fu cadd pump well  Reservoir volume = 0 0ml  No adverse reactions noted  Port flushed and deaccessed per routine  Dc ambulatory aware of next appt

## 2020-08-14 ENCOUNTER — OFFICE VISIT (OUTPATIENT)
Dept: HEMATOLOGY ONCOLOGY | Facility: CLINIC | Age: 55
End: 2020-08-14
Payer: COMMERCIAL

## 2020-08-14 VITALS
SYSTOLIC BLOOD PRESSURE: 122 MMHG | RESPIRATION RATE: 18 BRPM | OXYGEN SATURATION: 98 % | HEIGHT: 64 IN | WEIGHT: 139.6 LBS | HEART RATE: 82 BPM | DIASTOLIC BLOOD PRESSURE: 78 MMHG | TEMPERATURE: 98.6 F | BODY MASS INDEX: 23.83 KG/M2

## 2020-08-14 DIAGNOSIS — C78.7 COLON CANCER METASTASIZED TO LIVER (HCC): Primary | ICD-10-CM

## 2020-08-14 DIAGNOSIS — C18.9 COLON CANCER METASTASIZED TO LIVER (HCC): Primary | ICD-10-CM

## 2020-08-14 PROCEDURE — 1036F TOBACCO NON-USER: CPT | Performed by: INTERNAL MEDICINE

## 2020-08-14 PROCEDURE — 99214 OFFICE O/P EST MOD 30 MIN: CPT | Performed by: INTERNAL MEDICINE

## 2020-08-14 PROCEDURE — 3008F BODY MASS INDEX DOCD: CPT | Performed by: INTERNAL MEDICINE

## 2020-08-14 RX ORDER — ATROPINE SULFATE 1 MG/ML
0.25 INJECTION, SOLUTION INTRAMUSCULAR; INTRAVENOUS; SUBCUTANEOUS ONCE
Status: CANCELLED | OUTPATIENT
Start: 2020-08-24

## 2020-08-14 RX ORDER — SODIUM CHLORIDE 9 MG/ML
20 INJECTION, SOLUTION INTRAVENOUS ONCE
Status: CANCELLED | OUTPATIENT
Start: 2020-09-08

## 2020-08-14 RX ORDER — ATROPINE SULFATE 1 MG/ML
0.25 INJECTION, SOLUTION INTRAMUSCULAR; INTRAVENOUS; SUBCUTANEOUS ONCE AS NEEDED
Status: CANCELLED | OUTPATIENT
Start: 2020-10-05

## 2020-08-14 RX ORDER — ATROPINE SULFATE 1 MG/ML
0.25 INJECTION, SOLUTION INTRAMUSCULAR; INTRAVENOUS; SUBCUTANEOUS ONCE
Status: CANCELLED | OUTPATIENT
Start: 2020-09-21

## 2020-08-14 RX ORDER — ATROPINE SULFATE 1 MG/ML
0.25 INJECTION, SOLUTION INTRAMUSCULAR; INTRAVENOUS; SUBCUTANEOUS ONCE
Status: CANCELLED | OUTPATIENT
Start: 2020-10-05

## 2020-08-14 RX ORDER — SODIUM CHLORIDE 9 MG/ML
20 INJECTION, SOLUTION INTRAVENOUS ONCE
Status: CANCELLED | OUTPATIENT
Start: 2020-10-05

## 2020-08-14 RX ORDER — ATROPINE SULFATE 1 MG/ML
0.25 INJECTION, SOLUTION INTRAMUSCULAR; INTRAVENOUS; SUBCUTANEOUS ONCE AS NEEDED
Status: CANCELLED | OUTPATIENT
Start: 2020-09-21

## 2020-08-14 RX ORDER — SODIUM CHLORIDE 9 MG/ML
20 INJECTION, SOLUTION INTRAVENOUS ONCE
Status: CANCELLED | OUTPATIENT
Start: 2020-09-21

## 2020-08-14 RX ORDER — ATROPINE SULFATE 1 MG/ML
0.25 INJECTION, SOLUTION INTRAMUSCULAR; INTRAVENOUS; SUBCUTANEOUS ONCE AS NEEDED
Status: CANCELLED | OUTPATIENT
Start: 2020-08-24

## 2020-08-14 RX ORDER — ATROPINE SULFATE 1 MG/ML
0.25 INJECTION, SOLUTION INTRAMUSCULAR; INTRAVENOUS; SUBCUTANEOUS ONCE AS NEEDED
Status: CANCELLED | OUTPATIENT
Start: 2020-09-08

## 2020-08-14 RX ORDER — ATROPINE SULFATE 1 MG/ML
0.25 INJECTION, SOLUTION INTRAMUSCULAR; INTRAVENOUS; SUBCUTANEOUS ONCE
Status: CANCELLED | OUTPATIENT
Start: 2020-09-08

## 2020-08-14 RX ORDER — SODIUM CHLORIDE 9 MG/ML
20 INJECTION, SOLUTION INTRAVENOUS ONCE
Status: CANCELLED | OUTPATIENT
Start: 2020-08-24

## 2020-08-14 NOTE — PROGRESS NOTES
Hematology / Oncology Outpatient Follow Up Note    Mague Garcia 47 y o  female :1965 EZS:[id number]         Date:  2020    Assessment / Plan:  A 55-year-old female with metastatic colon cancer  She has extensive liver metastasis   Her tumor has K-wanda mutation and is MSI stable  BRAF is wild type   She had 12 cycle of FOLFOX with bevacizumab with excellent tolerance, resulting in partial response   Since 2018, she has been on maintenance bevacizumab monotherapy   She underwent palliative resection of transverse colon due to the bleeding   She fully recovered from the surgery  Subsequently, she was on maintenance bevacizumab  He had disease progression in May 2020  Therefore, she is currently on FOLFIRI plus bevacizumab with minimal toxicity  She only had minor biochemical progression  Clinically, she is stable with no tumor related symptomatology  I recommended her to continue with FOLFIRI with bevacizumab  In 6 weeks, we will obtain CT scan of chest abdomen pelvis for disease evaluation followed by office visit  She is in agreement with my recommendations            Subjective:      HPI:  A 55-year-old female who was found to have severe microcytic anemia in 2018   Therefore, she was advised to be hospitalized  Dominick Page was given transfusion  Selene Ida was performed which did not show any major pathology   As outpatient basis, she underwent CT scan of abdomen pelvis which showed transverse colon mass as well as multiple liver metastatic disease   She subsequently underwent liver biopsy which showed metastatic adenocarcinoma, consistent with colorectal primary   She presents today to discuss treatment options   Since 2018, she lost 25 pound  Dominick Page has mildly anorexic  [facility], she has no complaint of pain   She denied any respiratory symptoms    She has normal bowel movement    Prior to the hospitalization, she had slowly progressive fatigue as well as some exertional shortness of breath   She is currently on oral iron 3 times per day  Maria De Jesus Dorsey has rheumatoid arthritis for which she is on weekly methotrexate and folic acid   She has no family history of colorectal cancer  Maria De Jesus Dorsey is a lifetime never smoker   Her performance status is normal            Interval History:   A 47year-old female with metastatic colon cancer with extensive liver metastasis   Her tumor has K-wanda mutation and is MSI stable   She started systemic chemotherapy with FOLFOX-6 which she tolerated very well  We were notified by pathology Department that molecular testing cannot be performed on liver biopsy tissue   She was treated with 12 cycle of FOLFOX with bevacizumab with excellent tolerance   She had good partial response   Since April 2019, she was on maintenance bevacizumab monotherapy  However, she was found to have progressive disease in May 2020  Therefore, she is currently on FOLFIRI  She underwent colonoscopy in mid July 2020 which was normal   After that, we add bevacizumab to FOLFIRI  She presents today for follow-up  She feels well  She has some diarrhea further week of chemotherapy  She has no nausea vomiting  Her weight is stable  She has no complaint of pain  She denied neuropathy  Her performance status is normal                                                                                Objective:      Primary Diagnosis:     Metastatic colon cancer   K-wanda mutation positive   BRAF wild type  MSI stable   Diagnosed in October 2018      Cancer Staging:  Cancer Staging  No matching staging information was found for the patient         Previous Hematologic/ Oncologic Treatment:       1  Bevacizumab with FOLFOX-6   X12 cycle   Completed in April 2019    2  Bevacizumab monotherapy from April 2019 through October 2019    3  Bevacizumab with infusion of 5 FU from November 2019 through May 2020       Current Hematologic/ Oncologic Treatment:       Bevacizumab with FOLFIRI     6th cycle to be given in August 24, 2020       Disease Status:      Stable disease on current regimen      Test Results:     Pathology:     Liver biopsy showed metastatic adenocarcinoma, consistent with colorectal primary   Molecular testing could not be performed per pathology department      Radiology:     CT scan of chest abdomen pelvis in May 2020 shows some progression of liver metastasis   No new metastatic disease       Laboratory:     See below   CEA in August 2020 was 188       Physical Exam:        General Appearance:    Alert, oriented          Eyes:    PERRL   Ears:    Normal external ear canals, both ears   Nose:   Nares normal, septum midline   Throat:   Mucosa moist  Pharynx without injection  Neck:   Supple         Lungs:     Clear to auscultation bilaterally   Chest Wall:    No tenderness or deformity    Heart:    Regular rate and rhythm         Abdomen:     Soft, non-tender, bowel sounds +, no organomegaly               Extremities:   Extremities no cyanosis or edema         Skin:   no rash or icterus  Lymph nodes:   Cervical, supraclavicular, and axillary nodes normal   Neurologic:   CNII-XII intact, normal strength, sensation and reflexes     Throughout           ROS: Review of Systems   All other systems reviewed and are negative  Imaging: No results found        Labs:   Lab Results   Component Value Date    WBC 3 39 (L) 08/07/2020    HGB 11 8 08/07/2020    HCT 36 2 08/07/2020    MCV 91 08/07/2020     08/07/2020     Lab Results   Component Value Date    K 3 3 (L) 08/07/2020     (H) 08/07/2020    CO2 24 08/07/2020    BUN 11 08/07/2020    CREATININE 0 76 08/07/2020    GLUF 90 08/07/2020    CALCIUM 9 0 08/07/2020    AST 54 (H) 08/07/2020    ALT 65 08/07/2020    ALKPHOS 260 (H) 08/07/2020    EGFR 89 08/07/2020         Lab Results   Component Value Date     (H) 09/24/2018         Lab Results   Component Value Date     3 (H) 07/25/2020         Lab Results   Component Value Date    IRON 11 (L) 09/13/2018    TIBC 302 09/13/2018    FERRITIN 15 09/13/2018         Lab Results   Component Value Date    FOLATE 11 4 09/13/2018         Current Medications: Reviewed  Allergies: Reviewed  PMH/FH/SH:  Reviewed      Vital Sign:    Body surface area is 1 68 meters squared      Wt Readings from Last 3 Encounters:   08/14/20 63 3 kg (139 lb 9 6 oz)   08/10/20 63 6 kg (140 lb 3 4 oz)   07/27/20 64 4 kg (141 lb 15 6 oz)        Temp Readings from Last 3 Encounters:   08/14/20 98 6 °F (37 °C)   08/10/20 97 9 °F (36 6 °C) (Temporal)   07/23/20 (!) 97 4 °F (36 3 °C) (Tympanic)        BP Readings from Last 3 Encounters:   08/14/20 122/78   08/10/20 138/72   07/27/20 109/68         Pulse Readings from Last 3 Encounters:   08/14/20 82   08/10/20 72   07/27/20 83     @LASTSAO2(3)@

## 2020-08-18 DIAGNOSIS — C78.7 COLON CANCER METASTASIZED TO LIVER (HCC): Primary | ICD-10-CM

## 2020-08-18 DIAGNOSIS — C18.9 COLON CANCER METASTASIZED TO LIVER (HCC): Primary | ICD-10-CM

## 2020-08-21 ENCOUNTER — LAB (OUTPATIENT)
Dept: LAB | Age: 55
End: 2020-08-21
Payer: COMMERCIAL

## 2020-08-21 DIAGNOSIS — C78.7 COLON CANCER METASTASIZED TO LIVER (HCC): ICD-10-CM

## 2020-08-21 DIAGNOSIS — C18.9 COLON CANCER METASTASIZED TO LIVER (HCC): ICD-10-CM

## 2020-08-21 LAB
ALBUMIN SERPL BCP-MCNC: 3.5 G/DL (ref 3.5–5)
ALP SERPL-CCNC: 251 U/L (ref 46–116)
ALT SERPL W P-5'-P-CCNC: 78 U/L (ref 12–78)
ANION GAP SERPL CALCULATED.3IONS-SCNC: 7 MMOL/L (ref 4–13)
AST SERPL W P-5'-P-CCNC: 52 U/L (ref 5–45)
BASOPHILS # BLD AUTO: 0.02 THOUSANDS/ΜL (ref 0–0.1)
BASOPHILS NFR BLD AUTO: 1 % (ref 0–1)
BILIRUB SERPL-MCNC: 0.85 MG/DL (ref 0.2–1)
BUN SERPL-MCNC: 13 MG/DL (ref 5–25)
CALCIUM SERPL-MCNC: 9.2 MG/DL (ref 8.3–10.1)
CHLORIDE SERPL-SCNC: 108 MMOL/L (ref 100–108)
CO2 SERPL-SCNC: 27 MMOL/L (ref 21–32)
CREAT SERPL-MCNC: 0.84 MG/DL (ref 0.6–1.3)
EOSINOPHIL # BLD AUTO: 0.02 THOUSAND/ΜL (ref 0–0.61)
EOSINOPHIL NFR BLD AUTO: 1 % (ref 0–6)
ERYTHROCYTE [DISTWIDTH] IN BLOOD BY AUTOMATED COUNT: 16.8 % (ref 11.6–15.1)
GFR SERPL CREATININE-BSD FRML MDRD: 78 ML/MIN/1.73SQ M
GLUCOSE P FAST SERPL-MCNC: 96 MG/DL (ref 65–99)
HCT VFR BLD AUTO: 37.4 % (ref 34.8–46.1)
HGB BLD-MCNC: 11.9 G/DL (ref 11.5–15.4)
IMM GRANULOCYTES # BLD AUTO: 0.01 THOUSAND/UL (ref 0–0.2)
IMM GRANULOCYTES NFR BLD AUTO: 0 % (ref 0–2)
LYMPHOCYTES # BLD AUTO: 1.32 THOUSANDS/ΜL (ref 0.6–4.47)
LYMPHOCYTES NFR BLD AUTO: 43 % (ref 14–44)
MCH RBC QN AUTO: 29.1 PG (ref 26.8–34.3)
MCHC RBC AUTO-ENTMCNC: 31.8 G/DL (ref 31.4–37.4)
MCV RBC AUTO: 91 FL (ref 82–98)
MONOCYTES # BLD AUTO: 0.24 THOUSAND/ΜL (ref 0.17–1.22)
MONOCYTES NFR BLD AUTO: 8 % (ref 4–12)
NEUTROPHILS # BLD AUTO: 1.43 THOUSANDS/ΜL (ref 1.85–7.62)
NEUTS SEG NFR BLD AUTO: 47 % (ref 43–75)
NRBC BLD AUTO-RTO: 0 /100 WBCS
PLATELET # BLD AUTO: 150 THOUSANDS/UL (ref 149–390)
PMV BLD AUTO: 9.9 FL (ref 8.9–12.7)
POTASSIUM SERPL-SCNC: 3.1 MMOL/L (ref 3.5–5.3)
PROT SERPL-MCNC: 7.5 G/DL (ref 6.4–8.2)
RBC # BLD AUTO: 4.09 MILLION/UL (ref 3.81–5.12)
SODIUM SERPL-SCNC: 142 MMOL/L (ref 136–145)
WBC # BLD AUTO: 3.04 THOUSAND/UL (ref 4.31–10.16)

## 2020-08-21 PROCEDURE — 85025 COMPLETE CBC W/AUTO DIFF WBC: CPT

## 2020-08-21 PROCEDURE — 80053 COMPREHEN METABOLIC PANEL: CPT

## 2020-08-21 PROCEDURE — 36415 COLL VENOUS BLD VENIPUNCTURE: CPT

## 2020-08-24 ENCOUNTER — HOSPITAL ENCOUNTER (OUTPATIENT)
Dept: INFUSION CENTER | Facility: HOSPITAL | Age: 55
Discharge: HOME | End: 2020-08-24
Payer: COMMERCIAL

## 2020-08-24 VITALS
TEMPERATURE: 97.8 F | HEIGHT: 64 IN | DIASTOLIC BLOOD PRESSURE: 68 MMHG | BODY MASS INDEX: 24.16 KG/M2 | RESPIRATION RATE: 16 BRPM | SYSTOLIC BLOOD PRESSURE: 118 MMHG | WEIGHT: 141.54 LBS | HEART RATE: 68 BPM

## 2020-08-24 DIAGNOSIS — C18.9 COLON CANCER METASTASIZED TO LIVER (HCC): Primary | ICD-10-CM

## 2020-08-24 DIAGNOSIS — E87.6 HYPOKALEMIA: Primary | ICD-10-CM

## 2020-08-24 DIAGNOSIS — C78.7 COLON CANCER METASTASIZED TO LIVER (HCC): Primary | ICD-10-CM

## 2020-08-24 LAB — POTASSIUM SERPL-SCNC: 3.1 MMOL/L (ref 3.6–5)

## 2020-08-24 PROCEDURE — 96375 TX/PRO/DX INJ NEW DRUG ADDON: CPT

## 2020-08-24 PROCEDURE — 84132 ASSAY OF SERUM POTASSIUM: CPT | Performed by: INTERNAL MEDICINE

## 2020-08-24 PROCEDURE — 96417 CHEMO IV INFUS EACH ADDL SEQ: CPT

## 2020-08-24 PROCEDURE — 96415 CHEMO IV INFUSION ADDL HR: CPT

## 2020-08-24 PROCEDURE — G0498 CHEMO EXTEND IV INFUS W/PUMP: HCPCS

## 2020-08-24 PROCEDURE — 96413 CHEMO IV INFUSION 1 HR: CPT

## 2020-08-24 PROCEDURE — 96367 TX/PROPH/DG ADDL SEQ IV INF: CPT

## 2020-08-24 PROCEDURE — 96368 THER/DIAG CONCURRENT INF: CPT

## 2020-08-24 RX ORDER — ATROPINE SULFATE 1 MG/ML
0.25 INJECTION, SOLUTION INTRAMUSCULAR; INTRAVENOUS; SUBCUTANEOUS ONCE
Status: COMPLETED | OUTPATIENT
Start: 2020-08-24 | End: 2020-08-24

## 2020-08-24 RX ORDER — POTASSIUM CHLORIDE 20 MEQ/1
20 TABLET, EXTENDED RELEASE ORAL DAILY
Qty: 30 TABLET | Refills: 0 | Status: SHIPPED | OUTPATIENT
Start: 2020-08-24 | End: 2021-01-01 | Stop reason: ALTCHOICE

## 2020-08-24 RX ORDER — ATROPINE SULFATE 1 MG/ML
0.25 INJECTION, SOLUTION INTRAMUSCULAR; INTRAVENOUS; SUBCUTANEOUS ONCE AS NEEDED
Status: DISCONTINUED | OUTPATIENT
Start: 2020-08-24 | End: 2020-08-28 | Stop reason: HOSPADM

## 2020-08-24 RX ORDER — SODIUM CHLORIDE 9 MG/ML
20 INJECTION, SOLUTION INTRAVENOUS ONCE
Status: COMPLETED | OUTPATIENT
Start: 2020-08-24 | End: 2020-08-24

## 2020-08-24 RX ADMIN — BEVACIZUMAB 320 MG: 400 INJECTION, SOLUTION INTRAVENOUS at 10:18

## 2020-08-24 RX ADMIN — ONDANSETRON HYDROCHLORIDE: 2 INJECTION, SOLUTION INTRAMUSCULAR; INTRAVENOUS at 09:50

## 2020-08-24 RX ADMIN — SODIUM CHLORIDE 20 ML/HR: 0.9 INJECTION, SOLUTION INTRAVENOUS at 09:49

## 2020-08-24 RX ADMIN — LEUCOVORIN CALCIUM 684 MG: 350 INJECTION, POWDER, LYOPHILIZED, FOR SOLUTION INTRAMUSCULAR; INTRAVENOUS at 11:12

## 2020-08-24 RX ADMIN — ATROPINE SULFATE 0.25 MG: 1 INJECTION, SOLUTION INTRAMUSCULAR; INTRAVENOUS; SUBCUTANEOUS at 11:08

## 2020-08-24 RX ADMIN — SODIUM CHLORIDE 300 MG: 0.9 INJECTION, SOLUTION INTRAVENOUS at 11:15

## 2020-08-24 NOTE — PROGRESS NOTES
Per [place] Rn/ Dr Claude Junes, informed pt that an rx for potassium will be called to her pharmacy for her potassium level of 3 1 today  Pt is aware   No need for IV supplementation per MD

## 2020-08-24 NOTE — PLAN OF CARE
Problem: Potential for Falls  Goal: Patient will remain free of falls  Description: INTERVENTIONS:  - Assess patient frequently for physical needs  -  Identify cognitive and physical deficits and behaviors that affect risk of falls    -  [place] fall precautions as indicated by assessment   - Educate patient/family on patient safety including physical limitations  - Instruct patient to call for assistance with activity based on assessment  - Modify environment to reduce risk of injury  - Consider OT/PT consult to assist with strengthening/mobility  Outcome: Progressing

## 2020-08-24 NOTE — PROGRESS NOTES
Pt tolerated avastin, leucovorin and camptosar  Adrucil cadd pump attached at 2ml/hr  Pt is aware to return in 46 hours   discharged ambulatory deferring avs

## 2020-08-26 ENCOUNTER — HOSPITAL ENCOUNTER (OUTPATIENT)
Dept: INFUSION CENTER | Facility: HOSPITAL | Age: 55
Discharge: HOME | End: 2020-08-26

## 2020-08-26 DIAGNOSIS — C18.9 COLON CANCER METASTASIZED TO LIVER (HCC): Primary | ICD-10-CM

## 2020-08-26 DIAGNOSIS — C78.7 COLON CANCER METASTASIZED TO LIVER (HCC): Primary | ICD-10-CM

## 2020-08-26 NOTE — PLAN OF CARE
Problem: Potential for Falls  Goal: Patient will remain free of falls  Description: INTERVENTIONS:  - Assess patient frequently for physical needs  -  Identify cognitive and physical deficits and behaviors that affect risk of falls    -  [place] fall precautions as indicated by assessment   - Educate patient/family on patient safety including physical limitations  - Instruct patient to call for assistance with activity based on assessment  - Modify environment to reduce risk of injury  - Consider OT/PT consult to assist with strengthening/mobility  Outcome: Progressing

## 2020-09-01 DIAGNOSIS — C78.7 COLON CANCER METASTASIZED TO LIVER (HCC): Primary | ICD-10-CM

## 2020-09-01 DIAGNOSIS — C18.9 COLON CANCER METASTASIZED TO LIVER (HCC): Primary | ICD-10-CM

## 2020-09-04 ENCOUNTER — LAB (OUTPATIENT)
Dept: LAB | Age: 55
End: 2020-09-04
Payer: COMMERCIAL

## 2020-09-04 DIAGNOSIS — C18.9 COLON CANCER METASTASIZED TO LIVER (HCC): ICD-10-CM

## 2020-09-04 DIAGNOSIS — C78.7 COLON CANCER METASTASIZED TO LIVER (HCC): ICD-10-CM

## 2020-09-04 LAB
ALBUMIN SERPL BCP-MCNC: 3.5 G/DL (ref 3.5–5)
ALP SERPL-CCNC: 310 U/L (ref 46–116)
ALT SERPL W P-5'-P-CCNC: 129 U/L (ref 12–78)
ANION GAP SERPL CALCULATED.3IONS-SCNC: 6 MMOL/L (ref 4–13)
AST SERPL W P-5'-P-CCNC: 81 U/L (ref 5–45)
BASOPHILS # BLD AUTO: 0.01 THOUSANDS/ΜL (ref 0–0.1)
BASOPHILS NFR BLD AUTO: 0 % (ref 0–1)
BILIRUB SERPL-MCNC: 0.79 MG/DL (ref 0.2–1)
BUN SERPL-MCNC: 17 MG/DL (ref 5–25)
CALCIUM SERPL-MCNC: 9.4 MG/DL (ref 8.3–10.1)
CHLORIDE SERPL-SCNC: 109 MMOL/L (ref 100–108)
CO2 SERPL-SCNC: 26 MMOL/L (ref 21–32)
CREAT SERPL-MCNC: 0.88 MG/DL (ref 0.6–1.3)
EOSINOPHIL # BLD AUTO: 0.01 THOUSAND/ΜL (ref 0–0.61)
EOSINOPHIL NFR BLD AUTO: 0 % (ref 0–6)
ERYTHROCYTE [DISTWIDTH] IN BLOOD BY AUTOMATED COUNT: 16.8 % (ref 11.6–15.1)
GFR SERPL CREATININE-BSD FRML MDRD: 74 ML/MIN/1.73SQ M
GLUCOSE P FAST SERPL-MCNC: 94 MG/DL (ref 65–99)
HCT VFR BLD AUTO: 38.5 % (ref 34.8–46.1)
HGB BLD-MCNC: 11.9 G/DL (ref 11.5–15.4)
IMM GRANULOCYTES # BLD AUTO: 0.01 THOUSAND/UL (ref 0–0.2)
IMM GRANULOCYTES NFR BLD AUTO: 0 % (ref 0–2)
LYMPHOCYTES # BLD AUTO: 1.37 THOUSANDS/ΜL (ref 0.6–4.47)
LYMPHOCYTES NFR BLD AUTO: 43 % (ref 14–44)
MCH RBC QN AUTO: 29.2 PG (ref 26.8–34.3)
MCHC RBC AUTO-ENTMCNC: 30.9 G/DL (ref 31.4–37.4)
MCV RBC AUTO: 94 FL (ref 82–98)
MONOCYTES # BLD AUTO: 0.25 THOUSAND/ΜL (ref 0.17–1.22)
MONOCYTES NFR BLD AUTO: 8 % (ref 4–12)
NEUTROPHILS # BLD AUTO: 1.57 THOUSANDS/ΜL (ref 1.85–7.62)
NEUTS SEG NFR BLD AUTO: 49 % (ref 43–75)
NRBC BLD AUTO-RTO: 0 /100 WBCS
PLATELET # BLD AUTO: 158 THOUSANDS/UL (ref 149–390)
PMV BLD AUTO: 9.7 FL (ref 8.9–12.7)
POTASSIUM SERPL-SCNC: 4.2 MMOL/L (ref 3.5–5.3)
PROT SERPL-MCNC: 7.5 G/DL (ref 6.4–8.2)
RBC # BLD AUTO: 4.08 MILLION/UL (ref 3.81–5.12)
SODIUM SERPL-SCNC: 141 MMOL/L (ref 136–145)
WBC # BLD AUTO: 3.22 THOUSAND/UL (ref 4.31–10.16)

## 2020-09-04 PROCEDURE — 80053 COMPREHEN METABOLIC PANEL: CPT

## 2020-09-04 PROCEDURE — 36415 COLL VENOUS BLD VENIPUNCTURE: CPT

## 2020-09-04 PROCEDURE — 85025 COMPLETE CBC W/AUTO DIFF WBC: CPT

## 2020-09-08 ENCOUNTER — HOSPITAL ENCOUNTER (OUTPATIENT)
Dept: INFUSION CENTER | Facility: HOSPITAL | Age: 55
Discharge: HOME | End: 2020-09-08
Payer: COMMERCIAL

## 2020-09-08 VITALS
DIASTOLIC BLOOD PRESSURE: 82 MMHG | RESPIRATION RATE: 18 BRPM | HEART RATE: 83 BPM | HEIGHT: 64 IN | TEMPERATURE: 97.5 F | BODY MASS INDEX: 24.39 KG/M2 | WEIGHT: 142.86 LBS | SYSTOLIC BLOOD PRESSURE: 138 MMHG

## 2020-09-08 DIAGNOSIS — C78.7 COLON CANCER METASTASIZED TO LIVER (HCC): Primary | ICD-10-CM

## 2020-09-08 DIAGNOSIS — C18.9 COLON CANCER METASTASIZED TO LIVER (HCC): Primary | ICD-10-CM

## 2020-09-08 PROCEDURE — 96367 TX/PROPH/DG ADDL SEQ IV INF: CPT

## 2020-09-08 PROCEDURE — 96415 CHEMO IV INFUSION ADDL HR: CPT

## 2020-09-08 PROCEDURE — 96417 CHEMO IV INFUS EACH ADDL SEQ: CPT

## 2020-09-08 PROCEDURE — 96375 TX/PRO/DX INJ NEW DRUG ADDON: CPT

## 2020-09-08 PROCEDURE — 96368 THER/DIAG CONCURRENT INF: CPT

## 2020-09-08 PROCEDURE — 96413 CHEMO IV INFUSION 1 HR: CPT

## 2020-09-08 PROCEDURE — G0498 CHEMO EXTEND IV INFUS W/PUMP: HCPCS

## 2020-09-08 RX ORDER — ATROPINE SULFATE 1 MG/ML
0.25 INJECTION, SOLUTION INTRAMUSCULAR; INTRAVENOUS; SUBCUTANEOUS ONCE AS NEEDED
Status: DISCONTINUED | OUTPATIENT
Start: 2020-09-08 | End: 2020-09-12 | Stop reason: HOSPADM

## 2020-09-08 RX ORDER — ATROPINE SULFATE 1 MG/ML
0.25 INJECTION, SOLUTION INTRAMUSCULAR; INTRAVENOUS; SUBCUTANEOUS ONCE
Status: COMPLETED | OUTPATIENT
Start: 2020-09-08 | End: 2020-09-08

## 2020-09-08 RX ORDER — SODIUM CHLORIDE 9 MG/ML
20 INJECTION, SOLUTION INTRAVENOUS ONCE
Status: COMPLETED | OUTPATIENT
Start: 2020-09-08 | End: 2020-09-08

## 2020-09-08 RX ADMIN — SODIUM CHLORIDE 300 MG: 0.9 INJECTION, SOLUTION INTRAVENOUS at 11:32

## 2020-09-08 RX ADMIN — BEVACIZUMAB 320 MG: 400 INJECTION, SOLUTION INTRAVENOUS at 10:43

## 2020-09-08 RX ADMIN — ATROPINE SULFATE 0.25 MG: 1 INJECTION, SOLUTION INTRAMUSCULAR; INTRAVENOUS; SUBCUTANEOUS at 11:23

## 2020-09-08 RX ADMIN — LEUCOVORIN CALCIUM 648 MG: 350 INJECTION, POWDER, LYOPHILIZED, FOR SOLUTION INTRAMUSCULAR; INTRAVENOUS at 11:31

## 2020-09-08 RX ADMIN — ONDANSETRON HYDROCHLORIDE: 2 INJECTION, SOLUTION INTRAMUSCULAR; INTRAVENOUS at 09:52

## 2020-09-08 RX ADMIN — SODIUM CHLORIDE 20 ML/HR: 0.9 INJECTION, SOLUTION INTRAVENOUS at 09:52

## 2020-09-08 NOTE — PROGRESS NOTES
Chemo given today without incident  Pt connected to CADD pump,positive blood return at time of connect  Green light flashing on pump on D/C  Pt verbalizes understanding of when to return for pump disconnect  AVS declined

## 2020-09-10 ENCOUNTER — HOSPITAL ENCOUNTER (OUTPATIENT)
Dept: INFUSION CENTER | Facility: HOSPITAL | Age: 55
Discharge: HOME | End: 2020-09-10
Attending: INTERNAL MEDICINE

## 2020-09-10 DIAGNOSIS — C78.7 COLON CANCER METASTASIZED TO LIVER (HCC): Primary | ICD-10-CM

## 2020-09-10 DIAGNOSIS — C18.9 COLON CANCER METASTASIZED TO LIVER (HCC): Primary | ICD-10-CM

## 2020-09-10 NOTE — PROGRESS NOTES
Pt arrived on unit for CADD D/C, Res vol 0ml  Tolerated disconnect  Left unit ambulatory with a steady gait

## 2020-09-15 DIAGNOSIS — C18.9 COLON CANCER METASTASIZED TO LIVER (HCC): Primary | ICD-10-CM

## 2020-09-15 DIAGNOSIS — C78.7 COLON CANCER METASTASIZED TO LIVER (HCC): Primary | ICD-10-CM

## 2020-09-18 ENCOUNTER — APPOINTMENT (OUTPATIENT)
Dept: LAB | Age: 55
End: 2020-09-18
Payer: COMMERCIAL

## 2020-09-18 DIAGNOSIS — C18.9 COLON CANCER METASTASIZED TO LIVER (HCC): ICD-10-CM

## 2020-09-18 DIAGNOSIS — C78.7 COLON CANCER METASTASIZED TO LIVER (HCC): ICD-10-CM

## 2020-09-18 LAB
ALBUMIN SERPL BCP-MCNC: 3.5 G/DL (ref 3.5–5)
ALP SERPL-CCNC: 282 U/L (ref 46–116)
ALT SERPL W P-5'-P-CCNC: 87 U/L (ref 12–78)
ANION GAP SERPL CALCULATED.3IONS-SCNC: 6 MMOL/L (ref 4–13)
AST SERPL W P-5'-P-CCNC: 61 U/L (ref 5–45)
BASOPHILS # BLD AUTO: 0.02 THOUSANDS/ΜL (ref 0–0.1)
BASOPHILS NFR BLD AUTO: 1 % (ref 0–1)
BILIRUB SERPL-MCNC: 0.87 MG/DL (ref 0.2–1)
BUN SERPL-MCNC: 10 MG/DL (ref 5–25)
CALCIUM SERPL-MCNC: 9.4 MG/DL (ref 8.3–10.1)
CEA SERPL-MCNC: 189.7 NG/ML (ref 0–3)
CHLORIDE SERPL-SCNC: 108 MMOL/L (ref 100–108)
CO2 SERPL-SCNC: 27 MMOL/L (ref 21–32)
CREAT SERPL-MCNC: 0.89 MG/DL (ref 0.6–1.3)
EOSINOPHIL # BLD AUTO: 0.02 THOUSAND/ΜL (ref 0–0.61)
EOSINOPHIL NFR BLD AUTO: 1 % (ref 0–6)
ERYTHROCYTE [DISTWIDTH] IN BLOOD BY AUTOMATED COUNT: 15.9 % (ref 11.6–15.1)
GFR SERPL CREATININE-BSD FRML MDRD: 73 ML/MIN/1.73SQ M
GLUCOSE P FAST SERPL-MCNC: 99 MG/DL (ref 65–99)
HCT VFR BLD AUTO: 39.2 % (ref 34.8–46.1)
HGB BLD-MCNC: 12.2 G/DL (ref 11.5–15.4)
IMM GRANULOCYTES # BLD AUTO: 0.01 THOUSAND/UL (ref 0–0.2)
IMM GRANULOCYTES NFR BLD AUTO: 0 % (ref 0–2)
LYMPHOCYTES # BLD AUTO: 1.47 THOUSANDS/ΜL (ref 0.6–4.47)
LYMPHOCYTES NFR BLD AUTO: 42 % (ref 14–44)
MCH RBC QN AUTO: 29.2 PG (ref 26.8–34.3)
MCHC RBC AUTO-ENTMCNC: 31.1 G/DL (ref 31.4–37.4)
MCV RBC AUTO: 94 FL (ref 82–98)
MONOCYTES # BLD AUTO: 0.25 THOUSAND/ΜL (ref 0.17–1.22)
MONOCYTES NFR BLD AUTO: 7 % (ref 4–12)
NEUTROPHILS # BLD AUTO: 1.71 THOUSANDS/ΜL (ref 1.85–7.62)
NEUTS SEG NFR BLD AUTO: 49 % (ref 43–75)
NRBC BLD AUTO-RTO: 0 /100 WBCS
PLATELET # BLD AUTO: 183 THOUSANDS/UL (ref 149–390)
PMV BLD AUTO: 10 FL (ref 8.9–12.7)
POTASSIUM SERPL-SCNC: 3.7 MMOL/L (ref 3.5–5.3)
PROT SERPL-MCNC: 7.4 G/DL (ref 6.4–8.2)
RBC # BLD AUTO: 4.18 MILLION/UL (ref 3.81–5.12)
SODIUM SERPL-SCNC: 141 MMOL/L (ref 136–145)
WBC # BLD AUTO: 3.48 THOUSAND/UL (ref 4.31–10.16)

## 2020-09-18 PROCEDURE — 85025 COMPLETE CBC W/AUTO DIFF WBC: CPT

## 2020-09-18 PROCEDURE — 82378 CARCINOEMBRYONIC ANTIGEN: CPT

## 2020-09-18 PROCEDURE — 80053 COMPREHEN METABOLIC PANEL: CPT

## 2020-09-18 PROCEDURE — 36415 COLL VENOUS BLD VENIPUNCTURE: CPT

## 2020-09-21 ENCOUNTER — HOSPITAL ENCOUNTER (OUTPATIENT)
Dept: INFUSION CENTER | Facility: HOSPITAL | Age: 55
Discharge: HOME | End: 2020-09-21
Payer: COMMERCIAL

## 2020-09-21 VITALS
TEMPERATURE: 97.3 F | WEIGHT: 144.18 LBS | DIASTOLIC BLOOD PRESSURE: 80 MMHG | HEART RATE: 87 BPM | HEIGHT: 64 IN | OXYGEN SATURATION: 100 % | BODY MASS INDEX: 24.62 KG/M2 | SYSTOLIC BLOOD PRESSURE: 126 MMHG | RESPIRATION RATE: 18 BRPM

## 2020-09-21 DIAGNOSIS — C78.7 COLON CANCER METASTASIZED TO LIVER (HCC): Primary | ICD-10-CM

## 2020-09-21 DIAGNOSIS — C18.9 COLON CANCER METASTASIZED TO LIVER (HCC): Primary | ICD-10-CM

## 2020-09-21 PROCEDURE — 96368 THER/DIAG CONCURRENT INF: CPT

## 2020-09-21 PROCEDURE — 96367 TX/PROPH/DG ADDL SEQ IV INF: CPT

## 2020-09-21 PROCEDURE — 96413 CHEMO IV INFUSION 1 HR: CPT

## 2020-09-21 PROCEDURE — 96415 CHEMO IV INFUSION ADDL HR: CPT

## 2020-09-21 PROCEDURE — 96375 TX/PRO/DX INJ NEW DRUG ADDON: CPT

## 2020-09-21 PROCEDURE — 96417 CHEMO IV INFUS EACH ADDL SEQ: CPT

## 2020-09-21 PROCEDURE — G0498 CHEMO EXTEND IV INFUS W/PUMP: HCPCS

## 2020-09-21 RX ORDER — ATROPINE SULFATE 1 MG/ML
0.25 INJECTION, SOLUTION INTRAMUSCULAR; INTRAVENOUS; SUBCUTANEOUS ONCE
Status: COMPLETED | OUTPATIENT
Start: 2020-09-21 | End: 2020-09-21

## 2020-09-21 RX ORDER — SODIUM CHLORIDE 9 MG/ML
20 INJECTION, SOLUTION INTRAVENOUS ONCE
Status: COMPLETED | OUTPATIENT
Start: 2020-09-21 | End: 2020-09-21

## 2020-09-21 RX ORDER — ATROPINE SULFATE 1 MG/ML
0.25 INJECTION, SOLUTION INTRAMUSCULAR; INTRAVENOUS; SUBCUTANEOUS ONCE AS NEEDED
Status: DISCONTINUED | OUTPATIENT
Start: 2020-09-21 | End: 2020-09-25 | Stop reason: HOSPADM

## 2020-09-21 RX ADMIN — SODIUM CHLORIDE 300 MG: 0.9 INJECTION, SOLUTION INTRAVENOUS at 11:47

## 2020-09-21 RX ADMIN — BEVACIZUMAB 320 MG: 400 INJECTION, SOLUTION INTRAVENOUS at 10:42

## 2020-09-21 RX ADMIN — ATROPINE SULFATE 0.25 MG: 1 INJECTION, SOLUTION INTRAMUSCULAR; INTRAVENOUS; SUBCUTANEOUS at 11:37

## 2020-09-21 RX ADMIN — LEUCOVORIN CALCIUM 684 MG: 350 INJECTION, POWDER, LYOPHILIZED, FOR SOLUTION INTRAMUSCULAR; INTRAVENOUS at 11:47

## 2020-09-21 RX ADMIN — ONDANSETRON HYDROCHLORIDE: 2 INJECTION, SOLUTION INTRAMUSCULAR; INTRAVENOUS at 10:08

## 2020-09-21 RX ADMIN — ATROPINE SULFATE 0.25 MG: 1 INJECTION, SOLUTION INTRAMUSCULAR; INTRAVENOUS; SUBCUTANEOUS at 11:56

## 2020-09-21 RX ADMIN — SODIUM CHLORIDE 20 ML/HR: 0.9 INJECTION, SOLUTION INTRAVENOUS at 09:50

## 2020-09-21 NOTE — PROGRESS NOTES
Pt tolerated infusion of avastin, camptosar, leucovorin, and application of 5 FU CADD without difficulty  No s/s reaction noted  PRN dose of atropine given today due to pt reporting diarrhea from camptosar  Will assess for effectiveness at next appt  Aware of return time on Wed for CADD d/c    Left ambulatory declining AVS

## 2020-09-21 NOTE — PLAN OF CARE
Problem: Potential for Falls  Goal: Patient will remain free of falls  Description: INTERVENTIONS:  - Assess patient frequently for physical needs  -  Identify cognitive and physical deficits and behaviors that affect risk of falls    -  [place] fall precautions as indicated by assessment   - Educate patient/family on patient safety including physical limitations  - Instruct patient to call for assistance with activity based on assessment  - Modify environment to reduce risk of injury  - Consider OT/PT consult to assist with strengthening/mobility  Outcome: Progressing

## 2020-09-23 ENCOUNTER — HOSPITAL ENCOUNTER (OUTPATIENT)
Dept: INFUSION CENTER | Facility: HOSPITAL | Age: 55
Discharge: HOME | End: 2020-09-23
Attending: INTERNAL MEDICINE

## 2020-09-23 ENCOUNTER — HOSPITAL ENCOUNTER (OUTPATIENT)
Dept: CT IMAGING | Facility: HOSPITAL | Age: 55
Discharge: HOME | End: 2020-09-23
Attending: INTERNAL MEDICINE
Payer: COMMERCIAL

## 2020-09-23 DIAGNOSIS — C78.7 COLON CANCER METASTASIZED TO LIVER (HCC): ICD-10-CM

## 2020-09-23 DIAGNOSIS — C18.9 COLON CANCER METASTASIZED TO LIVER (HCC): Primary | ICD-10-CM

## 2020-09-23 DIAGNOSIS — C78.7 COLON CANCER METASTASIZED TO LIVER (HCC): Primary | ICD-10-CM

## 2020-09-23 DIAGNOSIS — C18.9 COLON CANCER METASTASIZED TO LIVER (HCC): ICD-10-CM

## 2020-09-23 PROCEDURE — 74177 CT ABD & PELVIS W/CONTRAST: CPT

## 2020-09-23 PROCEDURE — G1004 CDSM NDSC: HCPCS

## 2020-09-23 PROCEDURE — 71260 CT THORAX DX C+: CPT

## 2020-09-23 RX ADMIN — IOHEXOL 100 ML: 350 INJECTION, SOLUTION INTRAVENOUS at 14:12

## 2020-09-23 NOTE — PLAN OF CARE
Problem: Potential for Falls  Goal: Patient will remain free of falls  Description: INTERVENTIONS:  - Assess patient frequently for physical needs  -  Identify cognitive and physical deficits and behaviors that affect risk of falls    -  [place] fall precautions as indicated by assessment   - Educate patient/family on patient safety including physical limitations  - Instruct patient to call for assistance with activity based on assessment  - Modify environment to reduce risk of injury  - Consider OT/PT consult to assist with strengthening/mobility  Outcome: Progressing

## 2020-09-23 NOTE — PROGRESS NOTES
Pt arrived on unit for cadd pump disconnect  Res vol 0ml, tolerated disconnect  Left unit ambulatory with a steady gait

## 2020-09-28 ENCOUNTER — OFFICE VISIT (OUTPATIENT)
Dept: HEMATOLOGY ONCOLOGY | Facility: CLINIC | Age: 55
End: 2020-09-28
Payer: COMMERCIAL

## 2020-09-28 VITALS
BODY MASS INDEX: 24.62 KG/M2 | TEMPERATURE: 98.4 F | SYSTOLIC BLOOD PRESSURE: 118 MMHG | WEIGHT: 144.18 LBS | HEART RATE: 82 BPM | RESPIRATION RATE: 18 BRPM | DIASTOLIC BLOOD PRESSURE: 64 MMHG | OXYGEN SATURATION: 98 % | HEIGHT: 64 IN

## 2020-09-28 DIAGNOSIS — C78.7 COLON CANCER METASTASIZED TO LIVER (HCC): Primary | ICD-10-CM

## 2020-09-28 DIAGNOSIS — C18.9 COLON CANCER METASTASIZED TO LIVER (HCC): Primary | ICD-10-CM

## 2020-09-28 PROCEDURE — 99214 OFFICE O/P EST MOD 30 MIN: CPT | Performed by: INTERNAL MEDICINE

## 2020-09-28 RX ORDER — ATROPINE SULFATE 1 MG/ML
0.25 INJECTION, SOLUTION INTRAMUSCULAR; INTRAVENOUS; SUBCUTANEOUS ONCE
Status: CANCELLED | OUTPATIENT
Start: 2020-01-01

## 2020-09-28 RX ORDER — FENOPROFEN CALCIUM 200 MG
CAPSULE ORAL 2 TIMES DAILY
COMMUNITY
End: 2021-01-01 | Stop reason: ALTCHOICE

## 2020-09-28 RX ORDER — SODIUM CHLORIDE 9 MG/ML
20 INJECTION, SOLUTION INTRAVENOUS ONCE
Status: CANCELLED | OUTPATIENT
Start: 2020-01-01

## 2020-09-28 RX ORDER — ATROPINE SULFATE 1 MG/ML
0.25 INJECTION, SOLUTION INTRAMUSCULAR; INTRAVENOUS; SUBCUTANEOUS ONCE
Status: CANCELLED | OUTPATIENT
Start: 2020-10-19

## 2020-09-28 RX ORDER — ATROPINE SULFATE 1 MG/ML
0.25 INJECTION, SOLUTION INTRAMUSCULAR; INTRAVENOUS; SUBCUTANEOUS ONCE AS NEEDED
Status: CANCELLED | OUTPATIENT
Start: 2020-01-01

## 2020-09-28 RX ORDER — SODIUM CHLORIDE 9 MG/ML
20 INJECTION, SOLUTION INTRAVENOUS ONCE
Status: CANCELLED | OUTPATIENT
Start: 2020-10-19

## 2020-09-28 RX ORDER — ATROPINE SULFATE 1 MG/ML
0.25 INJECTION, SOLUTION INTRAMUSCULAR; INTRAVENOUS; SUBCUTANEOUS ONCE AS NEEDED
Status: CANCELLED | OUTPATIENT
Start: 2020-10-19

## 2020-09-28 NOTE — PROGRESS NOTES
Hematology / Oncology Outpatient Follow Up Note    Wendy Stevens 54 y o  female :1965 QAT:[id number]         Date:  2020    Assessment / Plan:  A 51-year-old female with metastatic colon cancer  She has extensive liver metastasis   Her tumor has K-wanda mutation and is MSI stable  BRAF is wild type   She had 12 cycle of FOLFOX with bevacizumab with excellent tolerance, resulting in partial response   Since 2018, she has been on maintenance bevacizumab monotherapy   She underwent palliative resection of transverse colon due to the bleeding   She fully recovered from the surgery   Subsequently, she was on maintenance bevacizumab   He had disease progression in May 2020  Therefore, she is currently on FOLFIRI plus bevacizumab with minimal toxicity  She has biochemical stable disease but radiographically has regression of liver metastasis  Therefore, I recommended her to continue with bevacizumab with FOLFIRI every 2 weeks  She is in agreement with my recommendation  I will see her again in 2 months for routine follow-up         Subjective:      HPI:  A 51-year-old female who was found to have severe microcytic anemia in 2018   Therefore, she was advised to be hospitalized  Ordilia Castrejon was given transfusion  [place] was performed which did not show any major pathology   As outpatient basis, she underwent CT scan of abdomen pelvis which showed transverse colon mass as well as multiple liver metastatic disease   She subsequently underwent liver biopsy which showed metastatic adenocarcinoma, consistent with colorectal primary   She presents today to discuss treatment options   Since 2018, she lost 25 pound  Ordilia Bellvilma has mildly anorexic  [facility], she has no complaint of pain   She denied any respiratory symptoms    She has normal bowel movement    Prior to the hospitalization, she had slowly progressive fatigue as well as some exertional shortness of breath   She is currently on oral iron 3 times per day  Pedro Weaver has rheumatoid arthritis for which she is on weekly methotrexate and folic acid   She has no family history of colorectal cancer  Pedro Weaver is a lifetime never smoker  Ginger Simon performance status is normal            Interval History:   A 47year-old female with metastatic colon cancer with extensive liver metastasis   Her tumor has K-wanda mutation and is MSI stable   She started systemic chemotherapy with FOLFOX-6 which she tolerated very well  We were notified by pathology Department that molecular testing cannot be performed on liver biopsy tissue   She was treated with 12 cycle of FOLFOX with bevacizumab with excellent tolerance   She had good partial response   Since April 2019, she was on maintenance bevacizumab monotherapy   However, she was found to have progressive disease in May 2020  Therefore, she is currently on FOLFIRI  She underwent colonoscopy in mid July 2020 which was normal   After that, we add bevacizumab to FOLFIRI  She presents today for follow-up  She continued to do well with no tumor related symptomatology  Her appetite is good  Her weight is stable  She has no complaint of abdominal pain  She has stable diarrhea  She denied any nausea  Her performance status is normal                                                                                Objective:      Primary Diagnosis:     Metastatic colon cancer   K-wanda mutation positive   BRAF wild type   MSI stable   Diagnosed in October 2018      Cancer Staging:  Cancer Staging  No matching staging information was found for the patient         Previous Hematologic/ Oncologic Treatment:       1  Bevacizumab with FOLFOX-6   X12 cycle   Completed in April 2019    2  Bevacizumab monotherapy from April 2019 through October 2019    3  Bevacizumab with infusion of 5 FU from November 2019 through May 2020       Current Hematologic/ Oncologic Treatment:       Bevacizumab with FOLFIRI    6th cycle to be given in August 24, 2020       Disease Status:      Stable disease on current regimen       Test Results:     Pathology:     Liver biopsy showed metastatic adenocarcinoma, consistent with colorectal primary   Molecular testing could not be performed per pathology department      Radiology:     CT scan of chest abdomen pelvis in September 2020 showed regression of liver metastasis  Laboratory:     See below   See below for CEA      Physical Exam:        General Appearance:    Alert, oriented          Eyes:    PERRL   Ears:    Normal external ear canals, both ears   Nose:   Nares normal, septum midline   Throat:   Mucosa moist  Pharynx without injection  Neck:   Supple         Lungs:     Clear to auscultation bilaterally   Chest Wall:    No tenderness or deformity    Heart:    Regular rate and rhythm         Abdomen:     Soft, non-tender, bowel sounds +, no organomegaly               Extremities:   Extremities no cyanosis or edema         Skin:   no rash or icterus  Lymph nodes:   Cervical, supraclavicular, and axillary nodes normal   Neurologic:   CNII-XII intact, normal strength, sensation and reflexes     Throughout           ROS: Review of Systems   Gastrointestinal:        Stable diarrhea   All other systems reviewed and are negative  Imaging: Ct Chest Abdomen Pelvis W Contrast    Result Date: 9/25/2020  Narrative: CT CHEST, ABDOMEN AND PELVIS WITH IV CONTRAST INDICATION:   C18 9: Malignant neoplasm of colon, unspecified C78 7: Secondary malignant neoplasm of liver and intrahepatic bile duct  COMPARISON:  CT chest abdomen and pelvis 5/27/2020 and CT chest 1/29/2019 TECHNIQUE: CT examination of the chest, abdomen and pelvis was performed  In addition to portal venous phase postcontrast scanning through the abdomen and pelvis, delayed phase postcontrast scanning was performed through the upper abdominal viscera  Axial, sagittal, and coronal 2D reformatted images were created from the source data and submitted for interpretation  Radiation dose length product (DLP) for this visit:  694 mGy-cm   This examination, like all CT scans performed in the [place], was performed utilizing techniques to minimize radiation dose exposure, including the use of iterative reconstruction and automated exposure control  IV Contrast:  100 mL of iohexol (OMNIPAQUE)   350 Enteric Contrast: Enteric contrast was administered  FINDINGS: CHEST LUNGS:  Stable 4 mm ground glass nodule right upper lobe image 28 series 3  No new pulmonary nodule  No infiltrate  Central airways are clear  PLEURA:  Slightly diminished loculated pleural effusion within the right major fissure  HEART/GREAT VESSELS:  Unremarkable for patient's age  Tip of portacatheter in the superior right atrium  MEDIASTINUM AND ANTHONY:  Unremarkable  CHEST WALL AND LOWER NECK:   Right anterior chest wall marko catheter  ABDOMEN LIVER/BILIARY TREE:  Multiple hepatic hypodense metastatic lesions; representative lesions will be measured on series 2: Image 42, segment 8, 1 6 x 1 5 cm previously 1 8 x 1 6 cm  Image 43, segment 2, 2 x 1 5 cm previously 2 6 x 1 6 cm  Image 49, segment 7, 2 4 x 1 9 cm previously 2 9 x 2 2 cm  Image 64, segment 5/6, 1 7 x 1 2 cm previously 2 8 x 2 3 cm  No new or enlarging  No duct dilation  GALLBLADDER:  There are gallstone(s) within the gallbladder, without pericholecystic inflammatory changes  SPLEEN:  Unremarkable  PANCREAS:  Unremarkable  ADRENAL GLANDS:  Unremarkable  KIDNEYS/URETERS: One or more sharply circumscribed subcentimeter renal hypodensities are noted  These lesions are too small to accurately characterize, but are statistically most likely to represent benign cortical renal cyst(s)  According to the guidelines published in the [facility] Paper of the ACR Incidental Findings Committee (Radiology 2010), no further workup of these lesions is recommended  Stable small left renal simple cysts, the larger of which measures 2 cm   Kidneys otherwise unremarkable  No perinephric collection  STOMACH AND BOWEL:  Post right hemicolectomy  No abnormal soft tissue thickening at the ileocolic anastomosis  No bowel obstruction  APPENDIX:  Post appendectomy  ABDOMINOPELVIC CAVITY:  No ascites  No pneumoperitoneum  No lymphadenopathy  VESSELS:  Stable prominent bilateral adnexal and left ovarian veins may be sequela of chronic pelvic mass or congestion syndrome  Stable tiny distal esophageal and gastrohepatic varices  PELVIS REPRODUCTIVE ORGANS:  Unremarkable for patient's age  URINARY BLADDER:  Unremarkable  ABDOMINAL WALL/INGUINAL REGIONS:  Stable small ventral midline supraumbilical fat-containing hernia  OSSEOUS STRUCTURES:  No acute fracture or osseous destructive lesion identified  Mild degenerative changes of the spine  Impression: CT chest: 4 mm groundglass nodule right upper lobe unchanged as far back as November 2019  No new pulmonary nodule  No significant interval change  CT abdomen and pelvis: Interval regression of disease with diminished size of hepatic lesions  No new or enlarging lesions  No other significant interval change   Workstation performed: [id number]         Labs:   Lab Results   Component Value Date    WBC 3 48 (L) 09/18/2020    HGB 12 2 09/18/2020    HCT 39 2 09/18/2020    MCV 94 09/18/2020     09/18/2020     Lab Results   Component Value Date    K 3 7 09/18/2020     09/18/2020    CO2 27 09/18/2020    BUN 10 09/18/2020    CREATININE 0 89 09/18/2020    GLUF 99 09/18/2020    CALCIUM 9 4 09/18/2020    AST 61 (H) 09/18/2020    ALT 87 (H) 09/18/2020    ALKPHOS 282 (H) 09/18/2020    EGFR 73 09/18/2020         Lab Results   Component Value Date     (H) 09/24/2018         Lab Results   Component Value Date     7 (H) 09/18/2020         Lab Results   Component Value Date    IRON 11 (L) 09/13/2018    TIBC 302 09/13/2018    FERRITIN 15 09/13/2018         Lab Results   Component Value Date    FOLATE 11 4 09/13/2018 Current Medications: Reviewed  Allergies: Reviewed  PMH/FH/SH:  Reviewed      Vital Sign:    Body surface area is 1 7 meters squared      Wt Readings from Last 3 Encounters:   09/28/20 65 4 kg (144 lb 2 9 oz)   09/21/20 65 4 kg (144 lb 2 9 oz)   09/08/20 64 8 kg (142 lb 13 7 oz)        Temp Readings from Last 3 Encounters:   09/28/20 98 4 °F (36 9 °C) (Tympanic Core)   09/21/20 (!) 97 3 °F (36 3 °C) (Tympanic)   09/08/20 97 5 °F (36 4 °C)        BP Readings from Last 3 Encounters:   09/28/20 118/64   09/21/20 126/80   09/08/20 138/82         Pulse Readings from Last 3 Encounters:   09/28/20 82   09/21/20 87   09/08/20 83     @LASTSAO2(3)@

## 2020-09-29 DIAGNOSIS — C78.7 COLON CANCER METASTASIZED TO LIVER (HCC): Primary | ICD-10-CM

## 2020-09-29 DIAGNOSIS — C18.9 COLON CANCER METASTASIZED TO LIVER (HCC): Primary | ICD-10-CM

## 2020-10-02 ENCOUNTER — APPOINTMENT (OUTPATIENT)
Dept: LAB | Age: 55
End: 2020-10-02
Payer: COMMERCIAL

## 2020-10-02 DIAGNOSIS — C18.9 COLON CANCER METASTASIZED TO LIVER (HCC): ICD-10-CM

## 2020-10-02 DIAGNOSIS — C78.7 COLON CANCER METASTASIZED TO LIVER (HCC): ICD-10-CM

## 2020-10-02 LAB
ALBUMIN SERPL BCP-MCNC: 3.2 G/DL (ref 3.5–5)
ALP SERPL-CCNC: 244 U/L (ref 46–116)
ALT SERPL W P-5'-P-CCNC: 41 U/L (ref 12–78)
ANION GAP SERPL CALCULATED.3IONS-SCNC: 6 MMOL/L (ref 4–13)
AST SERPL W P-5'-P-CCNC: 25 U/L (ref 5–45)
BASOPHILS # BLD AUTO: 0.03 THOUSANDS/ΜL (ref 0–0.1)
BASOPHILS NFR BLD AUTO: 1 % (ref 0–1)
BILIRUB SERPL-MCNC: 0.57 MG/DL (ref 0.2–1)
BUN SERPL-MCNC: 9 MG/DL (ref 5–25)
CALCIUM ALBUM COR SERPL-MCNC: 9.7 MG/DL (ref 8.3–10.1)
CALCIUM SERPL-MCNC: 9.1 MG/DL (ref 8.3–10.1)
CHLORIDE SERPL-SCNC: 106 MMOL/L (ref 100–108)
CO2 SERPL-SCNC: 27 MMOL/L (ref 21–32)
CREAT SERPL-MCNC: 0.82 MG/DL (ref 0.6–1.3)
EOSINOPHIL # BLD AUTO: 0.06 THOUSAND/ΜL (ref 0–0.61)
EOSINOPHIL NFR BLD AUTO: 2 % (ref 0–6)
ERYTHROCYTE [DISTWIDTH] IN BLOOD BY AUTOMATED COUNT: 16.5 % (ref 11.6–15.1)
GFR SERPL CREATININE-BSD FRML MDRD: 81 ML/MIN/1.73SQ M
GLUCOSE P FAST SERPL-MCNC: 105 MG/DL (ref 65–99)
HCT VFR BLD AUTO: 36.2 % (ref 34.8–46.1)
HGB BLD-MCNC: 11.8 G/DL (ref 11.5–15.4)
IMM GRANULOCYTES # BLD AUTO: 0.01 THOUSAND/UL (ref 0–0.2)
IMM GRANULOCYTES NFR BLD AUTO: 0 % (ref 0–2)
LYMPHOCYTES # BLD AUTO: 1.44 THOUSANDS/ΜL (ref 0.6–4.47)
LYMPHOCYTES NFR BLD AUTO: 38 % (ref 14–44)
MCH RBC QN AUTO: 30.3 PG (ref 26.8–34.3)
MCHC RBC AUTO-ENTMCNC: 32.6 G/DL (ref 31.4–37.4)
MCV RBC AUTO: 93 FL (ref 82–98)
MONOCYTES # BLD AUTO: 0.31 THOUSAND/ΜL (ref 0.17–1.22)
MONOCYTES NFR BLD AUTO: 8 % (ref 4–12)
NEUTROPHILS # BLD AUTO: 1.91 THOUSANDS/ΜL (ref 1.85–7.62)
NEUTS SEG NFR BLD AUTO: 51 % (ref 43–75)
NRBC BLD AUTO-RTO: 0 /100 WBCS
PLATELET # BLD AUTO: 177 THOUSANDS/UL (ref 149–390)
PMV BLD AUTO: 11 FL (ref 8.9–12.7)
POTASSIUM SERPL-SCNC: 3.2 MMOL/L (ref 3.5–5.3)
PROT SERPL-MCNC: 7 G/DL (ref 6.4–8.2)
RBC # BLD AUTO: 3.89 MILLION/UL (ref 3.81–5.12)
SODIUM SERPL-SCNC: 139 MMOL/L (ref 136–145)
WBC # BLD AUTO: 3.76 THOUSAND/UL (ref 4.31–10.16)

## 2020-10-02 PROCEDURE — 85025 COMPLETE CBC W/AUTO DIFF WBC: CPT

## 2020-10-02 PROCEDURE — 80053 COMPREHEN METABOLIC PANEL: CPT

## 2020-10-02 PROCEDURE — 36415 COLL VENOUS BLD VENIPUNCTURE: CPT

## 2020-10-05 ENCOUNTER — HOSPITAL ENCOUNTER (OUTPATIENT)
Dept: INFUSION CENTER | Facility: HOSPITAL | Age: 55
Discharge: HOME | End: 2020-10-05
Payer: COMMERCIAL

## 2020-10-05 VITALS
WEIGHT: 143.3 LBS | OXYGEN SATURATION: 100 % | SYSTOLIC BLOOD PRESSURE: 123 MMHG | RESPIRATION RATE: 16 BRPM | HEART RATE: 76 BPM | TEMPERATURE: 98 F | DIASTOLIC BLOOD PRESSURE: 76 MMHG | BODY MASS INDEX: 24.46 KG/M2 | HEIGHT: 64 IN

## 2020-10-05 DIAGNOSIS — C18.9 COLON CANCER METASTASIZED TO LIVER (HCC): Primary | ICD-10-CM

## 2020-10-05 DIAGNOSIS — C78.7 COLON CANCER METASTASIZED TO LIVER (HCC): Primary | ICD-10-CM

## 2020-10-05 PROCEDURE — 96415 CHEMO IV INFUSION ADDL HR: CPT

## 2020-10-05 PROCEDURE — 96417 CHEMO IV INFUS EACH ADDL SEQ: CPT

## 2020-10-05 PROCEDURE — 96413 CHEMO IV INFUSION 1 HR: CPT

## 2020-10-05 PROCEDURE — 96375 TX/PRO/DX INJ NEW DRUG ADDON: CPT

## 2020-10-05 PROCEDURE — 96367 TX/PROPH/DG ADDL SEQ IV INF: CPT

## 2020-10-05 RX ORDER — ATROPINE SULFATE 1 MG/ML
0.25 INJECTION, SOLUTION INTRAMUSCULAR; INTRAVENOUS; SUBCUTANEOUS ONCE
Status: COMPLETED | OUTPATIENT
Start: 2020-10-05 | End: 2020-10-05

## 2020-10-05 RX ORDER — ATROPINE SULFATE 1 MG/ML
0.25 INJECTION, SOLUTION INTRAMUSCULAR; INTRAVENOUS; SUBCUTANEOUS ONCE AS NEEDED
Status: DISCONTINUED | OUTPATIENT
Start: 2020-10-05 | End: 2020-10-09 | Stop reason: HOSPADM

## 2020-10-05 RX ORDER — SODIUM CHLORIDE 9 MG/ML
20 INJECTION, SOLUTION INTRAVENOUS ONCE
Status: COMPLETED | OUTPATIENT
Start: 2020-10-05 | End: 2020-10-05

## 2020-10-05 RX ADMIN — IRINOTECAN HYDROCHLORIDE 300 MG: 20 INJECTION INTRAVENOUS at 11:14

## 2020-10-05 RX ADMIN — ATROPINE SULFATE 0.25 MG: 1 INJECTION, SOLUTION INTRAMUSCULAR; INTRAVENOUS; SUBCUTANEOUS at 11:23

## 2020-10-05 RX ADMIN — ATROPINE SULFATE 0.25 MG: 1 INJECTION, SOLUTION INTRAMUSCULAR; INTRAVENOUS; SUBCUTANEOUS at 11:07

## 2020-10-05 RX ADMIN — LEUCOVORIN CALCIUM 684 MG: 350 INJECTION, POWDER, LYOPHILIZED, FOR SOLUTION INTRAMUSCULAR; INTRAVENOUS at 11:14

## 2020-10-05 RX ADMIN — SODIUM CHLORIDE 20 ML/HR: 0.9 INJECTION, SOLUTION INTRAVENOUS at 09:40

## 2020-10-05 RX ADMIN — ONDANSETRON HYDROCHLORIDE: 2 INJECTION, SOLUTION INTRAMUSCULAR; INTRAVENOUS at 09:40

## 2020-10-05 RX ADMIN — BEVACIZUMAB 320 MG: 400 INJECTION, SOLUTION INTRAVENOUS at 10:23

## 2020-10-06 ENCOUNTER — TELEPHONE (OUTPATIENT)
Dept: SURGICAL ONCOLOGY | Facility: CLINIC | Age: 55
End: 2020-10-06

## 2020-10-07 ENCOUNTER — HOSPITAL ENCOUNTER (OUTPATIENT)
Dept: INFUSION CENTER | Facility: HOSPITAL | Age: 55
Discharge: HOME | End: 2020-10-07
Attending: INTERNAL MEDICINE

## 2020-10-07 VITALS — TEMPERATURE: 97.2 F

## 2020-10-07 DIAGNOSIS — C78.7 COLON CANCER METASTASIZED TO LIVER (HCC): Primary | ICD-10-CM

## 2020-10-07 DIAGNOSIS — C18.9 COLON CANCER METASTASIZED TO LIVER (HCC): Primary | ICD-10-CM

## 2020-10-09 ENCOUNTER — OFFICE VISIT (OUTPATIENT)
Dept: SURGICAL ONCOLOGY | Facility: CLINIC | Age: 55
End: 2020-10-09
Payer: COMMERCIAL

## 2020-10-09 VITALS
HEIGHT: 64 IN | TEMPERATURE: 98.3 F | DIASTOLIC BLOOD PRESSURE: 76 MMHG | RESPIRATION RATE: 16 BRPM | BODY MASS INDEX: 24.14 KG/M2 | HEART RATE: 74 BPM | WEIGHT: 141.4 LBS | SYSTOLIC BLOOD PRESSURE: 120 MMHG

## 2020-10-09 DIAGNOSIS — C78.7 COLON CANCER METASTASIZED TO LIVER (HCC): Primary | ICD-10-CM

## 2020-10-09 DIAGNOSIS — C18.9 COLON CANCER METASTASIZED TO LIVER (HCC): Primary | ICD-10-CM

## 2020-10-09 PROCEDURE — 99214 OFFICE O/P EST MOD 30 MIN: CPT | Performed by: SURGERY

## 2020-10-12 ENCOUNTER — SOCIAL WORK (OUTPATIENT)
Dept: INFUSION CENTER | Facility: CLINIC | Age: 55
End: 2020-10-12

## 2020-10-14 DIAGNOSIS — C78.7 COLON CANCER METASTASIZED TO LIVER (HCC): Primary | ICD-10-CM

## 2020-10-14 DIAGNOSIS — C18.9 COLON CANCER METASTASIZED TO LIVER (HCC): Primary | ICD-10-CM

## 2020-10-16 ENCOUNTER — TRANSCRIBE ORDERS (OUTPATIENT)
Dept: URGENT CARE | Age: 55
End: 2020-10-16

## 2020-10-16 ENCOUNTER — LAB (OUTPATIENT)
Dept: LAB | Age: 55
End: 2020-10-16
Payer: COMMERCIAL

## 2020-10-16 DIAGNOSIS — C78.7 COLON CANCER METASTASIZED TO LIVER (HCC): ICD-10-CM

## 2020-10-16 DIAGNOSIS — C18.9 COLON CANCER METASTASIZED TO LIVER (HCC): ICD-10-CM

## 2020-10-16 DIAGNOSIS — M05.89 OTHER RHEUMATOID ARTHRITIS WITH RHEUMATOID FACTOR OF MULTIPLE SITES (HCC): ICD-10-CM

## 2020-10-16 DIAGNOSIS — M05.89 OTHER RHEUMATOID ARTHRITIS WITH RHEUMATOID FACTOR OF MULTIPLE SITES (HCC): Primary | ICD-10-CM

## 2020-10-16 LAB
CRP SERPL QL: <3 MG/L
ERYTHROCYTE [SEDIMENTATION RATE] IN BLOOD: 52 MM/HOUR (ref 0–29)

## 2020-10-16 PROCEDURE — 85652 RBC SED RATE AUTOMATED: CPT

## 2020-10-16 PROCEDURE — 86140 C-REACTIVE PROTEIN: CPT

## 2020-10-19 ENCOUNTER — HOSPITAL ENCOUNTER (OUTPATIENT)
Dept: INFUSION CENTER | Facility: HOSPITAL | Age: 55
Discharge: HOME | End: 2020-10-19
Attending: INTERNAL MEDICINE
Payer: COMMERCIAL

## 2020-10-19 VITALS
RESPIRATION RATE: 18 BRPM | DIASTOLIC BLOOD PRESSURE: 79 MMHG | WEIGHT: 143.3 LBS | TEMPERATURE: 97.4 F | HEIGHT: 64 IN | BODY MASS INDEX: 24.46 KG/M2 | OXYGEN SATURATION: 98 % | HEART RATE: 77 BPM | SYSTOLIC BLOOD PRESSURE: 137 MMHG

## 2020-10-19 DIAGNOSIS — C18.9 COLON CANCER METASTASIZED TO LIVER (HCC): Primary | ICD-10-CM

## 2020-10-19 DIAGNOSIS — C78.7 COLON CANCER METASTASIZED TO LIVER (HCC): Primary | ICD-10-CM

## 2020-10-19 PROCEDURE — 96413 CHEMO IV INFUSION 1 HR: CPT

## 2020-10-19 PROCEDURE — G0498 CHEMO EXTEND IV INFUS W/PUMP: HCPCS

## 2020-10-19 PROCEDURE — 96375 TX/PRO/DX INJ NEW DRUG ADDON: CPT

## 2020-10-19 PROCEDURE — 96367 TX/PROPH/DG ADDL SEQ IV INF: CPT

## 2020-10-19 PROCEDURE — 96368 THER/DIAG CONCURRENT INF: CPT

## 2020-10-19 PROCEDURE — 96415 CHEMO IV INFUSION ADDL HR: CPT

## 2020-10-19 PROCEDURE — 96417 CHEMO IV INFUS EACH ADDL SEQ: CPT

## 2020-10-19 RX ORDER — SODIUM CHLORIDE 9 MG/ML
20 INJECTION, SOLUTION INTRAVENOUS ONCE
Status: COMPLETED | OUTPATIENT
Start: 2020-10-19 | End: 2020-10-19

## 2020-10-19 RX ORDER — ATROPINE SULFATE 1 MG/ML
0.25 INJECTION, SOLUTION INTRAMUSCULAR; INTRAVENOUS; SUBCUTANEOUS ONCE AS NEEDED
Status: DISCONTINUED | OUTPATIENT
Start: 2020-10-19 | End: 2020-10-23 | Stop reason: HOSPADM

## 2020-10-19 RX ORDER — ATROPINE SULFATE 1 MG/ML
0.25 INJECTION, SOLUTION INTRAMUSCULAR; INTRAVENOUS; SUBCUTANEOUS ONCE
Status: COMPLETED | OUTPATIENT
Start: 2020-10-19 | End: 2020-10-19

## 2020-10-19 RX ADMIN — LEUCOVORIN CALCIUM 684 MG: 350 INJECTION, POWDER, LYOPHILIZED, FOR SOLUTION INTRAMUSCULAR; INTRAVENOUS at 11:31

## 2020-10-19 RX ADMIN — BEVACIZUMAB 320 MG: 400 INJECTION, SOLUTION INTRAVENOUS at 10:21

## 2020-10-19 RX ADMIN — IRINOTECAN HYDROCHLORIDE 300 MG: 20 INJECTION INTRAVENOUS at 11:33

## 2020-10-19 RX ADMIN — ATROPINE SULFATE 0.25 MG: 1 INJECTION, SOLUTION INTRAMUSCULAR; INTRAVENOUS; SUBCUTANEOUS at 11:46

## 2020-10-19 RX ADMIN — ATROPINE SULFATE 0.25 MG: 1 INJECTION, SOLUTION INTRAMUSCULAR; INTRAVENOUS; SUBCUTANEOUS at 11:30

## 2020-10-19 RX ADMIN — ONDANSETRON HYDROCHLORIDE: 2 INJECTION, SOLUTION INTRAMUSCULAR; INTRAVENOUS at 09:45

## 2020-10-19 RX ADMIN — SODIUM CHLORIDE 20 ML/HR: 0.9 INJECTION, SOLUTION INTRAVENOUS at 09:18

## 2020-10-21 ENCOUNTER — HOSPITAL ENCOUNTER (OUTPATIENT)
Dept: INFUSION CENTER | Facility: HOSPITAL | Age: 55
Discharge: HOME | End: 2020-10-21
Attending: INTERNAL MEDICINE

## 2020-10-21 VITALS — TEMPERATURE: 97.2 F

## 2020-10-21 DIAGNOSIS — C78.7 COLON CANCER METASTASIZED TO LIVER (HCC): Primary | ICD-10-CM

## 2020-10-21 DIAGNOSIS — C18.9 COLON CANCER METASTASIZED TO LIVER (HCC): Primary | ICD-10-CM

## 2020-10-27 DIAGNOSIS — C18.9 COLON CANCER METASTASIZED TO LIVER (HCC): Primary | ICD-10-CM

## 2020-10-27 DIAGNOSIS — C78.7 COLON CANCER METASTASIZED TO LIVER (HCC): Primary | ICD-10-CM

## 2020-10-30 ENCOUNTER — LAB (OUTPATIENT)
Dept: LAB | Age: 55
End: 2020-10-30
Payer: COMMERCIAL

## 2020-10-30 DIAGNOSIS — C18.9 COLON CANCER METASTASIZED TO LIVER (HCC): ICD-10-CM

## 2020-10-30 DIAGNOSIS — C78.7 COLON CANCER METASTASIZED TO LIVER (HCC): ICD-10-CM

## 2020-10-30 LAB
BASOPHILS # BLD AUTO: 0.03 THOUSANDS/ΜL (ref 0–0.1)
BASOPHILS NFR BLD AUTO: 1 % (ref 0–1)
EOSINOPHIL # BLD AUTO: 0.04 THOUSAND/ΜL (ref 0–0.61)
EOSINOPHIL NFR BLD AUTO: 1 % (ref 0–6)
ERYTHROCYTE [DISTWIDTH] IN BLOOD BY AUTOMATED COUNT: 16.6 % (ref 11.6–15.1)
HCT VFR BLD AUTO: 40.6 % (ref 34.8–46.1)
HGB BLD-MCNC: 13 G/DL (ref 11.5–15.4)
IMM GRANULOCYTES # BLD AUTO: 0.01 THOUSAND/UL (ref 0–0.2)
IMM GRANULOCYTES NFR BLD AUTO: 0 % (ref 0–2)
LYMPHOCYTES # BLD AUTO: 1.58 THOUSANDS/ΜL (ref 0.6–4.47)
LYMPHOCYTES NFR BLD AUTO: 36 % (ref 14–44)
MCH RBC QN AUTO: 29.8 PG (ref 26.8–34.3)
MCHC RBC AUTO-ENTMCNC: 32 G/DL (ref 31.4–37.4)
MCV RBC AUTO: 93 FL (ref 82–98)
MONOCYTES # BLD AUTO: 0.36 THOUSAND/ΜL (ref 0.17–1.22)
MONOCYTES NFR BLD AUTO: 8 % (ref 4–12)
NEUTROPHILS # BLD AUTO: 2.41 THOUSANDS/ΜL (ref 1.85–7.62)
NEUTS SEG NFR BLD AUTO: 54 % (ref 43–75)
NRBC BLD AUTO-RTO: 0 /100 WBCS
PLATELET # BLD AUTO: 218 THOUSANDS/UL (ref 149–390)
PMV BLD AUTO: 9.9 FL (ref 8.9–12.7)
RBC # BLD AUTO: 4.36 MILLION/UL (ref 3.81–5.12)
WBC # BLD AUTO: 4.43 THOUSAND/UL (ref 4.31–10.16)

## 2020-10-30 PROCEDURE — 82378 CARCINOEMBRYONIC ANTIGEN: CPT

## 2020-10-30 PROCEDURE — 85025 COMPLETE CBC W/AUTO DIFF WBC: CPT

## 2020-10-30 PROCEDURE — 36415 COLL VENOUS BLD VENIPUNCTURE: CPT

## 2020-10-31 LAB — CEA SERPL-MCNC: 246.3 NG/ML (ref 0–3)

## 2020-12-28 NOTE — PROGRESS NOTES
Pt tolerated treatment today with no adverse reactions  CADD pump connected for a 46hr infusion  Settings as follows: Rate 2ml/hr, volume 92ml and LL2  All connections tapped in open position  Green light blinking  Left unit ambulatory with a steady gait

## 2020-12-28 NOTE — PLAN OF CARE
Problem: Potential for Falls  Goal: Patient will remain free of falls  Description: INTERVENTIONS:  - Assess patient frequently for physical needs  -  Identify cognitive and physical deficits and behaviors that affect risk of falls    -  [place] fall precautions as indicated by assessment   - Educate patient/family on patient safety including physical limitations  - Instruct patient to call for assistance with activity based on assessment  - Modify environment to reduce risk of injury  - Consider OT/PT consult to assist with strengthening/mobility  Outcome: Progressing

## 2020-12-30 NOTE — PLAN OF CARE
Problem: Potential for Falls  Goal: Patient will remain free of falls  Description: INTERVENTIONS:  - Assess patient frequently for physical needs  -  Identify cognitive and physical deficits and behaviors that affect risk of falls    -  [place] fall precautions as indicated by assessment   - Educate patient/family on patient safety including physical limitations  - Instruct patient to call for assistance with activity based on assessment  - Modify environment to reduce risk of injury  - Consider OT/PT consult to assist with strengthening/mobility  Outcome: Progressing

## 2020-12-30 NOTE — PROGRESS NOTES
CADD pump disconnected at zero volume and port flushed per protocol, confirmed next appts for scan and Tanya EDWARDS declined

## 2021-01-01 ENCOUNTER — LAB (OUTPATIENT)
Dept: LAB | Age: 56
End: 2021-01-01
Payer: COMMERCIAL

## 2021-01-01 ENCOUNTER — OFFICE VISIT (OUTPATIENT)
Dept: HEMATOLOGY ONCOLOGY | Facility: CLINIC | Age: 56
End: 2021-01-01
Payer: COMMERCIAL

## 2021-01-01 ENCOUNTER — TELEPHONE (OUTPATIENT)
Dept: HEMATOLOGY ONCOLOGY | Facility: CLINIC | Age: 56
End: 2021-01-01

## 2021-01-01 ENCOUNTER — HOSPITAL ENCOUNTER (OUTPATIENT)
Dept: INFUSION CENTER | Facility: HOSPITAL | Age: 56
Discharge: HOME | End: 2021-07-06
Attending: INTERNAL MEDICINE
Payer: COMMERCIAL

## 2021-01-01 ENCOUNTER — TRANSITIONAL CARE MANAGEMENT (OUTPATIENT)
Dept: FAMILY MEDICINE CLINIC | Facility: CLINIC | Age: 56
End: 2021-01-01

## 2021-01-01 ENCOUNTER — APPOINTMENT (EMERGENCY)
Dept: RADIOLOGY | Facility: HOSPITAL | Age: 56
DRG: 375 | End: 2021-01-01
Payer: COMMERCIAL

## 2021-01-01 ENCOUNTER — OFFICE VISIT (OUTPATIENT)
Dept: FAMILY MEDICINE CLINIC | Facility: CLINIC | Age: 56
End: 2021-01-01
Payer: COMMERCIAL

## 2021-01-01 ENCOUNTER — HOSPITAL ENCOUNTER (OUTPATIENT)
Dept: INFUSION CENTER | Facility: HOSPITAL | Age: 56
Discharge: HOME | End: 2021-06-18
Payer: COMMERCIAL

## 2021-01-01 ENCOUNTER — RADIATION ONCOLOGY CONSULT (OUTPATIENT)
Dept: RADIATION ONCOLOGY | Facility: CLINIC | Age: 56
End: 2021-01-01
Attending: RADIOLOGY
Payer: COMMERCIAL

## 2021-01-01 ENCOUNTER — TELEPHONE (OUTPATIENT)
Dept: INTERVENTIONAL RADIOLOGY/VASCULAR | Facility: CLINIC | Age: 56
End: 2021-01-01

## 2021-01-01 ENCOUNTER — APPOINTMENT (OUTPATIENT)
Dept: LAB | Age: 56
End: 2021-01-01
Payer: COMMERCIAL

## 2021-01-01 ENCOUNTER — TELEPHONE (OUTPATIENT)
Dept: RADIOLOGY | Facility: HOSPITAL | Age: 56
End: 2021-01-01

## 2021-01-01 ENCOUNTER — PREP FOR PROCEDURE (OUTPATIENT)
Dept: INTERVENTIONAL RADIOLOGY/VASCULAR | Facility: CLINIC | Age: 56
End: 2021-01-01

## 2021-01-01 ENCOUNTER — IMMUNIZATIONS (OUTPATIENT)
Dept: FAMILY MEDICINE CLINIC | Facility: HOSPITAL | Age: 56
End: 2021-01-01

## 2021-01-01 ENCOUNTER — APPOINTMENT (EMERGENCY)
Dept: CT IMAGING | Facility: HOSPITAL | Age: 56
DRG: 375 | End: 2021-01-01
Payer: COMMERCIAL

## 2021-01-01 ENCOUNTER — HOSPITAL ENCOUNTER (OUTPATIENT)
Dept: RADIOLOGY | Facility: HOSPITAL | Age: 56
Discharge: HOME | End: 2021-10-28
Attending: INTERNAL MEDICINE | Admitting: RADIOLOGY
Payer: COMMERCIAL

## 2021-01-01 ENCOUNTER — APPOINTMENT (EMERGENCY)
Dept: RADIOLOGY | Facility: HOSPITAL | Age: 56
DRG: 871 | End: 2021-01-01
Payer: COMMERCIAL

## 2021-01-01 ENCOUNTER — TELEPHONE (OUTPATIENT)
Dept: SURGERY | Facility: HOSPITAL | Age: 56
End: 2021-01-01

## 2021-01-01 ENCOUNTER — HOSPITAL ENCOUNTER (OUTPATIENT)
Dept: INFUSION CENTER | Facility: HOSPITAL | Age: 56
Discharge: HOME | End: 2021-08-30
Attending: INTERNAL MEDICINE

## 2021-01-01 ENCOUNTER — APPOINTMENT (OUTPATIENT)
Dept: RADIOLOGY | Facility: HOSPITAL | Age: 56
End: 2021-01-01
Attending: RADIOLOGY
Payer: COMMERCIAL

## 2021-01-01 ENCOUNTER — TELEPHONE (OUTPATIENT)
Dept: RADIATION ONCOLOGY | Facility: HOSPITAL | Age: 56
End: 2021-01-01

## 2021-01-01 ENCOUNTER — TELEPHONE (OUTPATIENT)
Dept: NUTRITION | Facility: CLINIC | Age: 56
End: 2021-01-01

## 2021-01-01 ENCOUNTER — HOSPITAL ENCOUNTER (OUTPATIENT)
Dept: RADIOLOGY | Facility: HOSPITAL | Age: 56
Discharge: HOME | End: 2021-10-18
Attending: INTERNAL MEDICINE
Payer: COMMERCIAL

## 2021-01-01 ENCOUNTER — HOSPITAL ENCOUNTER (OUTPATIENT)
Dept: INFUSION CENTER | Facility: HOSPITAL | Age: 56
Discharge: HOME | End: 2021-05-14
Payer: COMMERCIAL

## 2021-01-01 ENCOUNTER — OFFICE VISIT (OUTPATIENT)
Dept: PALLIATIVE MEDICINE | Facility: CLINIC | Age: 56
End: 2021-01-01
Payer: COMMERCIAL

## 2021-01-01 ENCOUNTER — TRANSCRIBE ORDERS (OUTPATIENT)
Dept: ADMINISTRATIVE | Facility: HOSPITAL | Age: 56
End: 2021-01-01

## 2021-01-01 ENCOUNTER — HOSPITAL ENCOUNTER (OUTPATIENT)
Dept: INFUSION CENTER | Facility: HOSPITAL | Age: 56
Discharge: HOME | End: 2021-07-08
Attending: INTERNAL MEDICINE

## 2021-01-01 ENCOUNTER — HOSPITAL ENCOUNTER (INPATIENT)
Facility: HOSPITAL | Age: 56
LOS: 2 days | Discharge: HOME | DRG: 375 | End: 2021-10-13
Attending: EMERGENCY MEDICINE | Admitting: INTERNAL MEDICINE
Payer: COMMERCIAL

## 2021-01-01 ENCOUNTER — HOSPITAL ENCOUNTER (OUTPATIENT)
Dept: INFUSION CENTER | Facility: HOSPITAL | Age: 56
Discharge: HOME | End: 2021-03-18
Payer: COMMERCIAL

## 2021-01-01 ENCOUNTER — TELEMEDICINE (OUTPATIENT)
Dept: INTERVENTIONAL RADIOLOGY/VASCULAR | Facility: CLINIC | Age: 56
End: 2021-01-01
Payer: COMMERCIAL

## 2021-01-01 ENCOUNTER — HOSPITAL ENCOUNTER (INPATIENT)
Facility: HOSPITAL | Age: 56
LOS: 2 days | DRG: 871 | End: 2021-11-01
Attending: EMERGENCY MEDICINE | Admitting: INTERNAL MEDICINE
Payer: COMMERCIAL

## 2021-01-01 ENCOUNTER — DOCUMENTATION (OUTPATIENT)
Dept: HEMATOLOGY ONCOLOGY | Facility: CLINIC | Age: 56
End: 2021-01-01

## 2021-01-01 ENCOUNTER — TELEPHONE (OUTPATIENT)
Dept: RADIATION ONCOLOGY | Facility: CLINIC | Age: 56
End: 2021-01-01

## 2021-01-01 ENCOUNTER — APPOINTMENT (EMERGENCY)
Dept: CT IMAGING | Facility: HOSPITAL | Age: 56
DRG: 871 | End: 2021-01-01
Payer: COMMERCIAL

## 2021-01-01 ENCOUNTER — APPOINTMENT (OUTPATIENT)
Dept: RADIATION ONCOLOGY | Facility: HOSPITAL | Age: 56
End: 2021-01-01
Attending: RADIOLOGY
Payer: COMMERCIAL

## 2021-01-01 ENCOUNTER — HOSPITAL ENCOUNTER (OUTPATIENT)
Dept: INFUSION CENTER | Facility: HOSPITAL | Age: 56
Discharge: HOME | End: 2021-04-16
Payer: COMMERCIAL

## 2021-01-01 ENCOUNTER — HOSPITAL ENCOUNTER (OUTPATIENT)
Dept: CT IMAGING | Facility: HOSPITAL | Age: 56
Discharge: HOME | End: 2021-08-26
Attending: INTERNAL MEDICINE
Payer: COMMERCIAL

## 2021-01-01 ENCOUNTER — LAB REQUISITION (OUTPATIENT)
Dept: LAB | Facility: HOSPITAL | Age: 56
End: 2021-01-01
Payer: COMMERCIAL

## 2021-01-01 ENCOUNTER — APPOINTMENT (OUTPATIENT)
Dept: LAB | Age: 56
End: 2021-01-01

## 2021-01-01 ENCOUNTER — HOSPITAL ENCOUNTER (OUTPATIENT)
Dept: CT IMAGING | Facility: HOSPITAL | Age: 56
Discharge: HOME | End: 2021-05-29
Attending: INTERNAL MEDICINE
Payer: COMMERCIAL

## 2021-01-01 ENCOUNTER — HOSPITAL ENCOUNTER (OUTPATIENT)
Dept: CT IMAGING | Facility: HOSPITAL | Age: 56
Discharge: HOME | End: 2021-01-09
Attending: SURGERY
Payer: COMMERCIAL

## 2021-01-01 ENCOUNTER — APPOINTMENT (OUTPATIENT)
Dept: RADIOLOGY | Facility: HOSPITAL | Age: 56
End: 2021-01-01
Payer: COMMERCIAL

## 2021-01-01 ENCOUNTER — HOSPITAL ENCOUNTER (OUTPATIENT)
Dept: RADIOLOGY | Facility: HOSPITAL | Age: 56
Discharge: HOME | End: 2021-02-25
Attending: RADIOLOGY | Admitting: RADIOLOGY
Payer: COMMERCIAL

## 2021-01-01 ENCOUNTER — HOSPITAL ENCOUNTER (OUTPATIENT)
Dept: INFUSION CENTER | Facility: HOSPITAL | Age: 56
Discharge: HOME | End: 2021-01-12
Attending: INTERNAL MEDICINE

## 2021-01-01 ENCOUNTER — TELEPHONE (OUTPATIENT)
Dept: GENETICS | Facility: CLINIC | Age: 56
End: 2021-01-01

## 2021-01-01 ENCOUNTER — HOSPITAL ENCOUNTER (OUTPATIENT)
Dept: INFUSION CENTER | Facility: HOSPITAL | Age: 56
End: 2021-01-01
Attending: INTERNAL MEDICINE

## 2021-01-01 ENCOUNTER — HOSPITAL ENCOUNTER (OUTPATIENT)
Dept: INFUSION CENTER | Facility: HOSPITAL | Age: 56
Discharge: HOME | End: 2021-07-21
Attending: INTERNAL MEDICINE

## 2021-01-01 ENCOUNTER — HOSPITAL ENCOUNTER (OUTPATIENT)
Dept: INFUSION CENTER | Facility: HOSPITAL | Age: 56
Discharge: HOME | End: 2021-08-11
Attending: INTERNAL MEDICINE
Payer: COMMERCIAL

## 2021-01-01 ENCOUNTER — HOSPITAL ENCOUNTER (OUTPATIENT)
Dept: INFUSION CENTER | Facility: HOSPITAL | Age: 56
Discharge: HOME | End: 2021-07-19
Attending: INTERNAL MEDICINE
Payer: COMMERCIAL

## 2021-01-01 ENCOUNTER — HOSPITAL ENCOUNTER (OUTPATIENT)
Dept: INFUSION CENTER | Facility: HOSPITAL | Age: 56
Discharge: HOME | End: 2021-08-25
Attending: INTERNAL MEDICINE
Payer: COMMERCIAL

## 2021-01-01 ENCOUNTER — HOSPITAL ENCOUNTER (OUTPATIENT)
Dept: INFUSION CENTER | Facility: HOSPITAL | Age: 56
Discharge: HOME | End: 2021-06-23
Attending: INTERNAL MEDICINE

## 2021-01-01 ENCOUNTER — HOSPITAL ENCOUNTER (OUTPATIENT)
Dept: INFUSION CENTER | Facility: HOSPITAL | Age: 56
Discharge: HOME | End: 2021-06-21
Attending: INTERNAL MEDICINE
Payer: COMMERCIAL

## 2021-01-01 ENCOUNTER — APPOINTMENT (INPATIENT)
Dept: RADIOLOGY | Facility: HOSPITAL | Age: 56
DRG: 375 | End: 2021-01-01
Payer: COMMERCIAL

## 2021-01-01 ENCOUNTER — TRANSCRIBE ORDERS (OUTPATIENT)
Dept: URGENT CARE | Age: 56
End: 2021-01-01

## 2021-01-01 ENCOUNTER — HOSPITAL ENCOUNTER (OUTPATIENT)
Dept: INFUSION CENTER | Facility: HOSPITAL | Age: 56
Discharge: HOME | End: 2021-08-13
Attending: INTERNAL MEDICINE

## 2021-01-01 ENCOUNTER — HOSPITAL ENCOUNTER (OUTPATIENT)
Dept: RADIOLOGY | Facility: HOSPITAL | Age: 56
Discharge: HOME | End: 2021-02-16
Attending: RADIOLOGY | Admitting: RADIOLOGY
Payer: COMMERCIAL

## 2021-01-01 ENCOUNTER — HOSPITAL ENCOUNTER (OUTPATIENT)
Dept: INFUSION CENTER | Facility: HOSPITAL | Age: 56
Discharge: HOME | End: 2021-08-27
Attending: INTERNAL MEDICINE

## 2021-01-01 ENCOUNTER — TELEPHONE (OUTPATIENT)
Dept: INPATIENT UNIT | Facility: HOSPITAL | Age: 56
End: 2021-01-01

## 2021-01-01 ENCOUNTER — TELEMEDICINE (OUTPATIENT)
Dept: FAMILY MEDICINE CLINIC | Facility: CLINIC | Age: 56
End: 2021-01-01
Payer: COMMERCIAL

## 2021-01-01 VITALS
HEIGHT: 64 IN | DIASTOLIC BLOOD PRESSURE: 78 MMHG | RESPIRATION RATE: 18 BRPM | OXYGEN SATURATION: 98 % | HEART RATE: 76 BPM | TEMPERATURE: 98 F | WEIGHT: 137 LBS | BODY MASS INDEX: 23.39 KG/M2 | SYSTOLIC BLOOD PRESSURE: 132 MMHG

## 2021-01-01 VITALS
HEART RATE: 92 BPM | BODY MASS INDEX: 20.39 KG/M2 | DIASTOLIC BLOOD PRESSURE: 52 MMHG | HEIGHT: 64 IN | WEIGHT: 115.08 LBS | RESPIRATION RATE: 18 BRPM | OXYGEN SATURATION: 93 % | OXYGEN SATURATION: 95 % | HEIGHT: 63 IN | SYSTOLIC BLOOD PRESSURE: 124 MMHG | TEMPERATURE: 97.5 F | RESPIRATION RATE: 26 BRPM | HEART RATE: 76 BPM | WEIGHT: 143 LBS | DIASTOLIC BLOOD PRESSURE: 78 MMHG | TEMPERATURE: 98.8 F | BODY MASS INDEX: 24.41 KG/M2 | SYSTOLIC BLOOD PRESSURE: 94 MMHG

## 2021-01-01 VITALS
BODY MASS INDEX: 22.39 KG/M2 | WEIGHT: 131.17 LBS | RESPIRATION RATE: 18 BRPM | OXYGEN SATURATION: 94 % | SYSTOLIC BLOOD PRESSURE: 126 MMHG | DIASTOLIC BLOOD PRESSURE: 74 MMHG | HEIGHT: 64 IN | TEMPERATURE: 98.1 F | HEART RATE: 104 BPM

## 2021-01-01 VITALS
OXYGEN SATURATION: 98 % | DIASTOLIC BLOOD PRESSURE: 70 MMHG | BODY MASS INDEX: 20.9 KG/M2 | HEIGHT: 64 IN | WEIGHT: 122.4 LBS | RESPIRATION RATE: 17 BRPM | TEMPERATURE: 98.2 F | HEART RATE: 84 BPM | SYSTOLIC BLOOD PRESSURE: 115 MMHG

## 2021-01-01 VITALS
RESPIRATION RATE: 18 BRPM | DIASTOLIC BLOOD PRESSURE: 77 MMHG | WEIGHT: 129.63 LBS | SYSTOLIC BLOOD PRESSURE: 114 MMHG | HEIGHT: 64 IN | BODY MASS INDEX: 22.13 KG/M2 | HEART RATE: 82 BPM | TEMPERATURE: 98.6 F

## 2021-01-01 VITALS
BODY MASS INDEX: 20.89 KG/M2 | TEMPERATURE: 97.3 F | HEART RATE: 89 BPM | DIASTOLIC BLOOD PRESSURE: 72 MMHG | RESPIRATION RATE: 20 BRPM | HEIGHT: 64 IN | WEIGHT: 122.36 LBS | OXYGEN SATURATION: 99 % | SYSTOLIC BLOOD PRESSURE: 108 MMHG

## 2021-01-01 VITALS
HEART RATE: 79 BPM | RESPIRATION RATE: 16 BRPM | OXYGEN SATURATION: 100 % | DIASTOLIC BLOOD PRESSURE: 72 MMHG | HEIGHT: 64 IN | SYSTOLIC BLOOD PRESSURE: 115 MMHG | TEMPERATURE: 98.2 F | WEIGHT: 121.69 LBS | BODY MASS INDEX: 20.78 KG/M2

## 2021-01-01 VITALS
TEMPERATURE: 96.3 F | HEART RATE: 71 BPM | BODY MASS INDEX: 21.18 KG/M2 | DIASTOLIC BLOOD PRESSURE: 84 MMHG | WEIGHT: 123.4 LBS | SYSTOLIC BLOOD PRESSURE: 110 MMHG | RESPIRATION RATE: 16 BRPM | OXYGEN SATURATION: 99 %

## 2021-01-01 VITALS
BODY MASS INDEX: 21.06 KG/M2 | HEART RATE: 95 BPM | OXYGEN SATURATION: 98 % | RESPIRATION RATE: 18 BRPM | WEIGHT: 126.4 LBS | DIASTOLIC BLOOD PRESSURE: 64 MMHG | HEIGHT: 65 IN | TEMPERATURE: 97.1 F | SYSTOLIC BLOOD PRESSURE: 110 MMHG

## 2021-01-01 VITALS
DIASTOLIC BLOOD PRESSURE: 96 MMHG | HEART RATE: 89 BPM | SYSTOLIC BLOOD PRESSURE: 147 MMHG | RESPIRATION RATE: 18 BRPM | TEMPERATURE: 96.7 F | WEIGHT: 138.2 LBS | HEIGHT: 64 IN | OXYGEN SATURATION: 98 % | BODY MASS INDEX: 23.6 KG/M2

## 2021-01-01 VITALS
HEART RATE: 78 BPM | BODY MASS INDEX: 24.16 KG/M2 | RESPIRATION RATE: 18 BRPM | HEIGHT: 64 IN | DIASTOLIC BLOOD PRESSURE: 87 MMHG | SYSTOLIC BLOOD PRESSURE: 125 MMHG | WEIGHT: 141.54 LBS | TEMPERATURE: 97.2 F

## 2021-01-01 VITALS
BODY MASS INDEX: 21.3 KG/M2 | HEIGHT: 64 IN | TEMPERATURE: 97.8 F | RESPIRATION RATE: 18 BRPM | WEIGHT: 124.78 LBS | SYSTOLIC BLOOD PRESSURE: 116 MMHG | HEART RATE: 95 BPM | DIASTOLIC BLOOD PRESSURE: 67 MMHG

## 2021-01-01 VITALS
RESPIRATION RATE: 18 BRPM | DIASTOLIC BLOOD PRESSURE: 80 MMHG | TEMPERATURE: 98.4 F | HEART RATE: 92 BPM | SYSTOLIC BLOOD PRESSURE: 116 MMHG | HEIGHT: 64 IN | BODY MASS INDEX: 21 KG/M2 | OXYGEN SATURATION: 100 % | WEIGHT: 123 LBS

## 2021-01-01 VITALS
SYSTOLIC BLOOD PRESSURE: 124 MMHG | BODY MASS INDEX: 23.73 KG/M2 | DIASTOLIC BLOOD PRESSURE: 88 MMHG | WEIGHT: 139 LBS | HEIGHT: 64 IN

## 2021-01-01 VITALS
RESPIRATION RATE: 16 BRPM | HEART RATE: 88 BPM | DIASTOLIC BLOOD PRESSURE: 74 MMHG | OXYGEN SATURATION: 100 % | SYSTOLIC BLOOD PRESSURE: 111 MMHG

## 2021-01-01 VITALS
BODY MASS INDEX: 23.32 KG/M2 | TEMPERATURE: 98.1 F | WEIGHT: 140 LBS | DIASTOLIC BLOOD PRESSURE: 83 MMHG | HEIGHT: 65 IN | HEART RATE: 67 BPM | SYSTOLIC BLOOD PRESSURE: 132 MMHG | OXYGEN SATURATION: 98 % | RESPIRATION RATE: 18 BRPM

## 2021-01-01 VITALS
TEMPERATURE: 98 F | BODY MASS INDEX: 23.9 KG/M2 | OXYGEN SATURATION: 99 % | HEIGHT: 64 IN | HEART RATE: 78 BPM | WEIGHT: 140 LBS | RESPIRATION RATE: 18 BRPM | DIASTOLIC BLOOD PRESSURE: 80 MMHG | SYSTOLIC BLOOD PRESSURE: 124 MMHG

## 2021-01-01 VITALS
SYSTOLIC BLOOD PRESSURE: 126 MMHG | OXYGEN SATURATION: 99 % | HEIGHT: 64 IN | DIASTOLIC BLOOD PRESSURE: 78 MMHG | WEIGHT: 134 LBS | RESPIRATION RATE: 18 BRPM | BODY MASS INDEX: 22.88 KG/M2 | TEMPERATURE: 96.2 F | HEART RATE: 94 BPM

## 2021-01-01 VITALS
BODY MASS INDEX: 21.23 KG/M2 | HEART RATE: 90 BPM | RESPIRATION RATE: 16 BRPM | HEIGHT: 64 IN | DIASTOLIC BLOOD PRESSURE: 72 MMHG | SYSTOLIC BLOOD PRESSURE: 119 MMHG | WEIGHT: 124.34 LBS | TEMPERATURE: 97 F

## 2021-01-01 VITALS
SYSTOLIC BLOOD PRESSURE: 103 MMHG | DIASTOLIC BLOOD PRESSURE: 71 MMHG | OXYGEN SATURATION: 98 % | BODY MASS INDEX: 23.32 KG/M2 | HEART RATE: 78 BPM | TEMPERATURE: 98.6 F | WEIGHT: 140 LBS | HEIGHT: 65 IN | RESPIRATION RATE: 18 BRPM

## 2021-01-01 VITALS — TEMPERATURE: 98.8 F

## 2021-01-01 VITALS
TEMPERATURE: 101.1 F | WEIGHT: 132 LBS | DIASTOLIC BLOOD PRESSURE: 68 MMHG | SYSTOLIC BLOOD PRESSURE: 108 MMHG | BODY MASS INDEX: 22.53 KG/M2 | HEIGHT: 64 IN

## 2021-01-01 VITALS — BODY MASS INDEX: 21.15 KG/M2 | HEIGHT: 64 IN | WEIGHT: 123.9 LBS

## 2021-01-01 VITALS
HEART RATE: 86 BPM | RESPIRATION RATE: 16 BRPM | OXYGEN SATURATION: 97 % | SYSTOLIC BLOOD PRESSURE: 102 MMHG | DIASTOLIC BLOOD PRESSURE: 62 MMHG

## 2021-01-01 DIAGNOSIS — C78.7 SECONDARY MALIGNANT NEOPLASM OF LIVER (HCC): Primary | ICD-10-CM

## 2021-01-01 DIAGNOSIS — R16.0 LIVER MASSES: ICD-10-CM

## 2021-01-01 DIAGNOSIS — C78.7 COLON CANCER METASTASIZED TO LIVER (HCC): ICD-10-CM

## 2021-01-01 DIAGNOSIS — R23.2 HOT FLASHES: ICD-10-CM

## 2021-01-01 DIAGNOSIS — C18.9 COLON CANCER METASTASIZED TO LIVER (HCC): Primary | ICD-10-CM

## 2021-01-01 DIAGNOSIS — C18.9 COLON CANCER METASTASIZED TO LIVER (HCC): ICD-10-CM

## 2021-01-01 DIAGNOSIS — C78.7 COLON CANCER METASTASIZED TO LIVER (HCC): Primary | ICD-10-CM

## 2021-01-01 DIAGNOSIS — K72.90: ICD-10-CM

## 2021-01-01 DIAGNOSIS — R18.0 MALIGNANT ASCITES: ICD-10-CM

## 2021-01-01 DIAGNOSIS — F41.9 ANXIETY: ICD-10-CM

## 2021-01-01 DIAGNOSIS — C18.9 METASTATIC COLON CANCER TO LIVER (HCC): Primary | ICD-10-CM

## 2021-01-01 DIAGNOSIS — E87.6 HYPOKALEMIA: ICD-10-CM

## 2021-01-01 DIAGNOSIS — C18.9 COLON CANCER METASTASIZED TO MULTIPLE SITES (HCC): ICD-10-CM

## 2021-01-01 DIAGNOSIS — G89.3 CANCER RELATED PAIN: ICD-10-CM

## 2021-01-01 DIAGNOSIS — C78.7 SECONDARY MALIGNANT NEOPLASM OF LIVER (HCC): ICD-10-CM

## 2021-01-01 DIAGNOSIS — R73.01 IMPAIRED FASTING GLUCOSE: ICD-10-CM

## 2021-01-01 DIAGNOSIS — K59.00 CONSTIPATION: ICD-10-CM

## 2021-01-01 DIAGNOSIS — R74.01 TRANSAMINITIS: ICD-10-CM

## 2021-01-01 DIAGNOSIS — M06.042 RHEUMATOID ARTHRITIS INVOLVING BOTH HANDS WITH NEGATIVE RHEUMATOID FACTOR (HCC): ICD-10-CM

## 2021-01-01 DIAGNOSIS — Z20.822 CLOSE EXPOSURE TO COVID-19 VIRUS: Primary | ICD-10-CM

## 2021-01-01 DIAGNOSIS — Z00.8 HEALTH EXAMINATION IN POPULATION SURVEYS: ICD-10-CM

## 2021-01-01 DIAGNOSIS — Z23 ENCOUNTER FOR IMMUNIZATION: Primary | ICD-10-CM

## 2021-01-01 DIAGNOSIS — M06.041 RHEUMATOID ARTHRITIS INVOLVING BOTH HANDS WITH NEGATIVE RHEUMATOID FACTOR (HCC): ICD-10-CM

## 2021-01-01 DIAGNOSIS — R79.89 ELEVATED LACTIC ACID LEVEL: ICD-10-CM

## 2021-01-01 DIAGNOSIS — E87.5 HYPERKALEMIA: ICD-10-CM

## 2021-01-01 DIAGNOSIS — R18.8 ASCITES: ICD-10-CM

## 2021-01-01 DIAGNOSIS — C22.9 LIVER CANCER (HCC): ICD-10-CM

## 2021-01-01 DIAGNOSIS — C22.9 LIVER CANCER (HCC): Primary | ICD-10-CM

## 2021-01-01 DIAGNOSIS — C18.9 COLON CANCER METASTASIZED TO MULTIPLE SITES (HCC): Primary | ICD-10-CM

## 2021-01-01 DIAGNOSIS — G89.3 CANCER RELATED PAIN: Primary | ICD-10-CM

## 2021-01-01 DIAGNOSIS — D72.829 LEUKOCYTOSIS: ICD-10-CM

## 2021-01-01 DIAGNOSIS — R53.1 WEAKNESS: Primary | ICD-10-CM

## 2021-01-01 DIAGNOSIS — Z00.8 HEALTH EXAMINATION IN POPULATION SURVEYS: Primary | ICD-10-CM

## 2021-01-01 DIAGNOSIS — K59.03 DRUG INDUCED CONSTIPATION: ICD-10-CM

## 2021-01-01 DIAGNOSIS — E87.1 HYPONATREMIA: ICD-10-CM

## 2021-01-01 DIAGNOSIS — C78.7 METASTATIC COLON CANCER TO LIVER (HCC): Primary | ICD-10-CM

## 2021-01-01 DIAGNOSIS — Z71.89 COUNSELING REGARDING ADVANCED CARE PLANNING AND GOALS OF CARE: ICD-10-CM

## 2021-01-01 DIAGNOSIS — G62.2 POLYNEUROPATHY DUE TO OTHER TOXIC AGENTS (HCC): ICD-10-CM

## 2021-01-01 DIAGNOSIS — C18.9 METASTATIC COLON CANCER TO LIVER (HCC): ICD-10-CM

## 2021-01-01 DIAGNOSIS — C78.7 METASTATIC COLON CANCER TO LIVER (HCC): ICD-10-CM

## 2021-01-01 DIAGNOSIS — N17.9 ACUTE KIDNEY INJURY (HCC): ICD-10-CM

## 2021-01-01 DIAGNOSIS — E86.0 DEHYDRATION: ICD-10-CM

## 2021-01-01 DIAGNOSIS — M05.89 OTHER RHEUMATOID ARTHRITIS WITH RHEUMATOID FACTOR OF MULTIPLE SITES (HCC): ICD-10-CM

## 2021-01-01 DIAGNOSIS — D53.1 MEGALOBLASTIC ANEMIA: ICD-10-CM

## 2021-01-01 DIAGNOSIS — E44.0 MODERATE PROTEIN-CALORIE MALNUTRITION (HCC): ICD-10-CM

## 2021-01-01 DIAGNOSIS — N39.0 URINARY TRACT INFECTION: ICD-10-CM

## 2021-01-01 DIAGNOSIS — Z23 ENCOUNTER FOR IMMUNIZATION: ICD-10-CM

## 2021-01-01 LAB
ABO GROUP BLD: NORMAL
ALBUMIN FLD-MCNC: <0.6 G/DL
ALBUMIN SERPL BCP-MCNC: 1 G/DL (ref 3.5–5)
ALBUMIN SERPL BCP-MCNC: 1.2 G/DL (ref 3.5–5)
ALBUMIN SERPL BCP-MCNC: 1.4 G/DL (ref 3.5–5)
ALBUMIN SERPL BCP-MCNC: 1.5 G/DL (ref 3.5–5)
ALBUMIN SERPL BCP-MCNC: 1.5 G/DL (ref 3.5–5)
ALBUMIN SERPL BCP-MCNC: 1.6 G/DL (ref 3.5–5)
ALBUMIN SERPL BCP-MCNC: 1.7 G/DL (ref 3.5–5)
ALBUMIN SERPL BCP-MCNC: 1.9 G/DL (ref 3.5–5)
ALBUMIN SERPL BCP-MCNC: 2 G/DL (ref 3.5–5)
ALBUMIN SERPL BCP-MCNC: 2.7 G/DL (ref 3.5–5)
ALBUMIN SERPL BCP-MCNC: 2.8 G/DL (ref 3.5–5)
ALBUMIN SERPL BCP-MCNC: 3.3 G/DL (ref 3.5–5)
ALBUMIN SERPL BCP-MCNC: 3.4 G/DL (ref 3.5–5)
ALBUMIN SERPL BCP-MCNC: 3.4 G/DL (ref 3–5.2)
ALBUMIN SERPL BCP-MCNC: 3.4 G/DL (ref 3–5.2)
ALBUMIN SERPL BCP-MCNC: 3.9 G/DL (ref 3–5.2)
ALBUMIN SERPL BCP-MCNC: 4.1 G/DL (ref 3–5.2)
ALP SERPL-CCNC: 271 U/L (ref 46–116)
ALP SERPL-CCNC: 275 U/L (ref 46–116)
ALP SERPL-CCNC: 296 U/L (ref 46–116)
ALP SERPL-CCNC: 296 U/L (ref 46–116)
ALP SERPL-CCNC: 309 U/L (ref 46–116)
ALP SERPL-CCNC: 352 U/L (ref 43–122)
ALP SERPL-CCNC: 363 U/L (ref 46–116)
ALP SERPL-CCNC: 367 U/L (ref 46–116)
ALP SERPL-CCNC: 377 U/L (ref 46–116)
ALP SERPL-CCNC: 410 U/L (ref 46–116)
ALP SERPL-CCNC: 424 U/L (ref 46–116)
ALP SERPL-CCNC: 462 U/L (ref 43–122)
ALP SERPL-CCNC: 510 U/L (ref 46–116)
ALP SERPL-CCNC: 535 U/L (ref 43–122)
ALP SERPL-CCNC: 552 U/L (ref 43–122)
ALP SERPL-CCNC: 661 U/L (ref 46–116)
ALP SERPL-CCNC: 823 U/L (ref 46–116)
ALT SERPL W P-5'-P-CCNC: 14 U/L (ref 9–52)
ALT SERPL W P-5'-P-CCNC: 19 U/L (ref 12–78)
ALT SERPL W P-5'-P-CCNC: 20 U/L (ref 12–78)
ALT SERPL W P-5'-P-CCNC: 21 U/L (ref 12–78)
ALT SERPL W P-5'-P-CCNC: 25 U/L (ref 12–78)
ALT SERPL W P-5'-P-CCNC: 26 U/L (ref 12–78)
ALT SERPL W P-5'-P-CCNC: 28 U/L (ref 12–78)
ALT SERPL W P-5'-P-CCNC: 31 U/L
ALT SERPL W P-5'-P-CCNC: 31 U/L (ref 12–78)
ALT SERPL W P-5'-P-CCNC: 31 U/L (ref 12–78)
ALT SERPL W P-5'-P-CCNC: 35 U/L (ref 12–78)
ALT SERPL W P-5'-P-CCNC: 43 U/L (ref 12–78)
ALT SERPL W P-5'-P-CCNC: 44 U/L
ALT SERPL W P-5'-P-CCNC: 46 U/L
ALT SERPL W P-5'-P-CCNC: 69 U/L (ref 12–78)
ALT SERPL W P-5'-P-CCNC: 84 U/L (ref 12–78)
ALT SERPL W P-5'-P-CCNC: 94 U/L (ref 12–78)
AMMONIA PLAS-SCNC: 40 UMOL/L (ref 11–35)
AMMONIA PLAS-SCNC: 89 UMOL/L (ref 11–35)
ANION GAP SERPL CALCULATED.3IONS-SCNC: 10 MMOL/L (ref 4–13)
ANION GAP SERPL CALCULATED.3IONS-SCNC: 11 MMOL/L (ref 4–13)
ANION GAP SERPL CALCULATED.3IONS-SCNC: 11 MMOL/L (ref 4–13)
ANION GAP SERPL CALCULATED.3IONS-SCNC: 3 MMOL/L (ref 4–13)
ANION GAP SERPL CALCULATED.3IONS-SCNC: 3 MMOL/L (ref 4–13)
ANION GAP SERPL CALCULATED.3IONS-SCNC: 4 MMOL/L (ref 4–13)
ANION GAP SERPL CALCULATED.3IONS-SCNC: 4 MMOL/L (ref 4–13)
ANION GAP SERPL CALCULATED.3IONS-SCNC: 4 MMOL/L (ref 5–14)
ANION GAP SERPL CALCULATED.3IONS-SCNC: 4 MMOL/L (ref 5–14)
ANION GAP SERPL CALCULATED.3IONS-SCNC: 5 MMOL/L (ref 4–13)
ANION GAP SERPL CALCULATED.3IONS-SCNC: 6 MMOL/L (ref 4–13)
ANION GAP SERPL CALCULATED.3IONS-SCNC: 7 MMOL/L (ref 4–13)
ANION GAP SERPL CALCULATED.3IONS-SCNC: 7 MMOL/L (ref 5–14)
ANION GAP SERPL CALCULATED.3IONS-SCNC: 7 MMOL/L (ref 5–14)
ANION GAP SERPL CALCULATED.3IONS-SCNC: 8 MMOL/L (ref 4–13)
ANION GAP SERPL CALCULATED.3IONS-SCNC: 8 MMOL/L (ref 4–13)
ANION GAP SERPL CALCULATED.3IONS-SCNC: 9 MMOL/L (ref 4–13)
ANION GAP SERPL CALCULATED.3IONS-SCNC: 9 MMOL/L (ref 4–13)
ANISOCYTOSIS BLD QL SMEAR: PRESENT
APPEARANCE FLD: CLEAR
APTT PPP: 139 SECONDS (ref 23–37)
APTT PPP: 38 SECONDS (ref 23–37)
AST SERPL W P-5'-P-CCNC: 283 U/L (ref 5–45)
AST SERPL W P-5'-P-CCNC: 305 U/L (ref 5–45)
AST SERPL W P-5'-P-CCNC: 33 U/L (ref 5–45)
AST SERPL W P-5'-P-CCNC: 40 U/L (ref 14–36)
AST SERPL W P-5'-P-CCNC: 42 U/L (ref 5–45)
AST SERPL W P-5'-P-CCNC: 47 U/L (ref 5–45)
AST SERPL W P-5'-P-CCNC: 48 U/L (ref 5–45)
AST SERPL W P-5'-P-CCNC: 49 U/L (ref 14–36)
AST SERPL W P-5'-P-CCNC: 50 U/L (ref 5–45)
AST SERPL W P-5'-P-CCNC: 57 U/L (ref 14–36)
AST SERPL W P-5'-P-CCNC: 58 U/L (ref 14–36)
AST SERPL W P-5'-P-CCNC: 84 U/L (ref 5–45)
AST SERPL W P-5'-P-CCNC: 88 U/L (ref 5–45)
AST SERPL W P-5'-P-CCNC: 90 U/L (ref 5–45)
AST SERPL W P-5'-P-CCNC: 92 U/L (ref 5–45)
ATRIAL RATE: 86 BPM
ATRIAL RATE: 87 BPM
ATRIAL RATE: 88 BPM
BACTERIA BLD CULT: NORMAL
BACTERIA BLD CULT: NORMAL
BACTERIA SPEC BFLD CULT: NO GROWTH
BACTERIA UR CULT: NORMAL
BACTERIA UR CULT: NORMAL
BACTERIA UR QL AUTO: ABNORMAL /HPF
BACTERIA UR QL AUTO: ABNORMAL /HPF
BASE EX.OXY STD BLDV CALC-SCNC: 83.9 % (ref 60–80)
BASE EXCESS BLDV CALC-SCNC: 2.6 MMOL/L
BASOPHILS # BLD AUTO: 0 THOUSANDS/ΜL (ref 0–0.1)
BASOPHILS # BLD AUTO: 0 THOUSANDS/ΜL (ref 0–0.1)
BASOPHILS # BLD AUTO: 0.01 THOUSANDS/ΜL (ref 0–0.1)
BASOPHILS # BLD AUTO: 0.01 THOUSANDS/ΜL (ref 0–0.1)
BASOPHILS # BLD AUTO: 0.02 THOUSANDS/ΜL (ref 0–0.1)
BASOPHILS # BLD AUTO: 0.03 THOUSANDS/ΜL (ref 0–0.1)
BASOPHILS # BLD AUTO: 0.03 THOUSANDS/ΜL (ref 0–0.1)
BASOPHILS # BLD AUTO: 0.04 THOUSANDS/ΜL (ref 0–0.1)
BASOPHILS # BLD AUTO: 0.05 THOUSANDS/ΜL (ref 0–0.1)
BASOPHILS # BLD AUTO: 0.05 THOUSANDS/ΜL (ref 0–0.1)
BASOPHILS # BLD AUTO: 0.06 THOUSANDS/ΜL (ref 0–0.1)
BASOPHILS NFR BLD AUTO: 0 % (ref 0–1)
BASOPHILS NFR BLD AUTO: 1 % (ref 0–1)
BILIRUB DIRECT SERPL-MCNC: 10.12 MG/DL (ref 0–0.2)
BILIRUB DIRECT SERPL-MCNC: 18.41 MG/DL (ref 0–0.2)
BILIRUB DIRECT SERPL-MCNC: 20.24 MG/DL (ref 0–0.2)
BILIRUB SERPL-MCNC: 0.57 MG/DL (ref 0.2–1)
BILIRUB SERPL-MCNC: 0.6 MG/DL
BILIRUB SERPL-MCNC: 0.69 MG/DL (ref 0.2–1)
BILIRUB SERPL-MCNC: 1 MG/DL (ref 0.2–1)
BILIRUB SERPL-MCNC: 1.03 MG/DL
BILIRUB SERPL-MCNC: 1.04 MG/DL (ref 0.2–1)
BILIRUB SERPL-MCNC: 1.07 MG/DL
BILIRUB SERPL-MCNC: 1.48 MG/DL
BILIRUB SERPL-MCNC: 1.55 MG/DL (ref 0.2–1)
BILIRUB SERPL-MCNC: 1.93 MG/DL (ref 0.2–1)
BILIRUB SERPL-MCNC: 12.09 MG/DL (ref 0.2–1)
BILIRUB SERPL-MCNC: 12.11 MG/DL (ref 0.2–1)
BILIRUB SERPL-MCNC: 12.87 MG/DL (ref 0.2–1)
BILIRUB SERPL-MCNC: 12.96 MG/DL (ref 0.2–1)
BILIRUB SERPL-MCNC: 19.66 MG/DL (ref 0.2–1)
BILIRUB SERPL-MCNC: 2.16 MG/DL (ref 0.2–1)
BILIRUB SERPL-MCNC: 22.39 MG/DL (ref 0.2–1)
BILIRUB UR QL STRIP: ABNORMAL
BILIRUB UR QL STRIP: ABNORMAL
BLD GP AB SCN SERPL QL: NEGATIVE
BUN SERPL-MCNC: 10 MG/DL (ref 5–25)
BUN SERPL-MCNC: 10 MG/DL (ref 5–25)
BUN SERPL-MCNC: 11 MG/DL (ref 5–25)
BUN SERPL-MCNC: 12 MG/DL (ref 5–25)
BUN SERPL-MCNC: 14 MG/DL (ref 5–25)
BUN SERPL-MCNC: 14 MG/DL (ref 5–25)
BUN SERPL-MCNC: 5 MG/DL (ref 5–25)
BUN SERPL-MCNC: 54 MG/DL (ref 5–25)
BUN SERPL-MCNC: 54 MG/DL (ref 5–25)
BUN SERPL-MCNC: 55 MG/DL (ref 5–25)
BUN SERPL-MCNC: 6 MG/DL (ref 5–25)
BUN SERPL-MCNC: 7 MG/DL (ref 5–25)
BUN SERPL-MCNC: 7 MG/DL (ref 5–25)
BUN SERPL-MCNC: 8 MG/DL (ref 5–25)
BUN SERPL-MCNC: 9 MG/DL (ref 5–25)
CALCIUM ALBUM COR SERPL-MCNC: 10 MG/DL (ref 8.3–10.1)
CALCIUM ALBUM COR SERPL-MCNC: 10.1 MG/DL (ref 8.3–10.1)
CALCIUM ALBUM COR SERPL-MCNC: 10.4 MG/DL (ref 8.3–10.1)
CALCIUM ALBUM COR SERPL-MCNC: 10.5 MG/DL (ref 8.3–10.1)
CALCIUM ALBUM COR SERPL-MCNC: 10.5 MG/DL (ref 8.3–10.1)
CALCIUM ALBUM COR SERPL-MCNC: 10.9 MG/DL (ref 8.3–10.1)
CALCIUM ALBUM COR SERPL-MCNC: 11 MG/DL (ref 8.3–10.1)
CALCIUM ALBUM COR SERPL-MCNC: 11 MG/DL (ref 8.3–10.1)
CALCIUM ALBUM COR SERPL-MCNC: 11.1 MG/DL (ref 8.3–10.1)
CALCIUM ALBUM COR SERPL-MCNC: 11.2 MG/DL (ref 8.3–10.1)
CALCIUM SERPL-MCNC: 10 MG/DL (ref 8.3–10.1)
CALCIUM SERPL-MCNC: 10 MG/DL (ref 8.3–10.1)
CALCIUM SERPL-MCNC: 10.2 MG/DL (ref 8.4–10.2)
CALCIUM SERPL-MCNC: 8.2 MG/DL (ref 8.3–10.1)
CALCIUM SERPL-MCNC: 8.6 MG/DL (ref 8.3–10.1)
CALCIUM SERPL-MCNC: 8.8 MG/DL (ref 8.3–10.1)
CALCIUM SERPL-MCNC: 8.9 MG/DL (ref 8.3–10.1)
CALCIUM SERPL-MCNC: 9.1 MG/DL (ref 8.3–10.1)
CALCIUM SERPL-MCNC: 9.3 MG/DL (ref 8.3–10.1)
CALCIUM SERPL-MCNC: 9.4 MG/DL (ref 8.3–10.1)
CALCIUM SERPL-MCNC: 9.5 MG/DL (ref 8.3–10.1)
CALCIUM SERPL-MCNC: 9.5 MG/DL (ref 8.4–10.2)
CALCIUM SERPL-MCNC: 9.5 MG/DL (ref 8.4–10.2)
CALCIUM SERPL-MCNC: 9.7 MG/DL (ref 8.3–10.1)
CALCIUM SERPL-MCNC: 9.9 MG/DL (ref 8.3–10.1)
CALCIUM SERPL-MCNC: 9.9 MG/DL (ref 8.4–10.2)
CEA SERPL-MCNC: 329.2 NG/ML (ref 0–3)
CEA SERPL-MCNC: 4098.9 NG/ML (ref 0–3)
CEA SERPL-MCNC: 457.6 NG/ML (ref 0–3)
CEA SERPL-MCNC: 4990 NG/ML (ref 0–3)
CEA SERPL-MCNC: 631.3 NG/ML (ref 0–3)
CEA SERPL-MCNC: 8373.3 NG/ML (ref 0–3)
CHLORIDE SERPL-SCNC: 100 MMOL/L (ref 97–108)
CHLORIDE SERPL-SCNC: 100 MMOL/L (ref 97–108)
CHLORIDE SERPL-SCNC: 101 MMOL/L (ref 100–108)
CHLORIDE SERPL-SCNC: 101 MMOL/L (ref 97–108)
CHLORIDE SERPL-SCNC: 102 MMOL/L (ref 100–108)
CHLORIDE SERPL-SCNC: 102 MMOL/L (ref 100–108)
CHLORIDE SERPL-SCNC: 103 MMOL/L (ref 100–108)
CHLORIDE SERPL-SCNC: 105 MMOL/L (ref 100–108)
CHLORIDE SERPL-SCNC: 105 MMOL/L (ref 100–108)
CHLORIDE SERPL-SCNC: 108 MMOL/L (ref 100–108)
CHLORIDE SERPL-SCNC: 87 MMOL/L (ref 100–108)
CHLORIDE SERPL-SCNC: 90 MMOL/L (ref 100–108)
CHLORIDE SERPL-SCNC: 92 MMOL/L (ref 100–108)
CHLORIDE SERPL-SCNC: 96 MMOL/L (ref 100–108)
CHLORIDE SERPL-SCNC: 97 MMOL/L (ref 100–108)
CHLORIDE SERPL-SCNC: 97 MMOL/L (ref 100–108)
CHLORIDE SERPL-SCNC: 97 MMOL/L (ref 97–108)
CHLORIDE SERPL-SCNC: 99 MMOL/L (ref 100–108)
CHOLEST SERPL-MCNC: 168 MG/DL (ref 50–200)
CLARITY UR: ABNORMAL
CLARITY UR: ABNORMAL
CO2 SERPL-SCNC: 23 MMOL/L (ref 21–32)
CO2 SERPL-SCNC: 25 MMOL/L (ref 21–32)
CO2 SERPL-SCNC: 26 MMOL/L (ref 21–32)
CO2 SERPL-SCNC: 26 MMOL/L (ref 21–32)
CO2 SERPL-SCNC: 27 MMOL/L (ref 21–32)
CO2 SERPL-SCNC: 28 MMOL/L (ref 21–32)
CO2 SERPL-SCNC: 29 MMOL/L (ref 21–32)
CO2 SERPL-SCNC: 30 MMOL/L (ref 21–32)
CO2 SERPL-SCNC: 30 MMOL/L (ref 22–30)
CO2 SERPL-SCNC: 31 MMOL/L (ref 21–32)
CO2 SERPL-SCNC: 31 MMOL/L (ref 21–32)
CO2 SERPL-SCNC: 31 MMOL/L (ref 22–30)
CO2 SERPL-SCNC: 32 MMOL/L (ref 22–30)
CO2 SERPL-SCNC: 33 MMOL/L (ref 22–30)
COLOR FLD: YELLOW
COLOR UR: ABNORMAL
COLOR UR: ABNORMAL
CREAT SERPL-MCNC: 0.51 MG/DL (ref 0.6–1.3)
CREAT SERPL-MCNC: 0.52 MG/DL (ref 0.6–1.3)
CREAT SERPL-MCNC: 0.52 MG/DL (ref 0.6–1.3)
CREAT SERPL-MCNC: 0.54 MG/DL (ref 0.6–1.3)
CREAT SERPL-MCNC: 0.55 MG/DL (ref 0.6–1.2)
CREAT SERPL-MCNC: 0.55 MG/DL (ref 0.6–1.2)
CREAT SERPL-MCNC: 0.57 MG/DL (ref 0.6–1.2)
CREAT SERPL-MCNC: 0.65 MG/DL (ref 0.6–1.3)
CREAT SERPL-MCNC: 0.66 MG/DL (ref 0.6–1.3)
CREAT SERPL-MCNC: 0.67 MG/DL (ref 0.6–1.2)
CREAT SERPL-MCNC: 0.67 MG/DL (ref 0.6–1.3)
CREAT SERPL-MCNC: 0.72 MG/DL (ref 0.6–1.3)
CREAT SERPL-MCNC: 0.74 MG/DL (ref 0.6–1.3)
CREAT SERPL-MCNC: 0.79 MG/DL (ref 0.6–1.3)
CREAT SERPL-MCNC: 0.86 MG/DL (ref 0.6–1.3)
CREAT SERPL-MCNC: 2.65 MG/DL (ref 0.6–1.3)
CREAT SERPL-MCNC: 2.74 MG/DL (ref 0.6–1.3)
CREAT SERPL-MCNC: 2.82 MG/DL (ref 0.6–1.3)
CRP SERPL QL: 21.3 MG/L
CRP SERPL QL: 71 MG/L
EOSINOPHIL # BLD AUTO: 0 THOUSAND/ΜL (ref 0–0.4)
EOSINOPHIL # BLD AUTO: 0 THOUSAND/ΜL (ref 0–0.61)
EOSINOPHIL # BLD AUTO: 0.01 THOUSAND/ΜL (ref 0–0.61)
EOSINOPHIL # BLD AUTO: 0.02 THOUSAND/ΜL (ref 0–0.61)
EOSINOPHIL # BLD AUTO: 0.03 THOUSAND/UL (ref 0–0.4)
EOSINOPHIL # BLD AUTO: 0.03 THOUSAND/ΜL (ref 0–0.61)
EOSINOPHIL # BLD AUTO: 0.03 THOUSAND/ΜL (ref 0–0.61)
EOSINOPHIL # BLD AUTO: 0.06 THOUSAND/ΜL (ref 0–0.61)
EOSINOPHIL # BLD AUTO: 0.06 THOUSAND/ΜL (ref 0–0.61)
EOSINOPHIL # BLD AUTO: 0.07 THOUSAND/ΜL (ref 0–0.61)
EOSINOPHIL # BLD AUTO: 0.07 THOUSAND/ΜL (ref 0–0.61)
EOSINOPHIL # BLD AUTO: 0.1 THOUSAND/ΜL (ref 0–0.4)
EOSINOPHIL NFR BLD AUTO: 0 % (ref 0–6)
EOSINOPHIL NFR BLD AUTO: 1 % (ref 0–6)
EOSINOPHIL NFR BLD AUTO: 2 % (ref 0–6)
EOSINOPHIL NFR BLD MANUAL: 2 % (ref 0–6)
ERYTHROCYTE [DISTWIDTH] IN BLOOD BY AUTOMATED COUNT: 16.1 % (ref 11.6–15.1)
ERYTHROCYTE [DISTWIDTH] IN BLOOD BY AUTOMATED COUNT: 17.1 % (ref 11.6–15.1)
ERYTHROCYTE [DISTWIDTH] IN BLOOD BY AUTOMATED COUNT: 17.3 % (ref 11.6–15.1)
ERYTHROCYTE [DISTWIDTH] IN BLOOD BY AUTOMATED COUNT: 17.8 % (ref 11.6–15.1)
ERYTHROCYTE [DISTWIDTH] IN BLOOD BY AUTOMATED COUNT: 18 %
ERYTHROCYTE [DISTWIDTH] IN BLOOD BY AUTOMATED COUNT: 18.6 % (ref 11.6–15.1)
ERYTHROCYTE [DISTWIDTH] IN BLOOD BY AUTOMATED COUNT: 19.3 %
ERYTHROCYTE [DISTWIDTH] IN BLOOD BY AUTOMATED COUNT: 20 % (ref 11.6–15.1)
ERYTHROCYTE [DISTWIDTH] IN BLOOD BY AUTOMATED COUNT: 20.9 %
ERYTHROCYTE [DISTWIDTH] IN BLOOD BY AUTOMATED COUNT: 21.4 % (ref 11.6–15.1)
ERYTHROCYTE [DISTWIDTH] IN BLOOD BY AUTOMATED COUNT: 21.8 %
ERYTHROCYTE [DISTWIDTH] IN BLOOD BY AUTOMATED COUNT: 21.8 % (ref 11.6–15.1)
ERYTHROCYTE [DISTWIDTH] IN BLOOD BY AUTOMATED COUNT: 22.5 % (ref 11.6–15.1)
ERYTHROCYTE [DISTWIDTH] IN BLOOD BY AUTOMATED COUNT: 22.5 % (ref 11.6–15.1)
ERYTHROCYTE [SEDIMENTATION RATE] IN BLOOD: 83 MM/HOUR (ref 0–29)
ERYTHROCYTE [SEDIMENTATION RATE] IN BLOOD: >130 MM/HOUR (ref 0–29)
EST. AVERAGE GLUCOSE BLD GHB EST-MCNC: 100 MG/DL
GFR SERPL CREATININE-BSD FRML MDRD: 100 ML/MIN/1.73SQ M
GFR SERPL CREATININE-BSD FRML MDRD: 105 ML/MIN/1.73SQ M
GFR SERPL CREATININE-BSD FRML MDRD: 106 ML/MIN/1.73SQ M
GFR SERPL CREATININE-BSD FRML MDRD: 106 ML/MIN/1.73SQ M
GFR SERPL CREATININE-BSD FRML MDRD: 107 ML/MIN/1.73SQ M
GFR SERPL CREATININE-BSD FRML MDRD: 107 ML/MIN/1.73SQ M
GFR SERPL CREATININE-BSD FRML MDRD: 108 ML/MIN/1.73SQ M
GFR SERPL CREATININE-BSD FRML MDRD: 108 ML/MIN/1.73SQ M
GFR SERPL CREATININE-BSD FRML MDRD: 18 ML/MIN/1.73SQ M
GFR SERPL CREATININE-BSD FRML MDRD: 19 ML/MIN/1.73SQ M
GFR SERPL CREATININE-BSD FRML MDRD: 19 ML/MIN/1.73SQ M
GFR SERPL CREATININE-BSD FRML MDRD: 76 ML/MIN/1.73SQ M
GFR SERPL CREATININE-BSD FRML MDRD: 84 ML/MIN/1.73SQ M
GFR SERPL CREATININE-BSD FRML MDRD: 91 ML/MIN/1.73SQ M
GFR SERPL CREATININE-BSD FRML MDRD: 94 ML/MIN/1.73SQ M
GFR SERPL CREATININE-BSD FRML MDRD: 99 ML/MIN/1.73SQ M
GLUCOSE FLD-MCNC: 150 MG/DL
GLUCOSE P FAST SERPL-MCNC: 103 MG/DL (ref 65–99)
GLUCOSE P FAST SERPL-MCNC: 105 MG/DL (ref 65–99)
GLUCOSE P FAST SERPL-MCNC: 106 MG/DL (ref 65–99)
GLUCOSE P FAST SERPL-MCNC: 93 MG/DL (ref 65–99)
GLUCOSE P FAST SERPL-MCNC: 96 MG/DL (ref 65–99)
GLUCOSE P FAST SERPL-MCNC: 97 MG/DL (ref 65–99)
GLUCOSE P FAST SERPL-MCNC: 99 MG/DL (ref 65–99)
GLUCOSE P FAST SERPL-MCNC: 99 MG/DL (ref 70–99)
GLUCOSE SERPL-MCNC: 106 MG/DL (ref 70–99)
GLUCOSE SERPL-MCNC: 111 MG/DL (ref 70–99)
GLUCOSE SERPL-MCNC: 124 MG/DL (ref 65–140)
GLUCOSE SERPL-MCNC: 128 MG/DL (ref 65–140)
GLUCOSE SERPL-MCNC: 129 MG/DL (ref 65–140)
GLUCOSE SERPL-MCNC: 129 MG/DL (ref 65–140)
GLUCOSE SERPL-MCNC: 131 MG/DL (ref 65–140)
GLUCOSE SERPL-MCNC: 185 MG/DL (ref 65–140)
GLUCOSE SERPL-MCNC: 88 MG/DL (ref 65–140)
GLUCOSE SERPL-MCNC: 97 MG/DL (ref 70–99)
GLUCOSE SERPL-MCNC: 99 MG/DL (ref 65–140)
GLUCOSE SERPL-MCNC: 99 MG/DL (ref 70–99)
GLUCOSE UR STRIP-MCNC: ABNORMAL MG/DL
GLUCOSE UR STRIP-MCNC: ABNORMAL MG/DL
GRAM STN SPEC: NORMAL
GRAM STN SPEC: NORMAL
HBA1C MFR BLD: 5.1 %
HCO3 BLDV-SCNC: 26.8 MMOL/L (ref 24–30)
HCT VFR BLD AUTO: 30.4 % (ref 34.8–46.1)
HCT VFR BLD AUTO: 31.4 % (ref 34.8–46.1)
HCT VFR BLD AUTO: 31.8 % (ref 34.8–46.1)
HCT VFR BLD AUTO: 32.3 % (ref 34.8–46.1)
HCT VFR BLD AUTO: 32.3 % (ref 36–46)
HCT VFR BLD AUTO: 32.5 % (ref 34.8–46.1)
HCT VFR BLD AUTO: 32.9 % (ref 34.8–46.1)
HCT VFR BLD AUTO: 33 % (ref 36–46)
HCT VFR BLD AUTO: 35.8 % (ref 36–46)
HCT VFR BLD AUTO: 35.9 % (ref 34.8–46.1)
HCT VFR BLD AUTO: 36.2 % (ref 34.8–46.1)
HCT VFR BLD AUTO: 37.3 % (ref 34.8–46.1)
HCT VFR BLD AUTO: 38 % (ref 34.8–46.1)
HCT VFR BLD AUTO: 38.2 % (ref 34.8–46.1)
HCT VFR BLD AUTO: 38.3 % (ref 36–46)
HCT VFR BLD AUTO: 40.9 % (ref 34.8–46.1)
HDLC SERPL-MCNC: 45 MG/DL
HGB BLD-MCNC: 10 G/DL (ref 11.5–15.4)
HGB BLD-MCNC: 10 G/DL (ref 11.5–15.4)
HGB BLD-MCNC: 10.5 G/DL (ref 12–16)
HGB BLD-MCNC: 10.8 G/DL (ref 11.5–15.4)
HGB BLD-MCNC: 10.8 G/DL (ref 12–16)
HGB BLD-MCNC: 11.2 G/DL (ref 11.5–15.4)
HGB BLD-MCNC: 11.7 G/DL (ref 11.5–15.4)
HGB BLD-MCNC: 11.7 G/DL (ref 12–16)
HGB BLD-MCNC: 11.8 G/DL (ref 11.5–15.4)
HGB BLD-MCNC: 12.1 G/DL (ref 11.5–15.4)
HGB BLD-MCNC: 12.3 G/DL (ref 12–16)
HGB BLD-MCNC: 12.5 G/DL (ref 11.5–15.4)
HGB BLD-MCNC: 8.9 G/DL (ref 11.5–15.4)
HGB BLD-MCNC: 9.5 G/DL (ref 11.5–15.4)
HGB BLD-MCNC: 9.7 G/DL (ref 11.5–15.4)
HGB BLD-MCNC: 9.8 G/DL (ref 11.5–15.4)
HGB UR QL STRIP.AUTO: ABNORMAL
HGB UR QL STRIP.AUTO: NEGATIVE
HISTIOCYTES NFR FLD: 35 %
HYALINE CASTS #/AREA URNS LPF: ABNORMAL /LPF
IMM GRANULOCYTES # BLD AUTO: 0 THOUSAND/UL (ref 0–0.2)
IMM GRANULOCYTES # BLD AUTO: 0.01 THOUSAND/UL (ref 0–0.2)
IMM GRANULOCYTES # BLD AUTO: 0.03 THOUSAND/UL (ref 0–0.2)
IMM GRANULOCYTES # BLD AUTO: 0.07 THOUSAND/UL (ref 0–0.2)
IMM GRANULOCYTES # BLD AUTO: 0.08 THOUSAND/UL (ref 0–0.2)
IMM GRANULOCYTES # BLD AUTO: 0.14 THOUSAND/UL (ref 0–0.2)
IMM GRANULOCYTES # BLD AUTO: 0.17 THOUSAND/UL (ref 0–0.2)
IMM GRANULOCYTES # BLD AUTO: >0.5 THOUSAND/UL (ref 0–0.2)
IMM GRANULOCYTES # BLD AUTO: >0.5 THOUSAND/UL (ref 0–0.2)
IMM GRANULOCYTES NFR BLD AUTO: 0 % (ref 0–2)
IMM GRANULOCYTES NFR BLD AUTO: 1 % (ref 0–2)
IMM GRANULOCYTES NFR BLD AUTO: 2 % (ref 0–2)
IMM GRANULOCYTES NFR BLD AUTO: 2 % (ref 0–2)
INR PPP: 0.97 (ref 0.84–1.19)
INR PPP: 2.67 (ref 0.84–1.19)
INR PPP: >5 (ref 0.84–1.19)
KETONES UR STRIP-MCNC: ABNORMAL MG/DL
KETONES UR STRIP-MCNC: NEGATIVE MG/DL
LACTATE SERPL-SCNC: 2.1 MMOL/L (ref 0.5–2)
LACTATE SERPL-SCNC: 3.3 MMOL/L (ref 0.5–2)
LACTATE SERPL-SCNC: 5.8 MMOL/L (ref 0.5–2)
LACTATE SERPL-SCNC: 6 MMOL/L (ref 0.5–2)
LACTATE SERPL-SCNC: 6.5 MMOL/L (ref 0.5–2)
LACTATE SERPL-SCNC: 7 MMOL/L (ref 0.5–2)
LDH FLD L TO P-CCNC: 158 U/L
LDLC SERPL CALC-MCNC: 99 MG/DL (ref 0–100)
LEUKOCYTE ESTERASE UR QL STRIP: NEGATIVE
LEUKOCYTE ESTERASE UR QL STRIP: NEGATIVE
LIPASE SERPL-CCNC: 73 U/L (ref 73–393)
LIPASE SERPL-CCNC: 97 U/L (ref 73–393)
LYMPHOCYTES # BLD AUTO: 0.23 THOUSAND/UL (ref 0.5–4)
LYMPHOCYTES # BLD AUTO: 0.29 THOUSAND/UL (ref 0.5–4)
LYMPHOCYTES # BLD AUTO: 0.36 THOUSANDS/ΜL (ref 0.6–4.47)
LYMPHOCYTES # BLD AUTO: 0.4 THOUSANDS/ΜL (ref 0.5–4)
LYMPHOCYTES # BLD AUTO: 0.4 THOUSANDS/ΜL (ref 0.5–4)
LYMPHOCYTES # BLD AUTO: 0.4 THOUSANDS/ΜL (ref 0.6–4.47)
LYMPHOCYTES # BLD AUTO: 0.53 THOUSANDS/ΜL (ref 0.6–4.47)
LYMPHOCYTES # BLD AUTO: 0.55 THOUSANDS/ΜL (ref 0.6–4.47)
LYMPHOCYTES # BLD AUTO: 0.58 THOUSANDS/ΜL (ref 0.6–4.47)
LYMPHOCYTES # BLD AUTO: 0.63 THOUSANDS/ΜL (ref 0.6–4.47)
LYMPHOCYTES # BLD AUTO: 0.77 THOUSANDS/ΜL (ref 0.6–4.47)
LYMPHOCYTES # BLD AUTO: 0.95 THOUSANDS/ΜL (ref 0.6–4.47)
LYMPHOCYTES # BLD AUTO: 0.98 THOUSANDS/ΜL (ref 0.6–4.47)
LYMPHOCYTES # BLD AUTO: 1.1 THOUSANDS/ΜL (ref 0.6–4.47)
LYMPHOCYTES # BLD AUTO: 1.45 THOUSANDS/ΜL (ref 0.6–4.47)
LYMPHOCYTES # BLD AUTO: 12 % (ref 25–45)
LYMPHOCYTES # BLD AUTO: 17 % (ref 25–45)
LYMPHOCYTES NFR BLD AUTO: 10 % (ref 14–44)
LYMPHOCYTES NFR BLD AUTO: 10 % (ref 14–44)
LYMPHOCYTES NFR BLD AUTO: 12 % (ref 14–44)
LYMPHOCYTES NFR BLD AUTO: 13 % (ref 25–45)
LYMPHOCYTES NFR BLD AUTO: 15 %
LYMPHOCYTES NFR BLD AUTO: 19 % (ref 14–44)
LYMPHOCYTES NFR BLD AUTO: 4 % (ref 14–44)
LYMPHOCYTES NFR BLD AUTO: 4 % (ref 14–44)
LYMPHOCYTES NFR BLD AUTO: 44 % (ref 14–44)
LYMPHOCYTES NFR BLD AUTO: 6 % (ref 14–44)
LYMPHOCYTES NFR BLD AUTO: 7 % (ref 14–44)
LYMPHOCYTES NFR BLD AUTO: 7 % (ref 14–44)
LYMPHOCYTES NFR BLD AUTO: 9 % (ref 14–44)
LYMPHOCYTES NFR BLD AUTO: 9 % (ref 25–45)
MAGNESIUM SERPL-MCNC: 2.7 MG/DL (ref 1.6–2.6)
MAGNESIUM SERPL-MCNC: 2.9 MG/DL (ref 1.6–2.6)
MCH RBC QN AUTO: 26.7 PG (ref 26.8–34.3)
MCH RBC QN AUTO: 27.1 PG (ref 26.8–34.3)
MCH RBC QN AUTO: 27.2 PG (ref 26.8–34.3)
MCH RBC QN AUTO: 27.3 PG (ref 26.8–34.3)
MCH RBC QN AUTO: 27.9 PG (ref 26.8–34.3)
MCH RBC QN AUTO: 28.1 PG (ref 26.8–34.3)
MCH RBC QN AUTO: 28.2 PG (ref 26–34)
MCH RBC QN AUTO: 28.3 PG (ref 26.8–34.3)
MCH RBC QN AUTO: 28.9 PG (ref 26.8–34.3)
MCH RBC QN AUTO: 29 PG (ref 26.8–34.3)
MCH RBC QN AUTO: 29.1 PG (ref 26.8–34.3)
MCH RBC QN AUTO: 29.1 PG (ref 26.8–34.3)
MCH RBC QN AUTO: 29.6 PG (ref 26.8–34.3)
MCH RBC QN AUTO: 29.8 PG (ref 26–34)
MCH RBC QN AUTO: 29.8 PG (ref 26–34)
MCH RBC QN AUTO: 30.1 PG (ref 26–34)
MCHC RBC AUTO-ENTMCNC: 29.3 G/DL (ref 31.4–37.4)
MCHC RBC AUTO-ENTMCNC: 29.9 G/DL (ref 31.4–37.4)
MCHC RBC AUTO-ENTMCNC: 30.1 G/DL (ref 31.4–37.4)
MCHC RBC AUTO-ENTMCNC: 30.2 G/DL (ref 31.4–37.4)
MCHC RBC AUTO-ENTMCNC: 30.4 G/DL (ref 31.4–37.4)
MCHC RBC AUTO-ENTMCNC: 30.6 G/DL (ref 31.4–37.4)
MCHC RBC AUTO-ENTMCNC: 30.9 G/DL (ref 31.4–37.4)
MCHC RBC AUTO-ENTMCNC: 31 G/DL (ref 31.4–37.4)
MCHC RBC AUTO-ENTMCNC: 31.4 G/DL (ref 31.4–37.4)
MCHC RBC AUTO-ENTMCNC: 31.8 G/DL (ref 31.4–37.4)
MCHC RBC AUTO-ENTMCNC: 32.1 G/DL (ref 31–36)
MCHC RBC AUTO-ENTMCNC: 32.6 G/DL (ref 31–36)
MCHC RBC AUTO-ENTMCNC: 32.7 G/DL (ref 31–36)
MCHC RBC AUTO-ENTMCNC: 32.8 G/DL (ref 31–36)
MCV RBC AUTO: 85 FL (ref 82–98)
MCV RBC AUTO: 86 FL (ref 82–98)
MCV RBC AUTO: 88 FL (ref 80–100)
MCV RBC AUTO: 90 FL (ref 82–98)
MCV RBC AUTO: 91 FL (ref 80–100)
MCV RBC AUTO: 91 FL (ref 80–100)
MCV RBC AUTO: 91 FL (ref 82–98)
MCV RBC AUTO: 92 FL (ref 80–100)
MCV RBC AUTO: 92 FL (ref 82–98)
MCV RBC AUTO: 94 FL (ref 82–98)
MCV RBC AUTO: 96 FL (ref 82–98)
MCV RBC AUTO: 96 FL (ref 82–98)
MCV RBC AUTO: 97 FL (ref 82–98)
MCV RBC AUTO: 97 FL (ref 82–98)
MONOCYTES # BLD AUTO: 0.18 THOUSAND/ΜL (ref 0.17–1.22)
MONOCYTES # BLD AUTO: 0.21 THOUSAND/ΜL (ref 0.17–1.22)
MONOCYTES # BLD AUTO: 0.28 THOUSAND/ΜL (ref 0.17–1.22)
MONOCYTES # BLD AUTO: 0.34 THOUSAND/UL (ref 0.2–0.9)
MONOCYTES # BLD AUTO: 0.4 THOUSAND/ΜL (ref 0.2–0.9)
MONOCYTES # BLD AUTO: 0.4 THOUSAND/ΜL (ref 0.2–0.9)
MONOCYTES # BLD AUTO: 0.42 THOUSAND/UL (ref 0.2–0.9)
MONOCYTES # BLD AUTO: 0.48 THOUSAND/ΜL (ref 0.17–1.22)
MONOCYTES # BLD AUTO: 0.53 THOUSAND/ΜL (ref 0.17–1.22)
MONOCYTES # BLD AUTO: 0.54 THOUSAND/ΜL (ref 0.17–1.22)
MONOCYTES # BLD AUTO: 0.57 THOUSAND/ΜL (ref 0.17–1.22)
MONOCYTES # BLD AUTO: 0.66 THOUSAND/ΜL (ref 0.17–1.22)
MONOCYTES # BLD AUTO: 0.86 THOUSAND/ΜL (ref 0.17–1.22)
MONOCYTES # BLD AUTO: 1.49 THOUSAND/ΜL (ref 0.17–1.22)
MONOCYTES # BLD AUTO: 1.49 THOUSAND/ΜL (ref 0.17–1.22)
MONOCYTES NFR BLD AUTO: 10 % (ref 4–12)
MONOCYTES NFR BLD AUTO: 11 % (ref 1–10)
MONOCYTES NFR BLD AUTO: 15 % (ref 4–12)
MONOCYTES NFR BLD AUTO: 20 % (ref 1–10)
MONOCYTES NFR BLD AUTO: 22 % (ref 1–10)
MONOCYTES NFR BLD AUTO: 30 %
MONOCYTES NFR BLD AUTO: 5 % (ref 4–12)
MONOCYTES NFR BLD AUTO: 6 % (ref 4–12)
MONOCYTES NFR BLD AUTO: 8 % (ref 4–12)
MONOCYTES NFR BLD AUTO: 9 % (ref 1–10)
MONOCYTES NFR BLD AUTO: 9 % (ref 4–12)
MUCOUS THREADS UR QL AUTO: ABNORMAL
MUCOUS THREADS UR QL AUTO: ABNORMAL
NEUTROPHILS # BLD AUTO: 1.63 THOUSANDS/ΜL (ref 1.85–7.62)
NEUTROPHILS # BLD AUTO: 11.18 THOUSANDS/ΜL (ref 1.85–7.62)
NEUTROPHILS # BLD AUTO: 11.33 THOUSANDS/ΜL (ref 1.85–7.62)
NEUTROPHILS # BLD AUTO: 2.06 THOUSANDS/ΜL (ref 1.85–7.62)
NEUTROPHILS # BLD AUTO: 2.46 THOUSANDS/ΜL (ref 1.85–7.62)
NEUTROPHILS # BLD AUTO: 2.7 THOUSANDS/ΜL (ref 1.8–7.8)
NEUTROPHILS # BLD AUTO: 23.53 THOUSANDS/ΜL (ref 1.85–7.62)
NEUTROPHILS # BLD AUTO: 23.77 THOUSANDS/ΜL (ref 1.85–7.62)
NEUTROPHILS # BLD AUTO: 3.5 THOUSANDS/ΜL (ref 1.8–7.8)
NEUTROPHILS # BLD AUTO: 3.99 THOUSANDS/ΜL (ref 1.85–7.62)
NEUTROPHILS # BLD AUTO: 4.36 THOUSANDS/ΜL (ref 1.85–7.62)
NEUTROPHILS # BLD AUTO: 4.62 THOUSANDS/ΜL (ref 1.85–7.62)
NEUTROPHILS # BLD AUTO: 6.23 THOUSANDS/ΜL (ref 1.85–7.62)
NEUTS BAND NFR BLD MANUAL: 1 % (ref 0–8)
NEUTS BAND NFR BLD MANUAL: 13 % (ref 0–8)
NEUTS SEG # BLD: 1.04 THOUSAND/UL (ref 1.8–7.8)
NEUTS SEG # BLD: 1.25 THOUSAND/UL (ref 1.8–7.8)
NEUTS SEG NFR BLD AUTO: 20 %
NEUTS SEG NFR BLD AUTO: 49 % (ref 43–75)
NEUTS SEG NFR BLD AUTO: 53 %
NEUTS SEG NFR BLD AUTO: 60 %
NEUTS SEG NFR BLD AUTO: 63 % (ref 43–75)
NEUTS SEG NFR BLD AUTO: 76 % (ref 45–65)
NEUTS SEG NFR BLD AUTO: 79 % (ref 43–75)
NEUTS SEG NFR BLD AUTO: 80 % (ref 43–75)
NEUTS SEG NFR BLD AUTO: 81 % (ref 43–75)
NEUTS SEG NFR BLD AUTO: 81 % (ref 45–65)
NEUTS SEG NFR BLD AUTO: 83 % (ref 43–75)
NEUTS SEG NFR BLD AUTO: 84 % (ref 43–75)
NEUTS SEG NFR BLD AUTO: 86 % (ref 43–75)
NEUTS SEG NFR BLD AUTO: 86 % (ref 43–75)
NEUTS SEG NFR BLD AUTO: 88 % (ref 43–75)
NEUTS SEG NFR BLD AUTO: 88 % (ref 43–75)
NITRITE UR QL STRIP: NEGATIVE
NITRITE UR QL STRIP: NEGATIVE
NON-SQ EPI CELLS URNS QL MICRO: ABNORMAL /HPF
NON-SQ EPI CELLS URNS QL MICRO: ABNORMAL /HPF
NONHDLC SERPL-MCNC: 123 MG/DL
NRBC BLD AUTO-RTO: 0 /100 WBCS
O2 CT BLDV-SCNC: 14.2 ML/DL
OTHER STN SPEC: ABNORMAL
P AXIS: 22 DEGREES
P AXIS: 70 DEGREES
P AXIS: 73 DEGREES
PCO2 BLDV: 39.8 MM HG (ref 42–50)
PH BLDV: 7.45 [PH] (ref 7.3–7.4)
PH UR STRIP.AUTO: 5 [PH]
PH UR STRIP.AUTO: 6 [PH] (ref 4.5–8)
PHOSPHATE SERPL-MCNC: 5.2 MG/DL (ref 2.7–4.5)
PLATELET # BLD AUTO: 111 THOUSANDS/UL (ref 149–390)
PLATELET # BLD AUTO: 113 THOUSANDS/UL (ref 150–450)
PLATELET # BLD AUTO: 115 THOUSANDS/UL (ref 149–390)
PLATELET # BLD AUTO: 124 THOUSANDS/UL (ref 149–390)
PLATELET # BLD AUTO: 125 THOUSANDS/UL (ref 149–390)
PLATELET # BLD AUTO: 134 THOUSANDS/UL (ref 149–390)
PLATELET # BLD AUTO: 138 THOUSANDS/UL (ref 149–390)
PLATELET # BLD AUTO: 138 THOUSANDS/UL (ref 150–450)
PLATELET # BLD AUTO: 138 THOUSANDS/UL (ref 150–450)
PLATELET # BLD AUTO: 139 THOUSANDS/UL (ref 149–390)
PLATELET # BLD AUTO: 141 THOUSANDS/UL (ref 150–450)
PLATELET # BLD AUTO: 161 THOUSANDS/UL (ref 149–390)
PLATELET # BLD AUTO: 165 THOUSANDS/UL (ref 149–390)
PLATELET # BLD AUTO: 168 THOUSANDS/UL (ref 149–390)
PLATELET # BLD AUTO: 177 THOUSANDS/UL (ref 149–390)
PLATELET # BLD AUTO: 180 THOUSANDS/UL (ref 149–390)
PLATELET # BLD AUTO: 187 THOUSANDS/UL (ref 149–390)
PLATELET BLD QL SMEAR: ABNORMAL
PLATELET BLD QL SMEAR: ABNORMAL
PMV BLD AUTO: 10.1 FL (ref 8.9–12.7)
PMV BLD AUTO: 10.1 FL (ref 8.9–12.7)
PMV BLD AUTO: 10.2 FL (ref 8.9–12.7)
PMV BLD AUTO: 10.4 FL (ref 8.9–12.7)
PMV BLD AUTO: 10.7 FL (ref 8.9–12.7)
PMV BLD AUTO: 10.8 FL (ref 8.9–12.7)
PMV BLD AUTO: 12 FL (ref 8.9–12.7)
PMV BLD AUTO: 7.3 FL (ref 8.9–12.7)
PMV BLD AUTO: 7.7 FL (ref 8.9–12.7)
PMV BLD AUTO: 7.8 FL (ref 8.9–12.7)
PMV BLD AUTO: 7.9 FL (ref 8.9–12.7)
PMV BLD AUTO: 8.5 FL (ref 8.9–12.7)
PMV BLD AUTO: 8.6 FL (ref 8.9–12.7)
PMV BLD AUTO: 8.8 FL (ref 8.9–12.7)
PMV BLD AUTO: 9 FL (ref 8.9–12.7)
PMV BLD AUTO: 9.1 FL (ref 8.9–12.7)
PMV BLD AUTO: 9.3 FL (ref 8.9–12.7)
PO2 BLDV: 54.8 MM HG (ref 35–45)
POTASSIUM SERPL-SCNC: 2.9 MMOL/L (ref 3.5–5.3)
POTASSIUM SERPL-SCNC: 2.9 MMOL/L (ref 3.5–5.3)
POTASSIUM SERPL-SCNC: 3.1 MMOL/L (ref 3.6–5)
POTASSIUM SERPL-SCNC: 3.2 MMOL/L (ref 3.5–5.3)
POTASSIUM SERPL-SCNC: 3.2 MMOL/L (ref 3.5–5.3)
POTASSIUM SERPL-SCNC: 3.5 MMOL/L (ref 3.6–5)
POTASSIUM SERPL-SCNC: 3.6 MMOL/L (ref 3.5–5.3)
POTASSIUM SERPL-SCNC: 3.7 MMOL/L (ref 3.5–5.3)
POTASSIUM SERPL-SCNC: 3.9 MMOL/L (ref 3.6–5)
POTASSIUM SERPL-SCNC: 4 MMOL/L (ref 3.5–5.3)
POTASSIUM SERPL-SCNC: 4.1 MMOL/L (ref 3.5–5.3)
POTASSIUM SERPL-SCNC: 4.3 MMOL/L (ref 3.5–5.3)
POTASSIUM SERPL-SCNC: 4.3 MMOL/L (ref 3.6–5)
POTASSIUM SERPL-SCNC: 5.7 MMOL/L (ref 3.5–5.3)
POTASSIUM SERPL-SCNC: 6.2 MMOL/L (ref 3.5–5.3)
POTASSIUM SERPL-SCNC: 7.3 MMOL/L (ref 3.5–5.3)
POTASSIUM SERPL-SCNC: 7.5 MMOL/L (ref 3.5–5.3)
PR INTERVAL: 112 MS
PR INTERVAL: 160 MS
PR INTERVAL: 167 MS
PROCALCITONIN SERPL-MCNC: 0.8 NG/ML
PROCALCITONIN SERPL-MCNC: 0.89 NG/ML
PROT FLD-MCNC: <2 G/DL
PROT SERPL-MCNC: 5.1 G/DL (ref 6.4–8.2)
PROT SERPL-MCNC: 6 G/DL (ref 6.4–8.2)
PROT SERPL-MCNC: 6.9 G/DL (ref 6.4–8.2)
PROT SERPL-MCNC: 7 G/DL (ref 6.4–8.2)
PROT SERPL-MCNC: 7.3 G/DL (ref 5.9–8.4)
PROT SERPL-MCNC: 7.4 G/DL (ref 6.4–8.2)
PROT SERPL-MCNC: 7.4 G/DL (ref 6.4–8.2)
PROT SERPL-MCNC: 7.5 G/DL (ref 6.4–8.2)
PROT SERPL-MCNC: 7.7 G/DL (ref 5.9–8.4)
PROT SERPL-MCNC: 7.8 G/DL (ref 5.9–8.4)
PROT SERPL-MCNC: 7.8 G/DL (ref 6.4–8.2)
PROT SERPL-MCNC: 7.8 G/DL (ref 6.4–8.2)
PROT SERPL-MCNC: 7.9 G/DL (ref 6.4–8.2)
PROT SERPL-MCNC: 8.1 G/DL (ref 5.9–8.4)
PROT SERPL-MCNC: 8.2 G/DL (ref 6.4–8.2)
PROT SERPL-MCNC: 8.2 G/DL (ref 6.4–8.2)
PROT SERPL-MCNC: 8.8 G/DL (ref 6.4–8.2)
PROT UR STRIP-MCNC: ABNORMAL MG/DL
PROT UR STRIP-MCNC: NEGATIVE MG/DL
PROTHROMBIN TIME: 12.8 SECONDS (ref 11.6–14.5)
PROTHROMBIN TIME: 27.5 SECONDS (ref 11.6–14.5)
PROTHROMBIN TIME: >120 SECONDS (ref 11.6–14.5)
QRS AXIS: 27 DEGREES
QRS AXIS: 83 DEGREES
QRS AXIS: 90 DEGREES
QRSD INTERVAL: 113 MS
QRSD INTERVAL: 130 MS
QRSD INTERVAL: 96 MS
QT INTERVAL: 336 MS
QT INTERVAL: 421 MS
QT INTERVAL: 464 MS
QTC INTERVAL: 404 MS
QTC INTERVAL: 510 MS
QTC INTERVAL: 555 MS
RBC # BLD AUTO: 3.15 MILLION/UL (ref 3.81–5.12)
RBC # BLD AUTO: 3.28 MILLION/UL (ref 3.81–5.12)
RBC # BLD AUTO: 3.44 MILLION/UL (ref 3.81–5.12)
RBC # BLD AUTO: 3.45 MILLION/UL (ref 3.81–5.12)
RBC # BLD AUTO: 3.5 MILLION/UL (ref 4–5.2)
RBC # BLD AUTO: 3.59 MILLION/UL (ref 3.81–5.12)
RBC # BLD AUTO: 3.62 MILLION/UL (ref 4–5.2)
RBC # BLD AUTO: 3.74 MILLION/UL (ref 3.81–5.12)
RBC # BLD AUTO: 3.78 MILLION/UL (ref 3.81–5.12)
RBC # BLD AUTO: 3.93 MILLION/UL (ref 4–5.2)
RBC # BLD AUTO: 3.98 MILLION/UL (ref 3.81–5.12)
RBC # BLD AUTO: 4.05 MILLION/UL (ref 3.81–5.12)
RBC # BLD AUTO: 4.05 MILLION/UL (ref 3.81–5.12)
RBC # BLD AUTO: 4.36 MILLION/UL (ref 4–5.2)
RBC # BLD AUTO: 4.45 MILLION/UL (ref 3.81–5.12)
RBC # BLD AUTO: 4.48 MILLION/UL (ref 3.81–5.12)
RBC #/AREA URNS AUTO: ABNORMAL /HPF
RBC #/AREA URNS AUTO: ABNORMAL /HPF
RBC MORPH BLD: NORMAL
RBC MORPH BLD: PRESENT
RH BLD: POSITIVE
SARS-COV-2 RNA SPEC QL NAA+PROBE: NOT DETECTED
SITE: NORMAL
SODIUM SERPL-SCNC: 125 MMOL/L (ref 136–145)
SODIUM SERPL-SCNC: 126 MMOL/L (ref 136–145)
SODIUM SERPL-SCNC: 127 MMOL/L (ref 136–145)
SODIUM SERPL-SCNC: 131 MMOL/L (ref 136–145)
SODIUM SERPL-SCNC: 132 MMOL/L (ref 136–145)
SODIUM SERPL-SCNC: 132 MMOL/L (ref 136–145)
SODIUM SERPL-SCNC: 134 MMOL/L (ref 136–145)
SODIUM SERPL-SCNC: 135 MMOL/L (ref 136–145)
SODIUM SERPL-SCNC: 136 MMOL/L (ref 136–145)
SODIUM SERPL-SCNC: 136 MMOL/L (ref 137–147)
SODIUM SERPL-SCNC: 136 MMOL/L (ref 137–147)
SODIUM SERPL-SCNC: 137 MMOL/L (ref 136–145)
SODIUM SERPL-SCNC: 137 MMOL/L (ref 136–145)
SODIUM SERPL-SCNC: 137 MMOL/L (ref 137–147)
SODIUM SERPL-SCNC: 137 MMOL/L (ref 137–147)
SODIUM SERPL-SCNC: 139 MMOL/L (ref 136–145)
SODIUM SERPL-SCNC: 139 MMOL/L (ref 136–145)
SODIUM SERPL-SCNC: 142 MMOL/L (ref 136–145)
SP GR UR STRIP.AUTO: 1.01 (ref 1–1.03)
SP GR UR STRIP.AUTO: 1.02 (ref 1–1.03)
SPECIMEN EXPIRATION DATE: NORMAL
T WAVE AXIS: -36 DEGREES
T WAVE AXIS: 64 DEGREES
T WAVE AXIS: 79 DEGREES
TOTAL CELLS COUNTED SPEC: 100
TRIGL SERPL-MCNC: 122 MG/DL
TROPONIN I SERPL-MCNC: 0.06 NG/ML
TROPONIN I SERPL-MCNC: <0.02 NG/ML
TSH SERPL DL<=0.05 MIU/L-ACNC: 1.79 UIU/ML (ref 0.36–3.74)
UROBILINOGEN UR QL STRIP.AUTO: 0.2 E.U./DL
UROBILINOGEN UR QL STRIP.AUTO: 4 E.U./DL
VENTRICULAR RATE: 86 BPM
VENTRICULAR RATE: 87 BPM
VENTRICULAR RATE: 88 BPM
WBC # BLD AUTO: 1.7 THOUSAND/UL (ref 4.5–11)
WBC # BLD AUTO: 1.9 THOUSAND/UL (ref 4.5–11)
WBC # BLD AUTO: 11.02 THOUSAND/UL (ref 4.31–10.16)
WBC # BLD AUTO: 12.91 THOUSAND/UL (ref 4.31–10.16)
WBC # BLD AUTO: 13.38 THOUSAND/UL (ref 4.31–10.16)
WBC # BLD AUTO: 26.71 THOUSAND/UL (ref 4.31–10.16)
WBC # BLD AUTO: 26.87 THOUSAND/UL (ref 4.31–10.16)
WBC # BLD AUTO: 3.04 THOUSAND/UL (ref 4.31–10.16)
WBC # BLD AUTO: 3.27 THOUSAND/UL (ref 4.31–10.16)
WBC # BLD AUTO: 3.32 THOUSAND/UL (ref 4.31–10.16)
WBC # BLD AUTO: 3.6 THOUSAND/UL (ref 4.5–11)
WBC # BLD AUTO: 4.3 THOUSAND/UL (ref 4.5–11)
WBC # BLD AUTO: 4.73 THOUSAND/UL (ref 4.31–10.16)
WBC # BLD AUTO: 5.46 THOUSAND/UL (ref 4.31–10.16)
WBC # BLD AUTO: 5.84 THOUSAND/UL (ref 4.31–10.16)
WBC # BLD AUTO: 7.51 THOUSAND/UL (ref 4.31–10.16)
WBC # FLD MANUAL: 79 /UL
WBC #/AREA URNS AUTO: ABNORMAL /HPF
WBC #/AREA URNS AUTO: ABNORMAL /HPF

## 2021-01-01 PROCEDURE — 96415 CHEMO IV INFUSION ADDL HR: CPT

## 2021-01-01 PROCEDURE — 96413 CHEMO IV INFUSION 1 HR: CPT

## 2021-01-01 PROCEDURE — 96367 TX/PROPH/DG ADDL SEQ IV INF: CPT

## 2021-01-01 PROCEDURE — U0003 INFECTIOUS AGENT DETECTION BY NUCLEIC ACID (DNA OR RNA); SEVERE ACUTE RESPIRATORY SYNDROME CORONAVIRUS 2 (SARS-COV-2) (CORONAVIRUS DISEASE [COVID-19]), AMPLIFIED PROBE TECHNIQUE, MAKING USE OF HIGH THROUGHPUT TECHNOLOGIES AS DESCRIBED BY CMS-2020-01-R: HCPCS | Performed by: FAMILY MEDICINE

## 2021-01-01 PROCEDURE — 76937 US GUIDE VASCULAR ACCESS: CPT

## 2021-01-01 PROCEDURE — 83615 LACTATE (LD) (LDH) ENZYME: CPT | Performed by: PHYSICIAN ASSISTANT

## 2021-01-01 PROCEDURE — 77470 SPECIAL RADIATION TREATMENT: CPT | Performed by: RADIOLOGY

## 2021-01-01 PROCEDURE — C1887 CATHETER, GUIDING: HCPCS

## 2021-01-01 PROCEDURE — G1004 CDSM NDSC: HCPCS

## 2021-01-01 PROCEDURE — 93010 ELECTROCARDIOGRAM REPORT: CPT | Performed by: INTERNAL MEDICINE

## 2021-01-01 PROCEDURE — 82378 CARCINOEMBRYONIC ANTIGEN: CPT

## 2021-01-01 PROCEDURE — 77370 RADIATION PHYSICS CONSULT: CPT | Performed by: RADIOLOGY

## 2021-01-01 PROCEDURE — 80061 LIPID PANEL: CPT

## 2021-01-01 PROCEDURE — 36247 INS CATH ABD/L-EXT ART 3RD: CPT | Performed by: RADIOLOGY

## 2021-01-01 PROCEDURE — 0012A SARS-COV-2 / COVID-19 MRNA VACCINE (MODERNA) 100 MCG: CPT

## 2021-01-01 PROCEDURE — C1894 INTRO/SHEATH, NON-LASER: HCPCS

## 2021-01-01 PROCEDURE — 80053 COMPREHEN METABOLIC PANEL: CPT

## 2021-01-01 PROCEDURE — 99153 MOD SED SAME PHYS/QHP EA: CPT

## 2021-01-01 PROCEDURE — 80076 HEPATIC FUNCTION PANEL: CPT | Performed by: INTERNAL MEDICINE

## 2021-01-01 PROCEDURE — 84145 PROCALCITONIN (PCT): CPT

## 2021-01-01 PROCEDURE — 85025 COMPLETE CBC W/AUTO DIFF WBC: CPT

## 2021-01-01 PROCEDURE — 85007 BL SMEAR W/DIFF WBC COUNT: CPT | Performed by: SPECIALIST

## 2021-01-01 PROCEDURE — C1769 GUIDE WIRE: HCPCS

## 2021-01-01 PROCEDURE — 85049 AUTOMATED PLATELET COUNT: CPT | Performed by: NURSE PRACTITIONER

## 2021-01-01 PROCEDURE — 90682 RIV4 VACC RECOMBINANT DNA IM: CPT | Performed by: INTERNAL MEDICINE

## 2021-01-01 PROCEDURE — 88342 IMHCHEM/IMCYTCHM 1ST ANTB: CPT | Performed by: PATHOLOGY

## 2021-01-01 PROCEDURE — 85652 RBC SED RATE AUTOMATED: CPT

## 2021-01-01 PROCEDURE — 86900 BLOOD TYPING SEROLOGIC ABO: CPT | Performed by: NURSE PRACTITIONER

## 2021-01-01 PROCEDURE — 76937 US GUIDE VASCULAR ACCESS: CPT | Performed by: RADIOLOGY

## 2021-01-01 PROCEDURE — 49083 ABD PARACENTESIS W/IMAGING: CPT | Performed by: RADIOLOGY

## 2021-01-01 PROCEDURE — 85027 COMPLETE CBC AUTOMATED: CPT | Performed by: INTERNAL MEDICINE

## 2021-01-01 PROCEDURE — 36415 COLL VENOUS BLD VENIPUNCTURE: CPT

## 2021-01-01 PROCEDURE — 88305 TISSUE EXAM BY PATHOLOGIST: CPT | Performed by: PATHOLOGY

## 2021-01-01 PROCEDURE — 99152 MOD SED SAME PHYS/QHP 5/>YRS: CPT

## 2021-01-01 PROCEDURE — 86901 BLOOD TYPING SEROLOGIC RH(D): CPT | Performed by: NURSE PRACTITIONER

## 2021-01-01 PROCEDURE — 80053 COMPREHEN METABOLIC PANEL: CPT | Performed by: PHYSICIAN ASSISTANT

## 2021-01-01 PROCEDURE — 84484 ASSAY OF TROPONIN QUANT: CPT

## 2021-01-01 PROCEDURE — 96376 TX/PRO/DX INJ SAME DRUG ADON: CPT

## 2021-01-01 PROCEDURE — 90471 IMMUNIZATION ADMIN: CPT | Performed by: INTERNAL MEDICINE

## 2021-01-01 PROCEDURE — 74177 CT ABD & PELVIS W/CONTRAST: CPT

## 2021-01-01 PROCEDURE — 99215 OFFICE O/P EST HI 40 MIN: CPT | Performed by: INTERNAL MEDICINE

## 2021-01-01 PROCEDURE — 82140 ASSAY OF AMMONIA: CPT | Performed by: EMERGENCY MEDICINE

## 2021-01-01 PROCEDURE — 85610 PROTHROMBIN TIME: CPT | Performed by: RADIOLOGY

## 2021-01-01 PROCEDURE — 80076 HEPATIC FUNCTION PANEL: CPT | Performed by: EMERGENCY MEDICINE

## 2021-01-01 PROCEDURE — 88112 CYTOPATH CELL ENHANCE TECH: CPT | Performed by: PATHOLOGY

## 2021-01-01 PROCEDURE — 99285 EMERGENCY DEPT VISIT HI MDM: CPT | Performed by: EMERGENCY MEDICINE

## 2021-01-01 PROCEDURE — 36247 INS CATH ABD/L-EXT ART 3RD: CPT

## 2021-01-01 PROCEDURE — 99254 IP/OBS CNSLTJ NEW/EST MOD 60: CPT | Performed by: INTERNAL MEDICINE

## 2021-01-01 PROCEDURE — 99152 MOD SED SAME PHYS/QHP 5/>YRS: CPT | Performed by: RADIOLOGY

## 2021-01-01 PROCEDURE — 84484 ASSAY OF TROPONIN QUANT: CPT | Performed by: EMERGENCY MEDICINE

## 2021-01-01 PROCEDURE — 80053 COMPREHEN METABOLIC PANEL: CPT | Performed by: RADIOLOGY

## 2021-01-01 PROCEDURE — 99211 OFF/OP EST MAY X REQ PHY/QHP: CPT | Performed by: RADIOLOGY

## 2021-01-01 PROCEDURE — 87040 BLOOD CULTURE FOR BACTERIA: CPT

## 2021-01-01 PROCEDURE — 85027 COMPLETE CBC AUTOMATED: CPT | Performed by: PHYSICIAN ASSISTANT

## 2021-01-01 PROCEDURE — G0498 CHEMO EXTEND IV INFUS W/PUMP: HCPCS

## 2021-01-01 PROCEDURE — 97163 PT EVAL HIGH COMPLEX 45 MIN: CPT | Performed by: PHYSICAL THERAPIST

## 2021-01-01 PROCEDURE — 85007 BL SMEAR W/DIFF WBC COUNT: CPT | Performed by: INTERNAL MEDICINE

## 2021-01-01 PROCEDURE — 84132 ASSAY OF SERUM POTASSIUM: CPT | Performed by: EMERGENCY MEDICINE

## 2021-01-01 PROCEDURE — 93005 ELECTROCARDIOGRAM TRACING: CPT

## 2021-01-01 PROCEDURE — 96374 THER/PROPH/DIAG INJ IV PUSH: CPT

## 2021-01-01 PROCEDURE — 83605 ASSAY OF LACTIC ACID: CPT

## 2021-01-01 PROCEDURE — 82042 OTHER SOURCE ALBUMIN QUAN EA: CPT | Performed by: PHYSICIAN ASSISTANT

## 2021-01-01 PROCEDURE — 99214 OFFICE O/P EST MOD 30 MIN: CPT | Performed by: INTERNAL MEDICINE

## 2021-01-01 PROCEDURE — 79445 NUCLEAR RX INTRA-ARTERIAL: CPT | Performed by: RADIOLOGY

## 2021-01-01 PROCEDURE — 99214 OFFICE O/P EST MOD 30 MIN: CPT | Performed by: FAMILY MEDICINE

## 2021-01-01 PROCEDURE — 80053 COMPREHEN METABOLIC PANEL: CPT | Performed by: INTERNAL MEDICINE

## 2021-01-01 PROCEDURE — 83735 ASSAY OF MAGNESIUM: CPT | Performed by: EMERGENCY MEDICINE

## 2021-01-01 PROCEDURE — 71045 X-RAY EXAM CHEST 1 VIEW: CPT

## 2021-01-01 PROCEDURE — 87070 CULTURE OTHR SPECIMN AEROBIC: CPT | Performed by: PHYSICIAN ASSISTANT

## 2021-01-01 PROCEDURE — 81001 URINALYSIS AUTO W/SCOPE: CPT | Performed by: NURSE PRACTITIONER

## 2021-01-01 PROCEDURE — A9540 TC99M MAA: HCPCS

## 2021-01-01 PROCEDURE — 36415 COLL VENOUS BLD VENIPUNCTURE: CPT | Performed by: INTERNAL MEDICINE

## 2021-01-01 PROCEDURE — 99497 ADVNCD CARE PLAN 30 MIN: CPT | Performed by: INTERNAL MEDICINE

## 2021-01-01 PROCEDURE — 96375 TX/PRO/DX INJ NEW DRUG ADDON: CPT

## 2021-01-01 PROCEDURE — 83735 ASSAY OF MAGNESIUM: CPT | Performed by: INTERNAL MEDICINE

## 2021-01-01 PROCEDURE — 83036 HEMOGLOBIN GLYCOSYLATED A1C: CPT

## 2021-01-01 PROCEDURE — 85025 COMPLETE CBC W/AUTO DIFF WBC: CPT | Performed by: INTERNAL MEDICINE

## 2021-01-01 PROCEDURE — 87040 BLOOD CULTURE FOR BACTERIA: CPT | Performed by: NURSE PRACTITIONER

## 2021-01-01 PROCEDURE — 99213 OFFICE O/P EST LOW 20 MIN: CPT | Performed by: RADIOLOGY

## 2021-01-01 PROCEDURE — 99495 TRANSJ CARE MGMT MOD F2F 14D: CPT | Performed by: FAMILY MEDICINE

## 2021-01-01 PROCEDURE — 88341 IMHCHEM/IMCYTCHM EA ADD ANTB: CPT | Performed by: PATHOLOGY

## 2021-01-01 PROCEDURE — 82805 BLOOD GASES W/O2 SATURATION: CPT | Performed by: EMERGENCY MEDICINE

## 2021-01-01 PROCEDURE — 36415 COLL VENOUS BLD VENIPUNCTURE: CPT | Performed by: EMERGENCY MEDICINE

## 2021-01-01 PROCEDURE — 87086 URINE CULTURE/COLONY COUNT: CPT | Performed by: NURSE PRACTITIONER

## 2021-01-01 PROCEDURE — 36246 INS CATH ABD/L-EXT ART 2ND: CPT | Performed by: RADIOLOGY

## 2021-01-01 PROCEDURE — 37243 VASC EMBOLIZE/OCCLUDE ORGAN: CPT | Performed by: RADIOLOGY

## 2021-01-01 PROCEDURE — 96360 HYDRATION IV INFUSION INIT: CPT

## 2021-01-01 PROCEDURE — 75726 ARTERY X-RAYS ABDOMEN: CPT

## 2021-01-01 PROCEDURE — 91301 SARS-COV-2 / COVID-19 MRNA VACCINE (MODERNA) 100 MCG: CPT

## 2021-01-01 PROCEDURE — 78201 LIVER IMAGING STATIC ONLY: CPT

## 2021-01-01 PROCEDURE — 75625 CONTRAST EXAM ABDOMINL AORTA: CPT

## 2021-01-01 PROCEDURE — 82140 ASSAY OF AMMONIA: CPT

## 2021-01-01 PROCEDURE — 99239 HOSP IP/OBS DSCHRG MGMT >30: CPT | Performed by: INTERNAL MEDICINE

## 2021-01-01 PROCEDURE — 96368 THER/DIAG CONCURRENT INF: CPT

## 2021-01-01 PROCEDURE — 84100 ASSAY OF PHOSPHORUS: CPT | Performed by: INTERNAL MEDICINE

## 2021-01-01 PROCEDURE — 99285 EMERGENCY DEPT VISIT HI MDM: CPT

## 2021-01-01 PROCEDURE — 75774 ARTERY X-RAY EACH VESSEL: CPT | Performed by: RADIOLOGY

## 2021-01-01 PROCEDURE — 71260 CT THORAX DX C+: CPT

## 2021-01-01 PROCEDURE — 99291 CRITICAL CARE FIRST HOUR: CPT | Performed by: NURSE PRACTITIONER

## 2021-01-01 PROCEDURE — 85025 COMPLETE CBC W/AUTO DIFF WBC: CPT | Performed by: EMERGENCY MEDICINE

## 2021-01-01 PROCEDURE — 80048 BASIC METABOLIC PNL TOTAL CA: CPT | Performed by: NURSE PRACTITIONER

## 2021-01-01 PROCEDURE — 77790 RADIATION HANDLING: CPT | Performed by: RADIOLOGY

## 2021-01-01 PROCEDURE — 85027 COMPLETE CBC AUTOMATED: CPT | Performed by: SPECIALIST

## 2021-01-01 PROCEDURE — 84157 ASSAY OF PROTEIN OTHER: CPT | Performed by: PHYSICIAN ASSISTANT

## 2021-01-01 PROCEDURE — 94640 AIRWAY INHALATION TREATMENT: CPT

## 2021-01-01 PROCEDURE — 82378 CARCINOEMBRYONIC ANTIGEN: CPT | Performed by: RADIOLOGY

## 2021-01-01 PROCEDURE — 85025 COMPLETE CBC W/AUTO DIFF WBC: CPT | Performed by: PHYSICIAN ASSISTANT

## 2021-01-01 PROCEDURE — 49083 ABD PARACENTESIS W/IMAGING: CPT

## 2021-01-01 PROCEDURE — 83605 ASSAY OF LACTIC ACID: CPT | Performed by: NURSE PRACTITIONER

## 2021-01-01 PROCEDURE — 85610 PROTHROMBIN TIME: CPT

## 2021-01-01 PROCEDURE — 89051 BODY FLUID CELL COUNT: CPT | Performed by: PHYSICIAN ASSISTANT

## 2021-01-01 PROCEDURE — 0W9G3ZX DRAINAGE OF PERITONEAL CAVITY, PERCUTANEOUS APPROACH, DIAGNOSTIC: ICD-10-PCS | Performed by: RADIOLOGY

## 2021-01-01 PROCEDURE — 36245 INS CATH ABD/L-EXT ART 1ST: CPT

## 2021-01-01 PROCEDURE — 86140 C-REACTIVE PROTEIN: CPT

## 2021-01-01 PROCEDURE — 83605 ASSAY OF LACTIC ACID: CPT | Performed by: EMERGENCY MEDICINE

## 2021-01-01 PROCEDURE — 96361 HYDRATE IV INFUSION ADD-ON: CPT

## 2021-01-01 PROCEDURE — 99223 1ST HOSP IP/OBS HIGH 75: CPT | Performed by: PHYSICIAN ASSISTANT

## 2021-01-01 PROCEDURE — 85730 THROMBOPLASTIN TIME PARTIAL: CPT | Performed by: NURSE PRACTITIONER

## 2021-01-01 PROCEDURE — 80053 COMPREHEN METABOLIC PANEL: CPT | Performed by: SPECIALIST

## 2021-01-01 PROCEDURE — 77300 RADIATION THERAPY DOSE PLAN: CPT | Performed by: RADIOLOGY

## 2021-01-01 PROCEDURE — 99238 HOSP IP/OBS DSCHRG MGMT 30/<: CPT | Performed by: INTERNAL MEDICINE

## 2021-01-01 PROCEDURE — 99243 OFF/OP CNSLTJ NEW/EST LOW 30: CPT | Performed by: RADIOLOGY

## 2021-01-01 PROCEDURE — 99291 CRITICAL CARE FIRST HOUR: CPT | Performed by: EMERGENCY MEDICINE

## 2021-01-01 PROCEDURE — 86850 RBC ANTIBODY SCREEN: CPT | Performed by: NURSE PRACTITIONER

## 2021-01-01 PROCEDURE — 80048 BASIC METABOLIC PNL TOTAL CA: CPT | Performed by: PHYSICIAN ASSISTANT

## 2021-01-01 PROCEDURE — 81001 URINALYSIS AUTO W/SCOPE: CPT

## 2021-01-01 PROCEDURE — 85652 RBC SED RATE AUTOMATED: CPT | Performed by: SPECIALIST

## 2021-01-01 PROCEDURE — 87040 BLOOD CULTURE FOR BACTERIA: CPT | Performed by: EMERGENCY MEDICINE

## 2021-01-01 PROCEDURE — 78803 RP LOCLZJ TUM SPECT 1 AREA: CPT

## 2021-01-01 PROCEDURE — 36248 INS CATH ABD/L-EXT ART ADDL: CPT

## 2021-01-01 PROCEDURE — 82378 CARCINOEMBRYONIC ANTIGEN: CPT | Performed by: INTERNAL MEDICINE

## 2021-01-01 PROCEDURE — 80076 HEPATIC FUNCTION PANEL: CPT

## 2021-01-01 PROCEDURE — C2616 BRACHYTX, NON-STR,YTTRIUM-90: HCPCS | Performed by: RADIOLOGY

## 2021-01-01 PROCEDURE — 0011A SARS-COV-2 / COVID-19 MRNA VACCINE (MODERNA) 100 MCG: CPT

## 2021-01-01 PROCEDURE — 83690 ASSAY OF LIPASE: CPT | Performed by: EMERGENCY MEDICINE

## 2021-01-01 PROCEDURE — 99024 POSTOP FOLLOW-UP VISIT: CPT | Performed by: RADIOLOGY

## 2021-01-01 PROCEDURE — 84443 ASSAY THYROID STIM HORMONE: CPT | Performed by: EMERGENCY MEDICINE

## 2021-01-01 PROCEDURE — 96411 CHEMO IV PUSH ADDL DRUG: CPT

## 2021-01-01 PROCEDURE — 99155 MOD SED OTH PHYS/QHP <5 YRS: CPT

## 2021-01-01 PROCEDURE — 87205 SMEAR GRAM STAIN: CPT | Performed by: PHYSICIAN ASSISTANT

## 2021-01-01 PROCEDURE — 74176 CT ABD & PELVIS W/O CONTRAST: CPT

## 2021-01-01 PROCEDURE — 86140 C-REACTIVE PROTEIN: CPT | Performed by: SPECIALIST

## 2021-01-01 PROCEDURE — 85610 PROTHROMBIN TIME: CPT | Performed by: NURSE PRACTITIONER

## 2021-01-01 PROCEDURE — 71275 CT ANGIOGRAPHY CHEST: CPT

## 2021-01-01 PROCEDURE — 80048 BASIC METABOLIC PNL TOTAL CA: CPT | Performed by: INTERNAL MEDICINE

## 2021-01-01 PROCEDURE — NC001 PR NO CHARGE: Performed by: NURSE PRACTITIONER

## 2021-01-01 PROCEDURE — U0005 INFEC AGEN DETEC AMPLI PROBE: HCPCS | Performed by: FAMILY MEDICINE

## 2021-01-01 PROCEDURE — 37243 VASC EMBOLIZE/OCCLUDE ORGAN: CPT

## 2021-01-01 PROCEDURE — 83690 ASSAY OF LIPASE: CPT

## 2021-01-01 PROCEDURE — 75726 ARTERY X-RAYS ABDOMEN: CPT | Performed by: RADIOLOGY

## 2021-01-01 PROCEDURE — 96365 THER/PROPH/DIAG IV INF INIT: CPT

## 2021-01-01 PROCEDURE — 99233 SBSQ HOSP IP/OBS HIGH 50: CPT | Performed by: INTERNAL MEDICINE

## 2021-01-01 PROCEDURE — 87086 URINE CULTURE/COLONY COUNT: CPT

## 2021-01-01 PROCEDURE — 80048 BASIC METABOLIC PNL TOTAL CA: CPT | Performed by: EMERGENCY MEDICINE

## 2021-01-01 PROCEDURE — 82945 GLUCOSE OTHER FLUID: CPT | Performed by: PHYSICIAN ASSISTANT

## 2021-01-01 PROCEDURE — 85730 THROMBOPLASTIN TIME PARTIAL: CPT

## 2021-01-01 RX ORDER — LACTULOSE 20 G/30ML
20 SOLUTION ORAL DAILY
Status: DISCONTINUED | OUTPATIENT
Start: 2021-01-01 | End: 2021-01-01 | Stop reason: HOSPADM

## 2021-01-01 RX ORDER — SODIUM CHLORIDE 9 MG/ML
75 INJECTION, SOLUTION INTRAVENOUS CONTINUOUS
Status: DISCONTINUED | OUTPATIENT
Start: 2021-01-01 | End: 2021-01-01 | Stop reason: HOSPADM

## 2021-01-01 RX ORDER — HYDROCODONE BITARTRATE AND ACETAMINOPHEN 5; 325 MG/1; MG/1
1 TABLET ORAL EVERY 6 HOURS PRN
Status: DISCONTINUED | OUTPATIENT
Start: 2021-01-01 | End: 2021-01-01 | Stop reason: HOSPADM

## 2021-01-01 RX ORDER — LORAZEPAM 2 MG/ML
0.5 INJECTION INTRAMUSCULAR
Status: DISCONTINUED | OUTPATIENT
Start: 2021-01-01 | End: 2021-01-01 | Stop reason: HOSPADM

## 2021-01-01 RX ORDER — SODIUM CHLORIDE 9 MG/ML
75 INJECTION, SOLUTION INTRAVENOUS CONTINUOUS
Status: CANCELLED | OUTPATIENT
Start: 2021-01-01

## 2021-01-01 RX ORDER — DEXTROSE MONOHYDRATE 50 MG/ML
20 INJECTION, SOLUTION INTRAVENOUS ONCE
Status: COMPLETED | OUTPATIENT
Start: 2021-01-01 | End: 2021-01-01

## 2021-01-01 RX ORDER — HYDROMORPHONE HCL IN WATER/PF 6 MG/30 ML
0.2 PATIENT CONTROLLED ANALGESIA SYRINGE INTRAVENOUS EVERY 4 HOURS PRN
Status: DISCONTINUED | OUTPATIENT
Start: 2021-01-01 | End: 2021-01-01 | Stop reason: HOSPADM

## 2021-01-01 RX ORDER — HEPARIN SODIUM 1000 [USP'U]/ML
INJECTION, SOLUTION INTRAVENOUS; SUBCUTANEOUS CODE/TRAUMA/SEDATION MEDICATION
Status: COMPLETED | OUTPATIENT
Start: 2021-01-01 | End: 2021-01-01

## 2021-01-01 RX ORDER — HYDROMORPHONE HCL/PF 1 MG/ML
0.5 SYRINGE (ML) INJECTION EVERY 4 HOURS PRN
Status: DISCONTINUED | OUTPATIENT
Start: 2021-01-01 | End: 2021-01-01

## 2021-01-01 RX ORDER — CEFAZOLIN SODIUM 1 G/50ML
1000 SOLUTION INTRAVENOUS ONCE
Status: COMPLETED | OUTPATIENT
Start: 2021-01-01 | End: 2021-01-01

## 2021-01-01 RX ORDER — LIDOCAINE WITH 8.4% SOD BICARB 0.9%(10ML)
SYRINGE (ML) INJECTION CODE/TRAUMA/SEDATION MEDICATION
Status: COMPLETED | OUTPATIENT
Start: 2021-01-01 | End: 2021-01-01

## 2021-01-01 RX ORDER — FLUOROURACIL 50 MG/ML
400 INJECTION, SOLUTION INTRAVENOUS ONCE
Status: CANCELLED | OUTPATIENT
Start: 2021-01-01

## 2021-01-01 RX ORDER — GLYCOPYRROLATE 0.2 MG/ML
0.1 INJECTION INTRAMUSCULAR; INTRAVENOUS EVERY 4 HOURS PRN
Status: DISCONTINUED | OUTPATIENT
Start: 2021-01-01 | End: 2021-01-01 | Stop reason: HOSPADM

## 2021-01-01 RX ORDER — MAGNESIUM HYDROXIDE/ALUMINUM HYDROXICE/SIMETHICONE 120; 1200; 1200 MG/30ML; MG/30ML; MG/30ML
30 SUSPENSION ORAL EVERY 6 HOURS PRN
Status: DISCONTINUED | OUTPATIENT
Start: 2021-01-01 | End: 2021-01-01 | Stop reason: HOSPADM

## 2021-01-01 RX ORDER — SODIUM CHLORIDE 9 MG/ML
20 INJECTION, SOLUTION INTRAVENOUS ONCE AS NEEDED
Status: DISCONTINUED | OUTPATIENT
Start: 2021-01-01 | End: 2021-01-01 | Stop reason: HOSPADM

## 2021-01-01 RX ORDER — DIPHENHYDRAMINE HYDROCHLORIDE 50 MG/ML
25 INJECTION INTRAMUSCULAR; INTRAVENOUS
Status: COMPLETED | OUTPATIENT
Start: 2021-01-01 | End: 2021-01-01

## 2021-01-01 RX ORDER — MIDAZOLAM HYDROCHLORIDE 2 MG/2ML
INJECTION, SOLUTION INTRAMUSCULAR; INTRAVENOUS CODE/TRAUMA/SEDATION MEDICATION
Status: COMPLETED | OUTPATIENT
Start: 2021-01-01 | End: 2021-01-01

## 2021-01-01 RX ORDER — LIDOCAINE WITH 8.4% SOD BICARB 0.9%(10ML)
SYRINGE (ML) INJECTION CODE/TRAUMA/SEDATION MEDICATION
Status: DISCONTINUED | OUTPATIENT
Start: 2021-01-01 | End: 2021-01-01 | Stop reason: HOSPADM

## 2021-01-01 RX ORDER — HYDROMORPHONE HCL/PF 1 MG/ML
1 SYRINGE (ML) INJECTION ONCE
Status: COMPLETED | OUTPATIENT
Start: 2021-01-01 | End: 2021-01-01

## 2021-01-01 RX ORDER — POTASSIUM CHLORIDE 20 MEQ/1
20 TABLET, EXTENDED RELEASE ORAL DAILY
Qty: 60 TABLET | Refills: 2 | Status: SHIPPED | OUTPATIENT
Start: 2021-01-01 | End: 2021-01-01 | Stop reason: HOSPADM

## 2021-01-01 RX ORDER — FAMOTIDINE 20 MG/50ML
20 INJECTION, SOLUTION INTRAVENOUS ONCE
Status: COMPLETED | OUTPATIENT
Start: 2021-01-01 | End: 2021-01-01

## 2021-01-01 RX ORDER — SODIUM CHLORIDE 9 MG/ML
20 INJECTION, SOLUTION INTRAVENOUS ONCE AS NEEDED
Status: CANCELLED | OUTPATIENT
Start: 2021-01-01

## 2021-01-01 RX ORDER — CEFAZOLIN SODIUM 1 G/50ML
1000 SOLUTION INTRAVENOUS ONCE
Status: CANCELLED | OUTPATIENT
Start: 2021-01-01

## 2021-01-01 RX ORDER — HEPARIN SODIUM 5000 [USP'U]/ML
5000 INJECTION, SOLUTION INTRAVENOUS; SUBCUTANEOUS EVERY 8 HOURS SCHEDULED
Status: DISCONTINUED | OUTPATIENT
Start: 2021-01-01 | End: 2021-01-01 | Stop reason: ALTCHOICE

## 2021-01-01 RX ORDER — OXYCODONE HYDROCHLORIDE 10 MG/1
10 TABLET ORAL EVERY 6 HOURS PRN
Status: DISCONTINUED | OUTPATIENT
Start: 2021-01-01 | End: 2021-01-01

## 2021-01-01 RX ORDER — DEXTROSE MONOHYDRATE 25 G/50ML
50 INJECTION, SOLUTION INTRAVENOUS ONCE
Status: COMPLETED | OUTPATIENT
Start: 2021-01-01 | End: 2021-01-01

## 2021-01-01 RX ORDER — FLUOROURACIL 50 MG/ML
400 INJECTION, SOLUTION INTRAVENOUS ONCE
Status: COMPLETED | OUTPATIENT
Start: 2021-01-01 | End: 2021-01-01

## 2021-01-01 RX ORDER — DEXTROSE MONOHYDRATE 50 MG/ML
20 INJECTION, SOLUTION INTRAVENOUS ONCE
Status: CANCELLED | OUTPATIENT
Start: 2021-01-01

## 2021-01-01 RX ORDER — SODIUM CHLORIDE, SODIUM GLUCONATE, SODIUM ACETATE, POTASSIUM CHLORIDE, MAGNESIUM CHLORIDE, SODIUM PHOSPHATE, DIBASIC, AND POTASSIUM PHOSPHATE .53; .5; .37; .037; .03; .012; .00082 G/100ML; G/100ML; G/100ML; G/100ML; G/100ML; G/100ML; G/100ML
75 INJECTION, SOLUTION INTRAVENOUS CONTINUOUS
Status: DISCONTINUED | OUTPATIENT
Start: 2021-01-01 | End: 2021-01-01

## 2021-01-01 RX ORDER — METHYLPREDNISOLONE 4 MG/1
TABLET ORAL
Qty: 1 EACH | Refills: 0 | Status: SHIPPED | OUTPATIENT
Start: 2021-01-01 | End: 2021-01-01

## 2021-01-01 RX ORDER — DOCUSATE SODIUM 100 MG/1
100 CAPSULE, LIQUID FILLED ORAL 2 TIMES DAILY PRN
Status: DISCONTINUED | OUTPATIENT
Start: 2021-01-01 | End: 2021-01-01 | Stop reason: HOSPADM

## 2021-01-01 RX ORDER — ONDANSETRON 2 MG/ML
4 INJECTION INTRAMUSCULAR; INTRAVENOUS EVERY 6 HOURS PRN
Status: DISCONTINUED | OUTPATIENT
Start: 2021-01-01 | End: 2021-01-01 | Stop reason: HOSPADM

## 2021-01-01 RX ORDER — SODIUM CHLORIDE 9 MG/ML
125 INJECTION, SOLUTION INTRAVENOUS CONTINUOUS
Status: DISCONTINUED | OUTPATIENT
Start: 2021-01-01 | End: 2021-01-01

## 2021-01-01 RX ORDER — HYDROCODONE BITARTRATE AND ACETAMINOPHEN 5; 325 MG/1; MG/1
1 TABLET ORAL EVERY 6 HOURS PRN
Status: DISCONTINUED | OUTPATIENT
Start: 2021-01-01 | End: 2021-01-01

## 2021-01-01 RX ORDER — HYDROMORPHONE HCL/PF 1 MG/ML
0.5 SYRINGE (ML) INJECTION
Status: DISCONTINUED | OUTPATIENT
Start: 2021-01-01 | End: 2021-01-01

## 2021-01-01 RX ORDER — OXYCODONE HYDROCHLORIDE 5 MG/1
5 TABLET ORAL EVERY 6 HOURS PRN
Status: DISCONTINUED | OUTPATIENT
Start: 2021-01-01 | End: 2021-01-01

## 2021-01-01 RX ORDER — FENTANYL CITRATE 50 UG/ML
INJECTION, SOLUTION INTRAMUSCULAR; INTRAVENOUS CODE/TRAUMA/SEDATION MEDICATION
Status: COMPLETED | OUTPATIENT
Start: 2021-01-01 | End: 2021-01-01

## 2021-01-01 RX ORDER — CEFAZOLIN SODIUM 1 G/50ML
1000 SOLUTION INTRAVENOUS ONCE
Status: DISCONTINUED | OUTPATIENT
Start: 2021-01-01 | End: 2021-01-01 | Stop reason: HOSPADM

## 2021-01-01 RX ORDER — VERAPAMIL HYDROCHLORIDE 2.5 MG/ML
INJECTION, SOLUTION INTRAVENOUS CODE/TRAUMA/SEDATION MEDICATION
Status: COMPLETED | OUTPATIENT
Start: 2021-01-01 | End: 2021-01-01

## 2021-01-01 RX ORDER — NITROGLYCERIN 20 MG/100ML
INJECTION INTRAVENOUS
Status: COMPLETED | OUTPATIENT
Start: 2021-01-01 | End: 2021-01-01

## 2021-01-01 RX ORDER — HYDROCODONE BITARTRATE AND ACETAMINOPHEN 5; 325 MG/1; MG/1
1 TABLET ORAL EVERY 6 HOURS PRN
Qty: 120 TABLET | Refills: 0 | Status: SHIPPED | OUTPATIENT
Start: 2021-01-01

## 2021-01-01 RX ORDER — ACETAMINOPHEN 325 MG/1
650 TABLET ORAL EVERY 6 HOURS PRN
Status: DISCONTINUED | OUTPATIENT
Start: 2021-01-01 | End: 2021-01-01 | Stop reason: HOSPADM

## 2021-01-01 RX ORDER — NITROGLYCERIN 20 MG/100ML
INJECTION INTRAVENOUS CODE/TRAUMA/SEDATION MEDICATION
Status: COMPLETED | OUTPATIENT
Start: 2021-01-01 | End: 2021-01-01

## 2021-01-01 RX ORDER — POLYETHYLENE GLYCOL 3350 17 G/17G
17 POWDER, FOR SOLUTION ORAL DAILY
Status: DISCONTINUED | OUTPATIENT
Start: 2021-01-01 | End: 2021-01-01 | Stop reason: HOSPADM

## 2021-01-01 RX ORDER — AMOXICILLIN 250 MG
1 CAPSULE ORAL
Status: DISCONTINUED | OUTPATIENT
Start: 2021-01-01 | End: 2021-01-01 | Stop reason: HOSPADM

## 2021-01-01 RX ORDER — CALCIUM GLUCONATE 20 MG/ML
2 INJECTION, SOLUTION INTRAVENOUS ONCE
Status: COMPLETED | OUTPATIENT
Start: 2021-01-01 | End: 2021-01-01

## 2021-01-01 RX ORDER — DIPHENHYDRAMINE HYDROCHLORIDE 50 MG/ML
25 INJECTION INTRAMUSCULAR; INTRAVENOUS
Status: ACTIVE | OUTPATIENT
Start: 2021-01-01 | End: 2021-01-01

## 2021-01-01 RX ORDER — FUROSEMIDE 10 MG/ML
40 SYRINGE (ML) INJECTION CONTINUOUS
Status: DISCONTINUED | OUTPATIENT
Start: 2021-01-01 | End: 2021-01-01

## 2021-01-01 RX ORDER — FUROSEMIDE 10 MG/ML
80 INJECTION INTRAMUSCULAR; INTRAVENOUS ONCE
Status: COMPLETED | OUTPATIENT
Start: 2021-01-01 | End: 2021-01-01

## 2021-01-01 RX ORDER — HEPARIN SODIUM 5000 [USP'U]/ML
5000 INJECTION, SOLUTION INTRAVENOUS; SUBCUTANEOUS EVERY 8 HOURS SCHEDULED
Status: DISCONTINUED | OUTPATIENT
Start: 2021-01-01 | End: 2021-01-01

## 2021-01-01 RX ORDER — AMOXICILLIN 250 MG
1 CAPSULE ORAL
Qty: 30 TABLET | Refills: 0 | Status: SHIPPED | OUTPATIENT
Start: 2021-01-01

## 2021-01-01 RX ORDER — HYDROMORPHONE HCL/PF 1 MG/ML
0.5 SYRINGE (ML) INJECTION ONCE
Status: COMPLETED | OUTPATIENT
Start: 2021-01-01 | End: 2021-01-01

## 2021-01-01 RX ORDER — ONDANSETRON 2 MG/ML
4 INJECTION INTRAMUSCULAR; INTRAVENOUS ONCE
Status: COMPLETED | OUTPATIENT
Start: 2021-01-01 | End: 2021-01-01

## 2021-01-01 RX ORDER — OMEPRAZOLE 40 MG/1
40 CAPSULE, DELAYED RELEASE ORAL DAILY
Qty: 30 CAPSULE | Refills: 0 | Status: SHIPPED | OUTPATIENT
Start: 2021-01-01 | End: 2021-01-01

## 2021-01-01 RX ORDER — HYDROCODONE BITARTRATE AND ACETAMINOPHEN 5; 325 MG/1; MG/1
1 TABLET ORAL EVERY 6 HOURS PRN
Qty: 20 TABLET | Refills: 0 | Status: SHIPPED | OUTPATIENT
Start: 2021-01-01 | End: 2021-01-01 | Stop reason: SDUPTHER

## 2021-01-01 RX ORDER — HYDROMORPHONE HCL/PF 1 MG/ML
0.2 SYRINGE (ML) INJECTION ONCE
Status: COMPLETED | OUTPATIENT
Start: 2021-01-01 | End: 2021-01-01

## 2021-01-01 RX ORDER — SODIUM POLYSTYRENE SULFONATE 4.1 MEQ/G
15 POWDER, FOR SUSPENSION ORAL; RECTAL ONCE
Status: COMPLETED | OUTPATIENT
Start: 2021-01-01 | End: 2021-01-01

## 2021-01-01 RX ADMIN — FENTANYL CITRATE 25 MCG: 50 INJECTION INTRAMUSCULAR; INTRAVENOUS at 09:57

## 2021-01-01 RX ADMIN — MIDAZOLAM 0.5 MG: 1 INJECTION INTRAMUSCULAR; INTRAVENOUS at 09:54

## 2021-01-01 RX ADMIN — MIDAZOLAM 1 MG: 1 INJECTION INTRAMUSCULAR; INTRAVENOUS at 08:49

## 2021-01-01 RX ADMIN — DIPHENHYDRAMINE HYDROCHLORIDE 25 MG: 50 INJECTION, SOLUTION INTRAMUSCULAR; INTRAVENOUS at 11:36

## 2021-01-01 RX ADMIN — HYDROMORPHONE HYDROCHLORIDE 0.5 MG: 1 INJECTION, SOLUTION INTRAMUSCULAR; INTRAVENOUS; SUBCUTANEOUS at 23:01

## 2021-01-01 RX ADMIN — NITROGLYCERIN 50 MCG: 20 INJECTION INTRAVENOUS at 10:29

## 2021-01-01 RX ADMIN — Medication 10 ML: at 08:53

## 2021-01-01 RX ADMIN — IOHEXOL 70 ML: 350 INJECTION, SOLUTION INTRAVENOUS at 11:47

## 2021-01-01 RX ADMIN — POLYETHYLENE GLYCOL 3350 17 G: 17 POWDER, FOR SOLUTION ORAL at 08:03

## 2021-01-01 RX ADMIN — SODIUM CHLORIDE 1000 ML: 0.9 INJECTION, SOLUTION INTRAVENOUS at 12:20

## 2021-01-01 RX ADMIN — LEUCOVORIN CALCIUM 660 MG: 200 INJECTION, POWDER, LYOPHILIZED, FOR SUSPENSION INTRAMUSCULAR; INTRAVENOUS at 11:10

## 2021-01-01 RX ADMIN — FUROSEMIDE 80 MG: 10 INJECTION, SOLUTION INTRAVENOUS at 23:34

## 2021-01-01 RX ADMIN — OXALIPLATIN 140.25 MG: 5 INJECTION, SOLUTION INTRAVENOUS at 11:27

## 2021-01-01 RX ADMIN — MORPHINE SULFATE 2 MG: 2 INJECTION, SOLUTION INTRAMUSCULAR; INTRAVENOUS at 23:15

## 2021-01-01 RX ADMIN — FAMOTIDINE 20 MG: 20 INJECTION, SOLUTION INTRAVENOUS at 11:59

## 2021-01-01 RX ADMIN — FENTANYL CITRATE 25 MCG: 50 INJECTION INTRAMUSCULAR; INTRAVENOUS at 09:45

## 2021-01-01 RX ADMIN — FENTANYL CITRATE 25 MCG: 50 INJECTION INTRAMUSCULAR; INTRAVENOUS at 11:01

## 2021-01-01 RX ADMIN — ACETAMINOPHEN 650 MG: 325 TABLET, FILM COATED ORAL at 08:04

## 2021-01-01 RX ADMIN — FENTANYL CITRATE 25 MCG: 50 INJECTION INTRAMUSCULAR; INTRAVENOUS at 10:57

## 2021-01-01 RX ADMIN — GLYCOPYRROLATE 0.1 MG: 0.2 INJECTION, SOLUTION INTRAMUSCULAR; INTRAVENOUS at 23:28

## 2021-01-01 RX ADMIN — DEXTROSE 20 ML/HR: 5 SOLUTION INTRAVENOUS at 11:18

## 2021-01-01 RX ADMIN — MIDAZOLAM 0.5 MG: 1 INJECTION INTRAMUSCULAR; INTRAVENOUS at 09:21

## 2021-01-01 RX ADMIN — DEXAMETHASONE SODIUM PHOSPHATE: 10 INJECTION, SOLUTION INTRAMUSCULAR; INTRAVENOUS at 09:30

## 2021-01-01 RX ADMIN — DEXAMETHASONE SODIUM PHOSPHATE: 10 INJECTION, SOLUTION INTRAMUSCULAR; INTRAVENOUS at 10:21

## 2021-01-01 RX ADMIN — SODIUM CHLORIDE 20 ML/HR: 0.9 INJECTION, SOLUTION INTRAVENOUS at 09:29

## 2021-01-01 RX ADMIN — SODIUM CHLORIDE 125 ML/HR: 0.9 INJECTION, SOLUTION INTRAVENOUS at 08:09

## 2021-01-01 RX ADMIN — OXALIPLATIN 99 MG: 5 INJECTION, SOLUTION INTRAVENOUS at 10:50

## 2021-01-01 RX ADMIN — NITROGLYCERIN 50 MCG: 20 INJECTION INTRAVENOUS at 10:04

## 2021-01-01 RX ADMIN — SODIUM POLYSTYRENE SULFONATE 15 G: 1 POWDER ORAL; RECTAL at 00:00

## 2021-01-01 RX ADMIN — OXALIPLATIN 100 MG: 5 INJECTION, SOLUTION INTRAVENOUS at 10:48

## 2021-01-01 RX ADMIN — IOHEXOL 100 ML: 350 INJECTION, SOLUTION INTRAVENOUS at 13:25

## 2021-01-01 RX ADMIN — LORAZEPAM 0.5 MG: 2 INJECTION INTRAMUSCULAR; INTRAVENOUS at 17:12

## 2021-01-01 RX ADMIN — ONDANSETRON: 2 SOLUTION INTRAMUSCULAR; INTRAVENOUS at 09:30

## 2021-01-01 RX ADMIN — Medication 10 ML: at 09:56

## 2021-01-01 RX ADMIN — LEUCOVORIN CALCIUM 660 MG: 200 INJECTION, POWDER, LYOPHILIZED, FOR SUSPENSION INTRAMUSCULAR; INTRAVENOUS at 11:24

## 2021-01-01 RX ADMIN — LEUCOVORIN CALCIUM 660 MG: 200 INJECTION, POWDER, LYOPHILIZED, FOR SUSPENSION INTRAMUSCULAR; INTRAVENOUS at 10:50

## 2021-01-01 RX ADMIN — DEXTROSE 20 ML/HR: 5 SOLUTION INTRAVENOUS at 12:51

## 2021-01-01 RX ADMIN — HYDROMORPHONE HYDROCHLORIDE 1 MG: 1 INJECTION, SOLUTION INTRAMUSCULAR; INTRAVENOUS; SUBCUTANEOUS at 12:20

## 2021-01-01 RX ADMIN — ACETAMINOPHEN 650 MG: 325 TABLET, FILM COATED ORAL at 14:53

## 2021-01-01 RX ADMIN — DEXTROSE MONOHYDRATE 50 ML: 500 INJECTION PARENTERAL at 03:07

## 2021-01-01 RX ADMIN — MORPHINE SULFATE 2 MG: 2 INJECTION, SOLUTION INTRAMUSCULAR; INTRAVENOUS at 23:28

## 2021-01-01 RX ADMIN — MIDAZOLAM 0.5 MG: 1 INJECTION INTRAMUSCULAR; INTRAVENOUS at 10:50

## 2021-01-01 RX ADMIN — DIPHENHYDRAMINE HYDROCHLORIDE 25 MG: 50 INJECTION, SOLUTION INTRAMUSCULAR; INTRAVENOUS at 11:22

## 2021-01-01 RX ADMIN — IODIXANOL 72 ML: 320 INJECTION, SOLUTION INTRAVASCULAR at 11:18

## 2021-01-01 RX ADMIN — Medication 200 MCG: at 09:17

## 2021-01-01 RX ADMIN — ONDANSETRON 4 MG: 2 INJECTION INTRAMUSCULAR; INTRAVENOUS at 12:20

## 2021-01-01 RX ADMIN — MIDAZOLAM 0.5 MG: 1 INJECTION INTRAMUSCULAR; INTRAVENOUS at 10:14

## 2021-01-01 RX ADMIN — IOHEXOL 100 ML: 350 INJECTION, SOLUTION INTRAVENOUS at 08:17

## 2021-01-01 RX ADMIN — Medication 6 ML: at 12:06

## 2021-01-01 RX ADMIN — SODIUM CHLORIDE 500 ML: 0.9 INJECTION, SOLUTION INTRAVENOUS at 11:55

## 2021-01-01 RX ADMIN — CALCIUM GLUCONATE 2 G: 20 INJECTION, SOLUTION INTRAVENOUS at 23:50

## 2021-01-01 RX ADMIN — MORPHINE SULFATE 2 MG: 2 INJECTION, SOLUTION INTRAMUSCULAR; INTRAVENOUS at 21:47

## 2021-01-01 RX ADMIN — HYDROCORTISONE SODIUM SUCCINATE 50 MG: 100 INJECTION, POWDER, FOR SOLUTION INTRAMUSCULAR; INTRAVENOUS at 11:58

## 2021-01-01 RX ADMIN — HYDROCODONE BITARTRATE AND ACETAMINOPHEN 1 TABLET: 5; 325 TABLET ORAL at 20:57

## 2021-01-01 RX ADMIN — ALBUTEROL SULFATE 5 MG: 2.5 SOLUTION RESPIRATORY (INHALATION) at 23:26

## 2021-01-01 RX ADMIN — DIPHENHYDRAMINE HYDROCHLORIDE 25 MG: 50 INJECTION, SOLUTION INTRAMUSCULAR; INTRAVENOUS at 11:58

## 2021-01-01 RX ADMIN — SODIUM CHLORIDE, SODIUM GLUCONATE, SODIUM ACETATE, POTASSIUM CHLORIDE, MAGNESIUM CHLORIDE, SODIUM PHOSPHATE, DIBASIC, AND POTASSIUM PHOSPHATE 75 ML/HR: .53; .5; .37; .037; .03; .012; .00082 INJECTION, SOLUTION INTRAVENOUS at 19:59

## 2021-01-01 RX ADMIN — HYDROCORTISONE SODIUM SUCCINATE 50 MG: 100 INJECTION, POWDER, FOR SOLUTION INTRAMUSCULAR; INTRAVENOUS at 11:37

## 2021-01-01 RX ADMIN — SODIUM CHLORIDE 500 ML: 0.9 INJECTION, SOLUTION INTRAVENOUS at 11:36

## 2021-01-01 RX ADMIN — FENTANYL CITRATE 25 MCG: 50 INJECTION INTRAMUSCULAR; INTRAVENOUS at 09:00

## 2021-01-01 RX ADMIN — SODIUM CHLORIDE 75 ML/HR: 0.9 INJECTION, SOLUTION INTRAVENOUS at 07:47

## 2021-01-01 RX ADMIN — HYDROMORPHONE HYDROCHLORIDE 0.2 MG: 1 INJECTION, SOLUTION INTRAMUSCULAR; INTRAVENOUS; SUBCUTANEOUS at 00:04

## 2021-01-01 RX ADMIN — NITROGLYCERIN 50 MCG: 20 INJECTION INTRAVENOUS at 10:42

## 2021-01-01 RX ADMIN — FLUOROURACIL 660 MG: 50 INJECTION, SOLUTION INTRAVENOUS at 13:39

## 2021-01-01 RX ADMIN — HYDROCODONE BITARTRATE AND ACETAMINOPHEN 1 TABLET: 5; 325 TABLET ORAL at 14:15

## 2021-01-01 RX ADMIN — SODIUM CHLORIDE 20 ML/HR: 0.9 INJECTION, SOLUTION INTRAVENOUS at 09:27

## 2021-01-01 RX ADMIN — SODIUM CHLORIDE 20 ML/HR: 0.9 INJECTION, SOLUTION INTRAVENOUS at 10:10

## 2021-01-01 RX ADMIN — HYDROCORTISONE SODIUM SUCCINATE 50 MG: 100 INJECTION, POWDER, FOR SOLUTION INTRAMUSCULAR; INTRAVENOUS at 11:56

## 2021-01-01 RX ADMIN — HYDROMORPHONE HYDROCHLORIDE 1 MG: 1 INJECTION, SOLUTION INTRAMUSCULAR; INTRAVENOUS; SUBCUTANEOUS at 17:43

## 2021-01-01 RX ADMIN — MIDAZOLAM 0.5 MG: 1 INJECTION INTRAMUSCULAR; INTRAVENOUS at 09:34

## 2021-01-01 RX ADMIN — SODIUM CHLORIDE 500 ML: 0.9 INJECTION, SOLUTION INTRAVENOUS at 00:18

## 2021-01-01 RX ADMIN — DEXTROSE 20 ML/HR: 5 SOLUTION INTRAVENOUS at 11:00

## 2021-01-01 RX ADMIN — CEFAZOLIN SODIUM 1000 MG: 1 SOLUTION INTRAVENOUS at 08:51

## 2021-01-01 RX ADMIN — DIPHENHYDRAMINE HYDROCHLORIDE 25 MG: 50 INJECTION, SOLUTION INTRAMUSCULAR; INTRAVENOUS at 11:56

## 2021-01-01 RX ADMIN — IOHEXOL 100 ML: 350 INJECTION, SOLUTION INTRAVENOUS at 09:57

## 2021-01-01 RX ADMIN — FENTANYL CITRATE 25 MCG: 50 INJECTION INTRAMUSCULAR; INTRAVENOUS at 10:37

## 2021-01-01 RX ADMIN — VERAPAMIL HYDROCHLORIDE 2.5 MG: 2.5 INJECTION, SOLUTION INTRAVENOUS at 09:17

## 2021-01-01 RX ADMIN — MORPHINE SULFATE 2 MG: 2 INJECTION, SOLUTION INTRAMUSCULAR; INTRAVENOUS at 18:09

## 2021-01-01 RX ADMIN — DIPHENHYDRAMINE HYDROCHLORIDE 25 MG: 50 INJECTION, SOLUTION INTRAMUSCULAR; INTRAVENOUS at 09:52

## 2021-01-01 RX ADMIN — DEXAMETHASONE SODIUM PHOSPHATE: 10 INJECTION, SOLUTION INTRAMUSCULAR; INTRAVENOUS at 10:15

## 2021-01-01 RX ADMIN — MIDAZOLAM 0.5 MG: 1 INJECTION INTRAMUSCULAR; INTRAVENOUS at 09:00

## 2021-01-01 RX ADMIN — Medication 40 MG/HR: at 03:08

## 2021-01-01 RX ADMIN — FENTANYL CITRATE 25 MCG: 50 INJECTION INTRAMUSCULAR; INTRAVENOUS at 09:14

## 2021-01-01 RX ADMIN — Medication 5 ML: at 09:13

## 2021-01-01 RX ADMIN — FAMOTIDINE 20 MG: 20 INJECTION, SOLUTION INTRAVENOUS at 11:39

## 2021-01-01 RX ADMIN — FENTANYL CITRATE 25 MCG: 50 INJECTION INTRAMUSCULAR; INTRAVENOUS at 11:04

## 2021-01-01 RX ADMIN — SODIUM CHLORIDE 20 ML/HR: 0.9 INJECTION, SOLUTION INTRAVENOUS at 09:55

## 2021-01-01 RX ADMIN — FAMOTIDINE 20 MG: 10 INJECTION INTRAVENOUS at 10:13

## 2021-01-01 RX ADMIN — FAMOTIDINE 20 MG: 10 INJECTION INTRAVENOUS at 10:56

## 2021-01-01 RX ADMIN — SODIUM CHLORIDE 500 ML: 0.9 INJECTION, SOLUTION INTRAVENOUS at 05:45

## 2021-01-01 RX ADMIN — FENTANYL CITRATE 25 MCG: 50 INJECTION INTRAMUSCULAR; INTRAVENOUS at 10:05

## 2021-01-01 RX ADMIN — FLUOROURACIL 660 MG: 50 INJECTION, SOLUTION INTRAVENOUS at 14:32

## 2021-01-01 RX ADMIN — CEFTRIAXONE SODIUM 2000 MG: 10 INJECTION, POWDER, FOR SOLUTION INTRAVENOUS at 17:42

## 2021-01-01 RX ADMIN — NITROGLYCERIN 200 MCG: 20 INJECTION INTRAVENOUS at 09:32

## 2021-01-01 RX ADMIN — SODIUM CHLORIDE 1200 ML: 0.9 INJECTION, SOLUTION INTRAVENOUS at 21:22

## 2021-01-01 RX ADMIN — SODIUM CHLORIDE 500 ML: 0.9 INJECTION, SOLUTION INTRAVENOUS at 19:53

## 2021-01-01 RX ADMIN — LEUCOVORIN CALCIUM 660 MG: 200 INJECTION, POWDER, LYOPHILIZED, FOR SUSPENSION INTRAMUSCULAR; INTRAVENOUS at 12:13

## 2021-01-01 RX ADMIN — CEFEPIME HYDROCHLORIDE 2000 MG: 2 INJECTION, POWDER, FOR SOLUTION INTRAVENOUS at 22:56

## 2021-01-01 RX ADMIN — INSULIN HUMAN 5 UNITS: 100 INJECTION, SOLUTION PARENTERAL at 23:16

## 2021-01-01 RX ADMIN — LEUCOVORIN CALCIUM 650 MG: 200 INJECTION, POWDER, LYOPHILIZED, FOR SUSPENSION INTRAMUSCULAR; INTRAVENOUS at 10:48

## 2021-01-01 RX ADMIN — POLYETHYLENE GLYCOL 3350 17 G: 17 POWDER, FOR SOLUTION ORAL at 21:47

## 2021-01-01 RX ADMIN — FENTANYL CITRATE 50 MCG: 50 INJECTION INTRAMUSCULAR; INTRAVENOUS at 08:49

## 2021-01-01 RX ADMIN — MIDAZOLAM 0.5 MG: 1 INJECTION INTRAMUSCULAR; INTRAVENOUS at 10:40

## 2021-01-01 RX ADMIN — FENTANYL CITRATE 25 MCG: 50 INJECTION INTRAMUSCULAR; INTRAVENOUS at 09:21

## 2021-01-01 RX ADMIN — DEXAMETHASONE SODIUM PHOSPHATE: 10 INJECTION, SOLUTION INTRAMUSCULAR; INTRAVENOUS at 10:02

## 2021-01-01 RX ADMIN — MORPHINE SULFATE 2 MG: 2 INJECTION, SOLUTION INTRAMUSCULAR; INTRAVENOUS at 00:00

## 2021-01-01 RX ADMIN — FENTANYL CITRATE 25 MCG: 50 INJECTION INTRAMUSCULAR; INTRAVENOUS at 10:50

## 2021-01-01 RX ADMIN — NITROGLYCERIN 50 MCG: 20 INJECTION INTRAVENOUS at 11:04

## 2021-01-01 RX ADMIN — OXALIPLATIN 140.25 MG: 5 INJECTION, SOLUTION, CONCENTRATE INTRAVENOUS at 11:10

## 2021-01-01 RX ADMIN — HEPARIN SODIUM 3000 UNITS: 1000 INJECTION INTRAVENOUS; SUBCUTANEOUS at 09:32

## 2021-01-01 RX ADMIN — CALCIUM GLUCONATE 2 G: 20 INJECTION, SOLUTION INTRAVENOUS at 03:08

## 2021-01-01 RX ADMIN — SODIUM CHLORIDE 10 UNITS: 9 INJECTION INTRAMUSCULAR; INTRAVENOUS; SUBCUTANEOUS at 03:07

## 2021-01-01 RX ADMIN — FENTANYL CITRATE 25 MCG: 50 INJECTION INTRAMUSCULAR; INTRAVENOUS at 09:36

## 2021-01-01 RX ADMIN — MORPHINE SULFATE 2 MG: 2 INJECTION, SOLUTION INTRAMUSCULAR; INTRAVENOUS at 17:14

## 2021-01-01 RX ADMIN — FLUOROURACIL 660 MG: 50 INJECTION, SOLUTION INTRAVENOUS at 14:28

## 2021-01-01 RX ADMIN — HYDROMORPHONE HYDROCHLORIDE 0.5 MG: 1 INJECTION, SOLUTION INTRAMUSCULAR; INTRAVENOUS; SUBCUTANEOUS at 15:08

## 2021-01-01 RX ADMIN — SODIUM CHLORIDE 75 ML/HR: 0.9 INJECTION, SOLUTION INTRAVENOUS at 01:30

## 2021-01-01 RX ADMIN — OXALIPLATIN 140.25 MG: 5 INJECTION, SOLUTION, CONCENTRATE INTRAVENOUS at 12:16

## 2021-01-01 RX ADMIN — MIDAZOLAM 0.5 MG: 1 INJECTION INTRAMUSCULAR; INTRAVENOUS at 10:28

## 2021-01-01 RX ADMIN — INFLUENZA A VIRUS A/WISCONSIN/588/2019 (H1N1) RECOMBINANT HEMAGGLUTININ ANTIGEN, INFLUENZA A VIRUS A/TASMANIA/503/2020 (H3N2) RECOMBINANT HEMAGGLUTININ ANTIGEN, INFLUENZA B VIRUS B/WASHINGTON/02/2019 RECOMBINANT HEMAGGLUTININ ANTIGEN, AND INFLUENZA B VIRUS B/PHUKET/3073/2013 RECOMBINANT HEMAGGLUTININ ANTIGEN 0.5 ML: 45; 45; 45; 45 INJECTION INTRAMUSCULAR at 08:06

## 2021-01-01 RX ADMIN — FENTANYL CITRATE 50 MCG: 50 INJECTION INTRAMUSCULAR; INTRAVENOUS at 09:03

## 2021-01-01 RX ADMIN — HYDROMORPHONE HYDROCHLORIDE 0.2 MG: 0.2 INJECTION, SOLUTION INTRAMUSCULAR; INTRAVENOUS; SUBCUTANEOUS at 20:40

## 2021-01-01 RX ADMIN — VERAPAMIL HYDROCHLORIDE 2.5 MG: 2.5 INJECTION, SOLUTION INTRAVENOUS at 09:32

## 2021-01-01 RX ADMIN — LORAZEPAM 0.5 MG: 2 INJECTION INTRAMUSCULAR; INTRAVENOUS at 23:55

## 2021-01-01 RX ADMIN — DEXTROSE 20 ML/HR: 5 SOLUTION INTRAVENOUS at 10:39

## 2021-01-01 RX ADMIN — MIDAZOLAM 0.5 MG: 1 INJECTION INTRAMUSCULAR; INTRAVENOUS at 09:15

## 2021-01-01 RX ADMIN — HEPARIN SODIUM 3000 UNITS: 1000 INJECTION INTRAVENOUS; SUBCUTANEOUS at 09:17

## 2021-01-01 RX ADMIN — MIDAZOLAM 0.5 MG: 1 INJECTION INTRAMUSCULAR; INTRAVENOUS at 09:36

## 2021-01-01 RX ADMIN — DEXTROSE 20 ML/HR: 5 SOLUTION INTRAVENOUS at 10:43

## 2021-01-01 RX ADMIN — NITROGLYCERIN 50 MCG: 20 INJECTION INTRAVENOUS at 11:03

## 2021-01-01 RX ADMIN — DEXTROSE MONOHYDRATE 50 ML: 500 INJECTION PARENTERAL at 23:12

## 2021-01-01 RX ADMIN — IOHEXOL 100 ML: 350 INJECTION, SOLUTION INTRAVENOUS at 09:14

## 2021-01-01 RX ADMIN — MIDAZOLAM 1 MG: 1 INJECTION INTRAMUSCULAR; INTRAVENOUS at 09:03

## 2021-01-01 RX ADMIN — ACETAMINOPHEN 650 MG: 325 TABLET, FILM COATED ORAL at 16:36

## 2021-01-01 RX ADMIN — NITROGLYCERIN 50 MCG: 20 INJECTION INTRAVENOUS at 09:45

## 2021-01-08 NOTE — PROGRESS NOTES
Assessment/Plan:     40-year-old woman with: metastatic colon cancer with liver Mets rheumatoid arthritis and polyneuropathy  Discussed workup and treatment options with risks and benefits will continue current medications encouraged close follow-up with her oncology team discussed supportive care return parameters and follow-up in 6 months    No problem-specific Assessment & Plan notes found for this encounter  Diagnoses and all orders for this visit:    Colon cancer metastasized to liver [facility])    Rheumatoid arthritis involving both hands with negative rheumatoid factor ([address] )    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 [facility])          Subjective:     Chief Complaint   Patient presents with    Follow-up     6 mo fu        Patient ID: Stephanie Puentester is a 54 y o  female  Patient is a 40-year-old woman who presents for follow-up on metastatic colon cancer with liver Mets rheumatoid arthritis and polyneuropathy she admits being stable on medications overall continues treatment she follows up with Oncology no acute complaints at this time no fevers chills nausea vomiting tolerating p o  intake no pain at this time no other complaints at this      The following portions of the patient's history were reviewed and updated as appropriate: allergies, current medications, past family history, past medical history, past social history, past surgical history and problem list     Review of Systems   Constitutional: Negative  HENT: Negative  Eyes: Negative  Respiratory: Negative  Cardiovascular: Negative  Gastrointestinal: Negative  Endocrine: Negative  Genitourinary: Negative  Musculoskeletal: Negative  Allergic/Immunologic: Negative  Neurological: Negative  Hematological: Negative  Psychiatric/Behavioral: Negative  All other systems reviewed and are negative          Objective:      /88 (BP Location: Right arm, Patient Position: Sitting, Cuff Size: Standard)   Ht 5' 4" (1 626 m)   Wt 63 kg (139 lb)   LMP 09/28/2018 (Exact Date)   BMI 23 86 kg/m²          Physical Exam  Constitutional:       Appearance: She is well-developed  HENT:      Head: Atraumatic  Right Ear: External ear normal       Left Ear: External ear normal    Eyes:      Conjunctiva/sclera: Conjunctivae normal       Pupils: Pupils are equal, round, and reactive to light  Neck:      Musculoskeletal: Normal range of motion  Cardiovascular:      Rate and Rhythm: Normal rate and regular rhythm  Heart sounds: Normal heart sounds  Pulmonary:      Effort: Pulmonary effort is normal  No respiratory distress  Breath sounds: Normal breath sounds  Abdominal:      General: There is no distension  Palpations: Abdomen is soft  Tenderness: There is no abdominal tenderness  There is no guarding or rebound  Musculoskeletal: Normal range of motion  Skin:     General: Skin is warm and dry  Neurological:      Mental Status: She is alert and oriented to person, place, and time  Cranial Nerves: No cranial nerve deficit  Psychiatric:         Behavior: Behavior normal          Thought Content:  Thought content normal          Judgment: Judgment normal

## 2021-01-11 NOTE — PROGRESS NOTES
Hematology / Oncology Outpatient Follow Up Note    Chito Webber 54 y o  female :1965 [facility]:[id number]         Date:  2021    Assessment / Plan:  A 59-year-old female with metastatic colon cancer  She has extensive liver metastasis   Her tumor has K-wanda mutation and is MSI stable   BRAF is wild type   She had 12 cycle of FOLFOX with bevacizumab with excellent tolerance, resulting in partial response   Since 2018, she has been on maintenance bevacizumab monotherapy   She underwent palliative resection of transverse colon due to the bleeding   She fully recovered from the surgery   Subsequently, she was on maintenance bevacizumab   He had disease progression in May 2020  Therefore, she is currently on FOLFIRI plus bevacizumab with minimal toxicity  Based on her recent CT scan as well as CEA, her disease progressed on current treatment  We discussed further treatment options  She is interested in Sir sphere which was previously suggested by radiation oncologist   Since liver is the only organ that has been able tumor, liver directed therapy with Sir sphere may be reasonable, although there is no survival advantage have been proven  She is aware of this  I will also ask pathology department to sent her tumor is out for [place] fusion protein as well as HER2 which may give her additional treatment options  She is in agreement with my recommendation    I will see her again a several weeks after the Sir sphere treatment to have further discussion             Subjective:      HPI:  A 59-year-old female who was found to have severe microcytic anemia in 2018   Therefore, she was advised to be hospitalized  Catracho Hoffman was given transfusion  Minnie Pae was performed which did not show any major pathology   As outpatient basis, she underwent CT scan of abdomen pelvis which showed transverse colon mass as well as multiple liver metastatic disease   She subsequently underwent liver biopsy which showed metastatic adenocarcinoma, consistent with colorectal primary   She presents today to discuss treatment options   Since January 2018, she lost 25 pound  Radha Martinez has mildly anorexic  [facility], she has no complaint of pain  She denied any respiratory symptoms    She has normal bowel movement    Prior to the hospitalization, she had slowly progressive fatigue as well as some exertional shortness of breath   She is currently on oral iron 3 times per day  Radha Martinez has rheumatoid arthritis for which she is on weekly methotrexate and folic acid   She has no family history of colorectal cancer  Radha Martinez is a lifetime never smoker   Her performance status is normal            Interval History:   A 54year-old female with metastatic colon cancer with extensive liver metastasis   Her tumor has K-wanda mutation and is MSI stable   She started systemic chemotherapy with FOLFOX-6 which she tolerated very well  We were notified by pathology Department that molecular testing cannot be performed on liver biopsy tissue   She was treated with 12 cycle of FOLFOX with bevacizumab with excellent tolerance   She had good partial response   Since April 2019, she was on maintenance bevacizumab monotherapy   However, she was found to have progressive disease in May 2020  Therefore, she is currently on FOLFIRI with bevacizumab  She had some stable disease on current treatment  However, recent CEA was found to be markedly elevated  Radiographically, she has progression of liver metastasis based on recent CT scan  She presents today to discuss further treatment options  She has very minimal tumor related symptoms  She still does not have any pain  Her weight is stable  She has chronic diarrhea  She has no respiratory symptoms  Her performance status is normal                                                                                Objective:      Primary Diagnosis:     Metastatic colon cancer   K-wanda mutation positive   BRAF wild type   MSI stable   Diagnosed in October 2018      Cancer Staging:  Cancer Staging  No matching staging information was found for the patient         Previous Hematologic/ Oncologic Treatment:       1  Bevacizumab with FOLFOX-6   X12 cycle   Completed in April 2019    2  Bevacizumab monotherapy from April 2019 through October 2019    3  Bevacizumab with infusion of 5 FU from November 2019 through May 2020    4  Bevacizumab with FOLFIRI from May 2020 through January 2021       Current Hematologic/ Oncologic Treatment:       Sir maurisio is considered      Disease Status:      Progressive disease on bevacizumab with FOLFIRI      Test Results:     Pathology:     Liver biopsy showed metastatic adenocarcinoma, consistent with colorectal primary   Molecular testing could not be performed per pathology department      Radiology:     CT scan of chest abdomen pelvis in January 2020 showed progression of liver metastasis      Laboratory:     See below   See below for CEA      Physical Exam:        General Appearance:    Alert, oriented          Eyes:    PERRL   Ears:    Normal external ear canals, both ears   Nose:   Nares normal, septum midline   Throat:   Mucosa moist  Pharynx without injection  Neck:   Supple         Lungs:     Clear to auscultation bilaterally   Chest Wall:    No tenderness or deformity    Heart:    Regular rate and rhythm         Abdomen:     Soft, non-tender, bowel sounds +, no organomegaly               Extremities:   Extremities no cyanosis or edema         Skin:   no rash or icterus  Lymph nodes:   Cervical, supraclavicular, and axillary nodes normal   Neurologic:   CNII-XII intact, normal strength, sensation and reflexes     Throughout            ROS: Review of Systems   All other systems reviewed and are negative            Imaging: Ct Chest Abdomen Pelvis W Contrast    Result Date: 1/11/2021  Narrative: CT CHEST, ABDOMEN AND PELVIS WITH IV CONTRAST INDICATION:   C18 9: Malignant neoplasm of colon, unspecified C78 7: Secondary malignant neoplasm of liver and intrahepatic bile duct  COMPARISON:  CT chest abdomen and pelvis 9/23/2020 and 1/29/2019 TECHNIQUE: CT examination of the chest, abdomen and pelvis was performed  Scanning through the abdomen was performed in arterial, venous and delayed phases according a protocol spefically designed to evaluate upper abdominal viscera  Axial, sagittal, and coronal 2D reformatted images were created from the source data and submitted for interpretation  Radiation dose length product (DLP) for this visit:  1144 mGy-cm   This examination, like all CT scans performed in the [facility], was performed utilizing techniques to minimize radiation dose exposure, including the use of iterative reconstruction and automated exposure control  IV Contrast:  100 mL of iohexol (OMNIPAQUE)   350 Enteric Contrast: Enteric contrast was administered  FINDINGS: CHEST LUNGS:  Stable 4 mm groundglass nodule right upper lobe image 29 series 5 unchanged as far back as January 2019  No new pulmonary nodule  No infiltrate  Central airways are clear  PLEURA:  Right posterior medial basilar multilobulated low density attributed to small multiloculated pleural effusion  Attention on follow-up advised  HEART/GREAT VESSELS:  Unremarkable for patient's age  Tip of portacatheter in the superior right atrium  MEDIASTINUM AND ANTHONY:  Unremarkable  CHEST WALL AND LOWER NECK:   Right anterior chest wall marko catheter  ABDOMEN LIVER/BILIARY TREE:  Multiple hepatic hypodense metastatic lesion; representative lesions will be measured on series 4: Image 40, segment 8 right lobe, 2 x 1 7 cm previously 1 4 x 1 3 cm  Image 42, segment 8 right lobe, 1 6 x 1 5 cm, stable  Image 45, segment 8 right lobe, 1 5 x 1 4 cm previously 5 x 4 mm  Image 48, segment 7 right lobe, 2 4 x 1 9 cm, stable  No duct dilation  GALLBLADDER:  No calcified gallstones  No pericholecystic inflammatory change   SPLEEN: Unremarkable  PANCREAS:  Unremarkable  ADRENAL GLANDS:  Unremarkable  KIDNEYS/URETERS:  Stable 2 cm left renal simple cyst  No hydronephrosis or perinephric collection  STOMACH AND BOWEL:  Post right hemicolectomy  No abnormal soft tissue at the ileocolic anastomosis  No bowel obstruction  APPENDIX:  Post appendectomy  ABDOMINOPELVIC CAVITY:  No ascites  No pneumoperitoneum  No lymphadenopathy  VESSELS:  Stable prominent bilateral adnexal and left ovarian veins may be sequela of chronic pelvic mass or congestion syndrome  Stable tiny distal esophageal and gastrohepatic varices  PELVIS REPRODUCTIVE ORGANS:  Unremarkable for patient's age  URINARY BLADDER:  Unremarkable  ABDOMINAL WALL/INGUINAL REGIONS:  Stable small ventral midline supraumbilical fat-containing hernia  OSSEOUS STRUCTURES:  No acute fracture or osseous destructive lesion identified  Mild degenerative changes of the spine  Impression: CT chest: No evidence of thoracic metastatic disease  4 mm groundglass nodule right upper lobe unchanged as far back as November 2019  Right posterior medial basilar multiloculated pleural effusion  CT abdomen and pelvis: Multiple hepatic metastatic lesions, some of which are stable and some of which have enlarged since September 2020  No other significant interval change  This study demonstrates a significant  finding and was documented as such in [facility] for liaison and referring practitioner notification   Workstation performed: [id number]         Labs:   Lab Results   Component Value Date    WBC 3 32 (L) 01/08/2021    HGB 11 7 01/08/2021    HCT 37 3 01/08/2021    MCV 92 01/08/2021     01/08/2021     Lab Results   Component Value Date    K 3 2 (L) 01/08/2021     01/08/2021    CO2 29 01/08/2021    BUN 14 01/08/2021    CREATININE 0 86 01/08/2021    GLUF 106 (H) 01/08/2021    CALCIUM 9 5 01/08/2021    CORRECTEDCA 10 1 01/08/2021    AST 47 (H) 01/08/2021    ALT 69 01/08/2021    ALKPHOS 309 (H) 01/08/2021 EGFR 76 01/08/2021         Lab Results   Component Value Date     (H) 09/24/2018         Lab Results   Component Value Date     3 (H) 01/08/2021         Lab Results   Component Value Date    IRON 11 (L) 09/13/2018    TIBC 302 09/13/2018    FERRITIN 15 09/13/2018         Lab Results   Component Value Date    FOLATE 11 4 09/13/2018         Current Medications: Reviewed  Allergies: Reviewed  PMH/FH/SH:  Reviewed      Vital Sign:    Body surface area is 1 67 meters squared      Wt Readings from Last 3 Encounters:   01/11/21 62 1 kg (137 lb)   01/06/21 63 kg (139 lb)   12/28/20 61 8 kg (136 lb 3 9 oz)        Temp Readings from Last 3 Encounters:   01/11/21 98 °F (36 7 °C) (Tympanic Core)   12/28/20 97 8 °F (36 6 °C) (Temporal)   12/14/20 98 °F (36 7 °C) (Temporal)        BP Readings from Last 3 Encounters:   01/06/21 124/88   12/28/20 128/83   12/14/20 130/82         Pulse Readings from Last 3 Encounters:   01/11/21 76   12/28/20 88   12/14/20 79     @LASTSAO2(3)@

## 2021-01-11 NOTE — TELEPHONE ENCOUNTER
Spoke to Isa Adams with Radiation Onc, they will reach out to [facility] when they can schedule appointment  Isa Adams will contact patient directly

## 2021-01-12 NOTE — PROGRESS NOTES
Assessment/Plan:             Diagnoses and all orders for this visit:    Close exposure to COVID-19 virus  -     Novel Coronavirus (COVID-19), PCR [facility] Collected in Office            Subjective:        Patient ID: Hilario Archer is a 54 y o  female  Patient presents with:  COVID-19: Exposure at work            The following portions of the patient's history were reviewed and updated as appropriate: allergies, current medications, past family history, past medical history, past social history, past surgical history and problem list       Review of Systems   Constitutional: Negative  HENT: Negative  Eyes: Negative  Respiratory: Negative  Cardiovascular: Negative  Gastrointestinal: Negative  Endocrine: Negative  Genitourinary: Negative  Musculoskeletal: Negative  Skin: Negative  Allergic/Immunologic: Negative  Neurological: Negative  Hematological: Negative  Psychiatric/Behavioral: Negative  All other systems reviewed and are negative  Objective:             LMP 09/28/2018 (Exact Date)          Physical Exam  Vitals signs and nursing note reviewed  Constitutional:       Appearance: She is well-developed  HENT:      Head: Normocephalic and atraumatic  Right Ear: External ear normal       Left Ear: External ear normal       Nose: Nose normal    Eyes:      Conjunctiva/sclera: Conjunctivae normal       Pupils: Pupils are equal, round, and reactive to light  Neck:      Musculoskeletal: Normal range of motion and neck supple  Cardiovascular:      Rate and Rhythm: Normal rate and regular rhythm  Heart sounds: Normal heart sounds  Pulmonary:      Effort: Pulmonary effort is normal       Breath sounds: Normal breath sounds  Abdominal:      General: Bowel sounds are normal       Palpations: Abdomen is soft  Musculoskeletal: Normal range of motion  Skin:     General: Skin is warm and dry     Neurological:      Mental Status: She is alert and oriented to person, place, and time  Deep Tendon Reflexes: Reflexes are normal and symmetric     Psychiatric:         Behavior: Behavior normal

## 2021-01-18 PROBLEM — C78.7 SECONDARY MALIGNANT NEOPLASM OF LIVER (HCC): Status: ACTIVE | Noted: 2021-01-01

## 2021-01-18 NOTE — PROGRESS NOTES
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 at time of her diagnosis in the   Liver biopsy on 10/25/2018 confirmed metastatic adenocarcinoma consistent with colorectal primary  Bret Folk has been receiving chemotherapy with Dr Mitch Yap  Bret Simons had a good partial response and then have palliative resection of her transverse colon primary secondary to bleeding in 2019   She has had no further bleeding since that time  Bret Simons had elevation of her CEA and was started back on Avastin and 5 FU in 2019  CEA level was 30 4 2019 and now this brennen up to 174 0 on May 18, 2020  Restaging CT scans of the chest, abdomen, and pelvis on May 27, 2020 revealed diffuse hepatic metastasis with some nodules that are stable and some nodules that are enlarging   There were no new nodules  Joyceluba Edmond was a stable 4 mm right upper lobe ground-glass nodule  Faustino Feli was no evidence of any disease outside of the liver   She started on FOLFIRI on Mary 15, 2020  She has not had Avastin since May 1, 2020   She is a good candidate for Sir sphere radiation therapy to treat her multiple bilateral liver metastasis   We discussed the rationale for Sir spheres including the acute side effects and the potential chronic complications with her  Ozzie Ni discussed mapping procedure 1st to be followed by treatment approximately 1 week later   We answered all of her questions and she consents to treatment   She will need to remain off Avastin until after completion of the Sir sphere treatment  Radha Martinez is also scheduled for colonoscopy on July 23, 2020  She will continue with her FOLFIRI infusion   She will be scheduled for Sir spheres most likely in August pending insurance authorization        7/17/20 Dr Cat Diaz- She presents today to discuss Sir sphere  We had extensive discussion regarding the cidofovir  Sir sphere is a liver directed therapy  Previous randomized trial comparing systemic chemotherapy with or without Sir sphere did not show any survival benefit  Response rate is slightly better with Sir sphere arm  Since she has no tumor related symptomatology, palliative benefit is not expected  With her current condition as well as no survival benefit with Sir sphere, indication for this procedure is highly questionable  She feel comfortable continuing with systemic chemotherapy at this moment       7/23/20 Colonoscopy was unremarkable    9/23/20 CT C/A/P- CT chest:  -4 mm groundglass nodule right upper lobe unchanged as far back as November 2019  No new pulmonary nodule   -No significant interval change      CT abdomen and pelvis:  -Interval regression of disease with diminished size of hepatic lesions  No new or enlarging lesions   -No other significant interval change  9/28/20 Dr Cat Diaz- she is currently on FOLFIRI plus bevacizumab with minimal toxicity   She has biochemical stable disease but radiographically has regression of liver metastasis   Therefore, I recommended her to continue with bevacizumab with FOLFIRI every 2 weeks       10/9/20 Dr Rehan Thomson- she is having a good response to therapy so far  Disease stable radiographically  3 month f/u with repeat scan and bloodwork      1/9/21 CT C/A/P- CT chest:  -No evidence of thoracic metastatic disease   4 mm groundglass nodule right upper lobe unchanged as far back as November 2019   -Right posterior medial basilar multiloculated pleural effusion      CT abdomen and pelvis:  -Multiple hepatic metastatic lesions, some of which are stable and some of which have enlarged since September 2020   -No other significant interval change      1/11/21 Dr Stu Fuller- Based on her recent CT scan as well as CEA, her disease progressed on current treatment   We discussed further treatment options  Cassius Silva is interested in Sir sphere which was previously suggested by radiation oncologist  Ericka Mcfarlane liver is the only organ that has been able tumor, liver directed therapy with Sir sphere may be reasonable, although there is no survival advantage have been proven   She is aware of this  Dario Chopra will also ask pathology department to sent her tumor is out for TRK fusion protein as well as HER2 which may give her additional treatment options      She reports fatigue at times that is stable  She denies any abdominal pains or cramps  She has no nausea and no vomiting  She continues to work full-time from home in the [employer] here at [facility] as a   She lives at home with her boyfriend of 25 years and their 15year-old son who is currently doing school virtually from home  She reports having a negative COVID test 1 week ago  She has a history of rheumatoid arthritis for many years with some contractures of her right hand  She is on methotrexate weekly as well as folic acid  No med onc or surg onc f/u scheduled    Historical Information   Oncology History   Colon cancer metastasized to liver [facility])   10/25/2018 Initial Diagnosis    Colon cancer metastasized to liver ([facility] [address] )     10/25/2018 Biopsy    Final Diagnosis   A   Liver mass (core needle biopsy):  - Metastatic adenocarcinoma with extensive necrosis           11/12/2018 - 4/2019 Chemotherapy    Bevacizumab with FOLFOX-6   X12 cycle     4/19/2019 - 10/27/2019 Chemotherapy    bevacizumab (AVASTIN) 900 mg in sodium chloride 0 9 % 100 mL IVPB, 15 mg/kg, Intravenous, Once, 5 of 9 cycles  Dose modification: 7 5 mg/kg (original dose 15 mg/kg, Cycle 2, Reason: Other (See Comments), Comment: regimen)  Administration: 450 mg (5/20/2019), 450 mg (6/10/2019), 450 mg (7/1/2019), 450 mg (10/7/2019)     8/29/2019 Surgery    Right hemicolectomy:  - Residual adenocarcinoma   - Thirty (30) lymph nodes negative for carcinoma (0/30)     11/4/2019 - 6/14/2020 Chemotherapy    bevacizumab (AVASTIN) 292 5 mg in sodium chloride 0 9 % 100 mL IVPB, 5 mg/kg = 292 5 mg (100 % of original dose 5 mg/kg), Intravenous, Once, 13 of 15 cycles  Dose modification: 5 mg/kg (original dose 5 mg/kg, Cycle 1)  Administration: 292 5 mg (11/4/2019), 292 5 mg (11/18/2019), 292 5 mg (12/2/2019), 292 5 mg (12/16/2019), 292 5 mg (12/31/2019), 292 5 mg (1/13/2020), 292 5 mg (1/27/2020), 292 5 mg (2/10/2020), 292 5 mg (2/24/2020), 292 5 mg (3/9/2020), 292 5 mg (5/4/2020), 292 5 mg (5/18/2020), 292 5 mg (6/1/2020)  leucovorin 656 mg in dextrose 5 % 250 mL IVPB, 400 mg/m2 = 656 mg, Intravenous, Once, 16 of 18 cycles  Administration: 656 mg (11/4/2019), 656 mg (11/18/2019), 656 mg (12/2/2019), 656 mg (12/16/2019), 656 mg (12/31/2019), 656 mg (4/6/2020), 656 mg (1/13/2020), 656 mg (1/27/2020), 656 mg (2/10/2020), 656 mg (2/24/2020), 656 mg (3/9/2020), 656 mg (3/23/2020), 656 mg (4/20/2020), 656 mg (5/4/2020), 656 mg (5/18/2020), 656 mg (6/1/2020)     6/15/2020 - 1/10/2021 Chemotherapy    bevacizumab (AVASTIN) 320 mg in sodium chloride 0 9 % 100 mL IVPB, 5 mg/kg = 320 mg, Intravenous, Once, 12 of 13 cycles  Administration: 320 mg (7/27/2020), 320 mg (8/10/2020), 320 mg (8/24/2020), 320 mg (9/8/2020), 320 mg (9/21/2020), 320 mg (10/5/2020), 320 mg (10/19/2020), 320 mg (11/2/2020), 320 mg (11/16/2020), 320 mg (11/30/2020), 320 mg (12/14/2020), 320 mg (12/28/2020)  irinotecan (CAMPTOSAR) 300 mg in sodium chloride 0 9 % 500 mL chemo infusion, 308 mg, Intravenous, Once, 15 of 16 cycles  Administration: 300 mg (6/15/2020), 300 mg (6/29/2020), 300 mg (7/13/2020), 300 mg (7/27/2020), 300 mg (8/10/2020), 300 mg (8/24/2020), 300 mg (9/8/2020), 300 mg (9/21/2020), 300 mg (10/5/2020), 300 mg (10/19/2020), 300 mg (11/2/2020), 300 mg (11/16/2020), 300 mg (11/30/2020), 300 mg (12/14/2020), 300 mg (12/28/2020)  leucovorin 684 mg in sodium chloride 0 9 % 250 mL IVPB, 400 mg/m2 = 684 mg, Intravenous, Once, 15 of 16 cycles  Administration: 684 mg (6/15/2020), 684 mg (6/29/2020), 684 mg (7/13/2020), 684 mg (7/27/2020), 684 mg (8/10/2020), 684 mg (8/24/2020), 648 mg (9/8/2020), 684 mg (9/21/2020), 684 mg (10/5/2020), 684 mg (10/19/2020), 684 mg (11/2/2020), 684 mg (11/16/2020), 684 mg (11/30/2020), 684 mg (12/14/2020), 684 mg (12/28/2020)           Past Medical History:   Diagnosis Date    Anxious mood     [facility]     [facility]     History of chemotherapy     [facility]      Past Surgical History:   Procedure Laterality Date    APPENDECTOMY      ESOPHAGOGASTRODUODENOSCOPY N/A 9/14/2018    Procedure: ESOPHAGOGASTRODUODENOSCOPY (EGD) with polypectomy;  Surgeon: Kayla Ceballos MD;  Location: [facility];   Service: Gastroenterology    IR BIOPSY LIVER MASS  10/25/2018    IR PORT PLACEMENT  11/7/2018    r chest    MS PART REMOVAL COLON W ANASTOMOSIS N/A 8/29/2019    Procedure: EXTENDED RIGHT COLON RESECTION;  Surgeon: Jonah Mata MD;  [facility];  Service: Surgical Oncology    TUBAL LIGATION      resolved 2007    WISDOM TOOTH EXTRACTION      resolved 1990       Family History   Problem Relation Age of Onset    Anxiety disorder Mother     Hypertension Father     Diabetes Father     Heart attack Maternal Grandmother         acute MI    Breast cancer Cousin        Social History   Social History     Substance and Sexual Activity   Alcohol Use Not Currently    Alcohol/week: 0 0 standard drinks    Frequency: Never    Drinks per session: Patient refused    Binge frequency: Never Social History     Substance and Sexual Activity   Drug Use No     Social History     Tobacco Use   Smoking Status Former Smoker    Packs/day: 0 00    Years: 0 00    Pack years: 0 00    Quit date: 2000    Years since quittin 4   Smokeless Tobacco Never Used     Meds/Allergies     Current Outpatient Medications:     cyanocobalamin (VITAMIN B-12) 1,000 mcg tablet, Take by mouth daily, Disp: , Rfl:     folic acid (FOLVITE) 1 mg tablet, Take 1 mg by mouth daily , Disp: , Rfl:     methotrexate 2 5 mg tablet, 15 mg once a week , Disp: , Rfl:     Multiple Vitamin (MULTIVITAMIN) capsule, Take 1 capsule by mouth daily, Disp: , Rfl:     Omega-3 Fatty Acids (FISH OIL PO), Take by mouth daily , Disp: , Rfl:     hydrocortisone 1 % lotion, Apply topically 2 (two) times a day, Disp: , Rfl:     Irinotecan HCl (CAMPTOSAR IV), irinotecan, Disp: , Rfl:     potassium chloride (K-DUR,KLOR-CON) 20 mEq tablet, Take 1 tablet (20 mEq total) by mouth daily (Patient not taking: Reported on 2021), Disp: 30 tablet, Rfl: 0  No Known Allergies    Review of Systems  Constitutional: Positive for fever (at times)  HENT: Negative  Eyes: Positive for visual disturbance (blurry vision at times)  Respiratory: Negative  Cardiovascular: Negative  Gastrointestinal: Negative  Endocrine: Negative  Genitourinary: Negative  Musculoskeletal: Positive for arthralgias (RA)  Skin: Negative  Allergic/Immunologic: Negative  Neurological: Positive for numbness (neuropathy in feet) and headaches (last week)  Hematological: Negative      Psychiatric/Behavioral: Negative        OBJECTIVE:   /96   Pulse 89   Temp (!) 96 7 °F (35 9 °C)   Resp 18   Ht 5' 4" (1 626 m)   Wt 62 7 kg (138 lb 3 2 oz)   LMP 2018 (Exact Date)   SpO2 98%   BMI 23 72 kg/m²   Pain Assessment:  0  Performance Status: ECOG/Zubrod/WHO: 1 - Symptomatic but completely ambulatory    Physical Exam   Constitutional: She is oriented to person, place, and time  She appears well-developed and well-nourished  No distress  HENT:   Head: Normocephalic and atraumatic  Mouth/Throat: No oropharyngeal exudate  Eyes: Pupils are equal, round, and reactive to light  Conjunctivae and EOM are normal  No scleral icterus  Neck: Normal range of motion  Neck supple  No tracheal deviation present  No thyromegaly present  Cardiovascular: Normal rate, regular rhythm and normal heart sounds  Pulmonary/Chest: Effort normal and breath sounds normal  No respiratory distress  She has no wheezes  She has no rales  She exhibits no tenderness  Abdominal: Soft  Bowel sounds are normal  She exhibits no distension and no mass  There is no tenderness  There is no rebound and no guarding  No hernia  Musculoskeletal: Normal range of motion  She exhibits no edema or tenderness  Lymphadenopathy:     She has no cervical adenopathy  Neurological: She is alert and oriented to person, place, and time  No cranial nerve deficit  Coordination normal    Skin: Skin is warm and dry  No rash noted  She is not diaphoretic  No erythema  No pallor  Psychiatric: She has a normal mood and affect  Her behavior is normal  Judgment and thought content normal    Nursing note and vitals reviewed      RESULTS  Lab Results    Chemistry        Component Value Date/Time    K 3 2 (L) 01/08/2021 0833     01/08/2021 0833    CO2 29 01/08/2021 0833    BUN 14 01/08/2021 0833    CREATININE 0 86 01/08/2021 0833        Component Value Date/Time    CALCIUM 9 5 01/08/2021 0833    ALKPHOS 309 (H) 01/08/2021 0833    AST 47 (H) 01/08/2021 0833    ALT 69 01/08/2021 0833        Lab Results   Component Value Date    WBC 3 32 (L) 01/08/2021    HGB 11 7 01/08/2021    HCT 37 3 01/08/2021    MCV 92 01/08/2021     01/08/2021     Imaging Studies  Ct Chest Abdomen Pelvis W Contrast    Result Date: 1/11/2021  Narrative: CT CHEST, ABDOMEN AND PELVIS WITH IV CONTRAST INDICATION:   C18 9: Malignant neoplasm of colon, unspecified C78 7: Secondary malignant neoplasm of liver and intrahepatic bile duct  COMPARISON:  CT chest abdomen and pelvis 9/23/2020 and 1/29/2019 TECHNIQUE: CT examination of the chest, abdomen and pelvis was performed  Scanning through the abdomen was performed in arterial, venous and delayed phases according a protocol spefically designed to evaluate upper abdominal viscera  Axial, sagittal, and coronal 2D reformatted images were created from the source data and submitted for interpretation  Radiation dose length product (DLP) for this visit:  1144 mGy-cm   This examination, like all CT scans performed in the [facility], was performed utilizing techniques to minimize radiation dose exposure, including the use of iterative reconstruction and automated exposure control  IV Contrast:  100 mL of iohexol (OMNIPAQUE)   350 Enteric Contrast: Enteric contrast was administered  FINDINGS: CHEST LUNGS:  Stable 4 mm groundglass nodule right upper lobe image 29 series 5 unchanged as far back as January 2019  No new pulmonary nodule  No infiltrate  Central airways are clear  PLEURA:  Right posterior medial basilar multilobulated low density attributed to small multiloculated pleural effusion  Attention on follow-up advised  HEART/GREAT VESSELS:  Unremarkable for patient's age  Tip of portacatheter in the superior right atrium  MEDIASTINUM AND ANTHONY:  Unremarkable  CHEST WALL AND LOWER NECK:   Right anterior chest wall marko catheter  ABDOMEN LIVER/BILIARY TREE:  Multiple hepatic hypodense metastatic lesion; representative lesions will be measured on series 4: Image 40, segment 8 right lobe, 2 x 1 7 cm previously 1 4 x 1 3 cm  Image 42, segment 8 right lobe, 1 6 x 1 5 cm, stable  Image 45, segment 8 right lobe, 1 5 x 1 4 cm previously 5 x 4 mm  Image 48, segment 7 right lobe, 2 4 x 1 9 cm, stable  No duct dilation  GALLBLADDER:  No calcified gallstones   No pericholecystic inflammatory change  SPLEEN:  Unremarkable  PANCREAS:  Unremarkable  ADRENAL GLANDS:  Unremarkable  KIDNEYS/URETERS:  Stable 2 cm left renal simple cyst  No hydronephrosis or perinephric collection  STOMACH AND BOWEL:  Post right hemicolectomy  No abnormal soft tissue at the ileocolic anastomosis  No bowel obstruction  APPENDIX:  Post appendectomy  ABDOMINOPELVIC CAVITY:  No ascites  No pneumoperitoneum  No lymphadenopathy  VESSELS:  Stable prominent bilateral adnexal and left ovarian veins may be sequela of chronic pelvic mass or congestion syndrome  Stable tiny distal esophageal and gastrohepatic varices  PELVIS REPRODUCTIVE ORGANS:  Unremarkable for patient's age  URINARY BLADDER:  Unremarkable  ABDOMINAL WALL/INGUINAL REGIONS:  Stable small ventral midline supraumbilical fat-containing hernia  OSSEOUS STRUCTURES:  No acute fracture or osseous destructive lesion identified  Mild degenerative changes of the spine  Impression: CT chest: No evidence of thoracic metastatic disease  4 mm groundglass nodule right upper lobe unchanged as far back as November 2019  Right posterior medial basilar multiloculated pleural effusion  CT abdomen and pelvis: Multiple hepatic metastatic lesions, some of which are stable and some of which have enlarged since September 2020  No other significant interval change  This study demonstrates a significant  finding and was documented as such in [place] for liaison and referring practitioner notification  Workstation performed: [id number]     Pathology: See Above    ASSESSMENT  1  Colon cancer metastasized to liver [facility])  Ambulatory referral to Interventional Radiology   2  Secondary malignant neoplasm of liver [facility])  Ambulatory referral to Interventional Radiology     Cancer Staging  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        PLAN/DISCUSSION  Orders Placed This Encounter   Procedures    Ambulatory referral to Interventional Radiology        Scott Soliz is a 54y o  year old female with a 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 at time of her diagnosis in the fall of 2018  Liver biopsy on 10/25/2018 confirmed metastatic adenocarcinoma consistent with colorectal primary  She has been receiving chemotherapy with Dr Anabel Hayden as outlined above  She had a good partial response and then have palliative resection of her transverse colon primary secondary to bleeding in August of 2019  She had some bleeding again in July 2020 and repeat colonoscopy July 23, 2020 was unremarkable  She was last seen here July 6, 2020 for consideration of Sir sphere treatment but had just started FOLFIRI on Mary 15, 2020  She was having no symptoms and decided to continue with the FOLFIRI infusion without any Sir sphere treatments last summer  She returns today for re-evaluation  Repeat CT scans of the chest abdomen pelvis from September 23, 2020 revealed a stable 4 mm ground-glass nodule in the right upper lobe along with regression of disease and decrease in size of hepatic metastasis with no new lesions  She was then continued on the FOLFIRI plus bevacizumab  Her CEA level brennen to 631 3 on January 8, 2021 from 246 3 on October 30, 2020  Repeat CT scans of the chest, abdomen, pelvis on January 9, 2021 revealed no evidence of any pulmonary metastatic disease with a stable 4 mm ground-glass right upper lobe lung nodule  There were multiple hepatic metastasis some of which were stable and sum of which have increased in size  We reviewed the results of the imaging studies with her today  Since her disease is progressing on her current treatment regimen and the only progression is confined to the liver, we would recommend liver directed therapy with Sir sphere radiation therapy to both the right and left hepatic lobes in divided doses on the same day  We discussed rationale for treatment including the acute side effects and the potential chronic complications  Her last chemotherapy treatment that included bevacizumab was on December 28, 2020  We recommend holding any additional bevacizumab until after she receives the Sir sphere radiation therapy  We understand her tumor was being sent out for the [place] fusion protein as well as HER2 in order to change her systemic treatment regimen  We discussed the acute side effects and the potential chronic complications of the Sir sphere treatment with her and she is agreeable  She will be scheduled for the Sir sphere treatment pending insurance authorization  Gladys Miller MD  1/18/2021,10:47 AM      Portions of the record may have been created with voice recognition software  Occasional wrong word or "sound a like" substitutions may have occurred due to the inherent limitations of voice recognition software  Read the chart carefully and recognize, using context, where substitutions have occurred

## 2021-01-18 NOTE — H&P (VIEW-ONLY)
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 at time of her diagnosis in the   Liver biopsy on 10/25/2018 confirmed metastatic adenocarcinoma consistent with colorectal primary  Nelson Champagne has been receiving chemotherapy with Dr Anabel Hayden  Nelson Champagne had a good partial response and then have palliative resection of her transverse colon primary secondary to bleeding in 2019   She has had no further bleeding since that time  Nelson Champagne had elevation of her CEA and was started back on Avastin and 5 FU in 2019  CEA level was 30 4 2019 and now this brennen up to 174 0 on May 18, 2020  Restaging CT scans of the chest, abdomen, and pelvis on May 27, 2020 revealed diffuse hepatic metastasis with some nodules that are stable and some nodules that are enlarging   There were no new nodules  Clara Manley was a stable 4 mm right upper lobe ground-glass nodule  Clara Manley was no evidence of any disease outside of the liver   She started on FOLFIRI on Mary 15, 2020  She has not had Avastin since May 1, 2020   She is a good candidate for Sir sphere radiation therapy to treat her multiple bilateral liver metastasis   We discussed the rationale for Sir spheres including the acute side effects and the potential chronic complications with her  Jalen De Paz discussed mapping procedure 1st to be followed by treatment approximately 1 week later   We answered all of her questions and she consents to treatment   She will need to remain off Avastin until after completion of the Sir sphere treatment  Maxime Shearer is also scheduled for colonoscopy on July 23, 2020  She will continue with her FOLFIRI infusion   She will be scheduled for Sir spheres most likely in August pending insurance authorization        7/17/20 Dr Kathleen Shipman- She presents today to discuss Sir sphere  We had extensive discussion regarding the cidofovir  Sir sphere is a liver directed therapy  Previous randomized trial comparing systemic chemotherapy with or without Sir sphere did not show any survival benefit  Response rate is slightly better with Sir sphere arm  Since she has no tumor related symptomatology, palliative benefit is not expected  With her current condition as well as no survival benefit with Sir sphere, indication for this procedure is highly questionable  She feel comfortable continuing with systemic chemotherapy at this moment       7/23/20 Colonoscopy was unremarkable    9/23/20 CT C/A/P- CT chest:  -4 mm groundglass nodule right upper lobe unchanged as far back as November 2019  No new pulmonary nodule   -No significant interval change      CT abdomen and pelvis:  -Interval regression of disease with diminished size of hepatic lesions  No new or enlarging lesions   -No other significant interval change  9/28/20 Dr Kathleen Shipman- she is currently on FOLFIRI plus bevacizumab with minimal toxicity   She has biochemical stable disease but radiographically has regression of liver metastasis   Therefore, I recommended her to continue with bevacizumab with FOLFIRI every 2 weeks       10/9/20 Dr Carlos A Unger- she is having a good response to therapy so far  Disease stable radiographically  3 month f/u with repeat scan and bloodwork      1/9/21 CT C/A/P- CT chest:  -No evidence of thoracic metastatic disease   4 mm groundglass nodule right upper lobe unchanged as far back as November 2019   -Right posterior medial basilar multiloculated pleural effusion      CT abdomen and pelvis:  -Multiple hepatic metastatic lesions, some of which are stable and some of which have enlarged since September 2020   -No other significant interval change      1/11/21 Dr Magnolia Ariza- Based on her recent CT scan as well as CEA, her disease progressed on current treatment   We discussed further treatment options  [facility] is interested in Sir sphere which was previously suggested by radiation oncologist  Robert Seip liver is the only organ that has been able tumor, liver directed therapy with Sir sphere may be reasonable, although there is no survival advantage have been proven   She is aware of this  Jhonny Pagan will also ask pathology department to sent her tumor is out for TRK fusion protein as well as HER2 which may give her additional treatment options      She reports fatigue at times that is stable  She denies any abdominal pains or cramps  She has no nausea and no vomiting  She continues to work full-time from home in the [employer] here at [address] as a   She lives at home with her boyfriend of 25 years and their 15year-old son who is currently doing school virtually from home  She reports having a negative COVID test 1 week ago  She has a history of rheumatoid arthritis for many years with some contractures of her right hand  She is on methotrexate weekly as well as folic acid  No med onc or surg onc f/u scheduled    Historical Information   Oncology History   Colon cancer metastasized to liver [facility])   10/25/2018 Initial Diagnosis    Colon cancer metastasized to liver ([facility] [address] )     10/25/2018 Biopsy    Final Diagnosis   A   Liver mass (core needle biopsy):  - Metastatic adenocarcinoma with extensive necrosis           11/12/2018 - 4/2019 Chemotherapy    Bevacizumab with FOLFOX-6   X12 cycle     4/19/2019 - 10/27/2019 Chemotherapy    bevacizumab (AVASTIN) 900 mg in sodium chloride 0 9 % 100 mL IVPB, 15 mg/kg, Intravenous, Once, 5 of 9 cycles  Dose modification: 7 5 mg/kg (original dose 15 mg/kg, Cycle 2, Reason: Other (See Comments), Comment: regimen)  Administration: 450 mg (5/20/2019), 450 mg (6/10/2019), 450 mg (7/1/2019), 450 mg (10/7/2019)     8/29/2019 Surgery    Right hemicolectomy:  - Residual adenocarcinoma   - Thirty (30) lymph nodes negative for carcinoma (0/30)     11/4/2019 - 6/14/2020 Chemotherapy    bevacizumab (AVASTIN) 292 5 mg in sodium chloride 0 9 % 100 mL IVPB, 5 mg/kg = 292 5 mg (100 % of original dose 5 mg/kg), Intravenous, Once, 13 of 15 cycles  Dose modification: 5 mg/kg (original dose 5 mg/kg, Cycle 1)  Administration: 292 5 mg (11/4/2019), 292 5 mg (11/18/2019), 292 5 mg (12/2/2019), 292 5 mg (12/16/2019), 292 5 mg (12/31/2019), 292 5 mg (1/13/2020), 292 5 mg (1/27/2020), 292 5 mg (2/10/2020), 292 5 mg (2/24/2020), 292 5 mg (3/9/2020), 292 5 mg (5/4/2020), 292 5 mg (5/18/2020), 292 5 mg (6/1/2020)  leucovorin 656 mg in dextrose 5 % 250 mL IVPB, 400 mg/m2 = 656 mg, Intravenous, Once, 16 of 18 cycles  Administration: 656 mg (11/4/2019), 656 mg (11/18/2019), 656 mg (12/2/2019), 656 mg (12/16/2019), 656 mg (12/31/2019), 656 mg (4/6/2020), 656 mg (1/13/2020), 656 mg (1/27/2020), 656 mg (2/10/2020), 656 mg (2/24/2020), 656 mg (3/9/2020), 656 mg (3/23/2020), 656 mg (4/20/2020), 656 mg (5/4/2020), 656 mg (5/18/2020), 656 mg (6/1/2020)     6/15/2020 - 1/10/2021 Chemotherapy    bevacizumab (AVASTIN) 320 mg in sodium chloride 0 9 % 100 mL IVPB, 5 mg/kg = 320 mg, Intravenous, Once, 12 of 13 cycles  Administration: 320 mg (7/27/2020), 320 mg (8/10/2020), 320 mg (8/24/2020), 320 mg (9/8/2020), 320 mg (9/21/2020), 320 mg (10/5/2020), 320 mg (10/19/2020), 320 mg (11/2/2020), 320 mg (11/16/2020), 320 mg (11/30/2020), 320 mg (12/14/2020), 320 mg (12/28/2020)  irinotecan (CAMPTOSAR) 300 mg in sodium chloride 0 9 % 500 mL chemo infusion, 308 mg, Intravenous, Once, 15 of 16 cycles  Administration: 300 mg (6/15/2020), 300 mg (6/29/2020), 300 mg (7/13/2020), 300 mg (7/27/2020), 300 mg (8/10/2020), 300 mg (8/24/2020), 300 mg (9/8/2020), 300 mg (9/21/2020), 300 mg (10/5/2020), 300 mg (10/19/2020), 300 mg (11/2/2020), 300 mg (11/16/2020), 300 mg (11/30/2020), 300 mg (12/14/2020), 300 mg (12/28/2020)  leucovorin 684 mg in sodium chloride 0 9 % 250 mL IVPB, 400 mg/m2 = 684 mg, Intravenous, Once, 15 of 16 cycles  Administration: 684 mg (6/15/2020), 684 mg (6/29/2020), 684 mg (7/13/2020), 684 mg (7/27/2020), 684 mg (8/10/2020), 684 mg (8/24/2020), 648 mg (9/8/2020), 684 mg (9/21/2020), 684 mg (10/5/2020), 684 mg (10/19/2020), 684 mg (11/2/2020), 684 mg (11/16/2020), 684 mg (11/30/2020), 684 mg (12/14/2020), 684 mg (12/28/2020)           Past Medical History:   Diagnosis Date    Anxious mood     [facility]     [facility]     History of chemotherapy     Rheumatoid arthritis [facility]      Past Surgical History:   Procedure Laterality Date    APPENDECTOMY      ESOPHAGOGASTRODUODENOSCOPY N/A 9/14/2018    Procedure: ESOPHAGOGASTRODUODENOSCOPY (EGD) with polypectomy;  Surgeon: Yusuf Price MD;  Location: [facility];   Service: Gastroenterology    IR BIOPSY LIVER MASS  10/25/2018    IR PORT PLACEMENT  11/7/2018    r chest    FL PART REMOVAL COLON W ANASTOMOSIS N/A 8/29/2019    Procedure: EXTENDED RIGHT COLON RESECTION;  Surgeon: Santos Maddox MD;  [facility]: [facility];  Service: Surgical Oncology    TUBAL LIGATION      resolved 2007    WISDOM TOOTH EXTRACTION      resolved 1990       Family History   Problem Relation Age of Onset    Anxiety disorder Mother     Hypertension Father     Diabetes Father     Heart attack Maternal Grandmother         acute MI    Breast cancer Cousin        Social History   Social History     Substance and Sexual Activity   Alcohol Use Not Currently    Alcohol/week: 0 0 standard drinks    Frequency: Never    Drinks per session: Patient refused    Binge frequency: Never Social History     Substance and Sexual Activity   Drug Use No     Social History     Tobacco Use   Smoking Status Former Smoker    Packs/day: 0 00    Years: 0 00    Pack years: 0 00    Quit date: 2000    Years since quittin 4   Smokeless Tobacco Never Used     Meds/Allergies     Current Outpatient Medications:     cyanocobalamin (VITAMIN B-12) 1,000 mcg tablet, Take by mouth daily, Disp: , Rfl:     folic acid (FOLVITE) 1 mg tablet, Take 1 mg by mouth daily , Disp: , Rfl:     methotrexate 2 5 mg tablet, 15 mg once a week , Disp: , Rfl:     Multiple Vitamin (MULTIVITAMIN) capsule, Take 1 capsule by mouth daily, Disp: , Rfl:     Omega-3 Fatty Acids (FISH OIL PO), Take by mouth daily , Disp: , Rfl:     hydrocortisone 1 % lotion, Apply topically 2 (two) times a day, Disp: , Rfl:     Irinotecan HCl (CAMPTOSAR IV), irinotecan, Disp: , Rfl:     potassium chloride (K-DUR,KLOR-CON) 20 mEq tablet, Take 1 tablet (20 mEq total) by mouth daily (Patient not taking: Reported on 2021), Disp: 30 tablet, Rfl: 0  No Known Allergies    Review of Systems  Constitutional: Positive for fever (at times)  HENT: Negative  Eyes: Positive for visual disturbance (blurry vision at times)  Respiratory: Negative  Cardiovascular: Negative  Gastrointestinal: Negative  Endocrine: Negative  Genitourinary: Negative  Musculoskeletal: Positive for arthralgias (RA)  Skin: Negative  Allergic/Immunologic: Negative  Neurological: Positive for numbness (neuropathy in feet) and headaches (last week)  Hematological: Negative      Psychiatric/Behavioral: Negative        OBJECTIVE:   /96   Pulse 89   Temp (!) 96 7 °F (35 9 °C)   Resp 18   Ht 5' 4" (1 626 m)   Wt 62 7 kg (138 lb 3 2 oz)   LMP 2018 (Exact Date)   SpO2 98%   BMI 23 72 kg/m²   Pain Assessment:  0  Performance Status: ECOG/Zubrod/WHO: 1 - Symptomatic but completely ambulatory    Physical Exam   Constitutional: She is oriented to person, place, and time  She appears well-developed and well-nourished  No distress  HENT:   Head: Normocephalic and atraumatic  Mouth/Throat: No oropharyngeal exudate  Eyes: Pupils are equal, round, and reactive to light  Conjunctivae and EOM are normal  No scleral icterus  Neck: Normal range of motion  Neck supple  No tracheal deviation present  No thyromegaly present  Cardiovascular: Normal rate, regular rhythm and normal heart sounds  Pulmonary/Chest: Effort normal and breath sounds normal  No respiratory distress  She has no wheezes  She has no rales  She exhibits no tenderness  Abdominal: Soft  Bowel sounds are normal  She exhibits no distension and no mass  There is no tenderness  There is no rebound and no guarding  No hernia  Musculoskeletal: Normal range of motion  She exhibits no edema or tenderness  Lymphadenopathy:     She has no cervical adenopathy  Neurological: She is alert and oriented to person, place, and time  No cranial nerve deficit  Coordination normal    Skin: Skin is warm and dry  No rash noted  She is not diaphoretic  No erythema  No pallor  Psychiatric: She has a normal mood and affect  Her behavior is normal  Judgment and thought content normal    Nursing note and vitals reviewed      RESULTS  Lab Results    Chemistry        Component Value Date/Time    K 3 2 (L) 01/08/2021 0833     01/08/2021 0833    CO2 29 01/08/2021 0833    BUN 14 01/08/2021 0833    CREATININE 0 86 01/08/2021 0833        Component Value Date/Time    CALCIUM 9 5 01/08/2021 0833    ALKPHOS 309 (H) 01/08/2021 0833    AST 47 (H) 01/08/2021 0833    ALT 69 01/08/2021 0833        Lab Results   Component Value Date    WBC 3 32 (L) 01/08/2021    HGB 11 7 01/08/2021    HCT 37 3 01/08/2021    MCV 92 01/08/2021     01/08/2021     Imaging Studies  Ct Chest Abdomen Pelvis W Contrast    Result Date: 1/11/2021  Narrative: CT CHEST, ABDOMEN AND PELVIS WITH IV CONTRAST INDICATION:   C18 9: Malignant neoplasm of colon, unspecified C78 7: Secondary malignant neoplasm of liver and intrahepatic bile duct  COMPARISON:  CT chest abdomen and pelvis 9/23/2020 and 1/29/2019 TECHNIQUE: CT examination of the chest, abdomen and pelvis was performed  Scanning through the abdomen was performed in arterial, venous and delayed phases according a protocol spefically designed to evaluate upper abdominal viscera  Axial, sagittal, and coronal 2D reformatted images were created from the source data and submitted for interpretation  Radiation dose length product (DLP) for this visit:  1144 mGy-cm   This examination, like all CT scans performed in the [facility], was performed utilizing techniques to minimize radiation dose exposure, including the use of iterative reconstruction and automated exposure control  IV Contrast:  100 mL of iohexol (OMNIPAQUE)   350 Enteric Contrast: Enteric contrast was administered  FINDINGS: CHEST LUNGS:  Stable 4 mm groundglass nodule right upper lobe image 29 series 5 unchanged as far back as January 2019  No new pulmonary nodule  No infiltrate  Central airways are clear  PLEURA:  Right posterior medial basilar multilobulated low density attributed to small multiloculated pleural effusion  Attention on follow-up advised  HEART/GREAT VESSELS:  Unremarkable for patient's age  Tip of portacatheter in the superior right atrium  MEDIASTINUM AND ANTHONY:  Unremarkable  CHEST WALL AND LOWER NECK:   Right anterior chest wall marko catheter  ABDOMEN LIVER/BILIARY TREE:  Multiple hepatic hypodense metastatic lesion; representative lesions will be measured on series 4: Image 40, segment 8 right lobe, 2 x 1 7 cm previously 1 4 x 1 3 cm  Image 42, segment 8 right lobe, 1 6 x 1 5 cm, stable  Image 45, segment 8 right lobe, 1 5 x 1 4 cm previously 5 x 4 mm  Image 48, segment 7 right lobe, 2 4 x 1 9 cm, stable  No duct dilation  GALLBLADDER:  No calcified gallstones   No pericholecystic inflammatory change  SPLEEN:  Unremarkable  PANCREAS:  Unremarkable  ADRENAL GLANDS:  Unremarkable  KIDNEYS/URETERS:  Stable 2 cm left renal simple cyst  No hydronephrosis or perinephric collection  STOMACH AND BOWEL:  Post right hemicolectomy  No abnormal soft tissue at the ileocolic anastomosis  No bowel obstruction  APPENDIX:  Post appendectomy  ABDOMINOPELVIC CAVITY:  No ascites  No pneumoperitoneum  No lymphadenopathy  VESSELS:  Stable prominent bilateral adnexal and left ovarian veins may be sequela of chronic pelvic mass or congestion syndrome  Stable tiny distal esophageal and gastrohepatic varices  PELVIS REPRODUCTIVE ORGANS:  Unremarkable for patient's age  URINARY BLADDER:  Unremarkable  ABDOMINAL WALL/INGUINAL REGIONS:  Stable small ventral midline supraumbilical fat-containing hernia  OSSEOUS STRUCTURES:  No acute fracture or osseous destructive lesion identified  Mild degenerative changes of the spine  Impression: CT chest: No evidence of thoracic metastatic disease  4 mm groundglass nodule right upper lobe unchanged as far back as November 2019  Right posterior medial basilar multiloculated pleural effusion  CT abdomen and pelvis: Multiple hepatic metastatic lesions, some of which are stable and some of which have enlarged since September 2020  No other significant interval change  This study demonstrates a significant  finding and was documented as such in [employer] for liaison and referring practitioner notification  Workstation performed: [id number]     Pathology: See Above    ASSESSMENT  1  Colon cancer metastasized to liver [facility])  Ambulatory referral to Interventional Radiology   2  Secondary malignant neoplasm of liver 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 at time of her diagnosis in the fall of 2018  Liver biopsy on 10/25/2018 confirmed metastatic adenocarcinoma consistent with colorectal primary  She has been receiving chemotherapy with Dr Yessenia Michel as outlined above  She had a good partial response and then have palliative resection of her transverse colon primary secondary to bleeding in August of 2019  She had some bleeding again in July 2020 and repeat colonoscopy July 23, 2020 was unremarkable  She was last seen here July 6, 2020 for consideration of Sir sphere treatment but had just started FOLFIRI on Mary 15, 2020  She was having no symptoms and decided to continue with the FOLFIRI infusion without any Sir sphere treatments last summer  She returns today for re-evaluation  Repeat CT scans of the chest abdomen pelvis from September 23, 2020 revealed a stable 4 mm ground-glass nodule in the right upper lobe along with regression of disease and decrease in size of hepatic metastasis with no new lesions  She was then continued on the FOLFIRI plus bevacizumab  Her CEA level brennen to 631 3 on January 8, 2021 from 246 3 on October 30, 2020  Repeat CT scans of the chest, abdomen, pelvis on January 9, 2021 revealed no evidence of any pulmonary metastatic disease with a stable 4 mm ground-glass right upper lobe lung nodule  There were multiple hepatic metastasis some of which were stable and sum of which have increased in size  We reviewed the results of the imaging studies with her today  Since her disease is progressing on her current treatment regimen and the only progression is confined to the liver, we would recommend liver directed therapy with Sir sphere radiation therapy to both the right and left hepatic lobes in divided doses on the same day  We discussed rationale for treatment including the acute side effects and the potential chronic complications  Her last chemotherapy treatment that included bevacizumab was on December 28, 2020  We recommend holding any additional bevacizumab until after she receives the Sir sphere radiation therapy  We understand her tumor was being sent out for the [place] fusion protein as well as HER2 in order to change her systemic treatment regimen  We discussed the acute side effects and the potential chronic complications of the Sir sphere treatment with her and she is agreeable  She will be scheduled for the Sir sphere treatment pending insurance authorization  Deuce Reese MD  1/18/2021,10:47 AM      Portions of the record may have been created with voice recognition software  Occasional wrong word or "sound a like" substitutions may have occurred due to the inherent limitations of voice recognition software  Read the chart carefully and recognize, using context, where substitutions have occurred

## 2021-01-18 NOTE — PROGRESS NOTES
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 at time of her diagnosis in the fall of 2018  Liver biopsy on 10/25/2018 confirmed metastatic adenocarcinoma consistent with colorectal primary  She has been receiving chemotherapy with Dr Mitch Yap  She had a good partial response and then have palliative resection of her transverse colon primary secondary to bleeding in August of 2019  She has had no further bleeding since that time  She had elevation of her CEA and was started back on Avastin and 5 FU in November of 2019  CEA level was 30 4 December 27, 2019 and now this brennen up to 174 0 on May 18, 2020  Restaging CT scans of the chest, abdomen, and pelvis on May 27, 2020 revealed diffuse hepatic metastasis with some nodules that are stable and some nodules that are enlarging  There were no new nodules  There was a stable 4 mm right upper lobe ground-glass nodule  There was no evidence of any disease outside of the liver  She started on FOLFIRI on Mary 15, 2020  She has not had Avastin since May 1, 2020  She is a good candidate for Sir sphere radiation therapy to treat her multiple bilateral liver metastasis  We discussed the rationale for Sir spheres including the acute side effects and the potential chronic complications with her  We discussed mapping procedure 1st to be followed by treatment approximately 1 week later  We answered all of her questions and she consents to treatment  She will need to remain off Avastin until after completion of the Sir sphere treatment  She is also scheduled for colonoscopy on July 23, 2020  She will continue with her FOLFIRI infusion    She will be scheduled for Sir spheres most likely in August pending insurance authorization  7/17/20 Dr Stu Fuller- She presents today to discuss Sir sphere  We had extensive discussion regarding the cidofovir  Sir sphere is a liver directed therapy  Previous randomized trial comparing systemic chemotherapy with or without Sir sphere did not show any survival benefit  Response rate is slightly better with Sir sphere arm  Since she has no tumor related symptomatology, palliative benefit is not expected  With her current condition as well as no survival benefit with Sir sphere, indication for this procedure is highly questionable  She feel comfortable continuing with systemic chemotherapy at this moment  9/23/20 CT C/A/P- CT chest:  -4 mm groundglass nodule right upper lobe unchanged as far back as November 2019  No new pulmonary nodule   -No significant interval change  CT abdomen and pelvis:  -Interval regression of disease with diminished size of hepatic lesions  No new or enlarging lesions   -No other significant interval change  9/28/20 Dr Stu Fuller- she is currently on FOLFIRI plus bevacizumab with minimal toxicity  She has biochemical stable disease but radiographically has regression of liver metastasis  Therefore, I recommended her to continue with bevacizumab with FOLFIRI every 2 weeks  10/9/20 Dr Acacia Miller- she is having a good response to therapy so far  Disease stable radiographically  3 month f/u with repeat scan and bloodwork  1/9/21 CT C/A/P- CT chest:  -No evidence of thoracic metastatic disease  4 mm groundglass nodule right upper lobe unchanged as far back as November 2019   -Right posterior medial basilar multiloculated pleural effusion  CT abdomen and pelvis:  -Multiple hepatic metastatic lesions, some of which are stable and some of which have enlarged since September 2020   -No other significant interval change        1/11/21 Dr Stu Fuller- Based on her recent CT scan as well as CEA, her disease progressed on current treatment  We discussed further treatment options  She is interested in Sir sphere which was previously suggested by radiation oncologist   Since liver is the only organ that has been able tumor, liver directed therapy with Sir sphere may be reasonable, although there is no survival advantage have been proven  She is aware of this  I will also ask pathology department to sent her tumor is out for [place] fusion protein as well as HER2 which may give her additional treatment options    No med onc or surg onc f/u scheduled      Oncology History   Colon cancer metastasized to liver [facility])   10/25/2018 Initial Diagnosis    Colon cancer metastas[facility] to liver ([facility] [address] )     10/25/2018 Biopsy    Final Diagnosis   A   Liver mass (core needle biopsy):  - Metastatic adenocarcinoma with extensive necrosis           11/12/2018 - 4/2019 Chemotherapy    Bevacizumab with FOLFOX-6   X12 cycle     4/19/2019 - 10/27/2019 Chemotherapy    bevacizumab (AVASTIN) 900 mg in sodium chloride 0 9 % 100 mL IVPB, 15 mg/kg, Intravenous, Once, 5 of 9 cycles  Dose modification: 7 5 mg/kg (original dose 15 mg/kg, Cycle 2, Reason: Other (See Comments), Comment: regimen)  Administration: 450 mg (5/20/2019), 450 mg (6/10/2019), 450 mg (7/1/2019), 450 mg (10/7/2019)     8/29/2019 Surgery    Right hemicolectomy:  - Residual adenocarcinoma   - Thirty (30) lymph nodes negative for carcinoma (0/30)     11/4/2019 - 6/14/2020 Chemotherapy    bevacizumab (AVASTIN) 292 5 mg in sodium chloride 0 9 % 100 mL IVPB, 5 mg/kg = 292 5 mg (100 % of original dose 5 mg/kg), Intravenous, Once, 13 of 15 cycles  Dose modification: 5 mg/kg (original dose 5 mg/kg, Cycle 1)  Administration: 292 5 mg (11/4/2019), 292 5 mg (11/18/2019), 292 5 mg (12/2/2019), 292 5 mg (12/16/2019), 292 5 mg (12/31/2019), 292 5 mg (1/13/2020), 292 5 mg (1/27/2020), 292 5 mg (2/10/2020), 292 5 mg (2/24/2020), 292 5 mg (3/9/2020), 292 5 mg (5/4/2020), 292 5 mg (5/18/2020), 292 5 mg (6/1/2020)  leucovorin 656 mg in dextrose 5 % 250 mL IVPB, 400 mg/m2 = 656 mg, Intravenous, Once, 16 of 18 cycles  Administration: 656 mg (11/4/2019), 656 mg (11/18/2019), 656 mg (12/2/2019), 656 mg (12/16/2019), 656 mg (12/31/2019), 656 mg (4/6/2020), 656 mg (1/13/2020), 656 mg (1/27/2020), 656 mg (2/10/2020), 656 mg (2/24/2020), 656 mg (3/9/2020), 656 mg (3/23/2020), 656 mg (4/20/2020), 656 mg (5/4/2020), 656 mg (5/18/2020), 656 mg (6/1/2020)     6/15/2020 - 1/10/2021 Chemotherapy    bevacizumab (AVASTIN) 320 mg in sodium chloride 0 9 % 100 mL IVPB, 5 mg/kg = 320 mg, Intravenous, Once, 12 of 13 cycles  Administration: 320 mg (7/27/2020), 320 mg (8/10/2020), 320 mg (8/24/2020), 320 mg (9/8/2020), 320 mg (9/21/2020), 320 mg (10/5/2020), 320 mg (10/19/2020), 320 mg (11/2/2020), 320 mg (11/16/2020), 320 mg (11/30/2020), 320 mg (12/14/2020), 320 mg (12/28/2020)  irinotecan (CAMPTOSAR) 300 mg in sodium chloride 0 9 % 500 mL chemo infusion, 308 mg, Intravenous, Once, 15 of 16 cycles  Administration: 300 mg (6/15/2020), 300 mg (6/29/2020), 300 mg (7/13/2020), 300 mg (7/27/2020), 300 mg (8/10/2020), 300 mg (8/24/2020), 300 mg (9/8/2020), 300 mg (9/21/2020), 300 mg (10/5/2020), 300 mg (10/19/2020), 300 mg (11/2/2020), 300 mg (11/16/2020), 300 mg (11/30/2020), 300 mg (12/14/2020), 300 mg (12/28/2020)  leucovorin 684 mg in sodium chloride 0 9 % 250 mL IVPB, 400 mg/m2 = 684 mg, Intravenous, Once, 15 of 16 cycles  Administration: 684 mg (6/15/2020), 684 mg (6/29/2020), 684 mg (7/13/2020), 684 mg (7/27/2020), 684 mg (8/10/2020), 684 mg (8/24/2020), 648 mg (9/8/2020), 684 mg (9/21/2020), 684 mg (10/5/2020), 684 mg (10/19/2020), 684 mg (11/2/2020), 684 mg (11/16/2020), 684 mg (11/30/2020), 684 mg (12/14/2020), 684 mg (12/28/2020)         Clinical Trial: no        Health Maintenance   Topic Date Due    Pneumococcal Vaccine: Pediatrics (0 to 5 Years) and At-Risk Patients (6 to 59 Years) (1 of 3 - PCV13) 08/16/1971    Influenza Vaccine (1) 2020    MAMMOGRAM  2021    Annual Physical  2021    Depression Screening PHQ  2022    BMI: Adult  2022    Cervical Cancer Screening  2023    HIB Vaccine  Aged Out    Hepatitis B Vaccine  Aged Out    IPV Vaccine  Aged Out    Hepatitis A Vaccine  Aged Out    Meningococcal ACWY Vaccine  Aged Out    HPV Vaccine  Aged Out    HIV Screening  Discontinued    Hepatitis C Screening  Discontinued    DTaP,Tdap,and Td Vaccines  Discontinued       Past Medical History:   Diagnosis Date    Anxious mood     [facility]     [facility] ([facility] )     History of chemotherapy     Rheumatoid arthritis ([facility] )        Past Surgical History:   Procedure Laterality Date    APPENDECTOMY      ESOPHAGOGASTRODUODENOSCOPY N/A 2018    Procedure: ESOPHAGOGASTRODUODENOSCOPY (EGD) with polypectomy;  Surgeon: Yusuf Price MD;  [facility]: [facility];   Service: Gastroenterology    IR BIOPSY LIVER MASS  10/25/2018    IR PORT PLACEMENT  2018    r chest    PA PART REMOVAL COLON W ANASTOMOSIS N/A 2019    Procedure: EXTENDED RIGHT COLON RESECTION;  Surgeon: Santos Maddox MD;  Location:  MAIN OR;  Service: Surgical Oncology    TUBAL LIGATION      resolved     WISDOM TOOTH EXTRACTION      resolved        Family History   Problem Relation Age of Onset    Anxiety disorder Mother     Hypertension Father     Diabetes Father     Heart attack Maternal Grandmother         acute MI    Breast cancer Cousin        Social History     Tobacco Use    Smoking status: Former Smoker     Packs/day: 0 00     Years: 0 00     Pack years: 0 00     Quit date: 2000     Years since quittin 4    Smokeless tobacco: Never Used   Substance Use Topics    Alcohol use: Not Currently     Alcohol/week: 0 0 standard drinks     Frequency: Never     Drinks per session: Patient refused     Binge frequency: Never    Drug use: No          Current Outpatient Medications:    cyanocobalamin (VITAMIN B-12) 1,000 mcg tablet, Take by mouth daily, Disp: , Rfl:     folic acid (FOLVITE) 1 mg tablet, Take 1 mg by mouth daily , Disp: , Rfl:     methotrexate 2 5 mg tablet, 15 mg once a week , Disp: , Rfl:     Multiple Vitamin (MULTIVITAMIN) capsule, Take 1 capsule by mouth daily, Disp: , Rfl:     Omega-3 Fatty Acids (FISH OIL PO), Take by mouth daily , Disp: , Rfl:     hydrocortisone 1 % lotion, Apply topically 2 (two) times a day, Disp: , Rfl:     Irinotecan HCl (CAMPTOSAR IV), irinotecan, Disp: , Rfl:     potassium chloride (K-DUR,KLOR-CON) 20 mEq tablet, Take 1 tablet (20 mEq total) by mouth daily (Patient not taking: Reported on 1/6/2021), Disp: 30 tablet, Rfl: 0    No Known Allergies     Review of Systems:  Review of Systems   Constitutional: Positive for fever (at times)  HENT: Negative  Eyes: Positive for visual disturbance (blurry vision at times)  Respiratory: Negative  Cardiovascular: Negative  Gastrointestinal: Negative  Endocrine: Negative  Genitourinary: Negative  Musculoskeletal: Positive for arthralgias (RA)  Skin: Negative  Allergic/Immunologic: Negative  Neurological: Positive for numbness (neuropathy in feet) and headaches (last week)  Hematological: Negative  Psychiatric/Behavioral: Negative  Vitals:    01/18/21 0946   BP: 147/96   Pulse: 89   Resp: 18   Temp: (!) 96 7 °F (35 9 °C)   SpO2: 98%   Weight: 62 7 kg (138 lb 3 2 oz)   Height: 5' 4" (1 626 m)       Pain Score: 0-No pain      Imaging:No images are attached to the encounter       Teaching SirSphere's packet reviewed    Implantable Devices (Port, Pacemaker, pain stimulator) port    Hip Replacement no

## 2021-01-21 NOTE — TELEPHONE ENCOUNTER
Patient scheduled for 3/11 at 320pm with Dr West Sena  She should see him 2 weeks after her procedure, which I believe is scheduled already  At that time, we will discuss her chemotherapy  She may also get the covid vaccine

## 2021-01-21 NOTE — TELEPHONE ENCOUNTER
Patient states Monday she had a appt with Dr Esdras Perez with radiation oncology and has recommendations regarding her resuming her chemo treatment after her sir insertion (documented details in 1/18 office note)    *Would like to know if she can have the covid 19 vaccine  Best call back [phone]

## 2021-01-21 NOTE — TELEPHONE ENCOUNTER
Patient advised of above  Verified above appointment with patient at [facility] location on 3/11 at 320pm with Dr Magnolia Ariza  Patient verbalized understanding of above

## 2021-01-27 NOTE — PROGRESS NOTES
Virtual Regular Visit      Assessment/Plan:    55F w/ metastatic colorectal cancer to liver, plans for [facility] selective internal radiation therapy as a form of liver directed therapy  This is administered in conjunction with radiation oncology, Dr Arcadio Araya has referred the patient  We reviewed her clinical course including presentation with metastatic colorectal cancer  We reviewed her imaging with liver metastases including the one that I biopsied and their response to therapy  Thankfully overall she has done quite well  I discussed the technical aspects of embolization including arterial access which I will plan to attempt from the wrist   If this is unable to treat the lesion effectively we will use groin access    I discussed that she will have 1 visit for mapping which will involve angiographic and scintigraphic mapping of her shunt function and arterial supply  I reviewed how if there is extrahepatic arterial supply it will either need to be embolized or the treatment may not be possible post embolization syndrome  She will then return for treatment  I reviewed her mesenteric anatomy including her alternate right hepatic artery that will require split doses to cover the whole liver      We discussed expected treatment effects including possible post embolization syndrome    I answered all of Ms renee's question and we look forward to completing the therapy        Problem List Items Addressed This Visit        Digestive    Colon cancer metastasized to liver [facility])    Secondary malignant neoplasm of liver [facility])               Reason for visit is   Chief Complaint   Patient presents with    Virtual Regular Visit        Encounter provider Jaylyn Ramos MD    Provider located at [address] [address] [phone]      Recent Visits  Date Type Provider Dept   01/27/21 Telemedicine Jaylyn Ramos MD [facility] 0698 recent visits within past 7 days and meeting all other requirements     Future Appointments  No visits were found meeting these conditions  Showing future appointments within next 150 days and meeting all other requirements        The patient was identified by name and date of birth  Lb Carpenter was informed that this is a telemedicine visit and that the visit is being conducted through [employer] and patient was informed that this is a secure, HIPAA-compliant platform  She agrees to proceed     My office door was closed  No one else was in the room  She acknowledged consent and understanding of privacy and security of the video platform  The patient has agreed to participate and understands they can discontinue the visit at any time  Patient is aware this is a billable service  Subjective  Lb Carpenter is a 54 y o  female with metastatic colorectal cancer being evaluated for internal radiation therapy  HPI     Past Medical History:   Diagnosis Date    Anxious mood     [facility]     Colon cancer ([facility] )     History of chemotherapy     [facility]        Past Surgical History:   Procedure Laterality Date    APPENDECTOMY      ESOPHAGOGASTRODUODENOSCOPY N/A 9/14/2018    Procedure: ESOPHAGOGASTRODUODENOSCOPY (EGD) with polypectomy;  Surgeon: Kayla Ceballos MD;  Location: [facility];   Service: Gastroenterology    IR BIOPSY LIVER MASS  10/25/2018    IR PORT PLACEMENT  11/7/2018    r chest    TN PART REMOVAL COLON W ANASTOMOSIS N/A 8/29/2019    Procedure: EXTENDED RIGHT COLON RESECTION;  Surgeon: Jonah Mata MD;  Location:  MAIN OR;  Service: Surgical Oncology    TUBAL LIGATION      resolved 2007    WISDOM TOOTH EXTRACTION      resolved 1990       Current Outpatient Medications   Medication Sig Dispense Refill    cyanocobalamin (VITAMIN B-12) 1,000 mcg tablet Take by mouth daily      folic acid (FOLVITE) 1 mg tablet Take 1 mg by mouth daily       hydrocortisone 1 % lotion Apply topically 2 (two) times a day      Irinotecan HCl (CAMPTOSAR IV) irinotecan      methotrexate 2 5 mg tablet 15 mg once a week       Multiple Vitamin (MULTIVITAMIN) capsule Take 1 capsule by mouth daily      Omega-3 Fatty Acids (FISH OIL PO) Take by mouth daily       potassium chloride (K-DUR,KLOR-CON) 20 mEq tablet Take 1 tablet (20 mEq total) by mouth daily (Patient not taking: Reported on 1/6/2021) 30 tablet 0     No current facility-administered medications for this visit  No Known Allergies    Review of Systems    Video Exam    There were no vitals filed for this visit  Physical Exam     I spent 25 minutes directly with the patient during this visit      VIRTUAL VISIT [facility] acknowledges that she has consented to an online visit or consultation  She understands that the online visit is based solely on information provided by her, and that, in the absence of a face-to-face physical evaluation by the physician, the diagnosis she receives is both limited and provisional in terms of accuracy and completeness  This is not intended to replace a full medical face-to-face evaluation by the physician  Ayush Jones understands and accepts these terms

## 2021-02-16 NOTE — SEDATION DOCUMENTATION
Y90 mapping done by Dr Tan Carrasco  Patient tolerated well and denies pain  VSS  TR band applied by Dr Orestes Kamara at [phone] and inflated with 16 mL air  Nuc med not ready for patient at this time so she will return to room at this time  Report given to Sugar Tejada RN

## 2021-02-16 NOTE — INTERVAL H&P NOTE
Update: (This section must be completed if the H&P was completed greater than 24 hrs to procedure or admission)    H&P reviewed  After examining the patient, I find no changed to the H&P since it had been written  55F w/ colorectal cancer metastatic to liver, interdisciplinary decision to proceed with intra-arterial radioembolization  Previously seen in virtual clinic  Variant anatomy noted  Rheumatoid arthritis  We will plan for left radial access, possible groin access    Risks including need for embolization, nontarget embolization, bleeding/access site complications discussed    Roya BUTCHER    MP2 ASA3    Patient re-evaluated   Accept as history and physical     Rickie Donovan MD/February 16, 2021/8:50 AM

## 2021-02-16 NOTE — BRIEF OP NOTE (RAD/CATH)
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

## 2021-02-16 NOTE — DISCHARGE INSTRUCTIONS
Discharge Instructions for SIRS Mapping Procedure                                    A Sirs mapping procedure is an arteriogram  done to prepare your liver for a SIRS Implantation  During the mapping your doctor will embolize (block) some of your blood vessels to keep the SIRS Spheres from traveling to areas outside your liver  AFTER YOU LEAVE:     Self-care:   · Limit activity: Rest for the remainder of the day of your procedure  Have some one with you until the next morning  Keep your arm or leg straight as much as possible   Rest as much as possible, sitting lying or reclining  Walk only to go to the bathroom, to bed or to eat  If the angiogram catheter was put in your leg, use the stairs as little as possible  No driving  · Keep your wound clean and dry  You may shower 24 hours after your procedure  The bandage you have on should fall off in 2-3 days  If there is any drainage from the puncture site, you should put on a clean bandage  · Watch for bleeding and bruising: It is normal to have a bruise and soreness where the angiogram catheter went in  · Diet:   · You may resume your regular diet  Small sips of flat soda will help with mild nausea  · Drink more liquids than usual for the next 24 hours                      Medications              Resume your normal medications              Start taking Prilosec as prescribed  daily for the next 30 days               Please get the Medrol prescription filled prior to your implantation (you will start taking it after the implantation)             WHAT [address]    After your second procedure, the SIRS implant  For  72 hours  NO pregnant visitors or family  No physical contact with others for more than two hours  Sleep alone in bed  No children or pets sitting on your lap  Keep a distance of three feet between yourself and others         · IMMEDIATELY Contact Interventional Radiology at [phone] [place] PATIENTS: Contact Interventional Radiology at [phone]) Bogdan Zee PATIENTS: Contact Interventional Radiology at [phone]) if any of the following occur:  · If your bruise gets larger or if you notice any active bleeding  APPLY DIRECT PRESSURE TO THE BLEEDING SITE  · If you notice increased swelling or have increased pain at the puncture site   · If you have any numbness or pain in the extremity of the puncture site   · If that extremity seems cold or pale  · You have fever greater than 101  · Persistent nausea or vomiting    Follow up with your primary healthcare provider  as directed: Write down your questions so you remember to ask them during your visits

## 2021-02-25 NOTE — H&P
Interventional Radiology  History and Physical 2/25/2021     Hilario Archer   1965   [id number]    Assessment/Plan:  55F with metastatic colorectal cancer, progressing on chemotherapy returns for radioembolization via Toby A 379 has been reviewed, dose calculations have been reviewed  We will perform split dose implantation via the left hepatic artery and replaced right hepatic artery  She recovered well from mapping    We have previously Discussed risks of the procedure including nontarget embolization    We will attempt left radial access again    MP2 ASA3  We jaymie sedate    /79   Pulse 69   Temp 98 1 °F (36 7 °C) (Oral)   Resp 20   Ht 5' 5" (1 651 m)   Wt 63 5 kg (140 lb)   LMP 09/28/2018 (Exact Date)   SpO2 97%   BMI 23 30 kg/m²       Problem List Items Addressed This Visit        Digestive    Colon cancer metastasized to liver [facility])    Relevant Orders    IR Y-90 radioembolization      Other Visit Diagnoses     Liver cancer ([facility] [address] )        Relevant Orders    NM liver spect post SIRS implant             Subjective:     Patient ID: Hilario Archer is a 54 y o  female  History of Present Illness  As above    Review of Systems      Past Medical History:   Diagnosis Date    Anxious mood     Cancer ([facility] [address] [facility]     Colon cancer [facility] [address] [facility]     History of chemotherapy     Rheumatoid arthritis ([facility] [address] )         Past Surgical History:   Procedure Laterality Date    APPENDECTOMY      ESOPHAGOGASTRODUODENOSCOPY N/A 9/14/2018    Procedure: ESOPHAGOGASTRODUODENOSCOPY (EGD) with polypectomy;  Surgeon: Reji Valencia MD;  Location: [facility];   Service: Gastroenterology    IR BIOPSY LIVER MASS  10/25/2018    IR PORT PLACEMENT  11/7/2018    r chest    IR Y-90 PRE-ANGIO/EMBO W/ LUNG SCAN  2/16/2021    OK PART REMOVAL COLON W ANASTOMOSIS N/A 8/29/2019    Procedure: EXTENDED RIGHT COLON RESECTION;  Surgeon: Nelly Nunez MD;  Location: [facility];  Service: Surgical Oncology    TUBAL LIGATION resolved     WISDOM TOOTH EXTRACTION      resolved         Social History     Tobacco Use   Smoking Status Former Smoker    Packs/day: 0 00    Years: 0 00    Pack years: 0 00    Quit date: 2000    Years since quittin 5   Smokeless Tobacco Never Used        Social History     Substance and Sexual Activity   Alcohol Use Not Currently    Alcohol/week: 0 0 standard drinks    Frequency: Never    Drinks per session: Patient refused    Binge frequency: Never        Social History     Substance and Sexual Activity   Drug Use No        No Known Allergies    Current Facility-Administered Medications   Medication Dose Route Frequency Provider Last Rate Last Admin    ceFAZolin (ANCEF) IVPB (premix in dextrose) 1,000 mg 50 mL  1,000 mg Intravenous Once Georgette Monique  mL/hr at 21 0851 1,000 mg at 21 0851    fentanyl citrate (PF) 100 MCG/2ML   Intravenous Code/Trauma/Sedation Med Georgette Monique MD   50 mcg at 21 9127    midazolam (VERSED) injection    Code/Trauma/Sedation Med Georgette Monique MD   1 mg at 21 8008    sodium chloride 0 9 % infusion  75 mL/hr Intravenous Continuous Georgette Monique MD              Objective:    Vitals:    21 0750   BP: 109/68   BP Location: Left arm   Pulse: 80   Resp: 16   Temp: 98 1 °F (36 7 °C)   TempSrc: Oral   SpO2: 99%   Weight: 63 5 kg (140 lb)   Height: 5' 5" (1 651 m)        Physical Exam      No results found for: BNP   Lab Results   Component Value Date    WBC 3 32 (L) 2021    HGB 11 7 2021    HCT 37 3 2021    MCV 92 2021     2021     Lab Results   Component Value Date    INR 0 97 2021    INR 1 09 2019    INR 1 08 2018    PROTIME 12 8 2021    PROTIME 13 7 2019    PROTIME 14 1 2018     Lab Results   Component Value Date    PTT 29 2019         I have personally reviewed pertinent imaging and laboratory results       Code Status: Prior  Advance Directive and Living Will:      Power of :    POLST:      This text is generated with voice recognition software  There may be translation, syntax,  or grammatical errors  If you have any questions, please contact the dictating provider

## 2021-02-25 NOTE — SEDATION DOCUMENTATION
Y-90 implantation completed by Dr Mandeep Rodriguez in IR without complication  TR band with 14cc air to left radial site  NV status intact to left hand and site unremarkable  Bedrest start time 1115  Nuc med dept not ready for patient at this time  Patient to return to [facility] and go to nuc med on call

## 2021-02-25 NOTE — SEDATION DOCUMENTATION
2cc air released from TR band to left radial site  Site unremarkable and NV status to left hand intact  [address] RN aware

## 2021-02-25 NOTE — DISCHARGE INSTRUCTIONS
SIRS IMPLANT DISCHARGE INSTRUCTIONS    WHAT YOU SHOULD KNOW:  For the next 72 hours after your SIRS implant NO pregnant visitors or family  No physical contact with others for more than two hours  Sleep alone in bed  No children or pets sitting on your lap  Keep a distance of three feet between yourself and others  AFTER YOU LEAVE:     Self-care:   · Limit activity: Rest for the remainder of the day of your procedure  Have some one with you until the next morning  Keep your arm or leg straight as much as possible  Rest as much as possible, sitting lying or reclining  Walk only to go to the bathroom, to bed or to eat  If the angiogram catheter was put in your leg, use the stairs as little as possible  No driving  · Keep your wound clean and dry  You may shower 24 hours after your procedure  The bandage you have on should fall off in 2-3 days  If there is any drainage from the puncture site, you should put on a clean bandage  · Watch for bleeding and bruising: It is normal to have a bruise and soreness where the angiogram catheter went in  · Medication Continue  to take Prilosec 20 mg daily for a total of  30 days after the initial mapping procedure  Start taking the  Medrol as directed  · Diet: You may resume your regular diet  Small sips of flat soda will help with mild nausea  Drink more liquids than usual for the next 24 hours      · IMMEDIATELY Contact Interventional Radiology at [phone] [place] PATIENTS: Contact Interventional Radiology at [phone]) Ashley Anderson PATIENTS: Contact Interventional Radiology at [phone]) if any of the following occur:  · If your bruise gets larger or if you notice any active bleeding  APPLY DIRECT PRESSURE TO THE BLEEDING SITE  · If you notice increased swelling or have increased pain at the puncture site   · If you have any numbness or pain in the extremity of the puncture site   · If that extremity seems cold or pale      · You have fever greater than 101  · Persistent nausea or vomiting      Follow up with your primary healthcare provider  as directed: Write down your questions so you remember to ask them during your visits

## 2021-02-25 NOTE — BRIEF OP NOTE (RAD/CATH)
INTERVENTIONAL RADIOLOGY PROCEDURE NOTE    Date: 2/25/2021    Procedure: IR Y-90 RADIOEMBOLIZATION    Preoperative diagnosis:   1  Colon cancer metastasized to liver ([facility] [address] )    2  Liver cancer ([facility] [address] )         Postoperative diagnosis: Same  Surgeon: Adriana Hartmann MD     Assistant: None  No qualified resident was available  Blood loss: minimal    Specimens: 0     Findings:     SIR SPHERES delivered into the left hepatic artery    SIR SPHERES delivered into the right hepatic artery    Left radial access    Complications: None immediate      Anesthesia: conscious sedation

## 2021-02-26 NOTE — QUICK NOTE
Called Ms Fermin    She feels well today  No left wrist issues  Mild abdominal pain yesterday has resolved, possibly this was related to sedation and NPO status    I discussed her imaging that the deposition of radiation in the liver looks good      She will continue with follow-up imaging in 3 months    All questions answered    She can reach out to us anytime

## 2021-03-11 NOTE — PROGRESS NOTES
Hematology / Oncology Outpatient Follow Up Note    Mika Form 54 y o  female :1965 QPR:[id number]         Date:  3/11/2021    Assessment / Plan:  A 63-year-old female with metastatic colon cancer  She has extensive liver metastasis   Her tumor has K-wanda mutation and is MSI stable   BRAF is wild type   She had 12 cycle of FOLFOX with bevacizumab with excellent tolerance, resulting in partial response   Since 2018, she has been on maintenance bevacizumab monotherapy   She underwent palliative resection of transverse colon due to the bleeding   She fully recovered from the surgery   Subsequently, she was on maintenance bevacizumab   He had disease progression in May 2020  Therefore, she is currently on FOLFIRI plus bevacizumab with minimal toxicity  Unfortunately, she had progressive disease biochemically as well as radiographically in 2021  Systemic chemotherapy was discontinued  She underwent Sir sphere to the liver since liver is predominant location of disease  She tolerated Sir sphere well  She has no further targetable mutation  As a next treatment option, I recommended her to try trifluridine tipiracil  Side effects of this oral chemotherapy was thoroughly discussed, including but not limited to neutropenia, risk of infection, diarrhea  She understood and wished to proceed  I wrecked obtain baseline CEA, CBC and CMP before the treatment starts  We will continue to monitor her CBC every 2 weeks  We also had long discussion regarding her overall prognosis  Since her disease is gradually become resistant disease, her life expectancy may be a year  She understood  I will see her again in 6 weeks for routine follow-up    She is in agreement with my recommendations         Subjective:      HPI:  A 63-year-old female who was found to have severe microcytic anemia in 2018   Therefore, she was advised to be hospitalized  Kristofer Marivel was given transfusion   EGD was performed which did not show any major pathology   As outpatient basis, she underwent CT scan of abdomen pelvis which showed transverse colon mass as well as multiple liver metastatic disease   She subsequently underwent liver biopsy which showed metastatic adenocarcinoma, consistent with colorectal primary   She presents today to discuss treatment options   Since January 2018, she lost 25 pound  Catracho Hoffman has mildly anorexic  [facility], she has no complaint of pain  She denied any respiratory symptoms    She has normal bowel movement    Prior to the hospitalization, she had slowly progressive fatigue as well as some exertional shortness of breath   She is currently on oral iron 3 times per day  Catracho Hoffman has rheumatoid arthritis for which she is on weekly methotrexate and folic acid   She has no family history of colorectal cancer  Catracho Hoffman is a lifetime never smoker   Her performance status is normal            Interval History:   A 54year-old female with metastatic colon cancer with extensive liver metastasis   Her tumor has K-wanda mutation and is MSI stable   She started systemic chemotherapy with FOLFOX-6 which she tolerated very well  We were notified by pathology Department that molecular testing cannot be performed on liver biopsy tissue   She was treated with 12 cycle of FOLFOX with bevacizumab with excellent tolerance   She had good partial response   Since April 2019, she was on maintenance bevacizumab monotherapy   However, she was found to have progressive disease in May 2020  Therefore, she is currently on FOLFIRI with bevacizumab   She had some stable disease on current treatment  However, she had biochemical and radiographic progression in January 2021  Therefore, FOLFIRI and bevacizumab was discontinued  She subsequent underwent liver directed therapy with Sir sphere in late February 2021    Lately, her tumor was found to be HER2 negative    Extensive molecular testing showed no other targetable mutation    She presents today to discuss further treatment options    She feels well with no complaint of pain  She is maintaining her weight  She denied any respiratory symptoms  Her performance status is normal                                                                            Objective:      Primary Diagnosis:     Metastatic colon cancer   K-wanda mutation positive   BRAF wild type  MSI stable   HER2 negative  Diagnosed in October 2018      Cancer Staging:  Cancer Staging  No matching staging information was found for the patient         Previous Hematologic/ Oncologic Treatment:       1  Bevacizumab with FOLFOX-6   X12 cycle   Completed in April 2019    2  Bevacizumab monotherapy from April 2019 through October 2019    3  Bevacizumab with infusion of 5 FU from November 2019 through May 2020    4  Bevacizumab with FOLFIRI from May 2020 through January 2021    5  Sir sphere Y90  Completed in February 25, 2021      Current Hematologic/ Oncologic Treatment:         Trifluridine tipiracil to be started in late March 2021      Disease Status:       to be determined      Test Results:     Pathology:     Liver biopsy showed metastatic adenocarcinoma, consistent with colorectal primary   Molecular testing could not be performed per pathology department      Radiology:     CT scan of chest abdomen pelvis in January 2020 showed progression of liver metastasis      Laboratory:     See below   See below for CEA      Physical Exam:        General Appearance:    Alert, oriented          Eyes:    PERRL   Ears:    Normal external ear canals, both ears   Nose:   Nares normal, septum midline   Throat:   Mucosa moist  Pharynx without injection  Neck:   Supple         Lungs:     Clear to auscultation bilaterally   Chest Wall:    No tenderness or deformity    Heart:    Regular rate and rhythm         Abdomen:     Soft, non-tender, bowel sounds +,  Liver is palpable 4 cm below right costal margin    Spleen is not palpable                Extremities:   Extremities no cyanosis or edema         Skin:   no rash or icterus  Lymph nodes:   Cervical, supraclavicular, and axillary nodes normal   Neurologic:   CNII-XII intact, normal strength, sensation and reflexes     Throughout           ROS: Review of Systems   All other systems reviewed and are negative  Imaging: Nm Liver Spect Post Sirs Implant    Result Date: 2/25/2021  Narrative: SIR-SPHERES - RADIOPHARMACEUTICAL LOCALIZATION WITH SPEC INDICATION: C22 9: Malignant neoplasm of liver, not specified as primary or secondary Hepatic metastases  COMPARISON:  1/9/2021 TECHNIQUE:   The study was performed utilizing 41 3 mCi Y90 SIR-spheres  The radiopharmaceutical dose was delivered to the Interventional Radiology Department where it was instilled into the appropriate sections of the liver via selective visceral arteriography  The patient was then transferred to the Nuclear Medicine Department for SPECT imaging of the liver  SPECT images demonstrate localization of the radiopharmaceutical within the liver  These images correspond to the lesions evident on the recent imaging studies  Impression: 1  Successful intra-arterial placement of Y90 SIR-spheres  Workstation performed: [id number]     Nm Liver Imaging Static Only Pre Sirs Mapping    Result Date: 2/16/2021  Narrative: LIVER IMAGING INDICATION:  Hepatic masses, SIR spheres evaluation COMPARISON:   CT 1/9/2021 RADIOPHARMACEUTICAL:  3 3 mCi Tc-99m MAA FINDINGS: Tc-99m MAA was injected into the hepatic artery, and multiple static images of the chest and abdomen were obtained  Heterogeneous radiotracer distribution in the liver is consistent with known liver masses  No significant lung activity is demonstrated on  this examination  Quantitative analysis demonstrates lung shunting of up to 6 %  No gastric or bowel activity is visualized  Impression: Lung shunting less than 10%   Workstation performed: [id number]     Ir Y-90 Pre-angio/embo W/ Lung Scan    Result Date: 2/17/2021  Narrative: Celiac artery angiogram Superior mesenteric artery angiogram Right hepatic artery angiogram and cone beam CT Common hepatic artery angiogram Left hepatic artery angiogram and cone beam CT Infusion of technetium 99 MAA labeled albumin Left radial artery angiogram Ultrasound-guided access to the left radial artery History: Colorectal cancer metastatic to liver  Disease persistence despite chemotherapy  Plans for selective internal radiation therapy  She is a candidate for radioembolization  Contrast: 70 mL Omnipaque Fluoro time: 17 minutes Additional medications: Intra-arterial nitroglycerin, verapamil, intravenous heparin Conscious sedation time: 180 minutes Technique: The patient was brought to the interventional radiology suite and identified verbally and by wrist band  The left radial artery was examined with ultrasound and found to be patent and adequate for access  A Barbeau test was performed, patient is Barbeau B  Oxygen saturation monitoring was maintained on the left hand  The patient was placed supine on the table and the left wrist was prepped and draped in the usual sterile fashion  Lidocaine was administered to the skin   The left radial artery was then punctured percutaneously with 5 ultrasound guidance utilizing Seldinger technique using the Terumo kit  A static image was saved  A slender glide sheath was placed  Verapamil, nitroglycerin were administered into the artery  Systemic heparin was administered  A heparinized saline flush bag was connected  A baby J glide wire was advanced into the abdominal aorta in conjunction with a Correlec catheter  The celiac artery was selected and angiography was performed  The superior mesenteric artery was now selected and angiography performed  Using the hi flow microcatheter the right hepatic artery was selected  Angiography was performed  Cone beam CT/rotational angiography was performed   Attempts were made to cannulate a tiny branch arising off the replaced right hepatic artery with several  wires microcatheters, the branch was too small  Attention was now turned back to the celiac artery  The base catheter was placed in the celiac artery and the hi flow microcatheter used to select the common hepatic artery  Angiography was performed  The left hepatic artery was now selected  Angiography was performed  Cone beam CT/rotational angiography was performed  Nuclear medicine arrived and the supplied technetium 99 MAA labeled albumin was slowly infused into the microcatheter be appropriate technique  The catheter was removed and given to nuclear medicine  Left radial artery sheath angiogram was performed  Nitroglycerin was given into the radial artery, the sheath was removed and hemostasis was obtained using a TR band which was removed prior to discharge  The patient tolerated the procedure without immediate complication  Findings: Variant anatomy with replaced right hepatic artery arising from the superior mesenteric artery  The right hepatic artery supplies the cystic artery as well as a tiny branch that may be a supraduodenal branch  This was too small to cannulate and embolization could be performed distal to this branch  Proper hepatic artery directly becomes the left hepatic artery with conventional anatomy  Left gastric artery arises from the proximal celiac trunk  Initial cone beam CT from the left hepatic artery with reflux into the gastroduodenal artery  Subsequent repeat cone beam CT performed with injection rate of 1 mL/s without reflux  This reveals no supply outside the liver  Several hypervascular left hepatic lesions are identified  Cone beam CT from the right hepatic artery reveals supply to the right liver  There is supply to the the gallbladder  A questionable supraduodenal branches identified from the mid right hepatic artery  This was too small to be cannulated   Irregular mass in the right lung base partially visualized, this appears to layer in the major fissure patient has a right loculated pleural effusion that comes and goes on multiple prior CT examinations  Technetium 99 labeled albumin mapping via injection of the left hepatic artery  Left radial artery sheath is occlusive  Patent left radial artery by ultrasound  After removal of the TR band and later in recovery there was a palpable radial pulse     Impression: Mapping angiography/cone beam CT from the left hepatic artery and from a replaced right hepatic artery Technetium 99 MAA injection from the left hepatic artery Plan: Transfer to nuclear medicine for scintigraphy and calculation of lung shunt fraction Workstation performed: [id number]     Ir Y-90 Radioembolization    Result Date: 2/26/2021  Narrative: Celiac artery angiogram Common hepatic artery angiogram Left hepatic artery angiogram Infusion of Y90 SIR SPHERES into the left hepatic artery Superior mesenteric artery angiogram Right hepatic artery angiogram Infusion of Y90 SIR SPHERES into the right hepatic artery Replaced right hepatic artery angiogram Ultrasound-guided access to the left radial artery History: Colorectal cancer metastatic to liver  Disease persistence despite chemotherapy  Plans for selective internal radiation therapy  She is a candidate for radioembolization and underwent mapping February 16  Contrast: 72 mL Omnipaque Fluoro time: 10 7 minutes Additional medications: Intra-arterial nitroglycerin, verapamil, intravenous heparin Conscious sedation time: 180 minutes Additional operators: Dr Mariann Robins of radiation oncology  Technique: The patient was brought to the interventional radiology suite and identified verbally and by wrist band  The left radial artery was examined with ultrasound and found to be patent and adequate for access  Oxygen saturation monitoring was maintained on the left hand   The patient was placed supine on the table and the left wrist was prepped and draped in the usual sterile fashion  Lidocaine was administered to the skin   The left radial artery was then punctured percutaneously with 5 ultrasound guidance utilizing Seldinger technique using the Terumo kit  A static image was saved  A slender glide sheath was placed  Verapamil, nitroglycerin were administered into the artery  Heparin was administered  A heparinized saline flush bag was connected  A baby J glide wire was advanced into the abdominal aorta in conjunction with a Lucy catheter  The celiac  artery was selected and angiography was performed  The common hepatic artery was selected and angiography was performed  Using a hi flow microcatheter the left hepatic artery was selected and angiography was performed  The catheter was positioned in the same location as for mapping  Radiation Oncology arrived and the [address] [place], [place] [address]vd delivery system was assembled in accordance with recommended procedure  The left-sided microspheres were infused using appropriate technique with intermittent  contrast injections  The microcatheter and base catheter were now removed and placed in the disposal container  Using a new Lucy catheter the superior mesenteric artery was selected  Angiography was performed  Using a new high flow microcatheter the right hepatic artery was selected and angiography was performed  The catheter was positioned in the same location  as for mapping  [facility] was assembled in accordance with recommended procedure  The right-sided microspheres were infused using appropriate technique with intermittent contrast injections  This microcatheter and base catheter were now removed and placed in the disposal container  Left radial angiogram was performed  Radiation physics verified the room and the patient  Nitroglycerin was given into the radial artery, the sheath was removed and hemostasis was obtained using a TR band which was removed prior to discharge  The patient tolerated the procedure without immediate complication  Findings: Celiac and superior mesenteric artery angiograms with unchanged findings from the mapping and Febres 16th including a replaced right hepatic artery arising from the superior mesenteric artery  All vessels remain patent  Left hepatic artery with a branch point amenable for embolization  Y90 Sir sphere embolization via injection of the left hepatic artery  Please see radiation oncology notes for specific dose delivered  Y90 Sir sphere embolization via injection of the replaced right hepatic artery  Please see radiation oncology notes for specific dose delivered  Patent left radial artery by ultrasound  Impression: SIR Sphere Y90  radial embolization performed in split doses from the left hepatic artery and replaced right hepatic artery  Plan: Transfer to nuclear medicine for scintigraphy Workstation performed: [id number]         Labs:   Lab Results   Component Value Date    WBC 3 32 (L) 01/08/2021    HGB 11 7 01/08/2021    HCT 37 3 01/08/2021    MCV 92 01/08/2021     01/08/2021     Lab Results   Component Value Date    K 4 0 02/25/2021     02/25/2021    CO2 31 02/25/2021    BUN 11 02/25/2021    CREATININE 0 74 02/25/2021    GLUF 99 02/25/2021    CALCIUM 10 0 02/25/2021    CORRECTEDCA 11 0 (H) 02/25/2021    AST 33 02/25/2021    ALT 31 02/25/2021    ALKPHOS 377 (H) 02/25/2021    EGFR 91 02/25/2021         Lab Results   Component Value Date     (H) 09/24/2018         Lab Results   Component Value Date     3 (H) 01/08/2021         Lab Results   Component Value Date    IRON 11 (L) 09/13/2018    TIBC 302 09/13/2018    FERRITIN 15 09/13/2018         Lab Results   Component Value Date    FOLATE 11 4 09/13/2018         Current Medications: Reviewed  Allergies: Reviewed  PMH/FH/SH:  Reviewed      Vital Sign:    Body surface area is 1 7 meters squared      Wt Readings from Last 3 Encounters:   03/11/21 64 9 kg (143 lb)   02/25/21 63 5 kg (140 lb)   02/16/21 63 5 kg (140 lb)        Temp Readings from Last 3 Encounters:   03/11/21 98 8 °F (37 1 °C) (Tympanic Core)   02/25/21 98 1 °F (36 7 °C) (Oral)   02/16/21 98 6 °F (37 °C) (Oral)        BP Readings from Last 3 Encounters:   03/11/21 124/78   02/25/21 132/83   02/16/21 103/71         Pulse Readings from Last 3 Encounters:   03/11/21 76   02/25/21 67   02/16/21 78     @LASTSAO2(3)@

## 2021-03-12 NOTE — PROGRESS NOTES
3/12/2021  Received notification from clinical pt will be starting lonsurf 55mg (two 20-8  19 mg with one 15-6 14mg tablet) b i d  on d 1-5 and 8-12 q28d    Pt has [facility]  ID # [id number]  BIN # [id number]  PCN # [id number]  GRP # [id number]    Completed the auth request forms and sent them to dr Moy Madrigal for review and signature    Received the signed forms back and submitted for auth via fax    3/15/2021  Received approval letter for the Lonsurf 20-8  19mg     Auth # [id number] is valid from 3/12/2021 through 3/12/2022      [facility]  Rx @ 9:16 ([phone]) per Lashanda Lyman, the 15-6 14mg has been approved under the same auth #    Notified clinical, [employer], and finance

## 2021-03-18 NOTE — PROGRESS NOTES
Port flushed per protocol and labs drawn per orders  Made next appt and AVS declined as pt uses mychart

## 2021-03-18 NOTE — PLAN OF CARE
Problem: Potential for Falls  Goal: Patient will remain free of falls  Description: INTERVENTIONS:  - Assess patient frequently for physical needs  -  Identify cognitive and physical deficits and behaviors that affect risk of falls    -  Toney fall precautions as indicated by assessment   - Educate patient/family on patient safety including physical limitations  - Instruct patient to call for assistance with activity based on assessment  - Modify environment to reduce risk of injury  - Consider OT/PT consult to assist with strengthening/mobility  Outcome: Progressing

## 2021-04-12 NOTE — PROGRESS NOTES
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 status post FOLFIRI with progression only in liver  She underwent SIR sphere treatment 2/25/21    A f/u note from IR the next day, pt described mild abdominal pain on day of tx, had resolved  3/11/21 Pt had a f/u with Dr Cassandria Lombard  As her next treatment option, he recommended, Trifluridine Tipiracil ( Lonsurf)  She will be monitored for pancytopenia q 2 weeks  He did discuss with the pt her life expectancy may be 1 yr  F/u in 6 weeks    3/18/21  6 (631 3 1/8/21)    4/16/21 infusion  4/22/21 f/u  Med Onc    Lab Results   Component Value Date    WBC 3 04 (L) 04/02/2021    HGB 12 5 04/02/2021    HCT 40 9 04/02/2021    MCV 91 04/02/2021     (L) 04/02/2021             Oncology History   Colon cancer metastasized to liver ([facility] [address] )   10/25/2018 Initial Diagnosis    Colon cancer metastasized to liver ([facility] [address] )     10/25/2018 Biopsy    Final Diagnosis   A   Liver mass (core needle biopsy):  - Metastatic adenocarcinoma with extensive necrosis           11/12/2018 - 4/2019 Chemotherapy    Bevacizumab with FOLFOX-6   X12 cycle     4/19/2019 - 10/27/2019 Chemotherapy    bevacizumab (AVASTIN) 900 mg in sodium chloride 0 9 % 100 mL IVPB, 15 mg/kg, Intravenous, Once, 5 of 9 cycles  Dose modification: 7 5 mg/kg (original dose 15 mg/kg, Cycle 2, Reason: Other (See Comments), Comment: regimen)  Administration: 450 mg (5/20/2019), 450 mg (6/10/2019), 450 mg (7/1/2019), 450 mg (10/7/2019)     8/29/2019 Surgery    Right hemicolectomy:  - Residual adenocarcinoma   - Thirty (30) lymph nodes negative for carcinoma (0/30)     11/4/2019 - 6/14/2020 Chemotherapy    bevacizumab (AVASTIN) 292 5 mg in sodium chloride 0 9 % 100 mL IVPB, 5 mg/kg = 292 5 mg (100 % of original dose 5 mg/kg), Intravenous, Once, 13 of 15 cycles  Dose modification: 5 mg/kg (original dose 5 mg/kg, Cycle 1)  Administration: 292 5 mg (11/4/2019), 292 5 mg (11/18/2019), 292 5 mg (12/2/2019), 292 5 mg (12/16/2019), 292 5 mg (12/31/2019), 292 5 mg (1/13/2020), 292 5 mg (1/27/2020), 292 5 mg (2/10/2020), 292 5 mg (2/24/2020), 292 5 mg (3/9/2020), 292 5 mg (5/4/2020), 292 5 mg (5/18/2020), 292 5 mg (6/1/2020)  leucovorin 656 mg in dextrose 5 % 250 mL IVPB, 400 mg/m2 = 656 mg, Intravenous, Once, 16 of 18 cycles  Administration: 656 mg (11/4/2019), 656 mg (11/18/2019), 656 mg (12/2/2019), 656 mg (12/16/2019), 656 mg (12/31/2019), 656 mg (4/6/2020), 656 mg (1/13/2020), 656 mg (1/27/2020), 656 mg (2/10/2020), 656 mg (2/24/2020), 656 mg (3/9/2020), 656 mg (3/23/2020), 656 mg (4/20/2020), 656 mg (5/4/2020), 656 mg (5/18/2020), 656 mg (6/1/2020)     6/15/2020 - 1/10/2021 Chemotherapy    bevacizumab (AVASTIN) 320 mg in sodium chloride 0 9 % 100 mL IVPB, 5 mg/kg = 320 mg, Intravenous, Once, 12 of 13 cycles  Administration: 320 mg (7/27/2020), 320 mg (8/10/2020), 320 mg (8/24/2020), 320 mg (9/8/2020), 320 mg (9/21/2020), 320 mg (10/5/2020), 320 mg (10/19/2020), 320 mg (11/2/2020), 320 mg (11/16/2020), 320 mg (11/30/2020), 320 mg (12/14/2020), 320 mg (12/28/2020)  irinotecan (CAMPTOSAR) 300 mg in sodium chloride 0 9 % 500 mL chemo infusion, 308 mg, Intravenous, Once, 15 of 16 cycles  Administration: 300 mg (6/15/2020), 300 mg (6/29/2020), 300 mg (7/13/2020), 300 mg (7/27/2020), 300 mg (8/10/2020), 300 mg (8/24/2020), 300 mg (9/8/2020), 300 mg (9/21/2020), 300 mg (10/5/2020), 300 mg (10/19/2020), 300 mg (11/2/2020), 300 mg (11/16/2020), 300 mg (11/30/2020), 300 mg (12/14/2020), 300 mg (12/28/2020)  leucovorin 684 mg in sodium chloride 0 9 % 250 mL IVPB, 400 mg/m2 = 684 mg, Intravenous, Once, 15 of 16 cycles  Administration: 684 mg (6/15/2020), 684 mg (6/29/2020), 684 mg (7/13/2020), 684 mg (7/27/2020), 684 mg (8/10/2020), 684 mg (8/24/2020), 648 mg (9/8/2020), 684 mg (9/21/2020), 684 mg (10/5/2020), 684 mg (10/19/2020), 684 mg (11/2/2020), 684 mg (11/16/2020), 684 mg (11/30/2020), 684 mg (12/14/2020), 684 mg (12/28/2020)     Secondary malignant neoplasm of liver ([facility] [address] )   1/18/2021 Initial Diagnosis    Secondary malignant neoplasm of liver (HCC)      Radiation    Sir Sphere   Treatments Site: whole liver     Dose:  1 53 GBq  Treatment dates:  2/25/21      Right lobe Dose:   Prescribed: 1 17 GBq,  Delivered: 1 18 GBq  Leftt lobe Dose:   Prescribed: 0 38 GBq,  Delivered: 0 35 GBq           Clinical Trial: no      Health Maintenance   Topic Date Due    Pneumococcal Vaccine: Pediatrics (0 to 5 Years) and At-Risk Patients (6 to 59 Years) (1 of 3 - PCV13) Never done    Influenza Vaccine (1) 09/01/2020    COVID-19 Vaccine (2 - Moderna 2-dose series) 04/15/2021    MAMMOGRAM  04/30/2021    Annual Physical  07/20/2021    Depression Screening PHQ  01/18/2022    BMI: Adult  03/11/2022    Cervical Cancer Screening  07/20/2023    HIB Vaccine  Aged Out    Hepatitis B Vaccine  Aged Out    IPV Vaccine  Aged Out    Hepatitis A Vaccine  Aged Out    Meningococcal ACWY Vaccine  Aged Out    HPV Vaccine  Aged Out    HIV Screening  Discontinued    Hepatitis C Screening  Discontinued    DTaP,Tdap,and Td Vaccines  Discontinued       Patient Active Problem List   Diagnosis    Arthritis    Hay fever    Herpes zoster    Seasonal allergies    Anxiety    Hot flashes    Impaired fasting glucose    Fatigue    Rheumatoid arthritis involving both hands with negative rheumatoid factor (HCC)    Other hyperlipidemia    Megaloblastic anemia    Severe anemia    Liver masses    Colon cancer metastasized to liver ([facility] [address] )    B12 deficiency    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 ([facility] [address] )    Secondary malignant neoplasm of liver ([facility] [address] )     Past Medical History:   Diagnosis Date    Anxious mood     Cancer ([facility] [address] [facility]     Colon cancer ([facility] [address] [facility]     History of chemotherapy     Rheumatoid arthritis ([facility] [address] )      Past Surgical History: Procedure Laterality Date    APPENDECTOMY      ESOPHAGOGASTRODUODENOSCOPY N/A 2018    Procedure: ESOPHAGOGASTRODUODENOSCOPY (EGD) with polypectomy;  Surgeon: Cas Garcia MD;  Location: [facility];   Service: Gastroenterology    IR BIOPSY LIVER MASS  10/25/2018    IR PORT PLACEMENT  2018    r chest    IR Y-90 PRE-ANGIO/EMBO W/ LUNG SCAN  2021    IR Y-90 RADIOEMBOLIZATION  2021    MT PART REMOVAL COLON W ANASTOMOSIS N/A 2019    Procedure: EXTENDED RIGHT COLON RESECTION;  Surgeon: Ketan Wayne MD;  Location: [facility];  Service: Surgical Oncology    TUBAL LIGATION      resolved     WISDOM TOOTH EXTRACTION      resolved      Family History   Problem Relation Age of Onset    Anxiety disorder Mother     Hypertension Father     Diabetes Father     Heart attack Maternal Grandmother         acute MI    Breast cancer Cousin      Social History     Socioeconomic History    Marital status: Single     Spouse name: Not on file    Number of children: Not on file    Years of education: Not on file    Highest education level: Not on file   Occupational History    Occupation: logistics   Social Needs    Financial resource strain: Not on file    Food insecurity     Worry: Not on file     Inability: Not on file    Transportation needs     Medical: Not on file     Non-medical: Not on file   Tobacco Use    Smoking status: Former Smoker     Packs/day: 0 00     Years: 0 00     Pack years: 0 00     Quit date: 2000     Years since quittin 6    Smokeless tobacco: Never Used   Substance and Sexual Activity    Alcohol use: Not Currently     Alcohol/week: 0 0 standard drinks     Frequency: Never     Drinks per session: Patient refused     Binge frequency: Never    Drug use: No    Sexual activity: Not Currently     Partners: Male     Birth control/protection: Female Sterilization     Comment: patient reports tubal    Lifestyle    Physical activity     Days per week: Not on file     Minutes per session: Not on file    Stress: Not on file   Relationships    Social connections     Talks on phone: Not on file     Gets together: Not on file     Attends Yazidism service: Not on file     Active member of club or organization: Not on file     Attends meetings of clubs or organizations: Not on file     Relationship status: Not on file    Intimate partner violence     Fear of current or ex partner: Not on file     Emotionally abused: Not on file     Physically abused: Not on file     Forced sexual activity: Not on file   Other Topics Concern    Not on file   Social History Narrative    Not on file       Current Outpatient Medications:     cyanocobalamin (VITAMIN B-12) 1,000 mcg tablet, Take by mouth daily, Disp: , Rfl:     folic acid (FOLVITE) 1 mg tablet, Take 1 mg by mouth daily , Disp: , Rfl:     hydrocortisone 1 % lotion, Apply topically 2 (two) times a day, Disp: , Rfl:     Irinotecan HCl (CAMPTOSAR IV), irinotecan, Disp: , Rfl:     methotrexate 2 5 mg tablet, 15 mg once a week , Disp: , Rfl:     methylPREDNISolone 4 MG tablet therapy pack, Use as directed on package  Start taking night of procedure (2/25/21), Disp: 1 each, Rfl: 0    Multiple Vitamin (MULTIVITAMIN) capsule, Take 1 capsule by mouth daily, Disp: , Rfl:     Omega-3 Fatty Acids (FISH OIL PO), Take by mouth daily , Disp: , Rfl:     omeprazole (PriLOSEC) 40 MG capsule, Take 1 capsule (40 mg total) by mouth daily Please start taking 2/23/21  May take morning of procedure with small sip of water , Disp: 30 capsule, Rfl: 0    potassium chloride (K-DUR,KLOR-CON) 20 mEq tablet, Take 1 tablet (20 mEq total) by mouth daily (Patient not taking: Reported on 1/6/2021), Disp: 30 tablet, Rfl: 0    Trifluridine-Tipiracil (Lonsurf) 15-6 14 MG TABS, Take 55 mg by mouth 2 (two) times a day Take 55mg total twice a day, on day 1-day 5, day 8- day 12, Disp: 10 tablet, Rfl: 11    Trifluridine-Tipiracil 20-8  19 MG TABS, Take 55 mg by mouth 2 (two) times a day, Disp: 40 tablet, Rfl: 11  No Known Allergies    Review of Systems:  Review of Systems   Constitutional: Negative  HENT: Negative  Eyes: Negative  Respiratory: Negative  Cardiovascular: Negative  Gastrointestinal: Negative  Endocrine: Negative  Genitourinary: Negative  Musculoskeletal: Negative  Skin: Positive for rash (rt arm)  Allergic/Immunologic: Negative  Neurological: Negative  Hematological: Negative  Psychiatric/Behavioral: Negative  There were no vitals filed for this visit  Imaging:No results found      Teaching

## 2021-04-12 NOTE — PROGRESS NOTES
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 status post FOLFIRI with progression only in liver  She underwent SIR sphere treatment 2/25/21  Interval History:  A f/u note from IR the next day, pt described mild abdominal pain on day of tx, had resolved       3/11/21 Pt had a f/u with Dr Regina Kwong  As her next treatment option, he recommended, Trifluridine Tipiracil ( Lonsurf)  She will be monitored for pancytopenia q 2 weeks  He did discuss with the pt her life expectancy may be 1 yr  F/u in 6 weeks     3/18/21  6 (631 3 1/8/21)  3/18/21  Lonsurf started    She denies any fatigue  She is having no nausea nor vomiting as well as 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  She has no abdominal pains  She is eating well and her weight is stable  She is still working from home  She gets blood work every 2 weeks     4/16/21 infusion and COVID vaccine #2  4/22/21 f/u  Dr Tamia Chang Results   Component Value Date     WBC 3 04 (L) 04/02/2021     HGB 12 5 04/02/2021     HCT 40 9 04/02/2021     MCV 91 04/02/2021      (L) 04/02/2021        Alkaline phosphatase is 424 up from 377 which is secondary to the Sir sphere treatment  Historical Information   Oncology History   Colon cancer metastasized to liver [facility])   10/25/2018 Initial Diagnosis    Colon cancer metastasized to liver ([facility] [address] )     10/25/2018 Biopsy    Final Diagnosis   A   Liver mass (core needle biopsy):  - Metastatic adenocarcinoma with extensive necrosis           11/12/2018 - 4/2019 Chemotherapy    Bevacizumab with FOLFOX-6   X12 cycle     4/19/2019 - 10/27/2019 Chemotherapy    bevacizumab (AVASTIN) 900 mg in sodium chloride 0 9 % 100 mL IVPB, 15 mg/kg, Intravenous, Once, 5 of 9 cycles  Dose modification: 7 5 mg/kg (original dose 15 mg/kg, Cycle 2, Reason: Other (See Comments), Comment: regimen)  Administration: 450 mg (5/20/2019), 450 mg (6/10/2019), 450 mg (7/1/2019), 450 mg (10/7/2019)     8/29/2019 Surgery    Right hemicolectomy:  - Residual adenocarcinoma   - Thirty (30) lymph nodes negative for carcinoma (0/30)     11/4/2019 - 6/14/2020 Chemotherapy    bevacizumab (AVASTIN) 292 5 mg in sodium chloride 0 9 % 100 mL IVPB, 5 mg/kg = 292 5 mg (100 % of original dose 5 mg/kg), Intravenous, Once, 13 of 15 cycles  Dose modification: 5 mg/kg (original dose 5 mg/kg, Cycle 1)  Administration: 292 5 mg (11/4/2019), 292 5 mg (11/18/2019), 292 5 mg (12/2/2019), 292 5 mg (12/16/2019), 292 5 mg (12/31/2019), 292 5 mg (1/13/2020), 292 5 mg (1/27/2020), 292 5 mg (2/10/2020), 292 5 mg (2/24/2020), 292 5 mg (3/9/2020), 292 5 mg (5/4/2020), 292 5 mg (5/18/2020), 292 5 mg (6/1/2020)  leucovorin 656 mg in dextrose 5 % 250 mL IVPB, 400 mg/m2 = 656 mg, Intravenous, Once, 16 of 18 cycles  Administration: 656 mg (11/4/2019), 656 mg (11/18/2019), 656 mg (12/2/2019), 656 mg (12/16/2019), 656 mg (12/31/2019), 656 mg (4/6/2020), 656 mg (1/13/2020), 656 mg (1/27/2020), 656 mg (2/10/2020), 656 mg (2/24/2020), 656 mg (3/9/2020), 656 mg (3/23/2020), 656 mg (4/20/2020), 656 mg (5/4/2020), 656 mg (5/18/2020), 656 mg (6/1/2020)     6/15/2020 - 1/10/2021 Chemotherapy    bevacizumab (AVASTIN) 320 mg in sodium chloride 0 9 % 100 mL IVPB, 5 mg/kg = 320 mg, Intravenous, Once, 12 of 13 cycles  Administration: 320 mg (7/27/2020), 320 mg (8/10/2020), 320 mg (8/24/2020), 320 mg (9/8/2020), 320 mg (9/21/2020), 320 mg (10/5/2020), 320 mg (10/19/2020), 320 mg (11/2/2020), 320 mg (11/16/2020), 320 mg (11/30/2020), 320 mg (12/14/2020), 320 mg (12/28/2020)  irinotecan (CAMPTOSAR) 300 mg in sodium chloride 0 9 % 500 mL chemo infusion, 308 mg, Intravenous, Once, 15 of 16 cycles  Administration: 300 mg (6/15/2020), 300 mg (6/29/2020), 300 mg (7/13/2020), 300 mg (7/27/2020), 300 mg (8/10/2020), 300 mg (8/24/2020), 300 mg (9/8/2020), 300 mg (9/21/2020), 300 mg (10/5/2020), 300 mg (10/19/2020), 300 mg (11/2/2020), 300 mg (11/16/2020), 300 mg (11/30/2020), 300 mg (12/14/2020), 300 mg (12/28/2020)  leucovorin 684 mg in sodium chloride 0 9 % 250 mL IVPB, 400 mg/m2 = 684 mg, Intravenous, Once, 15 of 16 cycles  Administration: 684 mg (6/15/2020), 684 mg (6/29/2020), 684 mg (7/13/2020), 684 mg (7/27/2020), 684 mg (8/10/2020), 684 mg (8/24/2020), 648 mg (9/8/2020), 684 mg (9/21/2020), 684 mg (10/5/2020), 684 mg (10/19/2020), 684 mg (11/2/2020), 684 mg (11/16/2020), 684 mg (11/30/2020), 684 mg (12/14/2020), 684 mg (12/28/2020)     [facility]   1/18/2021 Initial Diagnosis    Secondary malignant neoplasm of liver ([facility]CC)      Radiation    Sir Sphere   Treatments Site: whole liver     Dose:  1 53 GBq  Treatment dates:  2/25/21      Right lobe Dose:   Prescribed: 1 17 GBq,  Delivered: 1 18 GBq  Leftt lobe Dose:   Prescribed: 0 38 GBq,  Delivered: 0 35 GBq           Past Medical History:   Diagnosis Date    Anxious mood     Cancer ([facility] [address] )     [facility]     History of chemotherapy     [facility]      Past Surgical History:   Procedure Laterality Date    APPENDECTOMY      ESOPHAGOGASTRODUODENOSCOPY N/A 9/14/2018    Procedure: ESOPHAGOGASTRODUODENOSCOPY (EGD) with polypectomy;  Surgeon: Belle Watts MD;  Location: [facility];   Service: Gastroenterology    IR BIOPSY LIVER MASS  10/25/2018    IR PORT PLACEMENT  11/7/2018    r chest    IR Y-90 PRE-ANGIO/EMBO W/ LUNG SCAN  2/16/2021    IR Y-90 RADIOEMBOLIZATION  2/25/2021    NM PART REMOVAL COLON W ANASTOMOSIS N/A 8/29/2019    Procedure: EXTENDED RIGHT COLON RESECTION;  Surgeon: Shandra Chang MD;  Location: [facility];  Service: Surgical Oncology    TUBAL LIGATION      resolved 2007    WISDOM TOOTH EXTRACTION      resolved 1990       Social History Social History     Substance and Sexual Activity   Alcohol Use Not Currently    Alcohol/week: 0 0 standard drinks    Frequency: Never    Drinks per session: Patient refused    Binge frequency: Never     Social History     Substance and Sexual Activity   Drug Use No     Social History     Tobacco Use   Smoking Status Former Smoker    Packs/day: 0 00    Years: 0 00    Pack years: 0 00    Quit date: 2000    Years since quittin 6   Smokeless Tobacco Never Used     Meds/Allergies     Current Outpatient Medications:     cyanocobalamin (VITAMIN B-12) 1,000 mcg tablet, Take by mouth daily, Disp: , Rfl:     folic acid (FOLVITE) 1 mg tablet, Take 1 mg by mouth daily , Disp: , Rfl:     methotrexate 2 5 mg tablet, 15 mg once a week , Disp: , Rfl:     Multiple Vitamin (MULTIVITAMIN) capsule, Take 1 capsule by mouth daily, Disp: , Rfl:     Omega-3 Fatty Acids (FISH OIL PO), Take by mouth daily , Disp: , Rfl:     Trifluridine-Tipiracil (Lonsurf) 15-6 14 MG TABS, Take 55 mg by mouth 2 (two) times a day Take 55mg total twice a day, on day 1-day 5, day 8- day 12, Disp: 10 tablet, Rfl: 11    Trifluridine-Tipiracil 20-8  19 MG TABS, Take 55 mg by mouth 2 (two) times a day, Disp: 40 tablet, Rfl: 11    hydrocortisone 1 % lotion, Apply topically 2 (two) times a day, Disp: , Rfl:     Irinotecan HCl (CAMPTOSAR IV), irinotecan, Disp: , Rfl:     methylPREDNISolone 4 MG tablet therapy pack, Use as directed on package  Start taking night of procedure (21) (Patient not taking: Reported on 2021), Disp: 1 each, Rfl: 0    omeprazole (PriLOSEC) 40 MG capsule, Take 1 capsule (40 mg total) by mouth daily Please start taking 21  May take morning of procedure with small sip of water   (Patient not taking: Reported on 2021), Disp: 30 capsule, Rfl: 0    potassium chloride (K-DUR,KLOR-CON) 20 mEq tablet, Take 1 tablet (20 mEq total) by mouth daily (Patient not taking: Reported on 2021), Disp: 30 tablet, Rfl: 0  No Known Allergies    Review of Systems   Constitutional: Negative  HENT: Negative  Eyes: Negative  Respiratory: Negative  Cardiovascular: Negative  Gastrointestinal: Negative  Endocrine: Negative  Genitourinary: Negative  Musculoskeletal: Negative  Skin: Positive for rash (rt arm)  Allergic/Immunologic: Negative  Neurological: Negative  Hematological: Negative  Psychiatric/Behavioral: Negative         OBJECTIVE:   /87 (BP Location: Left arm, Patient Position: Sitting)   Pulse 78   Temp (!) 97 2 °F (36 2 °C) (Temporal)   Resp 18   Ht 5' 4" (1 626 m)   Wt 64 2 kg (141 lb 8 6 oz)   LMP 09/28/2018 (Exact Date)   BMI 24 29 kg/m²   Pain Assessment:  0  ECOG/Zubrod/WHO: 0 - Asymptomatic    Physical Exam  Vitals signs and nursing note reviewed  Constitutional:       General: She is not in acute distress  Appearance: She is well-developed  She is not diaphoretic  HENT:      Head: Normocephalic and atraumatic  Mouth/Throat:      Pharynx: No oropharyngeal exudate  Eyes:      General: No scleral icterus  Conjunctiva/sclera: Conjunctivae normal       Pupils: Pupils are equal, round, and reactive to light  Neck:      Musculoskeletal: Normal range of motion and neck supple  Thyroid: No thyromegaly  Trachea: No tracheal deviation  Cardiovascular:      Rate and Rhythm: Normal rate and regular rhythm  Heart sounds: Normal heart sounds  Pulmonary:      Effort: Pulmonary effort is normal  No respiratory distress  Breath sounds: Normal breath sounds  No stridor  No wheezing, rhonchi or rales  Chest:      Chest wall: No tenderness  Abdominal:      General: Bowel sounds are normal  There is no distension  Palpations: Abdomen is soft  There is no fluid wave, hepatomegaly or mass  Tenderness: There is no abdominal tenderness  There is no guarding or rebound  Hernia: No hernia is present     Musculoskeletal: Normal range of motion  General: No tenderness  Lymphadenopathy:      Cervical: No cervical adenopathy  Upper Body:      Right upper body: No supraclavicular adenopathy  Left upper body: No supraclavicular adenopathy  Skin:     General: Skin is warm and dry  Coloration: Skin is not pale  Findings: No erythema or rash  Neurological:      General: No focal deficit present  Mental Status: She is alert and oriented to person, place, and time  Cranial Nerves: No cranial nerve deficit  Coordination: Coordination normal    Psychiatric:         Mood and Affect: Mood normal          Behavior: Behavior normal          Thought Content:  Thought content normal          Judgment: Judgment normal        RESULTS    Lab Results:   Recent Results (from the past 672 hour(s))   CBC and differential    Collection Time: 03/18/21  8:47 AM   Result Value Ref Range    WBC 4 30 (L) 4 50 - 11 00 Thousand/uL    RBC 4 36 4 00 - 5 20 Million/uL    Hemoglobin 12 3 12 0 - 16 0 g/dL    Hematocrit 38 3 36 0 - 46 0 %    MCV 88 80 - 100 fL    MCH 28 2 26 0 - 34 0 pg    MCHC 32 1 31 0 - 36 0 g/dL    RDW 18 0 (H) <15 3 %    MPV 7 8 (L) 8 9 - 12 7 fL    Platelets 582 (L) 673 - 450 Thousands/uL    Neutrophils Relative 81 (H) 45 - 65 %    Lymphocytes Relative 9 (L) 25 - 45 %    Monocytes Relative 9 1 - 10 %    Eosinophils Relative 1 0 - 6 %    Basophils Relative 0 0 - 1 %    Neutrophils Absolute 3 50 1 80 - 7 80 Thousands/µL    Lymphocytes Absolute 0 40 (L) 0 50 - 4 00 Thousands/µL    Monocytes Absolute 0 40 0 20 - 0 90 Thousand/µL    Eosinophils Absolute 0 10 0 00 - 0 40 Thousand/µL    Basophils Absolute 0 00 0 00 - 0 10 Thousands/µL   CEA    Collection Time: 03/18/21  8:47 AM   Result Value Ref Range     6 (H) 0 0 - 3 0 ng/mL   Comprehensive metabolic panel    Collection Time: 03/18/21  8:47 AM   Result Value Ref Range    Sodium 137 137 - 147 mmol/L    Potassium 4 3 3 6 - 5 0 mmol/L    Chloride 100 97 - 108 mmol/L    CO2 30 22 - 30 mmol/L    ANION GAP 7 5 - 14 mmol/L    BUN 12 5 - 25 mg/dL    Creatinine 0 67 0 60 - 1 20 mg/dL    Glucose 106 (H) 70 - 99 mg/dL    Calcium 10 2 8 4 - 10 2 mg/dL    AST 40 (H) 14 - 36 U/L    ALT 14 9 - 52 U/L    Alkaline Phosphatase 352 (H) 43 - 122 U/L    Total Protein 8 1 5 9 - 8 4 g/dL    Albumin 4 1 3 0 - 5 2 g/dL    Total Bilirubin 0 60 <1 30 mg/dL    eGFR 99 >60 ml/min/1 73sq m   CBC and differential    Collection Time: 04/02/21  8:25 AM   Result Value Ref Range    WBC 3 04 (L) 4 31 - 10 16 Thousand/uL    RBC 4 48 3 81 - 5 12 Million/uL    Hemoglobin 12 5 11 5 - 15 4 g/dL    Hematocrit 40 9 34 8 - 46 1 %    MCV 91 82 - 98 fL    MCH 27 9 26 8 - 34 3 pg    MCHC 30 6 (L) 31 4 - 37 4 g/dL    RDW 17 3 (H) 11 6 - 15 1 %    MPV 10 1 8 9 - 12 7 fL    Platelets 300 (L) 496 - 390 Thousands/uL    nRBC 0 /100 WBCs    Neutrophils Relative 81 (H) 43 - 75 %    Immat GRANS % 0 0 - 2 %    Lymphocytes Relative 12 (L) 14 - 44 %    Monocytes Relative 6 4 - 12 %    Eosinophils Relative 1 0 - 6 %    Basophils Relative 0 0 - 1 %    Neutrophils Absolute 2 46 1 85 - 7 62 Thousands/µL    Immature Grans Absolute 0 01 0 00 - 0 20 Thousand/uL    Lymphocytes Absolute 0 36 (L) 0 60 - 4 47 Thousands/µL    Monocytes Absolute 0 18 0 17 - 1 22 Thousand/µL    Eosinophils Absolute 0 02 0 00 - 0 61 Thousand/µL    Basophils Absolute 0 01 0 00 - 0 10 Thousands/µL   Comprehensive metabolic panel    Collection Time: 04/02/21  8:25 AM   Result Value Ref Range    Sodium 139 136 - 145 mmol/L    Potassium 4 1 3 5 - 5 3 mmol/L    Chloride 105 100 - 108 mmol/L    CO2 28 21 - 32 mmol/L    ANION GAP 6 4 - 13 mmol/L    BUN 14 5 - 25 mg/dL    Creatinine 0 65 0 60 - 1 30 mg/dL    Glucose, Fasting 96 65 - 99 mg/dL    Calcium 10 0 8 3 - 10 1 mg/dL    Corrected Calcium 10 5 (H) 8 3 - 10 1 mg/dL    AST 50 (H) 5 - 45 U/L    ALT 43 12 - 78 U/L    Alkaline Phosphatase 424 (H) 46 - 116 U/L    Total Protein 8 2 6 4 - 8 2 g/dL    Albumin 3 4 (L) 3 5 - 5 0 g/dL    Total Bilirubin 1 00 0 20 - 1 00 mg/dL    eGFR 100 ml/min/1 73sq m       Imaging Studies:No results found  Assessment/Plan:  No orders of the defined types were placed in this encounter  Nancy Bearden is a 54y o  year old female with a 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 at time of her diagnosis in the fall of 2018  Liver biopsy on 10/25/2018 confirmed metastatic adenocarcinoma consistent with colorectal primary  Carmela Morales has been receiving chemotherapy with Dr Ananth Fleming as outlined above  Carmela Morales had a good partial response and then have palliative resection of her transverse colon primary secondary to bleeding in August of 2019   She had some bleeding again in July 2020 and repeat colonoscopy July 23, 2020 was unremarkable  She seen here July 6, 2020 for consideration of Sir sphere treatment but had just started FOLFIRI on Mary 15, 2020  She was having no symptoms and decided to continue with the FOLFIRI infusion without any Sir sphere treatments last summer      She was seen January 18, 2021 for re-evaluation  Repeat CT scans of the chest abdomen pelvis from September 23, 2020 revealed a stable 4 mm ground-glass nodule in the right upper lobe along with regression of disease and decrease in size of hepatic metastasis with no new lesions  She was then continued on the FOLFIRI plus bevacizumab  Her CEA level brennen to 631 3 on January 8, 2021 from 246 3 on October 30, 2020  Repeat CT scans of the chest, abdomen, pelvis on January 9, 2021 revealed no evidence of any pulmonary metastatic disease with a stable 4 mm ground-glass right upper lobe lung nodule  There were multiple hepatic metastasis some of which were stable and sum of which have increased in size  We reviewed the results of the imaging studies with her today    Since her disease was progressing on her current treatment regimen and the only progression is confined to the liver, we recommended liver directed therapy with Sir sphere radiation therapy to both the right and left hepatic lobes in divided doses on the same day  She received Sir sphere treatment to the whole liver on February 25, 2021  She returns today for follow-up examination     She has recovered well from treatment  She is eating well and having no nausea and no vomiting  Her weight is stable  She denies any fatigue  She saw Dr Mike Lopez March 11, 2021 and started on [facility] March 18, 2021  Her CEA level went from 631 in January down to 457 on March 18, 2021  She will be having repeat CT scans of the chest, abdomen, and pelvis on March 29, 2021 and then follow-up with Dr Mike Lopez  She will return here for follow-up in 3 months  Regla Sena MD  7/08/2082,1:20 PM    Portions of the record may have been created with voice recognition software   Occasional wrong word or "sound a like" substitutions may have occurred due to the inherent limitations of voice recognition software   Read the chart carefully and recognize, using context, where substitutions have occurred

## 2021-04-16 NOTE — PLAN OF CARE
Problem: Potential for Falls  Goal: Patient will remain free of falls  Description: INTERVENTIONS:  - Assess patient frequently for physical needs  -  Identify cognitive and physical deficits and behaviors that affect risk of falls    -  [place] fall precautions as indicated by assessment   - Educate patient/family on patient safety including physical limitations  - Instruct patient to call for assistance with activity based on assessment  - Modify environment to reduce risk of injury  - Consider OT/PT consult to assist with strengthening/mobility  Outcome: Progressing

## 2021-04-22 NOTE — PROGRESS NOTES
Hematology / Oncology Outpatient Follow Up Note    Heather Art 54 y o  female :1965 [facility]:[id number]         Date:  2021    Assessment / Plan:  A 59-year-old female with metastatic colon cancer  She has extensive liver metastasis   Her tumor has K-wanda mutation and is MSI stable   BRAF is wild type   She had 12 cycle of FOLFOX with bevacizumab with excellent tolerance, resulting in partial response   Since 2018, she was on maintenance bevacizumab monotherapy   She underwent palliative resection of transverse colon due to the bleeding   She fully recovered from the surgery   Subsequently, she was on maintenance bevacizumab   He had disease progression in May 2020  Therefore, she was on FOLFIRI plus bevacizumab with minimal toxicity  Unfortunately, she had progressive disease biochemically as well as radiographically in 2021  Systemic chemotherapy was discontinued  She underwent Sir sphere to the liver since liver is predominant location of disease , resulting in the reduction of CEA  Since late 2021, she has been on trifluridine tipiracil   55 mg b i d , day 1 through day 5 as well as day 8 through day 12  with minimal toxicity except mild neutropenia  This is day 7 of cycle 2  Last week, her ANC was 1 2  I recommended her to have CBC and CMP today  If she has less than 1000 of ANC, we may postpone the day 8 of cycle 2  Once obtain a CBC today, I will contact her  For subsequent CBC, will let her know when I giver call depending on the CBC today  I am going to set up CT scan of chest abdomen pelvis in late May 2021 for disease evaluation followed by office visit  She is in agreement with my recommendations        Subjective:      HPI:  A 59-year-old female who was found to have severe microcytic anemia in 2018   Therefore, she was advised to be hospitalized  Steffanie Felder was given transfusion  Diania Quarry was performed which did not show any major pathology   As outpatient basis, she underwent CT scan of abdomen pelvis which showed transverse colon mass as well as multiple liver metastatic disease   She subsequently underwent liver biopsy which showed metastatic adenocarcinoma, consistent with colorectal primary   She presents today to discuss treatment options   Since January 2018, she lost 25 pound  Rehan Adams has mildly anorexic  [facility], she has no complaint of pain  She denied any respiratory symptoms    She has normal bowel movement    Prior to the hospitalization, she had slowly progressive fatigue as well as some exertional shortness of breath   She is currently on oral iron 3 times per day  Rehna Adams has rheumatoid arthritis for which she is on weekly methotrexate and folic acid   She has no family history of colorectal cancer  Rehan Adams is a lifetime never smoker   Her performance status is normal            Interval History:   A 54year-old female with metastatic colon cancer with extensive liver metastasis   Her tumor has K-wanda mutation and is MSI stable   She started systemic chemotherapy with FOLFOX-6 which she tolerated very well  We were notified by pathology Department that molecular testing cannot be performed on liver biopsy tissue   She was treated with 12 cycle of FOLFOX with bevacizumab with excellent tolerance   She had good partial response   Since April 2019, she was on maintenance bevacizumab monotherapy   However, she was found to have progressive disease in May 2020  Therefore, she was then on FOLFIRI with bevacizumab   She had some stable disease on this treatment  However, she had biochemical and radiographic progression in January 2021  Therefore, FOLFIRI and bevacizumab was discontinued  She subsequent underwent liver directed therapy with Sir sphere in late February 2021    She started trifluridine tipiracil in late March 2021  This is day 7 of cycle 2  she has no complaint of diarrhea  She is eating well  She has stable weight  She has intermittent lower abdominal pain  She denied diarrhea  She is afebrile  Her performance status is normal                                                                            Objective:      Primary Diagnosis:     Metastatic colon cancer   K-wanda mutation positive   BRAF wild type  MSI stable   HER2 negative  Diagnosed in October 2018      Cancer Staging:  Cancer Staging  No matching staging information was found for the patient         Previous Hematologic/ Oncologic Treatment:       1  Bevacizumab with FOLFOX-6   X12 cycle   Completed in April 2019    2  Bevacizumab monotherapy from April 2019 through October 2019    3  Bevacizumab with infusion of 5 FU from November 2019 through May 2020    4  Bevacizumab with FOLFIRI from May 2020 through January 2021    5  Sir sphere Y90  Completed in February 25, 2021      Current Hematologic/ Oncologic Treatment:         Trifluridine tipiracil to be started in late March 2021      Disease Status:        Possible responding disease      Test Results:     Pathology:     Liver biopsy showed metastatic adenocarcinoma, consistent with colorectal primary   Molecular testing could not be performed per pathology department      Radiology:     CT scan of chest abdomen pelvis in January 2020 showed progression of liver metastasis      Laboratory:     See below   See below for CEA      Physical Exam:        General Appearance:    Alert, oriented          Eyes:    PERRL   Ears:    Normal external ear canals, both ears   Nose:   Nares normal, septum midline   Throat:   Mucosa moist  Pharynx without injection  Neck:   Supple         Lungs:     Clear to auscultation bilaterally   Chest Wall:    No tenderness or deformity    Heart:    Regular rate and rhythm         Abdomen:     Soft, non-tender, bowel sounds +,  Liver is palpable 4 cm below right costal margin  Spleen is not palpable                Extremities:   Extremities no cyanosis or edema         Skin:   no rash or icterus      Lymph nodes:   Cervical, supraclavicular, and axillary nodes normal   Neurologic:   CNII-XII intact, normal strength, sensation and reflexes     Throughout           ROS: Review of Systems   All other systems reviewed and are negative  Imaging: No results found  Labs:   Lab Results   Component Value Date    WBC 1 90 (LL) 04/16/2021    HGB 11 7 (L) 04/16/2021    HCT 35 8 (L) 04/16/2021    MCV 91 04/16/2021     (L) 04/16/2021     Lab Results   Component Value Date    K 3 9 04/16/2021     04/16/2021    CO2 32 (H) 04/16/2021    BUN 10 04/16/2021    CREATININE 0 55 (L) 04/16/2021    GLUF 96 04/02/2021    CALCIUM 9 9 04/16/2021    CORRECTEDCA 10 5 (H) 04/02/2021    AST 58 (H) 04/16/2021    ALT 44 (H) 04/16/2021    ALKPHOS 462 (H) 04/16/2021    EGFR 106 04/16/2021       Lab Results   Component Value Date     (H) 09/24/2018         Lab Results   Component Value Date     2 (H) 04/16/2021         Lab Results   Component Value Date    IRON 11 (L) 09/13/2018    TIBC 302 09/13/2018    FERRITIN 15 09/13/2018         Lab Results   Component Value Date    FOLATE 11 4 09/13/2018         Current Medications: Reviewed  Allergies: Reviewed  PMH/FH/SH:  Reviewed      Vital Sign:    Body surface area is 1 68 meters squared      Wt Readings from Last 3 Encounters:   04/22/21 63 5 kg (140 lb)   04/12/21 64 2 kg (141 lb 8 6 oz)   03/11/21 64 9 kg (143 lb)        Temp Readings from Last 3 Encounters:   04/22/21 98 °F (36 7 °C) (Tympanic Core)   04/12/21 (!) 97 2 °F (36 2 °C) (Temporal)   03/11/21 98 8 °F (37 1 °C) (Tympanic Core)        BP Readings from Last 3 Encounters:   04/22/21 124/80   04/12/21 125/87   03/11/21 124/78         Pulse Readings from Last 3 Encounters:   04/22/21 78   04/12/21 78   03/11/21 76     @LASTSAO2(3)@

## 2021-04-23 NOTE — TELEPHONE ENCOUNTER
Spoke with patient to make her aware of the following  She verbalized understanding and agreed to plan

## 2021-04-23 NOTE — TELEPHONE ENCOUNTER
----- Message from Romulo Medina MD sent at 4/23/2021  8:01 AM EDT -----  ANC 2 0  OK to start taking chemo pills today  Next CBC and CMP is around 5/12 before starting 3rd cycle  Let her know

## 2021-05-14 NOTE — TELEPHONE ENCOUNTER
Call received from:     Tomas Celeste  Date of lab draw:5/14/21  Critical value:WBC 1 7  Read back occurred:Yes  Tasked and IM:Aubrey Astorga

## 2021-05-14 NOTE — TELEPHONE ENCOUNTER
Discussed with Dr Mikie Tarango  Patient was supposed to start new cycle day 1 today  Dr Mikie Tarango would like patient to hold this dose and recheck CBC in one week  Orders in system and she will go next Friday to [facility]  Will contact patient once labs resulted for further instruction  She verbalized understanding and agreed to plan

## 2021-05-21 NOTE — TELEPHONE ENCOUNTER
Patient is at the out patient lab for repeat CBC - confirmed she does not need CMP repeated today    Only CBC    Will place a new standing order for Q 2 week CBCD and CMP - patient has used all of her orders

## 2021-05-24 NOTE — TELEPHONE ENCOUNTER
Spoke with patient to make her aware of the following  Labs placed in Epic  She verbalized understanding and agreed to plan

## 2021-05-24 NOTE — TELEPHONE ENCOUNTER
----- Message from Nazanin Blank MD sent at 5/24/2021  9:23 AM EDT -----  Her [address] recovered well  OK to start new cycle of oral chemo  Can you let her do CBC, CMP, CEA before her next visit on 6/3?

## 2021-06-02 NOTE — TELEPHONE ENCOUNTER
[place] from [facility] Radiologist DR Jeimy Barton would like to speak with Dr Nicholas Murray regarding the CT chest abdomen pelvis w contrast ,he can be reached at [phone]

## 2021-06-03 NOTE — PROGRESS NOTES
Hematology / Oncology Outpatient Follow Up Note    Brian Ayon 54 y o  female :1965 [facility]:[id number]         Date:  6/3/2021    Assessment / Plan:  A 51-year-old female with metastatic colon cancer  She has extensive liver metastasis   Her tumor has K-wanda mutation and is MSI stable   BRAF is wild type   She had 12 cycle of FOLFOX with bevacizumab with excellent tolerance, resulting in partial response   Since 2018, she was on maintenance bevacizumab monotherapy   She underwent palliative resection of transverse colon due to the bleeding   She fully recovered from the surgery   Subsequently, she was on maintenance bevacizumab   He had disease progression in May 2020  Therefore, she was on FOLFIRI plus bevacizumab with minimal toxicity   Unfortunately, she had progressive disease biochemically as well as radiographically in 2021  Systemic chemotherapy was discontinued   She underwent Sir sphere to the liver since liver is predominant location of disease , resulting in the reduction of CEA  Since late 2021, she has been on trifluridine tipiracil with excellent tolerance  Unfortunately, she has progressive disease not only in the liver but also new lung metastasis  She still have very good performance status and minimal symptoms from metastatic disease  Therefore, continuing systemic palliative chemotherapy is reasonable  I recommended her to recycle FOLFOX-6  Side effects of this regimen which she is very well aware was discussed including fatigue, neutropenia, small risk infection, neuropathy as well as cold sensitivity  She understood and wished to proceed  She is going to start new regimen in 2021  She is going to discontinue trifluridine tipiracil today  We also discussed the natural course of metastatic colorectal cancer  I will see her again in late 2021 for routine follow-up    All the patient questions were answered to her satisfaction       Subjective:      HPI:  A 59-year-old female who was found to have severe microcytic anemia in September 2018   Therefore, she was advised to be hospitalized  Renee Betancur was given transfusion  Candelario Sandman was performed which did not show any major pathology   As outpatient basis, she underwent CT scan of abdomen pelvis which showed transverse colon mass as well as multiple liver metastatic disease   She subsequently underwent liver biopsy which showed metastatic adenocarcinoma, consistent with colorectal primary   She presents today to discuss treatment options   Since January 2018, she lost 25 pound  Renee Betancur has mildly anorexic  [facility], she has no complaint of pain  She denied any respiratory symptoms    She has normal bowel movement    Prior to the hospitalization, she had slowly progressive fatigue as well as some exertional shortness of breath   She is currently on oral iron 3 times per day  Renee Betancur has rheumatoid arthritis for which she is on weekly methotrexate and folic acid   She has no family history of colorectal cancer  Renee Betancur is a lifetime never smoker   Her performance status is normal            Interval History:   A 54year-old female with metastatic colon cancer with extensive liver metastasis   Her tumor has K-wanda mutation and is MSI stable   She started systemic chemotherapy with FOLFOX-6 which she tolerated very well  We were notified by pathology Department that molecular testing cannot be performed on liver biopsy tissue   She was treated with 12 cycle of FOLFOX with bevacizumab with excellent tolerance   She had good partial response   Since April 2019, she was on maintenance bevacizumab monotherapy   However, she was found to have progressive disease in May 2020  Therefore, she was then on FOLFIRI with bevacizumab   She had some stable disease on this treatment  However, she had biochemical and radiographic progression in January 2021   Therefore, FOLFIRI and bevacizumab was discontinued   She subsequent underwent liver directed therapy with Sir sphere in late February 2021    She started trifluridine tipiracil in late March 2021  She tolerated trifluridine tipiracil well except some neutropenia  She recently underwent CT scan of chest abdomen pelvis which unfortunately showed progression of liver metastasis as well as new multiple small lung metastasis  She presents today for follow-up  She has no tumor related symptoms  She denied any pain  She has no diarrhea  Her weight is stable  She has no respiratory symptoms  Her performance status is normal                                                                           Objective:      Primary Diagnosis:     Metastatic colon cancer   K-wanda mutation positive   BRAF wild type   MSI stable   HER2 negative  Diagnosed in October 2018      Cancer Staging:  Cancer Staging  No matching staging information was found for the patient         Previous Hematologic/ Oncologic Treatment:       1  Bevacizumab with FOLFOX-6   X12 cycle   Completed in April 2019    2  Bevacizumab monotherapy from April 2019 through October 2019    3  Bevacizumab with infusion of 5 FU from November 2019 through May 2020    4  Bevacizumab with FOLFIRI from May 2020 through January 2021    5  Sir sphere Y90  Completed in February 25, 2021   6    Trifluridine tipiracil from late March 2021 through early June 2021      Current Hematologic/ Oncologic Treatment:         FOLFOX- 6 to be started in June 21, 2021      Disease Status:         progressive disease on trifluridine tipiracil      Test Results:     Pathology:     Liver biopsy showed metastatic adenocarcinoma, consistent with colorectal primary   Molecular testing could not be performed per pathology department      Radiology:     CT scan of chest abdomen pelvis in late May 2021 showed progressive disease in the liver as well as new metastasis in the lung      Laboratory:     See below   See below for CEA      Physical Exam:        General Appearance:    Alert, oriented          Eyes:    PERRL   Ears:    Normal external ear canals, both ears   Nose:   Nares normal, septum midline   Throat:   Mucosa moist  Pharynx without injection  Neck:   Supple         Lungs:     Clear to auscultation bilaterally   Chest Wall:    No tenderness or deformity    Heart:    Regular rate and rhythm         Abdomen:     Soft, non-tender, bowel sounds +,  Liver is palpable 4 cm below right costal margin   Spleen is not palpable                Extremities:   Extremities no cyanosis or edema         Skin:   no rash or icterus  Lymph nodes:   Cervical, supraclavicular, and axillary nodes normal   Neurologic:   CNII-XII intact, normal strength, sensation and reflexes     Throughout           ROS: Review of Systems   All other systems reviewed and are negative  Imaging: Ct Chest Abdomen Pelvis W Contrast    Result Date: 6/2/2021  Narrative: CT CHEST, ABDOMEN AND PELVIS WITH IV CONTRAST INDICATION:   C18 9: Malignant neoplasm of colon, unspecified C78 7: Secondary malignant neoplasm of liver and intrahepatic bile duct  "metastatic colon cancer  She has extensive liver metastasis  Her tumor has K-wanda mutation and is MSI stable  BRAF is wild type  She had 12 cycle of FOLFOX with bevacizumab with  excellent tolerance, resulting in partial response  Since April 2018, she was on maintenance bevacizumab monotherapy  She underwent palliative resection of transverse colon due to the bleeding  She fully recovered from the surgery  Subsequently, she  was on maintenance bevacizumab  He had disease progression in May 2020  Therefore, she was on FOLFIRI plus bevacizumab with minimal toxicity  Unfortunately, she had progressive disease biochemically as well as radiographically in January 2021  Systemic chemotherapy was discontinued  She underwent Sir sphere to the liver since liver is predominant location of disease , resulting in the reduction of CEA    Since late March 2021, she has been on trifluridine tipiracil  " COMPARISON:  Multiple priors most recently CT 1/9/2021 TECHNIQUE: CT examination of the chest, abdomen and pelvis was performed  Scanning through the abdomen was performed in arterial, venous and delayed phases according a protocol spefically designed to evaluate upper abdominal viscera  Axial, sagittal, and coronal 2D reformatted images were created from the source data and submitted for interpretation  Radiation dose length product (DLP) for this visit:  1136 mGy-cm   This examination, like all CT scans performed in the [facility], was performed utilizing techniques to minimize radiation dose exposure, including the use of iterative reconstruction and automated exposure control  IV Contrast:  100 mL of iohexol (OMNIPAQUE) Enteric Contrast: Enteric contrast was administered  FINDINGS: CHEST LUNGS:  No focal consolidation  0 5 cm solid pulmonary nodule right upper lobe unchanged (series 5, image 32)  There is a new 0 3 cm right upper lobe pulmonary nodule (series 5, image 46)  New 0 3 cm subpleural pulmonary nodule right lower lobe (series 5, image 66)  New 0 5 cm left upper lobe pulmonary nodule (series 5, image 23)  New 1 0 cm pulmonary nodule in the lingula (series 5, image 52)  New 0 7 cm pulmonary nodule lingula (series 5, image 64)  New 0 3 cm pulmonary nodule left lower lobe (series 5, image 80)  New 0 3 cm pulmonary nodule left lower lobe (series 5, image 53)  PLEURA:  Unremarkable  HEART/GREAT VESSELS:  Unremarkable for patient's age  Four-vessel arch  MEDIASTINUM AND ANTHONY:  Unremarkable  CHEST WALL AND LOWER NECK:   Right chest wall MediPort tip at the superior cavoatrial junction  ABDOMEN LIVER/BILIARY TREE:  There is a heterogeneously enhancing right hepatic segment 5/6 lesion, measuring up to 4 9 x 4 8 cm in greatest axial dimensions, previously there were 2 smaller lesions in this location measuring up to 2 cm    Patient has undergone radio embolization in the interim between the most recent CT scan  Multiple additional peripherally arterial hyperenhancing lesions are seen throughout the liver, which also appear enlarged since the prior examination, such as in hepatic segment 7/8, measuring up to 5 2 x 4 8 cm (series 4, image 44)  Additional enlarging lesion in hepatic segment 3/4 measuring up to 3 5 x 3 cm (series 4, image 59)  Multiple smaller lesions are also present which appear increased in size and number since prior examination  GALLBLADDER:  Gallstones with gallbladder wall thickening  SPLEEN:  Enlarged  PANCREAS:  Unremarkable  ADRENAL GLANDS:  Unremarkable  KIDNEYS/URETERS:  One or more simple renal cyst(s) is noted  Otherwise unremarkable kidneys  No hydronephrosis  STOMACH AND BOWEL:  Right hemicolectomy  No thickened or dilated bowel loops  APPENDIX:  No findings to suggest appendicitis  ABDOMINOPELVIC CAVITY:  No ascites  No pneumoperitoneum  No lymphadenopathy  VESSELS:  Replaced right hepatic artery arising from the SMA  Otherwise, no suspicious abnormality  Portal and hepatic veins are patent  PELVIS REPRODUCTIVE ORGANS:  Unremarkable for patient's age  URINARY BLADDER:  Unremarkable  ABDOMINAL WALL/INGUINAL REGIONS:  Unremarkable  OSSEOUS STRUCTURES:  No acute fracture or destructive osseous lesion  Impression: Multiple new pulmonary nodules compatible with pulmonary metastatic disease  Findings suspicious for progression of hepatic metastatic disease superimposed upon posttreatment change  Cholelithiasis with mild gallbladder wall thickening  Gallbladder wall thickening is likely reactive    I personally discussed this study with Barbara Mathew on 6/2/2021 at 12:13 PM  Workstation performed: [id number]         Labs:   Lab Results   Component Value Date    WBC 4 73 06/01/2021    HGB 11 2 (L) 06/01/2021    HCT 36 2 06/01/2021    MCV 96 06/01/2021     (L) 06/01/2021     Lab Results   Component Value Date    K 3 6 06/01/2021  06/01/2021    CO2 29 06/01/2021    BUN 6 06/01/2021    CREATININE 0 54 (L) 06/01/2021    GLUF 105 (H) 06/01/2021    CALCIUM 9 9 06/01/2021    CORRECTEDCA 10 9 (H) 06/01/2021    AST 48 (H) 06/01/2021    ALT 35 06/01/2021    ALKPHOS 510 (H) 06/01/2021    EGFR 107 06/01/2021         Lab Results   Component Value Date     (H) 09/24/2018         Lab Results   Component Value Date     2 (H) 04/16/2021         Lab Results   Component Value Date    IRON 11 (L) 09/13/2018    TIBC 302 09/13/2018    FERRITIN 15 09/13/2018       No results found for: [id number]    Lab Results   Component Value Date    FOLATE 11 4 09/13/2018         Current Medications: Reviewed  Allergies: Reviewed  PMH/FH/SH:  Reviewed      Vital Sign:    Body surface area is 1 65 meters squared      Wt Readings from Last 3 Encounters:   06/03/21 60 8 kg (134 lb)   04/22/21 63 5 kg (140 lb)   04/12/21 64 2 kg (141 lb 8 6 oz)        Temp Readings from Last 3 Encounters:   06/03/21 (!) 96 2 °F (35 7 °C) (Tympanic Core)   04/22/21 98 °F (36 7 °C) (Tympanic Core)   04/12/21 (!) 97 2 °F (36 2 °C) (Temporal)        BP Readings from Last 3 Encounters:   06/03/21 126/78   04/22/21 124/80   04/12/21 125/87         Pulse Readings from Last 3 Encounters:   06/03/21 94   04/22/21 78   04/12/21 78     @LASTSAO2(3)@

## 2021-06-18 NOTE — PROGRESS NOTES
Port flushed per protocol and central labs drawn per orders   confirmed with patient,chemo Monday 6-21-21

## 2021-06-21 NOTE — PROGRESS NOTES
Pt tolerated infusion of oxaliplatin, leucovorin, 5FU IVP, and connection of 5FU CADD without difficulty  No s/s reaction noted  Aware of return time on Wed for CADD d/c    Left ambulatory with AVS

## 2021-06-21 NOTE — PLAN OF CARE
Problem: Potential for Falls  Goal: Patient will remain free of falls  Description: INTERVENTIONS:  - Educate patient/family on patient safety including physical limitations  - Instruct patient to call for assistance with activity   - Consult OT/PT to assist with strengthening/mobility   - Keep Call bell within reach  - Keep bed low and locked with side rails adjusted as appropriate  - Keep care items and personal belongings within reach  - Initiate and maintain comfort rounds  - Make Fall Risk Sign visible to staff  - Offer Toileting every  Hours, in advance of need  - Initiate/Maintain alarm  - Obtain necessary fall risk management equipment:   - Apply yellow socks and bracelet for high fall risk patients  - Consider moving patient to room near nurses station  Outcome: Progressing     Problem: Knowledge Deficit  Goal: Patient/family/caregiver demonstrates understanding of disease process, treatment plan, medications, and discharge instructions  Description: Complete learning assessment and assess knowledge base    Interventions:  - Provide teaching at level of understanding  - Provide teaching via preferred learning methods  Outcome: Progressing

## 2021-06-30 NOTE — TELEPHONE ENCOUNTER
Patient calling to verify Jose Teresa received her leave of absence paperwork she stated she emailed yesterday   Patient can be reached at [phone]

## 2021-06-30 NOTE — TELEPHONE ENCOUNTER
Spoke with patient to let her know that her paperwork was sent over and received yesterday! I will be sending over a work note to them/her as well  Sent a copy to her home email  She verbalized understanding and agreed to plan

## 2021-07-06 NOTE — PROGRESS NOTES
No s/s reaction noted approx 45 min after oxali/leuco restarted  Pt reports feeling well and is resting comfortably

## 2021-07-06 NOTE — PROGRESS NOTES
Pt tolerated remainder of tx without further reaction  Reports feeling well at end of tx  CADD pump connected per order  Aware of return time on Thurs for CADD d/c    Left ambulatory with AVS

## 2021-07-06 NOTE — PROGRESS NOTES
Pt still with hives and facial/arm redness  Second half of benadryl and solucortef doses given per instruction of Dr Cleve Arreola  Will monitor

## 2021-07-06 NOTE — PROGRESS NOTES
Pt with hypersensitivity reaction at 1130 with c/o feeling extremely hot, facial flushing, hives on b/l arms  No SOB, chest tightness, nausea, or pain  VS 98 0-/55-91% on 2L  Hypersensitivity kit administered per protocol as follows:  25 mg benadryl, 50 mg solucortef, 20 mg pepcid  Repeat VS 97 8-/57-95% 2L  Pt reports feeling "a little better "  Notified Nidhi Kaufman RN at Dr Carol Mosley office via Teams

## 2021-07-06 NOTE — PROGRESS NOTES
Pt reports feeling better and flushing/hives have resolved  Notified Rehan Hector, RN  Will restart oxaliplatin and leucovorin per Dr Anais Carpenter

## 2021-07-08 NOTE — PROGRESS NOTES
Pt tolerated CADD pump removal without difficulty  Port flushed and deaccessed per protocol  Next appt confirmed    Left ambulatory declining AVS

## 2021-07-09 NOTE — PROGRESS NOTES
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 ([facility] [address] )    Rheumatoid arthritis involving both hands with negative rheumatoid factor (HCC)    Megaloblastic anemia          Subjective:     Chief Complaint   Patient presents with    Follow-up     6 month follow up         Patient ID: Luther Power is a 54 y o  female  Patient is a 51-year-old woman who presents for follow-up on metastatic colon cancer with liver and lung Mets, rheumatoid arthritis, 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 she admits being generally stable on medications denies acute complaints at this time no fevers chills nausea vomiting tolerating p o  Intake no other complaints at this time      The following portions of the patient's history were reviewed and updated as appropriate: allergies, current medications, past family history, past medical history, past social history, past surgical history and problem list     Review of Systems   Constitutional: Negative  HENT: Negative  Eyes: Negative  Respiratory: Negative  Cardiovascular: Negative  Gastrointestinal: Negative  Endocrine: Negative  Genitourinary: Negative  Musculoskeletal: Negative  Allergic/Immunologic: Negative  Neurological: Negative  Hematological: Negative  Psychiatric/Behavioral: Negative  All other systems reviewed and are negative          Objective:      /68 (BP Location: Right arm, Patient Position: Sitting, Cuff Size: Standard)   Temp (!) 101 1 °F (38 4 °C) (Tympanic)   Ht 5' 4" (1 626 m)   Wt 59 9 kg (132 lb)   LMP 09/28/2018 (Exact Date)   BMI 22 66 kg/m²          Physical Exam  Constitutional:       Appearance: She is well-developed  HENT:      Head: Atraumatic  Right Ear: External ear normal       Left Ear: External ear normal    Eyes:      Conjunctiva/sclera: Conjunctivae normal       Pupils: Pupils are equal, round, and reactive to light  Cardiovascular:      Rate and Rhythm: Normal rate and regular rhythm  Heart sounds: Normal heart sounds  Pulmonary:      Effort: Pulmonary effort is normal  No respiratory distress  Breath sounds: Normal breath sounds  Abdominal:      General: Bowel sounds are normal  There is no distension  Palpations: Abdomen is soft  Tenderness: There is no abdominal tenderness  There is no guarding or rebound  Musculoskeletal:         General: Normal range of motion  Cervical back: Normal range of motion  Skin:     General: Skin is warm and dry  Neurological:      Mental Status: She is alert and oriented to person, place, and time  Cranial Nerves: No cranial nerve deficit  Psychiatric:         Behavior: Behavior normal          Thought Content:  Thought content normal          Judgment: Judgment normal

## 2021-07-16 NOTE — TELEPHONE ENCOUNTER
Critical Results   Call Received From [facility]   Lab Department Location [place]   Lab Study Wbc 1 84   Date Blood Work was Done 7-16-21   Read Back of Information Done yes   Relevant Information

## 2021-07-19 NOTE — PROGRESS NOTES
Rec'd pt in good spirits, see toxicity assessment flowsheet  Port easily accessed  Extra premeds added for today's treatment due to infusion reaction with last cycle  All infusions tolerated well today, pt offered no complaints and appeared comfortable throughout the day  CADD Pump applied per protocol, blinking green operational  Pt then discharged to home with steady gait accompanied by family member

## 2021-07-21 NOTE — PROGRESS NOTES
Pt tolerated CADD pump d/c without difficulty  Port flushed and deaccessed per protocol  Next appt to be scheduled at oncology office visit tomorrow    Left ambulatory declining AVS

## 2021-07-21 NOTE — PLAN OF CARE
Problem: Knowledge Deficit  Goal: Patient/family/caregiver demonstrates understanding of disease process, treatment plan, medications, and discharge instructions  Description: Complete learning assessment and assess knowledge base    Interventions:  - Provide teaching at level of understanding  - Provide teaching via preferred learning methods  7/21/2021 1323 by Bev Knight RN  Outcome: Progressing  7/21/2021 1322 by Bev Knight RN  Outcome: Progressing

## 2021-07-22 NOTE — PROGRESS NOTES
Hematology / Oncology Outpatient Follow Up Note    Pete Villagomez 54 y o  female :[id number] ZGA:[id number]         Date:  2021    Assessment / Plan:  A 70-year-old female with metastatic colon cancer  She has extensive liver metastasis   Her tumor has K-wanda mutation and is MSI stable   BRAF is wild type   She had 12 cycle of FOLFOX with bevacizumab with excellent tolerance, resulting in partial response   Since 2018, she was on maintenance bevacizumab monotherapy   She underwent palliative resection of transverse colon due to the bleeding   She fully recovered from the surgery   Subsequently, she was on maintenance bevacizumab   He had disease progression in May 2020  Therefore, she was on FOLFIRI plus bevacizumab with minimal toxicity   Unfortunately, she had progressive disease biochemically as well as radiographically in 2021  Systemic chemotherapy was discontinued   She underwent Sir sphere to the liver since liver is predominant location of disease , resulting in the reduction of CEA   Since late 2021, she has been on trifluridine tipiracil with excellent tolerance  Unfortunately, she has progressive disease not only in the liver but also new lung metastasis  Since 2021, she is back on FOLFOX with excellent tolerance  Clinically, she is doing well  I recommended her to continue with FOLFOX every 2 weeks  I am going to order CT scan of chest abdomen pelvis in the 3rd to 4th week of 2021 for disease evaluation followed by office visit    She is in agreement with my recommendations        Subjective:      HPI:  A 70-year-old female who was found to have severe microcytic anemia in 2018   Therefore, she was advised to be hospitalized  Constantine Chatman was given transfusion  Jaydon Howell was performed which did not show any major pathology   As outpatient basis, she underwent CT scan of abdomen pelvis which showed transverse colon mass as well as multiple liver metastatic disease  Constantine Chatman subsequently underwent liver biopsy which showed metastatic adenocarcinoma, consistent with colorectal primary   She presents today to discuss treatment options   Since January 2018, she lost 25 pound  Talita Roberts has mildly anorexic  [facility], she has no complaint of pain  She denied any respiratory symptoms    She has normal bowel movement    Prior to the hospitalization, she had slowly progressive fatigue as well as some exertional shortness of breath   She is currently on oral iron 3 times per day  Talita Roberts has rheumatoid arthritis for which she is on weekly methotrexate and folic acid   She has no family history of colorectal cancer  Talita Roberts is a lifetime never smoker   Her performance status is normal            Interval History:   A 54year-old female with metastatic colon cancer with extensive liver metastasis   Her tumor has K-wanda mutation and is MSI stable   She started systemic chemotherapy with FOLFOX-6 which she tolerated very well  We were notified by pathology Department that molecular testing cannot be performed on liver biopsy tissue   She was treated with 12 cycle of FOLFOX with bevacizumab with excellent tolerance   She had good partial response   Since April 2019, she was on maintenance bevacizumab monotherapy   However, she was found to have progressive disease in May 2020  Therefore, she was then on FOLFIRI with bevacizumab   She had some stable disease on this treatment  However, she had biochemical and radiographic progression in January 2021   Therefore, FOLFIRI and bevacizumab was discontinued  She subsequent underwent liver directed therapy with Sir sphere in late February 2021    She started trifluridine tipiracil in late March 2021  She tolerated trifluridine tipiracil well except some neutropenia  Unfortunately, she had progressive disease in June 2021  since then, she is back on FOLFOX  She has been tolerating FOLFOX well  She has 3 cycle so far  She had mild neutropenia without any fever    She feels much better  She has mild diarrhea  She has no nausea vomiting  She denied any pain  Her performance status is normal                                                                            Objective:      Primary Diagnosis:     Metastatic colon cancer   K-wanda mutation positive   BRAF wild type  MSI stable   HER2 negative  Diagnosed in October 2018      Cancer Staging:  Cancer Staging  No matching staging information was found for the patient         Previous Hematologic/ Oncologic Treatment:       1  Bevacizumab with FOLFOX-6   X12 cycle   Completed in April 2019    2  Bevacizumab monotherapy from April 2019 through October 2019    3  Bevacizumab with infusion of 5 FU from November 2019 through May 2020    4  Bevacizumab with FOLFIRI from May 2020 through January 2021    5  Sir sphere Y90  Completed in February 25, 2021   6    Trifluridine tipiracil from late March 2021 through early June 2021      Current Hematologic/ Oncologic Treatment:         FOLFOX- 6 since June 21, 2021    4th cycle to be given in early August 2021      Disease Status:          not evaluated at this time      Test Results:     Pathology:     Liver biopsy showed metastatic adenocarcinoma, consistent with colorectal primary   Molecular testing could not be performed per pathology department      Radiology:     CT scan of chest abdomen pelvis in late May 2021 showed progressive disease in the liver as well as new metastasis in the lung      Laboratory:     See below   See below for CEA      Physical Exam:        General Appearance:    Alert, oriented          Eyes:    PERRL   Ears:    Normal external ear canals, both ears   Nose:   Nares normal, septum midline   Throat:   Mucosa moist  Pharynx without injection      Neck:   Supple         Lungs:     Clear to auscultation bilaterally   Chest Wall:    No tenderness or deformity    Heart:    Regular rate and rhythm         Abdomen:     Soft, non-tender, bowel sounds +,  Liver is palpable 4 cm below right costal margin   Spleen is not palpable                Extremities:   Extremities no cyanosis or edema         Skin:   no rash or icterus  Lymph nodes:   Cervical, supraclavicular, and axillary nodes normal   Neurologic:   CNII-XII intact, normal strength, sensation and reflexes     Throughout            ROS: Review of Systems   All other systems reviewed and are negative  Imaging: No results found  Labs:   Lab Results   Component Value Date    WBC 1 84 (LL) 07/16/2021    HGB 10 6 (L) 07/16/2021    HCT 35 0 07/16/2021    MCV 93 07/16/2021     (L) 07/16/2021     Lab Results   Component Value Date    K 2 9 (L) 07/16/2021     07/16/2021    CO2 29 07/16/2021    BUN 5 07/16/2021    CREATININE 0 47 (L) 07/16/2021    GLUF 93 07/02/2021    CALCIUM 9 2 07/16/2021    CORRECTEDCA 10 1 07/16/2021    AST 30 07/16/2021    ALT 23 07/16/2021    ALKPHOS 383 (H) 07/16/2021    EGFR 112 07/16/2021         Lab Results   Component Value Date     (H) 09/24/2018         Lab Results   Component Value Date    CEA 4,098 9 (H) 06/18/2021         Lab Results   Component Value Date    IRON 11 (L) 09/13/2018    TIBC 302 09/13/2018    FERRITIN 15 09/13/2018         Lab Results   Component Value Date    FOLATE 11 4 09/13/2018         Current Medications: Reviewed  Allergies: Reviewed  PMH/FH/SH:  Reviewed      Vital Sign:    Body surface area is 1 59 meters squared      Wt Readings from Last 3 Encounters:   07/22/21 55 8 kg (123 lb)   07/19/21 56 2 kg (123 lb 14 4 oz)   07/07/21 59 9 kg (132 lb)        Temp Readings from Last 3 Encounters:   07/22/21 98 4 °F (36 9 °C) (Tympanic)   07/07/21 (!) 101 1 °F (38 4 °C) (Tympanic)   07/06/21 97 8 °F (36 6 °C) (Temporal)        BP Readings from Last 3 Encounters:   07/22/21 116/80   07/07/21 108/68   07/06/21 116/67         Pulse Readings from Last 3 Encounters:   07/22/21 92   07/06/21 95   06/21/21 82     @LASTSAO2(3)@

## 2021-08-02 NOTE — TELEPHONE ENCOUNTER
I spoke with Jimmy Brian regarding her labs today  She has severe neutropenia  Therefore, I am going to postpone her chemotherapy by 1 week  I recommended her to go to the emergency department if she has fever  She also has hypokalemia with potassium 2 6  I recommended her to take potassium chloride twice a day instead of once a day  She is in agreement with my recommendation

## 2021-08-02 NOTE — TELEPHONE ENCOUNTER
Critical Results   Call Received From [facility]    Lab Department Location [place]    Lab Study WBC 1 21   Date Blood Work was Done Today    Read Back of Information Done yes   Relevant Information

## 2021-08-02 NOTE — TELEPHONE ENCOUNTER
Kathryn from the [facility] lab is calling in with a critical result     Potassium- 2 6

## 2021-08-03 NOTE — TELEPHONE ENCOUNTER
Chemo to be post poned by one week and patient to increase potassium 20 MeQ to twice a day  Dr Arin Pearce spoke with patient and made her aware

## 2021-08-11 NOTE — PROGRESS NOTES
Patient labs from yesterday calcium 11 and potassium 3 2  Chris Espinoza RN advised and Dr Sang Estrella aware  Patient will stay on same oral potassium dose  Patient also stated that 3 days after her pump disconnect she has burning in her vagina which is relieved by monistat  Advised to have patient have diflucan on board and per Dr Sang Estrella, patient is to continue with monistat and her OTC meds as usual   Patient tolerated treatment well today without complications    CADD pump connected, running, green light blinking and aware to return on Friday for pump disconnect at 1:00pm

## 2021-08-13 NOTE — PROGRESS NOTES
Pt tolerated CADD pump d/c without difficulty  Port flushed and deaccessed per protocol  Next appt confirmed    Left ambulatory declining AVS

## 2021-08-24 NOTE — PROGRESS NOTES
Spoke with Piedad Jackson RN and per DR Matias Trinidad, patient is ok to treat chemo tomorrow with bili 1 93 and patient is to stay on same oral potassium with level 2 9

## 2021-08-25 NOTE — PROGRESS NOTES
Pt tolerated rest of oxaliplatin today without any more complications  CADD pump connected, running, with green light blinking   Confirmed appt back for disconnect Friday at 12:15pm

## 2021-08-25 NOTE — PROGRESS NOTES
At 11:50am, I walked by patient's room and her face was covered in a rash after treatment running for one hour  Oxaliplatin and leucovorin on hold with saline wide open  Patient denied any symptoms at that time  Vitals stable  Hypersensitivity kit given  Patient rash clearing at 12:20pm   Spoke with Myah Iglesias RN and per Dr Brannon Grant, have patient rest another 15 minutes and if stable, ok to restart patient

## 2021-08-30 NOTE — PROGRESS NOTES
Hematology / Oncology Outpatient Follow Up Note    Angi Servin 64 y o  female :1965 EED:[id number]         Date:  2021    Assessment / Plan:  A 80-year-old female with metastatic colon cancer  She has extensive liver metastasis   Her tumor has K-wanda mutation and is MSI stable   BRAF is wild type   She had 12 cycle of FOLFOX with bevacizumab with excellent tolerance, resulting in partial response   Since 2018, she was on maintenance bevacizumab monotherapy   She underwent palliative resection of transverse colon due to the bleeding   She fully recovered from the surgery   Subsequently, she was on maintenance bevacizumab   He had disease progression in May 2020  Therefore, she was on FOLFIRI plus bevacizumab with minimal toxicity   Unfortunately, she had progressive disease biochemically as well as radiographically in 2021  Systemic chemotherapy was discontinued   She underwent Sir sphere to the liver since liver is predominant location of disease , resulting in the reduction of CEA   Since late 2021, she has been on trifluridine tipiracil with excellent tolerance   Unfortunately, she has progressive disease not only in the liver but also new lung metastasis  Since 2021, she is back on FOLFOX with excellent tolerance , except diarrhea  Again, CT scan of chest abdomen pelvis in 2021 showed progression of lung and liver metastasis  We discussed the further management  In her situation, there is no standard treatment available  She still has good performance status  Therefore, she may be a good candidate for early phase clinical trial   Therefore, I recommended her to go to [facility] to discuss further treatment options  She is in agreement with my recommendation  I asked her to give us a call after she she is seen by Oanh Rene oncologist       Subjective:      HPI:  A 80-year-old female who was found to have severe microcytic anemia in 2018   Therefore, she was advised to be hospitalized  [facility] was given transfusion  Isaac Hait was performed which did not show any major pathology   As outpatient basis, she underwent CT scan of abdomen pelvis which showed transverse colon mass as well as multiple liver metastatic disease   She subsequently underwent liver biopsy which showed metastatic adenocarcinoma, consistent with colorectal primary   She presents today to discuss treatment options   Since January 2018, she lost 25 pound  [facility] has mildly anorexic  [facility], she has no complaint of pain  She denied any respiratory symptoms    She has normal bowel movement    Prior to the hospitalization, she had slowly progressive fatigue as well as some exertional shortness of breath   She is currently on oral iron 3 times per day  [facility] has rheumatoid arthritis for which she is on weekly methotrexate and folic acid   She has no family history of colorectal cancer  [facility] is a lifetime never smoker   Her performance status is normal            Interval History:   A 64year-old female with metastatic colon cancer with extensive liver metastasis   Her tumor has K-wanda mutation and is MSI stable   She started systemic chemotherapy with FOLFOX-6 which she tolerated very well  We were notified by pathology Department that molecular testing cannot be performed on liver biopsy tissue   She was treated with 12 cycle of FOLFOX with bevacizumab with excellent tolerance   She had good partial response   Since April 2019, she was on maintenance bevacizumab monotherapy   However, she was found to have progressive disease in May 2020  Therefore, she was then on FOLFIRI with bevacizumab   She had some stable disease on this treatment  However, she had biochemical and radiographic progression in January 2021   Therefore, FOLFIRI and bevacizumab was discontinued   She subsequent underwent liver directed therapy with Sir sphere in late February 2021    She started trifluridine tipiracil in late March 2021   She tolerated trifluridine tipiracil well except some neutropenia  Unfortunately, she had progressive disease in June 2021  since then, she is back on FOLFOX  She presents today for routine follow-up  She continued to have moderate diarrhea  She is maintaining her weight  She has no respiratory symptoms  She denied any pain  Her performance status is well maintained with  0 to 1/4 on the ECOG scale  Unfortunately, recent CT scan showed progressive disease                                                                             Objective:      Primary Diagnosis:     Metastatic colon cancer   K-wanda mutation positive   BRAF wild type  MSI stable   HER2 negative  Diagnosed in October 2018      Cancer Staging:  Cancer Staging  No matching staging information was found for the patient         Previous Hematologic/ Oncologic Treatment:       1  Bevacizumab with FOLFOX-6   X12 cycle   Completed in April 2019    2  Bevacizumab monotherapy from April 2019 through October 2019    3  Bevacizumab with infusion of 5 FU from November 2019 through May 2020    4  Bevacizumab with FOLFIRI from May 2020 through January 2021    5  Sir sphere Y90  Completed in February 25, 2021    6    Trifluridine tipiracil from late March 2021 through early June 2021   7    FOLFOX- 6 x5 cycles from June 2021 through August 2021      Current Hematologic/ Oncologic Treatment:         To be determined      Disease Status:       progressive disease on FOLFOX      Test Results:     Pathology:     Liver biopsy showed metastatic adenocarcinoma, consistent with colorectal primary   Molecular testing could not be performed per pathology department      Radiology:     CT scan of chest abdomen pelvis in late August 2021 showed progression of liver metastasis      Laboratory:     See below   See below for CEA      Physical Exam:        General Appearance:    Alert, oriented          Eyes:    PERRL   Ears:    Normal external ear canals, both ears   Nose:   Nares normal, septum midline   Throat:   Mucosa moist  Pharynx without injection  Neck:   Supple         Lungs:     Clear to auscultation bilaterally   Chest Wall:    No tenderness or deformity    Heart:    Regular rate and rhythm         Abdomen:     Soft, non-tender, bowel sounds +,  Liver is palpable 4 cm below right costal margin   Spleen is not palpable                Extremities:   Extremities no cyanosis or edema         Skin:   no rash or icterus  Lymph nodes:   Cervical, supraclavicular, and axillary nodes normal   Neurologic:   CNII-XII intact, normal strength, sensation and reflexes     Throughout           ROS: Review of Systems   All other systems reviewed and are negative  Imaging: CT chest abdomen pelvis w contrast    Result Date: 8/30/2021  Narrative: CT CHEST, ABDOMEN AND PELVIS WITH IV CONTRAST INDICATION:   C18 9: Malignant neoplasm of colon, unspecified C78 7: Secondary malignant neoplasm of liver and intrahepatic bile duct  Follow-up treatment response; history of radio embolization in February 21 COMPARISON:  5/29/2021; 1/9/2021; TECHNIQUE: CT examination of the chest, abdomen and pelvis was performed  Axial, sagittal, and coronal 2D reformatted images were created from the source data and submitted for interpretation  Radiation dose length product (DLP) for this visit:  742 mGy-cm   This examination, like all CT scans performed in the [facility], was performed utilizing techniques to minimize radiation dose exposure, including the use of iterative reconstruction and automated exposure control  IV Contrast:  100 mL of iohexol (OMNIPAQUE) Enteric Contrast: Enteric contrast was administered  FINDINGS: CHEST Port-A-Cath is present with tip in SVC  LUNGS:  Left upper lobe 8 mm nodule image 27, series 3, previously 5 mm  Right upper lobe 10 x 9 mm nodule image 30, previously 3 mm  Left upper lobe 14 x 11 mm nodule image 55, previously 10 x 8 mm   Left lower lobe 9 x 9 mm nodule image 83, new  Right lower lobe 8 x 6 mm nodule image 78, new  Other scattered nodules are present most of which have increased since the prior examination  There is no tracheal or endobronchial lesion  PLEURA:  Large nodule in the major fissure is new measuring 3 7 x 1 6 cm, image 69  HEART/GREAT VESSELS:  Unremarkable for patient's age  MEDIASTINUM AND ANTHONY:  Unremarkable  CHEST WALL AND LOWER NECK:   Unremarkable  ABDOMEN LIVER/BILIARY TREE: Segment 3, 4 8 x 3 5 cm nodule, image 49 series 2, previously 3 5 x 2 9 cm  Complex multilocular nodule image 70, series 2, segment 6 measuring 6 9 x 6 9 cm, previously 5 8 x 6 1 cm  Segment 4 a nodule image 52, 5 5 x 3 3 cm, previously 3 5 x 2 5 cm  Other liver lesions have enlarged  GALLBLADDER:  Gallstone is noted  The gallbladder is contracted with some wall thickening    SPLEEN: Mildly enlarged at 14 cm  PANCREAS:  Unremarkable  ADRENAL GLANDS:  Unremarkable  KIDNEYS/URETERS:  Unremarkable  No hydronephrosis  Left renal cyst is identified  STOMACH AND BOWEL:  Unremarkable  APPENDIX:  No findings to suggest appendicitis  ABDOMINOPELVIC CAVITY:  No ascites  No pneumoperitoneum  No lymphadenopathy  Some infiltration of the peritoneal fat may be related to mild edema VESSELS:  Unremarkable for patient's age  Varices extend into the periesophageal region  The portal and splenic vein are patent however  PELVIS REPRODUCTIVE ORGANS:  Unremarkable for patient's age  URINARY BLADDER:  Unremarkable  ABDOMINAL WALL/INGUINAL REGIONS:  There is a epigastric hernia of fat noted at the level of the left lobe of the liver  OSSEOUS STRUCTURES:  No acute fracture or destructive osseous lesion  Schmorl's node is noted at the T9 level  Impression: Evidence of worsening metastatic disease to the lungs and liver  The study was marked in EPIC for significant notification   Workstation performed: [id number]         Labs:   Lab Results   Component Value Date WBC 3 27 (L) 08/24/2021    HGB 9 5 (L) 08/24/2021    HCT 31 8 (L) 08/24/2021    MCV 97 08/24/2021     08/24/2021     Lab Results   Component Value Date    K 2 9 (L) 08/24/2021     08/24/2021    CO2 25 08/24/2021    BUN 6 08/24/2021    CREATININE 0 52 (L) 08/24/2021    GLUF 97 08/24/2021    CALCIUM 8 8 08/24/2021    CORRECTEDCA 10 4 (H) 08/24/2021    AST 47 (H) 08/24/2021    ALT 21 08/24/2021    ALKPHOS 410 (H) 08/24/2021    EGFR 107 08/24/2021         Lab Results   Component Value Date     (H) 09/24/2018         Lab Results   Component Value Date    CEA 4,990 0 (H) 08/24/2021         Lab Results   Component Value Date    IRON 11 (L) 09/13/2018    TIBC 302 09/13/2018    FERRITIN 15 09/13/2018       No results found for: [id number]    Lab Results   Component Value Date    FOLATE 11 4 09/13/2018         Current Medications: Reviewed  Allergies: Reviewed  PMH/FH/SH:  Reviewed      Vital Sign:    Body surface area is 1 59 meters squared      Wt Readings from Last 3 Encounters:   08/30/21 55 5 kg (122 lb 6 4 oz)   08/30/21 55 5 kg (122 lb 5 7 oz)   08/25/21 55 2 kg (121 lb 11 1 oz)        Temp Readings from Last 3 Encounters:   08/30/21 98 2 °F (36 8 °C)   08/30/21 (!) 97 3 °F (36 3 °C)   08/27/21 98 8 °F (37 1 °C) (Temporal)        BP Readings from Last 3 Encounters:   08/30/21 115/70   08/30/21 108/72   08/25/21 115/72         Pulse Readings from Last 3 Encounters:   08/30/21 84   08/30/21 89   08/25/21 79     @LASTSAO2(3)@

## 2021-08-30 NOTE — TELEPHONE ENCOUNTER
Received notification by [employer] RN, Lora Pedroza , on 8/30/21 that pt has triggered for oncology nutrition care (reason for referral: Malnutrition Screening Tool (MST) Triggers: scored a 1 indicating 2-13# (0 9-6 kg) recent wt loss and is eating poorly due to a decreased appetite  (Date of MST: 8/30/21) and patient request due to "Baseline May 2021 135lbs/recent chemo med changes/decrease in wt " )  [place] and introduced self and explained the reason for today's call  Spoke with [facility] today who reports she has a decreased appetite, and dysgeusia  She reports the plan is to stop treatment at this time  She may be going down to [facility] to discuss further treatment options  Will hold off on setting up a nutrition appointment for now until location of tx is decided  [facility] was understandable  Provided this RDs contact information asking that [facility] reach out prn  All questions/concerns addressed at this time

## 2021-08-30 NOTE — PROGRESS NOTES
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 status post FOLFIRI with progression only in liver   She underwent SIR sphere treatment 21  She was last seen in radiation on 21  She follows up today for her 4 month follow up  Interval History:   21 - Malcolm Schuster  Pt on Trifluridine Tipiracil - mild neutropenia  Pt on cycle 2     21 - CT chest abdomen pelvis w contrast  IMPRESSION:  Multiple new pulmonary nodules compatible with pulmonary metastatic disease  Findings suspicious for progression of hepatic metastatic disease superimposed upon posttreatment change  Cholelithiasis with mild gallbladder wall thickening   Gallbladder wall thickening is likely reactive      21 - Hem Onc, Rufus Cranker  Continue with systemic palliative chemotherapy, FOLFOX-6  New regimen to start 21, discontinue Trifluridine Tipiracil today      21 - Maclolm Schuster  Pt is back on FOLFOX w/ excellent tolerance, continue every 2 weeks  Clinically doing well         21 - CT chest abdomen pelvis w contrast    results not yet read     She does have fatigue  She is having no nausea nor vomiting but does have diarrhea for 3 days after the FOLFOX-6  She has no abdominal pains  She is eating well but her weight has decreased 19 lbs since   She is still working from home and sometimes from the office  She gets blood work every 2 weeks  She denies any cough shortness of breath no hemoptysis      Upcomin21 - Malcolm Schuster    Historical Information   Oncology History   Colon cancer metastasized to liver [facility])   10/25/2018 Initial Diagnosis    Colon cancer metastasized to liver ([facility] [address] )     10/25/2018 Biopsy    Final Diagnosis   A   Liver mass (core needle biopsy):  - Metastatic adenocarcinoma with extensive necrosis           11/12/2018 - 4/2019 Chemotherapy    Bevacizumab with FOLFOX-6   X12 cycle     4/19/2019 - 10/27/2019 Chemotherapy    bevacizumab (AVASTIN) 900 mg in sodium chloride 0 9 % 100 mL IVPB, 15 mg/kg, Intravenous, Once, 5 of 9 cycles  Dose modification: 7 5 mg/kg (original dose 15 mg/kg, Cycle 2, Reason: Other (See Comments), Comment: regimen)  Administration: 450 mg (5/20/2019), 450 mg (6/10/2019), 450 mg (7/1/2019), 450 mg (10/7/2019)     8/29/2019 Surgery    Right hemicolectomy:  - Residual adenocarcinoma   - Thirty (30) lymph nodes negative for carcinoma (0/30)     11/4/2019 - 6/14/2020 Chemotherapy    bevacizumab (AVASTIN) 292 5 mg in sodium chloride 0 9 % 100 mL IVPB, 5 mg/kg = 292 5 mg (100 % of original dose 5 mg/kg), Intravenous, Once, 13 of 15 cycles  Dose modification: 5 mg/kg (original dose 5 mg/kg, Cycle 1)  Administration: 292 5 mg (11/4/2019), 292 5 mg (11/18/2019), 292 5 mg (12/2/2019), 292 5 mg (12/16/2019), 292 5 mg (12/31/2019), 292 5 mg (1/13/2020), 292 5 mg (1/27/2020), 292 5 mg (2/10/2020), 292 5 mg (2/24/2020), 292 5 mg (3/9/2020), 292 5 mg (5/4/2020), 292 5 mg (5/18/2020), 292 5 mg (6/1/2020)  leucovorin 656 mg in dextrose 5 % 250 mL IVPB, 400 mg/m2 = 656 mg, Intravenous, Once, 16 of 18 cycles  Administration: 656 mg (11/4/2019), 656 mg (11/18/2019), 656 mg (12/2/2019), 656 mg (12/16/2019), 656 mg (12/31/2019), 656 mg (4/6/2020), 656 mg (1/13/2020), 656 mg (1/27/2020), 656 mg (2/10/2020), 656 mg (2/24/2020), 656 mg (3/9/2020), 656 mg (3/23/2020), 656 mg (4/20/2020), 656 mg (5/4/2020), 656 mg (5/18/2020), 656 mg (6/1/2020)     6/15/2020 - 1/10/2021 Chemotherapy    bevacizumab (AVASTIN) 320 mg in sodium chloride 0 9 % 100 mL IVPB, 5 mg/kg = 320 mg, Intravenous, Once, 12 of 13 cycles  Administration: 320 mg (7/27/2020), 320 mg (8/10/2020), 320 mg (8/24/2020), 320 mg (9/8/2020), 320 mg (9/21/2020), 320 mg (10/5/2020), 320 mg (10/19/2020), 320 mg (11/2/2020), 320 mg (11/16/2020), 320 mg (11/30/2020), 320 mg (12/14/2020), 320 mg (12/28/2020)  irinotecan (CAMPTOSAR) 300 mg in sodium chloride 0 9 % 500 mL chemo infusion, 308 mg, Intravenous, Once, 15 of 16 cycles  Administration: 300 mg (6/15/2020), 300 mg (6/29/2020), 300 mg (7/13/2020), 300 mg (7/27/2020), 300 mg (8/10/2020), 300 mg (8/24/2020), 300 mg (9/8/2020), 300 mg (9/21/2020), 300 mg (10/5/2020), 300 mg (10/19/2020), 300 mg (11/2/2020), 300 mg (11/16/2020), 300 mg (11/30/2020), 300 mg (12/14/2020), 300 mg (12/28/2020)  leucovorin 684 mg in sodium chloride 0 9 % 250 mL IVPB, 400 mg/m2 = 684 mg, Intravenous, Once, 15 of 16 cycles  Administration: 684 mg (6/15/2020), 684 mg (6/29/2020), 684 mg (7/13/2020), 684 mg (7/27/2020), 684 mg (8/10/2020), 684 mg (8/24/2020), 648 mg (9/8/2020), 684 mg (9/21/2020), 684 mg (10/5/2020), 684 mg (10/19/2020), 684 mg (11/2/2020), 684 mg (11/16/2020), 684 mg (11/30/2020), 684 mg (12/14/2020), 684 mg (12/28/2020)     6/21/2021 -  Chemotherapy    fluorouracil (ADRUCIL), 400 mg/m2 = 660 mg, Intravenous, Once, 3 of 3 cycles  Administration: 660 mg (6/21/2021), 660 mg (7/6/2021), 660 mg (7/19/2021)  leucovorin calcium IVPB, 400 mg/m2 = 660 mg, Intravenous, Once, 5 of 12 cycles  Administration: 660 mg (6/21/2021), 660 mg (7/6/2021), 650 mg (8/25/2021), 660 mg (7/19/2021), 660 mg (8/11/2021)  oxaliplatin (ELOXATIN) chemo infusion, 85 mg/m2 = 140 25 mg, Intravenous, Once, 5 of 12 cycles  Dose modification: 60 mg/m2 (original dose 85 mg/m2, Cycle 5, Reason: Anticipated Tolerance)  Administration: 140 25 mg (6/21/2021), 140 25 mg (7/6/2021), 100 mg (8/25/2021), 140 25 mg (7/19/2021), 99 mg (8/11/2021)  fluorouracil (ADRUCIL) ambulatory infusion Soln, 1,200 mg/m2/day = 3,960 mg, Intravenous, Over 46 hours, 5 of 12 cycles     Secondary malignant neoplasm of liver ([facility] [address] )   1/18/2021 Initial Diagnosis    Secondary malignant neoplasm of liver [facility]      Radiation    Sir Sphere   Treatments Site: whole liver     Dose:  1 53 GBq  Treatment dates:  21      Right lobe Dose:   Prescribed: 1 17 GBq,  Delivered: 1 18 GBq  Leftt lobe Dose:   Prescribed: 0 38 GBq,  Delivered: 0 35 GBq           Past Medical History:   Diagnosis Date    Anxious mood     Cancer ([facility] [address] )     Colon cancer ([facility] [address] )     History of chemotherapy     Rheumatoid arthritis ([facility] [address] )      Past Surgical History:   Procedure Laterality Date    APPENDECTOMY      ESOPHAGOGASTRODUODENOSCOPY N/A 2018    Procedure: ESOPHAGOGASTRODUODENOSCOPY (EGD) with polypectomy;  Surgeon: Karon Hernandez MD;  Location: [facility];   Service: Gastroenterology    IR BIOPSY LIVER MASS  10/25/2018    IR PORT PLACEMENT  2018    r chest    IR Y-90 PRE-ANGIO/EMBO W/ LUNG SCAN  2021    IR Y-90 RADIOEMBOLIZATION  2021    NY PART REMOVAL COLON W ANASTOMOSIS N/A 2019    Procedure: EXTENDED RIGHT COLON RESECTION;  Surgeon: Renzo Lackey MD;  Location:  MAIN OR;  Service: Surgical Oncology    TUBAL LIGATION      resolved     WISDOM TOOTH EXTRACTION      resolved        Social History   Social History     Substance and Sexual Activity   Alcohol Use Not Currently    Alcohol/week: 0 0 standard drinks     Social History     Substance and Sexual Activity   Drug Use No     Social History     Tobacco Use   Smoking Status Former Smoker    Packs/day: 0 00    Years: 0 00    Pack years: 0 00    Quit date: 2000    Years since quittin 0   Smokeless Tobacco Never Used     Meds/Allergies     Current Outpatient Medications:     cyanocobalamin (VITAMIN B-12) 1,000 mcg tablet, Take by mouth daily, Disp: , Rfl:     Multiple Vitamin (MULTIVITAMIN) capsule, Take 1 capsule by mouth daily, Disp: , Rfl:     Omega-3 Fatty Acids (FISH OIL PO), Take by mouth daily , Disp: , Rfl:     potassium chloride (K-DUR,KLOR-CON) 20 mEq tablet, Take 1 tablet (20 mEq total) by mouth daily, Disp: 60 tablet, Rfl: 2    folic acid (FOLVITE) 1 mg tablet, Take 1 mg by mouth daily  (Patient not taking: Reported on 7/22/2021), Disp: , Rfl:     methotrexate 2 5 mg tablet, 15 mg once a week  (Patient not taking: Reported on 7/7/2021), Disp: , Rfl:     methylPREDNISolone 4 MG tablet therapy pack, Use as directed on package  Start taking night of procedure (2/25/21) (Patient not taking: Reported on 4/12/2021), Disp: 1 each, Rfl: 0    omeprazole (PriLOSEC) 40 MG capsule, Take 1 capsule (40 mg total) by mouth daily Please start taking 2/23/21  May take morning of procedure with small sip of water  (Patient not taking: Reported on 4/12/2021), Disp: 30 capsule, Rfl: 0  No Known Allergies    Review of Systems  Constitutional: Positive for unexpected weight change (Wt change May 2021 to present-Change in chemo meds/met disease)  HENT: Negative  Eyes: Negative  Respiratory: Negative  Cardiovascular: Negative  Gastrointestinal: Positive for abdominal distention and diarrhea (Chemo med related/usually noted w/chemo tx  Not daily)  Endocrine: Negative  Genitourinary: Negative  Musculoskeletal: Negative  Skin: Negative  Allergic/Immunologic: Negative  Hematological: Bruises/bleeds easily (Easier bleeding noted)  Psychiatric/Behavioral: Negative        OBJECTIVE:   /72 (BP Location: Left arm, Patient Position: Sitting)   Pulse 89   Temp (!) 97 3 °F (36 3 °C)   Resp 20   Ht 5' 4" (1 626 m)   Wt 55 5 kg (122 lb 5 7 oz)   LMP 09/28/2018 (Exact Date)   SpO2 99%   BMI 21 00 kg/m²   Pain Assessment:  0  ECOG/Zubrod/WHO: 1 - Symptomatic but completely ambulatory    Physical Exam   Vitals signs and nursing note reviewed  Constitutional:       General: She is not in acute distress  Appearance: She is well-developed  She is not diaphoretic  HENT:      Head: Normocephalic and atraumatic  Mouth/Throat:      Pharynx: No oropharyngeal exudate     Eyes:      General: No scleral icterus  Conjunctiva/sclera: Conjunctivae normal       Pupils: Pupils are equal, round, and reactive to light  Neck:      Musculoskeletal: Normal range of motion and neck supple  Thyroid: No thyromegaly  Trachea: No tracheal deviation  Cardiovascular:      Rate and Rhythm: Normal rate and regular rhythm  Heart sounds: Normal heart sounds  Pulmonary:      Effort: Pulmonary effort is normal  No respiratory distress  Breath sounds: Normal breath sounds  No stridor  No wheezing, rhonchi or rales  Chest:      Chest wall: No tenderness  Abdominal:      General: Bowel sounds are normal  There is no distension  Palpations: Abdomen is soft  There is no fluid wave, hepatomegaly or mass  Tenderness: There is no abdominal tenderness  There is no guarding or rebound  Hernia: No hernia is present  Musculoskeletal: Normal range of motion  General: No tenderness  Lymphadenopathy:      Cervical: No cervical adenopathy  Upper Body:      Right upper body: No supraclavicular adenopathy  Left upper body: No supraclavicular adenopathy  Skin:     General: Skin is warm and dry  Coloration: Skin is not pale  Findings: No erythema or rash  Neurological:      General: No focal deficit present  Mental Status: She is alert and oriented to person, place, and time  Cranial Nerves: No cranial nerve deficit  Coordination: Coordination normal    Psychiatric:         Mood and Affect: Mood normal          Behavior: Behavior normal          Thought Content:  Thought content normal          Judgment: Judgment normal      RESULTS    Lab Results:   Recent Results (from the past 672 hour(s))   CBC and differential    Collection Time: 08/10/21  8:39 AM   Result Value Ref Range    WBC 5 84 4 31 - 10 16 Thousand/uL    RBC 3 44 (L) 3 81 - 5 12 Million/uL    Hemoglobin 10 0 (L) 11 5 - 15 4 g/dL    Hematocrit 32 9 (L) 34 8 - 46 1 %    MCV 96 82 - 98 fL    MCH 29 1 26 8 - 34 3 pg    MCHC 30 4 (L) 31 4 - 37 4 g/dL    RDW 21 4 (H) 11 6 - 15 1 %    MPV 12 0 8 9 - 12 7 fL    Platelets 325 (L) 278 - 390 Thousands/uL    nRBC 0 /100 WBCs    Neutrophils Relative 79 (H) 43 - 75 %    Immat GRANS % 1 0 - 2 %    Lymphocytes Relative 10 (L) 14 - 44 %    Monocytes Relative 9 4 - 12 %    Eosinophils Relative 0 0 - 6 %    Basophils Relative 1 0 - 1 %    Neutrophils Absolute 4 62 1 85 - 7 62 Thousands/µL    Immature Grans Absolute 0 08 0 00 - 0 20 Thousand/uL    Lymphocytes Absolute 0 58 (L) 0 60 - 4 47 Thousands/µL    Monocytes Absolute 0 53 0 17 - 1 22 Thousand/µL    Eosinophils Absolute 0 00 0 00 - 0 61 Thousand/µL    Basophils Absolute 0 03 0 00 - 0 10 Thousands/µL   Comprehensive metabolic panel    Collection Time: 08/10/21  8:39 AM   Result Value Ref Range    Sodium 137 136 - 145 mmol/L    Potassium 3 2 (L) 3 5 - 5 3 mmol/L    Chloride 105 100 - 108 mmol/L    CO2 23 21 - 32 mmol/L    ANION GAP 9 4 - 13 mmol/L    BUN 6 5 - 25 mg/dL    Creatinine 0 52 (L) 0 60 - 1 30 mg/dL    Glucose, Fasting 93 65 - 99 mg/dL    Calcium 9 3 8 3 - 10 1 mg/dL    Corrected Calcium 11 0 (H) 8 3 - 10 1 mg/dL    AST 42 5 - 45 U/L    ALT 19 12 - 78 U/L    Alkaline Phosphatase 363 (H) 46 - 116 U/L    Total Protein 7 8 6 4 - 8 2 g/dL    Albumin 1 9 (L) 3 5 - 5 0 g/dL    Total Bilirubin 1 55 (H) 0 20 - 1 00 mg/dL    eGFR 108 ml/min/1 73sq m   CEA    Collection Time: 08/24/21  8:13 AM   Result Value Ref Range    CEA 4,990 0 (H) 0 0 - 3 0 ng/mL   CBC and differential    Collection Time: 08/24/21  8:13 AM   Result Value Ref Range    WBC 3 27 (L) 4 31 - 10 16 Thousand/uL    RBC 3 28 (L) 3 81 - 5 12 Million/uL    Hemoglobin 9 5 (L) 11 5 - 15 4 g/dL    Hematocrit 31 8 (L) 34 8 - 46 1 %    MCV 97 82 - 98 fL    MCH 29 0 26 8 - 34 3 pg    MCHC 29 9 (L) 31 4 - 37 4 g/dL    RDW 20 0 (H) 11 6 - 15 1 %    MPV 10 7 8 9 - 12 7 fL    Platelets 121 624 - 104 Thousands/uL    nRBC 0 /100 WBCs    Neutrophils Relative 63 43 - 75 % Immat GRANS % 0 0 - 2 %    Lymphocytes Relative 19 14 - 44 %    Monocytes Relative 15 (H) 4 - 12 %    Eosinophils Relative 2 0 - 6 %    Basophils Relative 1 0 - 1 %    Neutrophils Absolute 2 06 1 85 - 7 62 Thousands/µL    Immature Grans Absolute 0 01 0 00 - 0 20 Thousand/uL    Lymphocytes Absolute 0 63 0 60 - 4 47 Thousands/µL    Monocytes Absolute 0 48 0 17 - 1 22 Thousand/µL    Eosinophils Absolute 0 07 0 00 - 0 61 Thousand/µL    Basophils Absolute 0 02 0 00 - 0 10 Thousands/µL   Comprehensive metabolic panel    Collection Time: 08/24/21  8:13 AM   Result Value Ref Range    Sodium 132 (L) 136 - 145 mmol/L    Potassium 2 9 (L) 3 5 - 5 3 mmol/L    Chloride 103 100 - 108 mmol/L    CO2 25 21 - 32 mmol/L    ANION GAP 4 4 - 13 mmol/L    BUN 6 5 - 25 mg/dL    Creatinine 0 52 (L) 0 60 - 1 30 mg/dL    Glucose, Fasting 97 65 - 99 mg/dL    Calcium 8 8 8 3 - 10 1 mg/dL    Corrected Calcium 10 4 (H) 8 3 - 10 1 mg/dL    AST 47 (H) 5 - 45 U/L    ALT 21 12 - 78 U/L    Alkaline Phosphatase 410 (H) 46 - 116 U/L    Total Protein 8 8 (H) 6 4 - 8 2 g/dL    Albumin 2 0 (L) 3 5 - 5 0 g/dL    Total Bilirubin 1 93 (H) 0 20 - 1 00 mg/dL    eGFR 107 ml/min/1 73sq m     Imaging Studies: See Above    Assessment/Plan:  No orders of the defined types were placed in this encounter  Raina Edward is a 64y o  year old female with a 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 at time of her diagnosis in the fall of 2018  Liver biopsy on 10/25/2018 confirmed metastatic adenocarcinoma consistent with colorectal primary  Zi López has been receiving chemotherapy with Dr Ольга Conte as outlined above  Zi López had a good partial response and then have palliative resection of her transverse colon primary secondary to bleeding in August of 2019   She had some bleeding again in July 2020 and repeat colonoscopy July 23, 2020 was unremarkable   She seen here July 6, 2020 for consideration of Sir sphere treatment but had just started FOLFIRI on Mary 15, 2020   She was having no symptoms and decided to continue with the FOLFIRI infusion without any Sir sphere treatments last summer      She was seen January 18, 2021 for re-evaluation   Repeat CT scans of the chest abdomen pelvis from September 23, 2020 revealed a stable 4 mm ground-glass nodule in the right upper lobe along with regression of disease and decrease in size of hepatic metastasis with no new lesions   She was then continued on the FOLFIRI plus bevacizumab   Her CEA level brennen to 631 3 on January 8, 2021 from 246 3 on October 30, 2020  Repeat CT scans of the chest, abdomen, pelvis on January 9, 2021 revealed no evidence of any pulmonary metastatic disease with a stable 4 mm ground-glass right upper lobe lung nodule   There were multiple hepatic metastasis some of which were stable and sum of which have increased in size   Since her disease was progressing on her current treatment regimen and the only progression is confined to the liver, we recommended liver directed therapy with Sir sphere radiation therapy to both the right and left hepatic lobes in divided doses on the same day  She received Sir sphere treatment to the whole liver on February 25, 2021  She returns today for follow-up examination      She has fatigue from her ongoing treatment but no nausea and no vomiting  She has diarrhea for the 3 days after her FOLFOX treatment every 2 weeks  She has lost some weight but is eating all of her regular meals  She continues to work full-time  Blood work August 24th revealed her CEA tumor marker to decrease down to 4990 from 8373 on August 24 with normal liver transaminases and normal bilirubin of 1 55  Alkaline phosphatase remains elevated at 363  She will continue on FOLFOX and does have a good response in her CEA  CT scans of the chest, abdomen, and pelvis performed August 26th are pending at this time    She will continue with her systemic treatment and follow ups with Dr Arin Pearce  We do not recommend any additional radiation treatment at this time  She will be seen here on a p r n  basis  Kiran Perkins MD  8/30/2021,9:20 AM    Portions of the record may have been created with voice recognition software   Occasional wrong word or "sound a like" substitutions may have occurred due to the inherent limitations of voice recognition software   Read the chart carefully and recognize, using context, where substitutions have occurred

## 2021-08-30 NOTE — PLAN OF CARE
Problem: Potential for Falls  Goal: Patient will remain free of falls  Description  INTERVENTIONS:  - Assess patient frequently for physical needs  -  Identify cognitive and physical deficits and behaviors that affect risk of falls    -  Tiona fall precautions as indicated by assessment   - Educate patient/family on patient safety including physical limitations  - Instruct patient to call for assistance with activity based on assessment  - Modify environment to reduce risk of injury  - Consider OT/PT consult to assist with strengthening/mobility  Outcome: Progressing not applicable (Male) Abhishek-9 years to 21 years...

## 2021-08-30 NOTE — PROGRESS NOTES
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 status post FOLFIRI with progression only in liver  She underwent SIR sphere treatment 21  She was last seen in radiation on 21  She follows up today for her 4 month follow up     21 - Malcolm Schuster  Pt on Trifluridine Tipiracil - mild neutropenia  Pt on cycle 2    21 - CT chest abdomen pelvis w contrast  IMPRESSION:  Multiple new pulmonary nodules compatible with pulmonary metastatic disease  Findings suspicious for progression of hepatic metastatic disease superimposed upon posttreatment change  Cholelithiasis with mild gallbladder wall thickening  Gallbladder wall thickening is likely reactive  21 - Malcolm Schuster  Continue with systemic palliative chemotherapy, FOLFOX-6  New regimen to start 21, discontinue Trifluridine Tipiracil today  21 - Malcolm Schuster  Pt is back on FOLFOX w/ excellent tolerance, continue every 2 weeks  Clinically doing well       21 - CT chest abdomen pelvis w contrast    results not yet read    Upcomin21 - Malcolm Schuster          Follow up visit       Oncology History   Colon cancer metastasized to liver [facility])   10/25/2018 Initial Diagnosis    Colon cancer metastasized to liver ([facility] [address] )     10/25/2018 Biopsy    Final Diagnosis   A   Liver mass (core needle biopsy):  - Metastatic adenocarcinoma with extensive necrosis           2018 - 2019 Chemotherapy    Bevacizumab with FOLFOX-6   X12 cycle     2019 - 10/27/2019 Chemotherapy    bevacizumab (AVASTIN) 900 mg in sodium chloride 0 9 % 100 mL IVPB, 15 mg/kg, Intravenous, Once, 5 of 9 cycles  Dose modification: 7 5 mg/kg (original dose 15 mg/kg, Cycle 2, Reason: Other (See Comments), Comment: regimen)  Administration: 450 mg (2019), 450 mg (6/10/2019), 450 mg (2019), 450 mg (10/7/2019)     2019 Surgery    Right hemicolectomy:  - Residual adenocarcinoma   - Thirty (30) lymph nodes negative for carcinoma (0/30)     11/4/2019 - 6/14/2020 Chemotherapy    bevacizumab (AVASTIN) 292 5 mg in sodium chloride 0 9 % 100 mL IVPB, 5 mg/kg = 292 5 mg (100 % of original dose 5 mg/kg), Intravenous, Once, 13 of 15 cycles  Dose modification: 5 mg/kg (original dose 5 mg/kg, Cycle 1)  Administration: 292 5 mg (11/4/2019), 292 5 mg (11/18/2019), 292 5 mg (12/2/2019), 292 5 mg (12/16/2019), 292 5 mg (12/31/2019), 292 5 mg (1/13/2020), 292 5 mg (1/27/2020), 292 5 mg (2/10/2020), 292 5 mg (2/24/2020), 292 5 mg (3/9/2020), 292 5 mg (5/4/2020), 292 5 mg (5/18/2020), 292 5 mg (6/1/2020)  leucovorin 656 mg in dextrose 5 % 250 mL IVPB, 400 mg/m2 = 656 mg, Intravenous, Once, 16 of 18 cycles  Administration: 656 mg (11/4/2019), 656 mg (11/18/2019), 656 mg (12/2/2019), 656 mg (12/16/2019), 656 mg (12/31/2019), 656 mg (4/6/2020), 656 mg (1/13/2020), 656 mg (1/27/2020), 656 mg (2/10/2020), 656 mg (2/24/2020), 656 mg (3/9/2020), 656 mg (3/23/2020), 656 mg (4/20/2020), 656 mg (5/4/2020), 656 mg (5/18/2020), 656 mg (6/1/2020)     6/15/2020 - 1/10/2021 Chemotherapy    bevacizumab (AVASTIN) 320 mg in sodium chloride 0 9 % 100 mL IVPB, 5 mg/kg = 320 mg, Intravenous, Once, 12 of 13 cycles  Administration: 320 mg (7/27/2020), 320 mg (8/10/2020), 320 mg (8/24/2020), 320 mg (9/8/2020), 320 mg (9/21/2020), 320 mg (10/5/2020), 320 mg (10/19/2020), 320 mg (11/2/2020), 320 mg (11/16/2020), 320 mg (11/30/2020), 320 mg (12/14/2020), 320 mg (12/28/2020)  irinotecan (CAMPTOSAR) 300 mg in sodium chloride 0 9 % 500 mL chemo infusion, 308 mg, Intravenous, Once, 15 of 16 cycles  Administration: 300 mg (6/15/2020), 300 mg (6/29/2020), 300 mg (7/13/2020), 300 mg (7/27/2020), 300 mg (8/10/2020), 300 mg (8/24/2020), 300 mg (9/8/2020), 300 mg (9/21/2020), 300 mg (10/5/2020), 300 mg (10/19/2020), 300 mg (11/2/2020), 300 mg (11/16/2020), 300 mg (11/30/2020), 300 mg (12/14/2020), 300 mg (12/28/2020)  leucovorin 684 mg in sodium chloride 0 9 % 250 mL IVPB, 400 mg/m2 = 684 mg, Intravenous, Once, 15 of 16 cycles  Administration: 684 mg (6/15/2020), 684 mg (6/29/2020), 684 mg (7/13/2020), 684 mg (7/27/2020), 684 mg (8/10/2020), 684 mg (8/24/2020), 648 mg (9/8/2020), 684 mg (9/21/2020), 684 mg (10/5/2020), 684 mg (10/19/2020), 684 mg (11/2/2020), 684 mg (11/16/2020), 684 mg (11/30/2020), 684 mg (12/14/2020), 684 mg (12/28/2020)     6/21/2021 -  Chemotherapy    fluorouracil (ADRUCIL), 400 mg/m2 = 660 mg, Intravenous, Once, 3 of 3 cycles  Administration: 660 mg (6/21/2021), 660 mg (7/6/2021), 660 mg (7/19/2021)  leucovorin calcium IVPB, 400 mg/m2 = 660 mg, Intravenous, Once, 5 of 12 cycles  Administration: 660 mg (6/21/2021), 660 mg (7/6/2021), 650 mg (8/25/2021), 660 mg (7/19/2021), 660 mg (8/11/2021)  oxaliplatin (ELOXATIN) chemo infusion, 85 mg/m2 = 140 25 mg, Intravenous, Once, 5 of 12 cycles  Dose modification: 60 mg/m2 (original dose 85 mg/m2, Cycle 5, Reason: Anticipated Tolerance)  Administration: 140 25 mg (6/21/2021), 140 25 mg (7/6/2021), 100 mg (8/25/2021), 140 25 mg (7/19/2021), 99 mg (8/11/2021)  fluorouracil (ADRUCIL) ambulatory infusion Soln, 1,200 mg/m2/day = 3,960 mg, Intravenous, Over 46 hours, 5 of 12 cycles     Secondary malignant neoplasm of liver (HCC)   1/18/2021 Initial Diagnosis    Secondary malignant neoplasm of liver (HCC)      Radiation    Sir Sphere   Treatments Site: whole liver     Dose:  1 53 GBq  Treatment dates:  2/25/21      Right lobe Dose:   Prescribed: 1 17 GBq,  Delivered: 1 18 GBq  Leftt lobe Dose:   Prescribed: 0 38 GBq,  Delivered: 0 35 GBq           Clinical Trial: no      Health Maintenance   Topic Date Due    Pneumococcal Vaccine: Pediatrics (0 to 5 Years) and At-Risk Patients (6 to 59 Years) (1 of 4 - PCV13) Never done    Breast Cancer Screening: Mammogram  04/30/2021    COVID-19 Vaccine (3 - Moderna risk 3-dose series) 07/10/2021    Annual Physical 07/20/2021    Influenza Vaccine (1) 09/01/2021    BMI: Adult  08/25/2022    Depression Screening PHQ  08/30/2022    Cervical Cancer Screening  07/20/2023    HIB Vaccine  Aged Out    Hepatitis B Vaccine  Aged Out    IPV Vaccine  Aged Out    Hepatitis A Vaccine  Aged Out    Meningococcal ACWY Vaccine  Aged Out    HPV Vaccine  Aged Out    HIV Screening  Discontinued    Hepatitis C Screening  Discontinued    DTaP,Tdap,and Td Vaccines  Discontinued       Patient Active Problem List   Diagnosis    Arthritis    Hay fever    Herpes zoster    Seasonal allergies    Anxiety    Hot flashes    Impaired fasting glucose    Fatigue    Rheumatoid arthritis involving both hands with negative rheumatoid factor (HCC)    Other hyperlipidemia    Megaloblastic anemia    Severe anemia    Liver masses    Colon cancer metastasized to liver ([facility] [address] )    B12 deficiency    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    Secondary malignant neoplasm of liver (HCC)     Past Medical History:   Diagnosis Date    Anxious mood     [facility]     [facility] cancer [facility]     History of chemotherapy     Rheumatoid arthritis ([facility] )      Past Surgical History:   Procedure Laterality Date    APPENDECTOMY      ESOPHAGOGASTRODUODENOSCOPY N/A 9/14/2018    Procedure: ESOPHAGOGASTRODUODENOSCOPY (EGD) with polypectomy;  Surgeon: Red Jones MD;  Location: [facility];   Service: Gastroenterology    IR BIOPSY LIVER MASS  10/25/2018    IR PORT PLACEMENT  11/7/2018    r chest    IR Y-90 PRE-ANGIO/EMBO W/ LUNG SCAN  2/16/2021    IR Y-90 RADIOEMBOLIZATION  2/25/2021    ND PART REMOVAL COLON W ANASTOMOSIS N/A 8/29/2019    Procedure: EXTENDED RIGHT COLON RESECTION;  Surgeon: Silvano Claros MD;  Location: [facility];  Service: Surgical Oncology    TUBAL LIGATION      resolved 2007    WISDOM TOOTH EXTRACTION      resolved 1990     Family History   Problem Relation Age of Onset    Anxiety disorder Mother     Hypertension Father  Diabetes Father     Heart attack Maternal Grandmother         acute MI    Breast cancer Cousin      Social History     Socioeconomic History    Marital status: Single     Spouse name: Not on file    Number of children: Not on file    Years of education: Not on file    Highest education level: Not on file   Occupational History    Occupation: logistics   Tobacco Use    Smoking status: Former Smoker     Packs/day: 0 00     Years: 0 00     Pack years: 0 00     Quit date: 2000     Years since quittin 0    Smokeless tobacco: Never Used   Vaping Use    Vaping Use: Never used   Substance and Sexual Activity    Alcohol use: Not Currently     Alcohol/week: 0 0 standard drinks    Drug use: No    Sexual activity: Not Currently     Partners: Male     Birth control/protection: Female Sterilization     Comment: patient reports tubal    Other Topics Concern    Not on file   Social History Narrative    Not on file     Social Determinants of Health     Financial Resource Strain:     Difficulty of Paying Living Expenses:    Food Insecurity:     Worried About Running Out of Food in the Last Year:     Ran Out of Food in the Last Year:    Transportation Needs:     Lack of Transportation (Medical):      Lack of Transportation (Non-Medical):    Physical Activity:     Days of Exercise per Week:     Minutes of Exercise per Session:    Stress:     Feeling of Stress :    Social Connections:     Frequency of Communication with Friends and Family:     Frequency of Social Gatherings with Friends and Family:     Attends [employer] Services:     Active Member of Clubs or Organizations:     Attends Club or Organization Meetings:     Marital Status:    Intimate Partner Violence:     Fear of Current or Ex-Partner:     Emotionally Abused:     Physically Abused:     Sexually Abused:        Current Outpatient Medications:     cyanocobalamin (VITAMIN B-12) 1,000 mcg tablet, Take by mouth daily, Disp: , Rfl:   Multiple Vitamin (MULTIVITAMIN) capsule, Take 1 capsule by mouth daily, Disp: , Rfl:     Omega-3 Fatty Acids (FISH OIL PO), Take by mouth daily , Disp: , Rfl:     potassium chloride (K-DUR,KLOR-CON) 20 mEq tablet, Take 1 tablet (20 mEq total) by mouth daily, Disp: 60 tablet, Rfl: 2    folic acid (FOLVITE) 1 mg tablet, Take 1 mg by mouth daily  (Patient not taking: Reported on 7/22/2021), Disp: , Rfl:     methotrexate 2 5 mg tablet, 15 mg once a week  (Patient not taking: Reported on 7/7/2021), Disp: , Rfl:     methylPREDNISolone 4 MG tablet therapy pack, Use as directed on package  Start taking night of procedure (2/25/21) (Patient not taking: Reported on 4/12/2021), Disp: 1 each, Rfl: 0    omeprazole (PriLOSEC) 40 MG capsule, Take 1 capsule (40 mg total) by mouth daily Please start taking 2/23/21  May take morning of procedure with small sip of water  (Patient not taking: Reported on 4/12/2021), Disp: 30 capsule, Rfl: 0  No Known Allergies    Review of Systems:  Review of Systems   Constitutional: Positive for unexpected weight change (Wt change May 2021 to present-Change in chemo meds/met disease)  HENT: Negative  Eyes: Negative  Respiratory: Negative  Cardiovascular: Negative  Gastrointestinal: Positive for abdominal distention and diarrhea (Chemo med related/usually noted w/chemo tx  Not daily)  Endocrine: Negative  Genitourinary: Negative  Musculoskeletal: Negative  Skin: Negative  Allergic/Immunologic: Negative  Hematological: Bruises/bleeds easily (Easier bleeding noted)  Psychiatric/Behavioral: Negative  Vitals:    08/30/21 0839   BP: 108/72   BP Location: Left arm   Patient Position: Sitting   Pulse: 89   Resp: 20   Temp: (!) 97 3 °F (36 3 °C)   SpO2: 99%   Weight: 55 5 kg (122 lb 5 7 oz)   Height: 5' 4" (1 626 m)                 Imaging:No results found      Teaching

## 2021-10-11 PROBLEM — E87.2 LACTIC ACIDOSIS: Status: ACTIVE | Noted: 2021-01-01

## 2021-10-11 PROBLEM — R18.0 MALIGNANT ASCITES: Status: ACTIVE | Noted: 2021-01-01

## 2021-10-11 PROBLEM — E83.52 HYPERCALCEMIA OF MALIGNANCY: Status: ACTIVE | Noted: 2021-01-01

## 2021-10-11 PROBLEM — E80.6 HYPERBILIRUBINEMIA: Status: ACTIVE | Noted: 2021-01-01

## 2021-10-12 PROBLEM — E44.0 MODERATE PROTEIN-CALORIE MALNUTRITION (HCC): Status: ACTIVE | Noted: 2021-01-01

## 2021-10-13 PROBLEM — R82.71 ASYMPTOMATIC BACTERIURIA: Status: ACTIVE | Noted: 2021-01-01

## 2021-10-18 PROBLEM — K59.03 DRUG INDUCED CONSTIPATION: Status: ACTIVE | Noted: 2021-01-01

## 2021-10-18 PROBLEM — Z71.89 COUNSELING REGARDING ADVANCED CARE PLANNING AND GOALS OF CARE: Status: ACTIVE | Noted: 2021-01-01

## 2021-10-18 PROBLEM — G89.3 CANCER RELATED PAIN: Status: ACTIVE | Noted: 2021-01-01

## 2021-10-31 PROBLEM — K72.90 FULMINANT LIVER FAILURE (HCC): Status: ACTIVE | Noted: 2021-01-01

## 2021-10-31 PROBLEM — G93.40 ACUTE ENCEPHALOPATHY: Status: ACTIVE | Noted: 2021-01-01

## 2021-10-31 PROBLEM — R57.9 SHOCK (HCC): Status: ACTIVE | Noted: 2021-01-01

## 2021-10-31 PROBLEM — N17.9 AKI (ACUTE KIDNEY INJURY) (HCC): Status: ACTIVE | Noted: 2021-01-01

## 2021-10-31 PROBLEM — E87.5 HYPERKALEMIA: Status: ACTIVE | Noted: 2021-01-01

## 2021-11-02 ENCOUNTER — TELEPHONE (OUTPATIENT)
Dept: HEMATOLOGY ONCOLOGY | Facility: CLINIC | Age: 56
End: 2021-11-02

## 2021-11-05 LAB
BACTERIA BLD CULT: NORMAL
BACTERIA BLD CULT: NORMAL

## (undated) DEVICE — CHLORAPREP HI-LITE 26ML ORANGE

## (undated) DEVICE — PROXIMATE RELOADABLE LINEAR CUTTER WITH SAFETY LOCK-OUT, 75MM: Brand: PROXIMATE

## (undated) DEVICE — POOLE SUCTION HANDLE: Brand: CARDINAL HEALTH

## (undated) DEVICE — GLOVE SRG BIOGEL ECLIPSE 7

## (undated) DEVICE — PROXIMATE LINEAR CUTTER RELOAD, BLUE, 75MM: Brand: PROXIMATE

## (undated) DEVICE — SINGLE-USE BIOPSY FORCEPS: Brand: RADIAL JAW 4

## (undated) DEVICE — MEDI-VAC YANK SUCT HNDL W/TPRD BULBOUS TIP: Brand: CARDINAL HEALTH

## (undated) DEVICE — SUT SILK 2-0 18 IN A185H

## (undated) DEVICE — BETHLEHEM MAJOR GENERAL PACK: Brand: CARDINAL HEALTH

## (undated) DEVICE — ENSEAL 20 CM SHAFT, LARGE JAW: Brand: ENSEAL X1

## (undated) DEVICE — ELECTRODE BLADE MOD E-Z CLEAN 2.5IN 6.4CM -0012M

## (undated) DEVICE — TRAY FOLEY 16FR URIMETER SURESTEP

## (undated) DEVICE — INTENDED FOR TISSUE SEPARATION, AND OTHER PROCEDURES THAT REQUIRE A SHARP SURGICAL BLADE TO PUNCTURE OR CUT.: Brand: BARD-PARKER ® CARBON RIB-BACK BLADES

## (undated) DEVICE — SUT SILK 3-0 SH CR/8 18 IN C013D

## (undated) DEVICE — PROXIMATE RELOADABLE LINEAR STAPLER, 60MM: Brand: PROXIMATE

## (undated) DEVICE — INTENDED FOR TISSUE SEPARATION, AND OTHER PROCEDURES THAT REQUIRE A SHARP SURGICAL BLADE TO PUNCTURE OR CUT.: Brand: BARD-PARKER SAFETY BLADES SIZE 15, STERILE

## (undated) DEVICE — PLUMEPEN PRO 10FT

## (undated) DEVICE — GLOVE INDICATOR PI UNDERGLOVE SZ 7 BLUE